# Patient Record
Sex: FEMALE | Race: WHITE | NOT HISPANIC OR LATINO | Employment: OTHER | URBAN - METROPOLITAN AREA
[De-identification: names, ages, dates, MRNs, and addresses within clinical notes are randomized per-mention and may not be internally consistent; named-entity substitution may affect disease eponyms.]

---

## 2017-01-04 RX ORDER — HYDROCODONE BITARTRATE AND ACETAMINOPHEN 7.5; 325 MG/1; MG/1
TABLET ORAL
Qty: 90 TABLET | Refills: 0 | Status: SHIPPED | OUTPATIENT
Start: 2017-01-04 | End: 2017-01-26 | Stop reason: SDUPTHER

## 2017-01-04 NOTE — TELEPHONE ENCOUNTER
----- Message from Shell Whiting sent at 1/3/2017  4:15 PM EST -----  Contact: Patient, ph [phone]  Needs refill on Hydrocodone qty 90. Call patient at [phone] when script is ready for .

## 2017-01-26 ENCOUNTER — OFFICE VISIT (OUTPATIENT)
Dept: INTERNAL MEDICINE | Facility: CLINIC | Age: 47
End: 2017-01-26

## 2017-01-26 VITALS
DIASTOLIC BLOOD PRESSURE: 70 MMHG | SYSTOLIC BLOOD PRESSURE: 110 MMHG | WEIGHT: 247 LBS | BODY MASS INDEX: 38.98 KG/M2 | RESPIRATION RATE: 16 BRPM | HEART RATE: 80 BPM

## 2017-01-26 DIAGNOSIS — R21 RASH: ICD-10-CM

## 2017-01-26 DIAGNOSIS — I10 ESSENTIAL HYPERTENSION: Primary | ICD-10-CM

## 2017-01-26 DIAGNOSIS — R73.03 PREDIABETES: ICD-10-CM

## 2017-01-26 DIAGNOSIS — G89.29 OTHER CHRONIC PAIN: ICD-10-CM

## 2017-01-26 DIAGNOSIS — E78.5 HYPERLIPIDEMIA, UNSPECIFIED HYPERLIPIDEMIA TYPE: ICD-10-CM

## 2017-01-26 DIAGNOSIS — Z23 NEED FOR MMR VACCINE: ICD-10-CM

## 2017-01-26 LAB
ALBUMIN SERPL-MCNC: 4.3 G/DL (ref 3.2–4.8)
ALBUMIN/GLOB SERPL: 1.9 G/DL (ref 1.5–2.5)
ALP SERPL-CCNC: 76 U/L (ref 25–100)
ALT SERPL W P-5'-P-CCNC: 20 U/L (ref 7–40)
AMPHET+METHAMPHET UR QL: NEGATIVE
AMPHETAMINES UR QL: NEGATIVE
ANION GAP SERPL CALCULATED.3IONS-SCNC: 8 MMOL/L (ref 3–11)
ARTICHOKE IGE QN: 114 MG/DL (ref 0–130)
AST SERPL-CCNC: 18 U/L (ref 0–33)
BARBITURATES UR QL SCN: NEGATIVE
BENZODIAZ UR QL SCN: NEGATIVE
BILIRUB SERPL-MCNC: 0.3 MG/DL (ref 0.3–1.2)
BUN BLD-MCNC: 21 MG/DL (ref 9–23)
BUN/CREAT SERPL: 52.5 (ref 7–25)
BUPRENORPHINE SERPL-MCNC: NEGATIVE NG/ML
CALCIUM SPEC-SCNC: 9.9 MG/DL (ref 8.7–10.4)
CANNABINOIDS SERPL QL: NEGATIVE
CHLORIDE SERPL-SCNC: 101 MMOL/L (ref 99–109)
CHOLEST SERPL-MCNC: 206 MG/DL (ref 0–200)
CO2 SERPL-SCNC: 28 MMOL/L (ref 20–31)
COCAINE UR QL: NEGATIVE
CREAT BLD-MCNC: 0.4 MG/DL (ref 0.6–1.3)
DEPRECATED RDW RBC AUTO: 44.3 FL (ref 37–54)
ERYTHROCYTE [DISTWIDTH] IN BLOOD BY AUTOMATED COUNT: 13.4 % (ref 11.3–14.5)
EXPIRATION DATE: NORMAL
GFR SERPL CREATININE-BSD FRML MDRD: >150 ML/MIN/1.73
GLOBULIN UR ELPH-MCNC: 2.3 GM/DL
GLUCOSE BLD-MCNC: 134 MG/DL (ref 70–100)
HBA1C MFR BLD: 6 %
HCT VFR BLD AUTO: 42.6 % (ref 34.5–44)
HDLC SERPL-MCNC: 66 MG/DL (ref 40–60)
HGB BLD-MCNC: 14.3 G/DL (ref 11.5–15.5)
Lab: NORMAL
MCH RBC QN AUTO: 30.6 PG (ref 27–31)
MCHC RBC AUTO-ENTMCNC: 33.6 G/DL (ref 32–36)
MCV RBC AUTO: 91 FL (ref 80–99)
METHADONE UR QL SCN: NEGATIVE
OPIATES UR QL: NEGATIVE
OXYCODONE UR QL SCN: NEGATIVE
PCP UR QL SCN: NEGATIVE
PLATELET # BLD AUTO: 232 10*3/MM3 (ref 150–450)
PMV BLD AUTO: 11.4 FL (ref 6–12)
POTASSIUM BLD-SCNC: 4.5 MMOL/L (ref 3.5–5.5)
PROPOXYPH UR QL: NEGATIVE
PROT SERPL-MCNC: 6.6 G/DL (ref 5.7–8.2)
RBC # BLD AUTO: 4.68 10*6/MM3 (ref 3.89–5.14)
SODIUM BLD-SCNC: 137 MMOL/L (ref 132–146)
TRICYCLICS UR QL SCN: POSITIVE
TRIGL SERPL-MCNC: 111 MG/DL (ref 0–150)
WBC NRBC COR # BLD: 6.48 10*3/MM3 (ref 3.5–10.8)

## 2017-01-26 PROCEDURE — 36415 COLL VENOUS BLD VENIPUNCTURE: CPT | Performed by: INTERNAL MEDICINE

## 2017-01-26 PROCEDURE — 80053 COMPREHEN METABOLIC PANEL: CPT | Performed by: INTERNAL MEDICINE

## 2017-01-26 PROCEDURE — 80306 DRUG TEST PRSMV INSTRMNT: CPT | Performed by: INTERNAL MEDICINE

## 2017-01-26 PROCEDURE — 99214 OFFICE O/P EST MOD 30 MIN: CPT | Performed by: INTERNAL MEDICINE

## 2017-01-26 PROCEDURE — 80061 LIPID PANEL: CPT | Performed by: INTERNAL MEDICINE

## 2017-01-26 PROCEDURE — 83036 HEMOGLOBIN GLYCOSYLATED A1C: CPT | Performed by: INTERNAL MEDICINE

## 2017-01-26 PROCEDURE — 90471 IMMUNIZATION ADMIN: CPT | Performed by: INTERNAL MEDICINE

## 2017-01-26 PROCEDURE — 85027 COMPLETE CBC AUTOMATED: CPT | Performed by: INTERNAL MEDICINE

## 2017-01-26 PROCEDURE — 90707 MMR VACCINE SC: CPT | Performed by: INTERNAL MEDICINE

## 2017-01-26 RX ORDER — PANTOPRAZOLE SODIUM 40 MG/1
40 TABLET, DELAYED RELEASE ORAL DAILY
Qty: 30 TABLET | Refills: 5 | Status: SHIPPED | OUTPATIENT
Start: 2017-01-26 | End: 2017-11-09 | Stop reason: SDUPTHER

## 2017-01-26 RX ORDER — DICLOFENAC SODIUM 75 MG/1
75 TABLET, DELAYED RELEASE ORAL 2 TIMES DAILY
Qty: 30 TABLET | Refills: 5 | Status: SHIPPED | OUTPATIENT
Start: 2017-01-26 | End: 2017-10-16 | Stop reason: SDUPTHER

## 2017-01-26 RX ORDER — TRIAMCINOLONE ACETONIDE 0.25 MG/G
OINTMENT TOPICAL 2 TIMES DAILY PRN
Qty: 15 G | Refills: 0 | Status: SHIPPED | OUTPATIENT
Start: 2017-01-26 | End: 2017-09-19

## 2017-01-26 RX ORDER — CLOPIDOGREL BISULFATE 75 MG/1
75 TABLET ORAL DAILY
Qty: 90 TABLET | Refills: 2 | Status: SHIPPED | OUTPATIENT
Start: 2017-01-26 | End: 2018-02-12 | Stop reason: SDUPTHER

## 2017-01-26 RX ORDER — HYDROCODONE BITARTRATE AND ACETAMINOPHEN 7.5; 325 MG/1; MG/1
TABLET ORAL
Qty: 90 TABLET | Refills: 0 | Status: SHIPPED | OUTPATIENT
Start: 2017-01-26 | End: 2017-02-27 | Stop reason: SDUPTHER

## 2017-01-26 NOTE — LETTER
"VACCINE CONSENT FORM      Patient Name:  Hafsa Barron    Patient :  1970      I/We have read, or have been explained, the information about the diseases and the vaccines listed below.  There was an opportunity to ask questions and any questions were answered satisfactorily.  I/We believe that I/we understand the benefits and risks of the vaccines(s) cited, and ask the vaccine(s) listed below be given to me/us or the person named above (for which i have authorized to make the request).      Vaccine(s) give:    No orders of the defined types were placed in this encounter.        Medicare patients:    The only vaccine covered under your medical benefit is flu/pneumonia and hepatitis B.  All other may be covered under your \"Part D\" prescription plan and requires you to go to a pharmacy with a Physician orders for administration.  If you still prefer to have it administered at our office, you will receive a bill for the vaccine and administration cost.               Patient Initials                     Patient or Parent/Guardian Signature                    Date        A copy of the appropriate Centers for Disease Control and Prevention Vaccine Information Statements has been provided.   "

## 2017-01-26 NOTE — PROGRESS NOTES
Subjective   Hafsa Barron is a 46 y.o. female is here today for follow-up on HTN,HPL,prediabetes,chronic pain and acute complaint of rash. .    History of Present Illness     Ms. Barron is a 47 yo white female in clinic today for follow-up on HTN,HPL,prediabets,chronic pain and acute complaint of rash. She states she has been well overall. No headache, chest pain or fatigue. Ms. Barron is followed every 4-6 months by  Neuroscience for her muscular dystrophy. She has been seen by pain management since her last visit. However, she was told due to her muscular dystrophy they are unable to provide any treatment differing from what she is currently receiving here in the clinic. She has since been released from pain management service.   She is presenting today with the acute complaints of itching, red rash on her abdomen x 1 week. She has been using OTC hydrocortisone cream. She says the rash has improved with this application but just can't seem to completely go away. The rash is isolated to only her right flank, with no spreading, pain or drainage.     The following portions of the patient's history were reviewed and updated as appropriate: allergies, current medications, past family history, past medical history, past social history, past surgical history and problem list.           Review of Systems   Constitutional: Negative.    HENT: Negative.    Eyes: Negative.    Respiratory: Negative.    Cardiovascular:        See HPI.    Gastrointestinal: Negative.    Endocrine:        See HPI.    Genitourinary: Negative.    Musculoskeletal:        See HPI.    Skin: Positive for rash (pruritic, erythematous, isolated to right side of abdomen).   Allergic/Immunologic: Negative.    Neurological: Negative.    Hematological: Negative.    Psychiatric/Behavioral: Negative.             Objective   Physical Exam   Constitutional: She is oriented to person, place, and time. She appears well-developed and well-nourished.   HENT:    Head: Normocephalic and atraumatic.   Right Ear: External ear normal.   Left Ear: External ear normal.   Nose: Nose normal.   Mouth/Throat: Oropharynx is clear and moist.   Eyes: Conjunctivae and EOM are normal. Pupils are equal, round, and reactive to light.   Neck: Normal range of motion. Neck supple. No tracheal deviation present. No thyromegaly present.   Cardiovascular: Normal rate, regular rhythm, normal heart sounds and intact distal pulses.    Pulmonary/Chest: Effort normal and breath sounds normal.   Abdominal: Soft. Bowel sounds are normal.   Neurological: She is alert and oriented to person, place, and time. She has normal reflexes.   Skin: Skin is warm and dry. Rash noted.        Psychiatric: She has a normal mood and affect. Her behavior is normal.   Nursing note and vitals reviewed.             Assessment/Plan    Essential hypertension  -     Comprehensive Metabolic Panel  -     CBC (No Diff)    Hyperlipidemia, unspecified hyperlipidemia type  -     Lipid Panel    Prediabetes  -     POC Glycosylated Hemoglobin (Hb A1C)    Rash  -     triamcinolone (KENALOG) 0.025 % ointment; Apply  topically 2 (Two) Times a Day As Needed for rash.    Other chronic pain  -     Urine Drug Screen  -     HYDROcodone-acetaminophen (LORTAB) 7.5-325 MG per tablet; Take 1 tablet every 4-6 hrs as needed for pain    Need for MMR vaccine  -     MMR Vaccine Subcutaneous    Other orders  -     diclofenac (VOLTAREN) 75 MG EC tablet; Take 1 tablet by mouth 2 (Two) Times a Day.  -     pantoprazole (PROTONIX) 40 MG EC tablet; Take 1 tablet by mouth Daily.  -     clopidogrel (PLAVIX) 75 MG tablet; Take 1 tablet by mouth Daily.      1.) CBC, CMP, Lipid Panel, UDS and A1C today  2.) Refill chronic meds  3.) Triamcinolone 5% ointment BID prn, how to dose and possible s/e discussed.   4.) MMR today  5.) I have agreed to write her pain medication.     * Total time spent in discussion and exam 25 minutes.

## 2017-01-26 NOTE — LETTER
"VACCINE CONSENT FORM      Patient Name:  Hafsa Barron    Patient :  1970      I/We have read, or have been explained, the information about the diseases and the vaccines listed below.  There was an opportunity to ask questions and any questions were answered satisfactorily.  I/We believe that I/we understand the benefits and risks of the vaccines(s) cited, and ask the vaccine(s) listed below be given to me/us or the person named above (for which i have authorized to make the request).      Vaccine(s) give:    MMR-         Medicare patients:    The only vaccine covered under your medical benefit is flu/pneumonia and hepatitis B.  All other may be covered under your \"Part D\" prescription plan and requires you to go to a pharmacy with a Physician orders for administration.  If you still prefer to have it administered at our office, you will receive a bill for the vaccine and administration cost.               Patient Initials                     Patient or Parent/Guardian Signature                    Date        A copy of the appropriate Centers for Disease Control and Prevention Vaccine Information Statements has been provided.   "

## 2017-01-26 NOTE — MR AVS SNAPSHOT
Hafsa Barron   1/26/2017 10:00 AM   Office Visit    Provider:  Pily Giraldo DO   Department:  [facility]IC[facility]   Dept Phone:  [phone]                Your Full Care Plan              Today's Medication Changes          These changes are accurate as of: 1/26/17 11:11 AM.  If you have any questions, ask your nurse or doctor.               New Medication(s)Ordered:     triamcinolone 0.025 % ointment   Commonly known as:  KENALOG   Apply  topically 2 (Two) Times a Day As Needed for rash.   Started by:  Pily Giraldo,          Medication(s)that have changed:     clopidogrel 75 MG tablet   Commonly known as:  PLAVIX   Take 1 tablet by mouth Daily.   What changed:  See the new instructions.   Changed by:  Pily Giraldo, DO       diclofenac 75 MG EC tablet   Commonly known as:  VOLTAREN   Take 1 tablet by mouth 2 (Two) Times a Day.   What changed:  See the new instructions.   Changed by:  Pily Giraldo DO       loratadine 10 MG tablet   Commonly known as:  CLARITIN   Take 1 tablet by mouth daily.   What changed:  Another medication with the same name was removed. Continue taking this medication, and follow the directions you see here.   Changed by:  Pily Giraldo, DO       pantoprazole 40 MG EC tablet   Commonly known as:  PROTONIX   Take 1 tablet by mouth Daily.   What changed:  See the new instructions.   Changed by:  Pily Giraldo DO            Where to Get Your Medications      These medications were sent to [facility] [id number] [place] [address] [address] [place] [address] AT [place] [address] & [facility] [phone] [place] [phone] [address] [address]     Phone:  [phone]     clopidogrel 75 MG tablet    diclofenac 75 MG EC tablet    pantoprazole 40 MG EC tablet    triamcinolone 0.025 % ointment         You can get these medications from any pharmacy     Bring a paper prescription for each of these medications     HYDROcodone-acetaminophen 7.5-325 MG per tablet                  Your Updated Medication List          This list is accurate as of: 1/26/17 11:11 AM.  Always use your most recent med list.                amLODIPine 2.5 MG tablet   Commonly known as:  NORVASC   Take 1 tablet by mouth daily.       baclofen 10 MG tablet   Commonly known as:  LIORESAL       CALCIUM 600+D HIGH POTENCY 600-400 MG-UNIT per tablet   Generic drug:  calcium carbonate-vitamin d   TAKE 1 TABLET BY MOUTH EVERY DAY       clopidogrel 75 MG tablet   Commonly known as:  PLAVIX   Take 1 tablet by mouth Daily.       diclofenac 75 MG EC tablet   Commonly known as:  VOLTAREN   Take 1 tablet by mouth 2 (Two) Times a Day.       DULoxetine 60 MG capsule   Commonly known as:  CYMBALTA   Take 1 capsule by mouth daily.       estradiol 0.075 MG/24HR patch   Commonly known as:  MINIVELLE VIVELLE-DOT       fenofibrate 145 MG tablet   Commonly known as:  TRICOR   Take 1 tablet by mouth daily.       FLONASE ALLERGY RELIEF 50 MCG/ACT nasal spray   Generic drug:  fluticasone       HYDROcodone-acetaminophen 7.5-325 MG per tablet   Commonly known as:  LORTAB   Take 1 tablet every 4-6 hrs as needed for pain       loratadine 10 MG tablet   Commonly known as:  CLARITIN   Take 1 tablet by mouth daily.       metFORMIN 500 MG tablet   Commonly known as:  GLUCOPHAGE   TAKE 1 TABLET BY MOUTH TWICE DAILY WITH FOOD       montelukast 10 MG tablet   Commonly known as:  SINGULAIR   TAKE 1 TABLET BY MOUTH DAILY       NEURONTIN 600 MG tablet   Generic drug:  gabapentin       pantoprazole 40 MG EC tablet   Commonly known as:  PROTONIX   Take 1 tablet by mouth Daily.       potassium chloride 20 MEQ CR tablet   Commonly known as:  K-DUR,KLOR-CON   TAKE 1 TABLET BY MOUTH EVERY DAY       PROAIR  (90 BASE) MCG/ACT inhaler   Generic drug:  albuterol       spironolactone 50 MG tablet   Commonly known as:  ALDACTONE   TAKE  1 TABLET BY MOUTH TWICE DAILY       TiZANidine 4 MG capsule   Commonly known as:  ZANAFLEX       triamcinolone 0.025 % ointment   Commonly known as:  KENALOG   Apply  topically 2 (Two) Times a Day As Needed for rash.               We Performed the Following     CBC (No Diff)     Comprehensive Metabolic Panel     Lipid Panel     POC Glycosylated Hemoglobin (Hb A1C)     Urine Drug Screen       You Were Diagnosed With        Codes Comments    Essential hypertension    -  Primary ICD-10-CM: I10  ICD-9-CM: 401.9     Hyperlipidemia, unspecified hyperlipidemia type     ICD-10-CM: E78.5  ICD-9-CM: 272.4     Prediabetes     ICD-10-CM: R73.03  ICD-9-CM: 790.29     Rash     ICD-10-CM: R21  ICD-9-CM: 782.1     Other chronic pain     ICD-10-CM: G89.29  ICD-9-CM: 338.29     Need for MMR vaccine     ICD-10-CM: Z23  ICD-9-CM: V06.4       Instructions     None    Patient Instructions History      [employer] Signup     Our records indicate that you have an active [employer] account.    You can view your After Visit Summary by going to [employer] and logging in with your [employer] username and password.  If you don't have a [employer] username and password but a parent or guardian has access to your record, the parent or guardian should login with their own [employer] username and password and access your record to view the After Visit Summary.    If you have questions, you can email [email or web address] or call [phone] to talk to our [employer] staff.  Remember, [employer] is NOT to be used for urgent needs.  For medical emergencies, dial 911.               Other Info from Your Visit           Allergies     Penicillins        Reason for Visit     Hypertension follow up      Vital Signs     Blood Pressure Pulse Respirations Weight Body Mass Index Smoking Status    110/70 (BP Location: Left arm, Patient Position: Sitting) 80 16 247 lb (112 kg) 38.98 kg/m2 Never Assessed      Problems and Diagnoses Noted     High  cholesterol or triglycerides    High blood pressure    Prediabetes    Rash and nonspecific skin eruption        Other chronic pain        Need for MMR vaccine

## 2017-02-02 RX ORDER — POTASSIUM CHLORIDE 20 MEQ/1
TABLET, EXTENDED RELEASE ORAL
Qty: 90 TABLET | Refills: 0 | Status: SHIPPED | OUTPATIENT
Start: 2017-02-02 | End: 2017-04-30 | Stop reason: SDUPTHER

## 2017-02-27 DIAGNOSIS — G89.29 OTHER CHRONIC PAIN: ICD-10-CM

## 2017-02-27 RX ORDER — HYDROCODONE BITARTRATE AND ACETAMINOPHEN 7.5; 325 MG/1; MG/1
TABLET ORAL
Qty: 90 TABLET | Refills: 0 | Status: SHIPPED | OUTPATIENT
Start: 2017-02-27 | End: 2017-03-24 | Stop reason: SDUPTHER

## 2017-02-27 NOTE — TELEPHONE ENCOUNTER
----- Message from Swathi Yan sent at 2/27/2017 10:12 AM EST -----  NEEDS SCRIPT FOR    - HYDROcodone-acetaminophen (LORTAB) 7.5-325 MG per tablet     CALL BACK NUMBER [phone]

## 2017-03-24 DIAGNOSIS — G89.29 OTHER CHRONIC PAIN: ICD-10-CM

## 2017-03-27 RX ORDER — HYDROCODONE BITARTRATE AND ACETAMINOPHEN 7.5; 325 MG/1; MG/1
TABLET ORAL
Qty: 90 TABLET | Refills: 0 | Status: SHIPPED | OUTPATIENT
Start: 2017-03-27 | End: 2017-04-26 | Stop reason: SDUPTHER

## 2017-04-26 DIAGNOSIS — G89.29 OTHER CHRONIC PAIN: ICD-10-CM

## 2017-04-26 RX ORDER — HYDROCODONE BITARTRATE AND ACETAMINOPHEN 7.5; 325 MG/1; MG/1
TABLET ORAL
Qty: 90 TABLET | Refills: 0 | Status: SHIPPED | OUTPATIENT
Start: 2017-04-26 | End: 2017-05-23 | Stop reason: SDUPTHER

## 2017-04-26 NOTE — TELEPHONE ENCOUNTER
[phone]    PT NEEDS REFILL OF HYDROCODONE QTY 90    [facility]

## 2017-05-01 RX ORDER — POTASSIUM CHLORIDE 20 MEQ/1
TABLET, EXTENDED RELEASE ORAL
Qty: 90 TABLET | Refills: 0 | Status: SHIPPED | OUTPATIENT
Start: 2017-05-01 | End: 2017-07-28 | Stop reason: SDUPTHER

## 2017-05-23 ENCOUNTER — TELEPHONE (OUTPATIENT)
Dept: INTERNAL MEDICINE | Facility: CLINIC | Age: 47
End: 2017-05-23

## 2017-05-23 DIAGNOSIS — G89.29 OTHER CHRONIC PAIN: ICD-10-CM

## 2017-05-23 RX ORDER — HYDROCODONE BITARTRATE AND ACETAMINOPHEN 7.5; 325 MG/1; MG/1
TABLET ORAL
Qty: 90 TABLET | Refills: 0 | Status: SHIPPED | OUTPATIENT
Start: 2017-05-23 | End: 2017-06-22 | Stop reason: SDUPTHER

## 2017-06-02 RX ORDER — LORATADINE 10 MG/1
TABLET ORAL
Qty: 30 TABLET | Refills: 5 | Status: SHIPPED | OUTPATIENT
Start: 2017-06-02 | End: 2017-09-19

## 2017-06-07 RX ORDER — GABAPENTIN 600 MG/1
TABLET ORAL
Qty: 270 TABLET | Refills: 0 | Status: SHIPPED | OUTPATIENT
Start: 2017-06-07 | End: 2018-05-29 | Stop reason: SDUPTHER

## 2017-06-09 RX ORDER — DULOXETIN HYDROCHLORIDE 60 MG/1
CAPSULE, DELAYED RELEASE ORAL
Qty: 90 CAPSULE | Refills: 0 | Status: SHIPPED | OUTPATIENT
Start: 2017-06-09 | End: 2017-09-19 | Stop reason: SDUPTHER

## 2017-06-22 ENCOUNTER — TELEPHONE (OUTPATIENT)
Dept: INTERNAL MEDICINE | Facility: CLINIC | Age: 47
End: 2017-06-22

## 2017-06-22 DIAGNOSIS — G89.29 OTHER CHRONIC PAIN: ICD-10-CM

## 2017-06-22 RX ORDER — HYDROCODONE BITARTRATE AND ACETAMINOPHEN 7.5; 325 MG/1; MG/1
TABLET ORAL
Qty: 90 TABLET | Refills: 0 | Status: SHIPPED | OUTPATIENT
Start: 2017-06-22 | End: 2017-07-26 | Stop reason: SDUPTHER

## 2017-06-28 ENCOUNTER — TELEPHONE (OUTPATIENT)
Dept: INTERNAL MEDICINE | Facility: CLINIC | Age: 47
End: 2017-06-28

## 2017-06-28 RX ORDER — SPIRONOLACTONE 50 MG/1
TABLET, FILM COATED ORAL
Qty: 180 TABLET | Refills: 0 | Status: SHIPPED | OUTPATIENT
Start: 2017-06-28 | End: 2017-09-19 | Stop reason: SDUPTHER

## 2017-06-28 RX ORDER — ALBUTEROL SULFATE 90 UG/1
1 AEROSOL, METERED RESPIRATORY (INHALATION) EVERY 6 HOURS PRN
Qty: 90 INHALER | Refills: 2 | Status: SHIPPED | OUTPATIENT
Start: 2017-06-28 | End: 2022-03-07 | Stop reason: SDUPTHER

## 2017-06-28 RX ORDER — TIZANIDINE HYDROCHLORIDE 4 MG/1
4 CAPSULE, GELATIN COATED ORAL 3 TIMES DAILY
Qty: 90 CAPSULE | Refills: 2 | Status: SHIPPED | OUTPATIENT
Start: 2017-06-28 | End: 2017-06-28 | Stop reason: SDUPTHER

## 2017-06-28 RX ORDER — TIZANIDINE 4 MG/1
TABLET ORAL
Qty: 270 TABLET | Refills: 2 | Status: SHIPPED | OUTPATIENT
Start: 2017-06-28 | End: 2018-06-14 | Stop reason: SDUPTHER

## 2017-06-28 RX ORDER — FENOFIBRATE 145 MG/1
TABLET, COATED ORAL
Qty: 30 TABLET | Refills: 0 | Status: SHIPPED | OUTPATIENT
Start: 2017-06-28 | End: 2017-10-27 | Stop reason: SDUPTHER

## 2017-06-28 NOTE — TELEPHONE ENCOUNTER
SENT MEDICATION IN VIA ERX.    CALLED PT TO RELAY-NO ANSWER.  LEFT VM MESSAGE TO CALL OFFICE-OFFICE NBR GIVEN.

## 2017-06-28 NOTE — TELEPHONE ENCOUNTER
----- Message from Lucrecia Carpenter sent at 6/27/2017  1:18 PM EDT -----  APPT REQUEST FOR MEDICARE WELLNESS WAS RECEIVED THROUGH [employer]-    CALLED AND LEFT A VOICEMAIL FOR PT TO CALL AND SCHEDULE

## 2017-07-24 RX ORDER — CLOPIDOGREL BISULFATE 75 MG/1
TABLET ORAL
Qty: 90 TABLET | Refills: 0 | Status: SHIPPED | OUTPATIENT
Start: 2017-07-24 | End: 2017-09-19 | Stop reason: SDUPTHER

## 2017-07-25 ENCOUNTER — TRANSCRIBE ORDERS (OUTPATIENT)
Dept: INTERNAL MEDICINE | Facility: CLINIC | Age: 47
End: 2017-07-25

## 2017-07-25 DIAGNOSIS — G89.29 OTHER CHRONIC PAIN: ICD-10-CM

## 2017-07-25 DIAGNOSIS — Z12.31 VISIT FOR SCREENING MAMMOGRAM: Primary | ICD-10-CM

## 2017-07-25 RX ORDER — HYDROCODONE BITARTRATE AND ACETAMINOPHEN 7.5; 325 MG/1; MG/1
TABLET ORAL
Qty: 90 TABLET | Refills: 0 | Status: CANCELLED | OUTPATIENT
Start: 2017-07-25

## 2017-07-25 RX ORDER — MONTELUKAST SODIUM 10 MG/1
TABLET ORAL
Qty: 90 TABLET | Refills: 0 | Status: SHIPPED | OUTPATIENT
Start: 2017-07-25 | End: 2017-09-19 | Stop reason: SDUPTHER

## 2017-07-25 RX ORDER — PANTOPRAZOLE SODIUM 40 MG/1
TABLET, DELAYED RELEASE ORAL
Qty: 90 TABLET | Refills: 0 | Status: SHIPPED | OUTPATIENT
Start: 2017-07-25 | End: 2017-09-19 | Stop reason: SDUPTHER

## 2017-07-25 RX ORDER — AMLODIPINE BESYLATE 2.5 MG/1
TABLET ORAL
Qty: 90 TABLET | Refills: 0 | Status: SHIPPED | OUTPATIENT
Start: 2017-07-25 | End: 2017-09-19 | Stop reason: SDUPTHER

## 2017-07-26 ENCOUNTER — HOSPITAL ENCOUNTER (OUTPATIENT)
Dept: CARDIOLOGY | Facility: HOSPITAL | Age: 47
Discharge: HOME | End: 2017-07-26

## 2017-07-26 ENCOUNTER — APPOINTMENT (OUTPATIENT)
Dept: GENERAL RADIOLOGY | Facility: HOSPITAL | Age: 47
End: 2017-07-26

## 2017-07-26 ENCOUNTER — OFFICE VISIT (OUTPATIENT)
Dept: INTERNAL MEDICINE | Facility: CLINIC | Age: 47
End: 2017-07-26

## 2017-07-26 ENCOUNTER — HOSPITAL ENCOUNTER (EMERGENCY)
Facility: HOSPITAL | Age: 47
Discharge: HOME | End: 2017-07-26
Attending: EMERGENCY MEDICINE | Admitting: EMERGENCY MEDICINE

## 2017-07-26 VITALS
RESPIRATION RATE: 16 BRPM | HEIGHT: 66 IN | DIASTOLIC BLOOD PRESSURE: 78 MMHG | SYSTOLIC BLOOD PRESSURE: 129 MMHG | OXYGEN SATURATION: 92 % | BODY MASS INDEX: 43.39 KG/M2 | WEIGHT: 270 LBS | HEART RATE: 105 BPM | TEMPERATURE: 98 F

## 2017-07-26 VITALS
SYSTOLIC BLOOD PRESSURE: 134 MMHG | TEMPERATURE: 97.2 F | RESPIRATION RATE: 18 BRPM | DIASTOLIC BLOOD PRESSURE: 86 MMHG | HEART RATE: 70 BPM

## 2017-07-26 DIAGNOSIS — G89.29 OTHER CHRONIC PAIN: ICD-10-CM

## 2017-07-26 DIAGNOSIS — M79.605 LOWER EXTREMITY PAIN, DIFFUSE, LEFT: ICD-10-CM

## 2017-07-26 DIAGNOSIS — J30.89 SEASONAL ALLERGIC RHINITIS DUE TO OTHER ALLERGIC TRIGGER: ICD-10-CM

## 2017-07-26 DIAGNOSIS — M25.562 LEFT KNEE PAIN, UNSPECIFIED CHRONICITY: ICD-10-CM

## 2017-07-26 DIAGNOSIS — M25.562 ACUTE PAIN OF LEFT KNEE: ICD-10-CM

## 2017-07-26 DIAGNOSIS — M79.605 LOWER EXTREMITY PAIN, DIFFUSE, LEFT: Primary | ICD-10-CM

## 2017-07-26 DIAGNOSIS — I10 ESSENTIAL HYPERTENSION: Primary | ICD-10-CM

## 2017-07-26 DIAGNOSIS — E78.5 HYPERLIPIDEMIA, UNSPECIFIED HYPERLIPIDEMIA TYPE: ICD-10-CM

## 2017-07-26 LAB
ANION GAP SERPL CALCULATED.3IONS-SCNC: 9 MMOL/L (ref 3–11)
BASOPHILS # BLD AUTO: 0.06 10*3/MM3 (ref 0–0.2)
BASOPHILS NFR BLD AUTO: 1.1 % (ref 0–1)
BH CV LOWER VASCULAR LEFT COMMON FEMORAL AUGMENT: NORMAL
BH CV LOWER VASCULAR LEFT COMMON FEMORAL COMPRESS: NORMAL
BH CV LOWER VASCULAR LEFT COMMON FEMORAL PHASIC: NORMAL
BH CV LOWER VASCULAR LEFT COMMON FEMORAL SPONT: NORMAL
BH CV LOWER VASCULAR LEFT DISTAL FEMORAL COMPRESS: NORMAL
BH CV LOWER VASCULAR LEFT GASTRONEMIUS COMPRESS: NORMAL
BH CV LOWER VASCULAR LEFT GREATER SAPH AK COMPRESS: NORMAL
BH CV LOWER VASCULAR LEFT GREATER SAPH BK COMPRESS: NORMAL
BH CV LOWER VASCULAR LEFT LESSER SAPH COMPRESS: NORMAL
BH CV LOWER VASCULAR LEFT MID FEMORAL AUGMENT: NORMAL
BH CV LOWER VASCULAR LEFT MID FEMORAL COMPRESS: NORMAL
BH CV LOWER VASCULAR LEFT MID FEMORAL PHASIC: NORMAL
BH CV LOWER VASCULAR LEFT MID FEMORAL SPONT: NORMAL
BH CV LOWER VASCULAR LEFT PERONEAL COMPRESS: NORMAL
BH CV LOWER VASCULAR LEFT POPLITEAL AUGMENT: NORMAL
BH CV LOWER VASCULAR LEFT POPLITEAL COMPRESS: NORMAL
BH CV LOWER VASCULAR LEFT POPLITEAL PHASIC: NORMAL
BH CV LOWER VASCULAR LEFT POPLITEAL SPONT: NORMAL
BH CV LOWER VASCULAR LEFT POSTERIOR TIBIAL COMPRESS: NORMAL
BH CV LOWER VASCULAR LEFT PROXIMAL FEMORAL COMPRESS: NORMAL
BH CV LOWER VASCULAR LEFT SAPHENOFEMORAL JUNCTION AUGMENT: NORMAL
BH CV LOWER VASCULAR LEFT SAPHENOFEMORAL JUNCTION COMPETENT: NORMAL
BH CV LOWER VASCULAR LEFT SAPHENOFEMORAL JUNCTION COMPRESS: NORMAL
BH CV LOWER VASCULAR LEFT SAPHENOFEMORAL JUNCTION PHASIC: NORMAL
BH CV LOWER VASCULAR LEFT SAPHENOFEMORAL JUNCTION SPONT: NORMAL
BH CV LOWER VASCULAR RIGHT COMMON FEMORAL AUGMENT: NORMAL
BH CV LOWER VASCULAR RIGHT COMMON FEMORAL COMPRESS: NORMAL
BH CV LOWER VASCULAR RIGHT COMMON FEMORAL PHASIC: NORMAL
BH CV LOWER VASCULAR RIGHT COMMON FEMORAL SPONT: NORMAL
BUN BLD-MCNC: 10 MG/DL (ref 9–23)
BUN/CREAT SERPL: 20 (ref 7–25)
CALCIUM SPEC-SCNC: 9.4 MG/DL (ref 8.7–10.4)
CHLORIDE SERPL-SCNC: 105 MMOL/L (ref 99–109)
CO2 SERPL-SCNC: 24 MMOL/L (ref 20–31)
CREAT BLD-MCNC: 0.5 MG/DL (ref 0.6–1.3)
DEPRECATED RDW RBC AUTO: 42.9 FL (ref 37–54)
EOSINOPHIL # BLD AUTO: 0.1 10*3/MM3 (ref 0–0.3)
EOSINOPHIL NFR BLD AUTO: 1.9 % (ref 0–3)
ERYTHROCYTE [DISTWIDTH] IN BLOOD BY AUTOMATED COUNT: 13 % (ref 11.3–14.5)
GFR SERPL CREATININE-BSD FRML MDRD: 132 ML/MIN/1.73
GLUCOSE BLD-MCNC: 251 MG/DL (ref 70–100)
HCT VFR BLD AUTO: 42.6 % (ref 34.5–44)
HGB BLD-MCNC: 14.2 G/DL (ref 11.5–15.5)
IMM GRANULOCYTES # BLD: 0.02 10*3/MM3 (ref 0–0.03)
IMM GRANULOCYTES NFR BLD: 0.4 % (ref 0–0.6)
INR PPP: 1.2
LYMPHOCYTES # BLD AUTO: 1.9 10*3/MM3 (ref 0.6–4.8)
LYMPHOCYTES NFR BLD AUTO: 35.8 % (ref 24–44)
MCH RBC QN AUTO: 30.1 PG (ref 27–31)
MCHC RBC AUTO-ENTMCNC: 33.3 G/DL (ref 32–36)
MCV RBC AUTO: 90.3 FL (ref 80–99)
MONOCYTES # BLD AUTO: 0.41 10*3/MM3 (ref 0–1)
MONOCYTES NFR BLD AUTO: 7.7 % (ref 0–12)
NEUTROPHILS # BLD AUTO: 2.82 10*3/MM3 (ref 1.5–8.3)
NEUTROPHILS NFR BLD AUTO: 53.1 % (ref 41–71)
PLATELET # BLD AUTO: 169 10*3/MM3 (ref 150–450)
PMV BLD AUTO: 11.6 FL (ref 6–12)
POTASSIUM BLD-SCNC: 4.5 MMOL/L (ref 3.5–5.5)
PROTHROMBIN TIME: 13.1 SECONDS (ref 9.6–11.5)
RBC # BLD AUTO: 4.72 10*6/MM3 (ref 3.89–5.14)
SODIUM BLD-SCNC: 138 MMOL/L (ref 132–146)
WBC NRBC COR # BLD: 5.31 10*3/MM3 (ref 3.5–10.8)

## 2017-07-26 PROCEDURE — 96374 THER/PROPH/DIAG INJ IV PUSH: CPT

## 2017-07-26 PROCEDURE — 72100 X-RAY EXAM L-S SPINE 2/3 VWS: CPT

## 2017-07-26 PROCEDURE — 80048 BASIC METABOLIC PNL TOTAL CA: CPT | Performed by: EMERGENCY MEDICINE

## 2017-07-26 PROCEDURE — 85610 PROTHROMBIN TIME: CPT | Performed by: EMERGENCY MEDICINE

## 2017-07-26 PROCEDURE — 99214 OFFICE O/P EST MOD 30 MIN: CPT | Performed by: INTERNAL MEDICINE

## 2017-07-26 PROCEDURE — 25010000002 KETOROLAC TROMETHAMINE PER 15 MG: Performed by: EMERGENCY MEDICINE

## 2017-07-26 PROCEDURE — 96372 THER/PROPH/DIAG INJ SC/IM: CPT

## 2017-07-26 PROCEDURE — 96376 TX/PRO/DX INJ SAME DRUG ADON: CPT

## 2017-07-26 PROCEDURE — 85025 COMPLETE CBC W/AUTO DIFF WBC: CPT | Performed by: EMERGENCY MEDICINE

## 2017-07-26 PROCEDURE — 25010000002 HYDROMORPHONE PER 4 MG

## 2017-07-26 PROCEDURE — 93971 EXTREMITY STUDY: CPT

## 2017-07-26 PROCEDURE — 93971 EXTREMITY STUDY: CPT | Performed by: INTERNAL MEDICINE

## 2017-07-26 PROCEDURE — 25010000002 HYDROMORPHONE PER 4 MG: Performed by: EMERGENCY MEDICINE

## 2017-07-26 PROCEDURE — 99284 EMERGENCY DEPT VISIT MOD MDM: CPT

## 2017-07-26 PROCEDURE — 73560 X-RAY EXAM OF KNEE 1 OR 2: CPT

## 2017-07-26 RX ORDER — KETOROLAC TROMETHAMINE 30 MG/ML
60 INJECTION, SOLUTION INTRAMUSCULAR; INTRAVENOUS ONCE
Status: COMPLETED | OUTPATIENT
Start: 2017-07-26 | End: 2017-07-26

## 2017-07-26 RX ORDER — HYDROMORPHONE HYDROCHLORIDE 1 MG/ML
0.5 INJECTION, SOLUTION INTRAMUSCULAR; INTRAVENOUS; SUBCUTANEOUS AS NEEDED
Status: COMPLETED | OUTPATIENT
Start: 2017-07-26 | End: 2017-07-26

## 2017-07-26 RX ORDER — HYDROCODONE BITARTRATE AND ACETAMINOPHEN 10; 325 MG/1; MG/1
1 TABLET ORAL EVERY 8 HOURS PRN
Qty: 20 TABLET | Refills: 0 | Status: SHIPPED | OUTPATIENT
Start: 2017-07-26 | End: 2017-08-28

## 2017-07-26 RX ORDER — CYCLOBENZAPRINE HCL 10 MG
10 TABLET ORAL 3 TIMES DAILY PRN
COMMUNITY
End: 2018-02-12 | Stop reason: SDUPTHER

## 2017-07-26 RX ORDER — HYDROCODONE BITARTRATE AND ACETAMINOPHEN 7.5; 325 MG/1; MG/1
TABLET ORAL
Qty: 90 TABLET | Refills: 0 | Status: SHIPPED | OUTPATIENT
Start: 2017-07-26 | End: 2017-08-28 | Stop reason: SDUPTHER

## 2017-07-26 RX ORDER — HYDROMORPHONE HYDROCHLORIDE 1 MG/ML
0.5 INJECTION, SOLUTION INTRAMUSCULAR; INTRAVENOUS; SUBCUTANEOUS ONCE
Status: COMPLETED | OUTPATIENT
Start: 2017-07-26 | End: 2017-07-26

## 2017-07-26 RX ORDER — HYDROCODONE BITARTRATE AND ACETAMINOPHEN 5; 325 MG/1; MG/1
1 TABLET ORAL ONCE
Status: COMPLETED | OUTPATIENT
Start: 2017-07-26 | End: 2017-07-26

## 2017-07-26 RX ADMIN — HYDROMORPHONE HYDROCHLORIDE 0.5 MG: 1 INJECTION, SOLUTION INTRAMUSCULAR; INTRAVENOUS; SUBCUTANEOUS at 04:37

## 2017-07-26 RX ADMIN — HYDROMORPHONE HYDROCHLORIDE 0.5 MG: 1 INJECTION, SOLUTION INTRAMUSCULAR; INTRAVENOUS; SUBCUTANEOUS at 05:07

## 2017-07-26 RX ADMIN — HYDROCODONE BITARTRATE AND ACETAMINOPHEN 1 TABLET: 5; 325 TABLET ORAL at 02:57

## 2017-07-26 RX ADMIN — KETOROLAC TROMETHAMINE 60 MG: 30 INJECTION, SOLUTION INTRAMUSCULAR at 03:01

## 2017-07-26 NOTE — ED PROVIDER NOTES
Subjective   HPI Comments: Pt is a 46-year-old white female that presents emergency Department with complaints of pain in her left lower extremity.  Patient reports that the pain starts in her left knee and radiates up into her thigh.  Patient has a history of muscular dystrophy and advises she is on baclofen, hydrocodone, gabapentin.  Patient has been out of her hydrocodone for the past 2 days.  Patient is an appointment to see her physician tomorrow.  Patient advises that the pain has been increasingly worse over the past month.  Patient denies any injury.  Patient also has a history of sciatica he reports that this pain is making her sciatica worse.  Patient denies any recent travel, immobilization, surgical procedures.  Patient denies any history of DVT, PE.  Patient has a history of a CVA and is currently taking Plavix.    Patient is a 46 y.o. female presenting with lower extremity pain.   History provided by:  Patient  Lower Extremity Issue   Location:  Knee  Time since incident:  1 month  Injury: no    Knee location:  L knee  Pain details:     Quality:  Throbbing and shooting    Radiates to: Lt thigh.  Prior injury to area:  No  Relieved by:  Nothing  Worsened by:  Bearing weight  Associated symptoms: back pain    Associated symptoms: no numbness and no tingling        Review of Systems   Musculoskeletal: Positive for back pain.        Lt knee pain   Neurological: Negative for weakness and numbness.   All other systems reviewed and are negative.      Past Medical History:   Diagnosis Date   • Allergy    • Hyperlipidemia    • Hypertension    • IBS (irritable bowel syndrome)    • Internal hemorrhoid    • Kidney stone    • Muscular dystrophy    • Stroke 08/31/2013    resdual- left sided weakness.        Allergies   Allergen Reactions   • Penicillins        Past Surgical History:   Procedure Laterality Date   • GALLBLADDER SURGERY         Family History   Problem Relation Age of Onset   • Allergies Other    • ADD  / ADHD Other    • Heart block Other    • Other Other      CEREBROVASCULAR ACCIDENT    • Hypertension Other    • Cancer Other      LUNG CANCER   • Breast cancer Other    • Hyperlipidemia Other    • Ovarian cancer Neg Hx        Social History     Social History   • Marital status:      Spouse name: N/A   • Number of children: N/A   • Years of education: N/A     Social History Main Topics   • Smoking status: Former Smoker   • Smokeless tobacco: None   • Alcohol use No   • Drug use: No   • Sexual activity: Defer     Other Topics Concern   • None     Social History Narrative   • None           Objective   Physical Exam   Constitutional: She is oriented to person, place, and time. She appears well-developed and well-nourished. No distress.   HENT:   Head: Normocephalic and atraumatic.   Eyes: EOM are normal. Pupils are equal, round, and reactive to light.   Neck: Normal range of motion. Neck supple.   Cardiovascular: Normal rate, regular rhythm and intact distal pulses.    Pulmonary/Chest: Effort normal and breath sounds normal. No respiratory distress.   Musculoskeletal:        Left knee: She exhibits normal range of motion, no ecchymosis and no erythema. Tenderness found.        Lumbar back: She exhibits tenderness (paraspinal tenderness palpated). She exhibits normal range of motion and no bony tenderness.   Bilateral lower extremities are same size.  No obvious signs of ligamentous injury.  Pt has tenderness ant/post knee.   Neurological: She is alert and oriented to person, place, and time. She has normal strength. No cranial nerve deficit. GCS eye subscore is 4. GCS verbal subscore is 5. GCS motor subscore is 6.   Skin: Skin is warm, dry and intact. She is not diaphoretic.   Nursing note and vitals reviewed.      Procedures         ED Course  ED Course   Comment By Time   Pt reports that the pain has improved.  Patient is advised of results at this time.  Patient will be discharged home.  Patient to return  for an outpatient ultrasound of her left lower extremity to rule out a DVT in the morning.  Patient to keep her appointment as scheduled with her PCP for her chronic pain medication.  She agrees and verbalizes understanding. Jayda Puri, APRN 07/26 0522          Recent Results (from the past 24 hour(s))   CBC Auto Differential    Collection Time: 07/26/17  4:15 AM   Result Value Ref Range    WBC 5.31 3.50 - 10.80 10*3/mm3    RBC 4.72 3.89 - 5.14 10*6/mm3    Hemoglobin 14.2 11.5 - 15.5 g/dL    Hematocrit 42.6 34.5 - 44.0 %    MCV 90.3 80.0 - 99.0 fL    MCH 30.1 27.0 - 31.0 pg    MCHC 33.3 32.0 - 36.0 g/dL    RDW 13.0 11.3 - 14.5 %    RDW-SD 42.9 37.0 - 54.0 fl    MPV 11.6 6.0 - 12.0 fL    Platelets 169 150 - 450 10*3/mm3    Neutrophil % 53.1 41.0 - 71.0 %    Lymphocyte % 35.8 24.0 - 44.0 %    Monocyte % 7.7 0.0 - 12.0 %    Eosinophil % 1.9 0.0 - 3.0 %    Basophil % 1.1 (H) 0.0 - 1.0 %    Immature Grans % 0.4 0.0 - 0.6 %    Neutrophils, Absolute 2.82 1.50 - 8.30 10*3/mm3    Lymphocytes, Absolute 1.90 0.60 - 4.80 10*3/mm3    Monocytes, Absolute 0.41 0.00 - 1.00 10*3/mm3    Eosinophils, Absolute 0.10 0.00 - 0.30 10*3/mm3    Basophils, Absolute 0.06 0.00 - 0.20 10*3/mm3    Immature Grans, Absolute 0.02 0.00 - 0.03 10*3/mm3   Basic Metabolic Panel    Collection Time: 07/26/17  4:15 AM   Result Value Ref Range    Glucose 251 (H) 70 - 100 mg/dL    BUN 10 9 - 23 mg/dL    Creatinine 0.50 (L) 0.60 - 1.30 mg/dL    Sodium 138 132 - 146 mmol/L    Potassium 4.5 3.5 - 5.5 mmol/L    Chloride 105 99 - 109 mmol/L    CO2 24.0 20.0 - 31.0 mmol/L    Calcium 9.4 8.7 - 10.4 mg/dL    eGFR Non African Amer 132 >60 mL/min/1.73    BUN/Creatinine Ratio 20.0 7.0 - 25.0    Anion Gap 9.0 3.0 - 11.0 mmol/L   Protime-INR    Collection Time: 07/26/17  4:15 AM   Result Value Ref Range    Protime 13.1 (H) 9.6 - 11.5 Seconds    INR 1.20      Note: In addition to lab results from this visit, the labs listed above may include labs taken at another  "facility or during a different encounter within the last 24 hours. Please correlate lab times with ED admission and discharge times for further clarification of the services performed during this visit.    XR Knee 1 or 2 View Left   Final Result   Abnormal      No acute bone abnormality.      Small suprapatellar bursal effusion.      Possible diffuse edematous changes subcutaneous soft tissues versus obesity.      Additional nonacute findings as noted.      THIS DOCUMENT HAS BEEN ELECTRONICALLY SIGNED BY JOSE DE JESUS SANCHES MD      XR Spine Lumbar 2 or 3 View   Final Result   Abnormal      No acute bone abnormality.      Nonacute findings as noted above.      THIS DOCUMENT HAS BEEN ELECTRONICALLY SIGNED BY JOSE DE JESUS SANCHES MD        Vitals:    07/26/17 0151 07/26/17 0259 07/26/17 0314 07/26/17 0418   BP: 133/87 152/69  146/75   Pulse: (!) 122 112 105    Resp: 16      Temp: 98 °F (36.7 °C)      SpO2: 96% 98% 95% 94%   Weight: 270 lb (122 kg)      Height: 66\" (167.6 cm)        Medications   ketorolac (TORADOL) injection 60 mg (60 mg Intramuscular Given 7/26/17 0301)   HYDROcodone-acetaminophen (NORCO) 5-325 MG per tablet 1 tablet (1 tablet Oral Given 7/26/17 0257)   HYDROmorphone (DILAUDID) injection 0.5 mg (0.5 mg Intravenous Given 7/26/17 0437)   HYDROmorphone (DILAUDID) injection 0.5 mg (0.5 mg Intravenous Given 7/26/17 5427)     ECG/EMG Results (last 24 hours)     ** No results found for the last 24 hours. **                [facility]    Final diagnoses:   Lower extremity pain, diffuse, left   Left knee pain, unspecified chronicity            Jayda Puri, APRN  07/26/17 0559    "

## 2017-07-26 NOTE — PROGRESS NOTES
Subjective   Hafsa Barron is a 46 y.o. female.   Pt presents today with left lower extremity pain.             History of Present Illness   Pt presents today with left lower extremity pain for the last 3 weeks. She went to the ER earlier today who ordered an US of her  Left extremity (this was done at 11:30 am after she left the ER). She was instructed to follow up with her pcp today that the results would be back by then? but results are not back yet. She has chronically swollen legs off and on, due to her MS she is on chronic pain medication but states the pain in her left lower extremity which is located around her knee is controlled with her chronic meds.   She denies trauma to the area. Xray done in the ER revealed a small suprapatellar bursal effusion/swelling. Xray lumbar spine revealed no acute abnormality. She does have known arthritis in her hips and I wonder if she has arthritis in her knees (which she thinks she does) and she could of had a possible ruptured baker's cyst on the left side causing some component of pseudo phlebitis. None the less I discussed with her that I wanted her to see Ortho if US was negative for DVT.             The following portions of the patient's history were reviewed and updated as appropriate: allergies, current medications, past family history, past medical history, past social history, past surgical history and problem list.             Review of Systems   Constitutional: Negative.    HENT: Negative.    Eyes: Negative.    Respiratory: Negative.    Cardiovascular: Negative.    Gastrointestinal: Negative.    Endocrine: Negative.    Genitourinary: Negative.    Musculoskeletal:        See HPI.    Skin: Negative.    Allergic/Immunologic: Negative.    Neurological: Negative.    Hematological: Negative.    Psychiatric/Behavioral: Negative.                 Objective   Physical Exam   Constitutional: She is oriented to person, place, and time. She appears well-developed and  well-nourished.   HENT:   Head: Normocephalic and atraumatic.   Nose: Nose normal.   Eyes: Conjunctivae and EOM are normal.   Cardiovascular: Normal rate, regular rhythm, normal heart sounds and intact distal pulses.    Noted plus 1 pitting edema in legs, this is not new and happens intermittently.     What is new is soreness to flexion/extension of left knee which she can barely flex or extend.   Ermelinda's sign negative.    Pulmonary/Chest: Effort normal and breath sounds normal.   Abdominal: Soft. Bowel sounds are normal.   Neurological: She is alert and oriented to person, place, and time. She has normal reflexes.   Skin: Skin is warm and dry.   Psychiatric: She has a normal mood and affect.   Nursing note and vitals reviewed.            Assessment/Plan     Essential hypertension    Hyperlipidemia, unspecified hyperlipidemia type    Seasonal allergic rhinitis due to other allergic trigger    Other chronic pain  -     HYDROcodone-acetaminophen (LORTAB) 7.5-325 MG per tablet; Take 1 tablet every 4-6 hrs as needed for pain    Acute pain of left knee  -     HYDROcodone-acetaminophen (NORCO)  MG per tablet; Take 1 tablet by mouth Every 8 (Eight) Hours As Needed for Moderate Pain (4-6).        1.) Refilled her chronic dose of hydrocodone   2.) Due to left extremity pain instructed her to fill the hydrocodone 10 mg PO TID prn #20 only and use until we get her left lower extremity issue resolved, then she can titrate back down to the 7.5 mg (her regular dose)  when she can.   3.) I verified with radiology her US was negative for DVT   4.) Pt has an Ortho appt tomorrow at 2:30 with [facility].

## 2017-07-28 RX ORDER — FENOFIBRATE 145 MG/1
TABLET, COATED ORAL
Qty: 90 TABLET | Refills: 0 | Status: SHIPPED | OUTPATIENT
Start: 2017-07-28 | End: 2017-09-19 | Stop reason: SDUPTHER

## 2017-07-28 RX ORDER — POTASSIUM CHLORIDE 20 MEQ/1
TABLET, EXTENDED RELEASE ORAL
Qty: 90 TABLET | Refills: 0 | Status: SHIPPED | OUTPATIENT
Start: 2017-07-28 | End: 2018-02-12 | Stop reason: SDUPTHER

## 2017-08-22 ENCOUNTER — APPOINTMENT (OUTPATIENT)
Dept: MAMMOGRAPHY | Facility: HOSPITAL | Age: 47
End: 2017-08-22
Attending: INTERNAL MEDICINE

## 2017-08-28 ENCOUNTER — TELEPHONE (OUTPATIENT)
Dept: INTERNAL MEDICINE | Facility: CLINIC | Age: 47
End: 2017-08-28

## 2017-08-28 DIAGNOSIS — G89.29 OTHER CHRONIC PAIN: ICD-10-CM

## 2017-08-28 RX ORDER — HYDROCODONE BITARTRATE AND ACETAMINOPHEN 7.5; 325 MG/1; MG/1
TABLET ORAL
Qty: 90 TABLET | Refills: 0 | Status: SHIPPED | OUTPATIENT
Start: 2017-08-28 | End: 2017-09-19 | Stop reason: DRUGHIGH

## 2017-08-28 NOTE — TELEPHONE ENCOUNTER
LM for pt to return call.  Just needs notified that script is ready to .  Placed up front in file folder.

## 2017-08-28 NOTE — TELEPHONE ENCOUNTER
----- Message from Lucrecia Cobb sent at 8/28/2017  9:07 AM EDT -----  [phone]    NEEDS REFILL OF HYDROCODONE 7.5 QTY 90-WILL BE IN Tuesday AM FOR BLOODWORK-WANTS TO  THEN PLEASE    [facility]

## 2017-09-14 ENCOUNTER — APPOINTMENT (OUTPATIENT)
Dept: LAB | Facility: HOSPITAL | Age: 47
End: 2017-09-14

## 2017-09-14 ENCOUNTER — TRANSCRIBE ORDERS (OUTPATIENT)
Dept: LAB | Facility: HOSPITAL | Age: 47
End: 2017-09-14

## 2017-09-14 DIAGNOSIS — M25.562 ACUTE PAIN OF LEFT KNEE: Primary | ICD-10-CM

## 2017-09-14 LAB
ALBUMIN SERPL-MCNC: 4.2 G/DL (ref 3.2–4.8)
ALBUMIN/GLOB SERPL: 1.8 G/DL (ref 1.5–2.5)
ALP SERPL-CCNC: 137 U/L (ref 25–100)
ALT SERPL W P-5'-P-CCNC: 41 U/L (ref 7–40)
ANION GAP SERPL CALCULATED.3IONS-SCNC: 8 MMOL/L (ref 3–11)
AST SERPL-CCNC: 28 U/L (ref 0–33)
BILIRUB SERPL-MCNC: 0.4 MG/DL (ref 0.3–1.2)
BUN BLD-MCNC: 15 MG/DL (ref 9–23)
BUN/CREAT SERPL: 30 (ref 7–25)
CALCIUM SPEC-SCNC: 9.1 MG/DL (ref 8.7–10.4)
CHLORIDE SERPL-SCNC: 102 MMOL/L (ref 99–109)
CO2 SERPL-SCNC: 27 MMOL/L (ref 20–31)
CREAT BLD-MCNC: 0.5 MG/DL (ref 0.6–1.3)
CRP SERPL-MCNC: 0.57 MG/DL (ref 0–1)
DEPRECATED RDW RBC AUTO: 43.3 FL (ref 37–54)
ERYTHROCYTE [DISTWIDTH] IN BLOOD BY AUTOMATED COUNT: 13.4 % (ref 11.3–14.5)
ERYTHROCYTE [SEDIMENTATION RATE] IN BLOOD: 13 MM/HR (ref 0–20)
GFR SERPL CREATININE-BSD FRML MDRD: 132 ML/MIN/1.73
GLOBULIN UR ELPH-MCNC: 2.3 GM/DL
GLUCOSE BLD-MCNC: 249 MG/DL (ref 70–100)
HCT VFR BLD AUTO: 44.2 % (ref 34.5–44)
HGB BLD-MCNC: 14.8 G/DL (ref 11.5–15.5)
MCH RBC QN AUTO: 29.7 PG (ref 27–31)
MCHC RBC AUTO-ENTMCNC: 33.5 G/DL (ref 32–36)
MCV RBC AUTO: 88.6 FL (ref 80–99)
PLATELET # BLD AUTO: 236 10*3/MM3 (ref 150–450)
PMV BLD AUTO: 11.6 FL (ref 6–12)
POTASSIUM BLD-SCNC: 4.3 MMOL/L (ref 3.5–5.5)
PROT SERPL-MCNC: 6.5 G/DL (ref 5.7–8.2)
RBC # BLD AUTO: 4.99 10*6/MM3 (ref 3.89–5.14)
SODIUM BLD-SCNC: 137 MMOL/L (ref 132–146)
URATE SERPL-MCNC: 2.4 MG/DL (ref 3.1–7.8)
WBC NRBC COR # BLD: 7.11 10*3/MM3 (ref 3.5–10.8)

## 2017-09-14 PROCEDURE — 84550 ASSAY OF BLOOD/URIC ACID: CPT | Performed by: ORTHOPAEDIC SURGERY

## 2017-09-14 PROCEDURE — 86140 C-REACTIVE PROTEIN: CPT | Performed by: ORTHOPAEDIC SURGERY

## 2017-09-14 PROCEDURE — 80053 COMPREHEN METABOLIC PANEL: CPT | Performed by: ORTHOPAEDIC SURGERY

## 2017-09-14 PROCEDURE — 85652 RBC SED RATE AUTOMATED: CPT | Performed by: ORTHOPAEDIC SURGERY

## 2017-09-14 PROCEDURE — 85027 COMPLETE CBC AUTOMATED: CPT | Performed by: ORTHOPAEDIC SURGERY

## 2017-09-14 PROCEDURE — 86038 ANTINUCLEAR ANTIBODIES: CPT | Performed by: ORTHOPAEDIC SURGERY

## 2017-09-14 PROCEDURE — 36415 COLL VENOUS BLD VENIPUNCTURE: CPT | Performed by: ORTHOPAEDIC SURGERY

## 2017-09-18 LAB — ANA SER QL: NEGATIVE

## 2017-09-19 ENCOUNTER — OFFICE VISIT (OUTPATIENT)
Dept: INTERNAL MEDICINE | Facility: CLINIC | Age: 47
End: 2017-09-19

## 2017-09-19 VITALS
TEMPERATURE: 96.2 F | WEIGHT: 274 LBS | SYSTOLIC BLOOD PRESSURE: 144 MMHG | HEART RATE: 86 BPM | DIASTOLIC BLOOD PRESSURE: 86 MMHG | BODY MASS INDEX: 44.22 KG/M2 | RESPIRATION RATE: 22 BRPM

## 2017-09-19 DIAGNOSIS — J30.89 SEASONAL ALLERGIC RHINITIS DUE TO OTHER ALLERGIC TRIGGER: ICD-10-CM

## 2017-09-19 DIAGNOSIS — I10 ESSENTIAL HYPERTENSION: Primary | ICD-10-CM

## 2017-09-19 DIAGNOSIS — R73.03 PREDIABETES: ICD-10-CM

## 2017-09-19 DIAGNOSIS — E78.5 HYPERLIPIDEMIA, UNSPECIFIED HYPERLIPIDEMIA TYPE: ICD-10-CM

## 2017-09-19 DIAGNOSIS — E11.9 TYPE 2 DIABETES MELLITUS WITHOUT COMPLICATION, WITHOUT LONG-TERM CURRENT USE OF INSULIN (HCC): ICD-10-CM

## 2017-09-19 DIAGNOSIS — G71.00 MUSCULAR DYSTROPHY (HCC): ICD-10-CM

## 2017-09-19 LAB
EXPIRATION DATE: NORMAL
HBA1C MFR BLD: 9.7 %
Lab: NORMAL

## 2017-09-19 PROCEDURE — 83036 HEMOGLOBIN GLYCOSYLATED A1C: CPT | Performed by: INTERNAL MEDICINE

## 2017-09-19 PROCEDURE — 99214 OFFICE O/P EST MOD 30 MIN: CPT | Performed by: INTERNAL MEDICINE

## 2017-09-19 RX ORDER — HYDROCODONE BITARTRATE AND ACETAMINOPHEN 10; 325 MG/1; MG/1
1 TABLET ORAL EVERY 6 HOURS PRN
Qty: 120 TABLET | Refills: 0 | Status: SHIPPED | OUTPATIENT
Start: 2017-09-19 | End: 2017-10-19 | Stop reason: ALTCHOICE

## 2017-09-19 NOTE — PROGRESS NOTES
Subjective   Hafsa Barron is a 46 y.o. female.   Pt is here to follow up on HTN,HPL,seasonal allergies,muscular dystrophy and prediabetes.               History of Present Illness   Pt is here to follow up on HTN,HPL,seasonal allergies,muscular dystrophy and prediabetes.   Pt was instructed by Ortho to take 2 tabs of her 7.5 mg PO every 5-6  hours she has noticed an improvement in her knee pain as well as over all in her MD pain.               The following portions of the patient's history were reviewed and updated as appropriate: allergies, current medications, past family history, past medical history, past social history, past surgical history and problem list.          Review of Systems   Constitutional: Negative.    HENT: Negative.    Eyes: Negative.    Respiratory: Negative.    Cardiovascular:        See HPI.    Gastrointestinal: Negative.    Endocrine:        See HPI.    Genitourinary: Negative.    Musculoskeletal:        See HPI.    Skin: Negative.    Allergic/Immunologic:        See HPI.    Neurological:        See HPI.    Hematological: Negative.    Psychiatric/Behavioral: Negative.              Objective   Physical Exam   Constitutional: She is oriented to person, place, and time. She appears well-developed and well-nourished.   HENT:   Head: Normocephalic and atraumatic.   Right Ear: External ear normal.   Left Ear: External ear normal.   Nose: Nose normal.   Mouth/Throat: Oropharynx is clear and moist.   Eyes: Conjunctivae and EOM are normal. Pupils are equal, round, and reactive to light.   Neck: Normal range of motion. Neck supple. No tracheal deviation present. No thyromegaly present.   Cardiovascular: Normal rate, regular rhythm and normal heart sounds.    Pulmonary/Chest: Effort normal and breath sounds normal.   Abdominal: Soft. Bowel sounds are normal.   Neurological: She is alert and oriented to person, place, and time. She has normal reflexes.   Skin: Skin is warm and dry.   Psychiatric:  She has a normal mood and affect.   Nursing note and vitals reviewed.            Assessment/Plan      Essential hypertension    Hyperlipidemia, unspecified hyperlipidemia type    Seasonal allergic rhinitis due to other allergic trigger    Muscular dystrophy  -     Urine Drug Screen  -     HYDROcodone-acetaminophen (NORCO)  MG per tablet; Take 1 tablet by mouth Every 6 (Six) Hours As Needed for Moderate Pain .    Prediabetes  -     POC Glycosylated Hemoglobin (Hb A1C)        1.) Follow up in 3 months   2.) A1C and UDS   3.) Switch Lortab to 10 mg PO Q4-6 prn.   4.) A1C in office today was 9.6% increase metformin to 1000 mg PO BID.

## 2017-10-04 ENCOUNTER — OFFICE VISIT (OUTPATIENT)
Dept: INTERNAL MEDICINE | Facility: CLINIC | Age: 47
End: 2017-10-04

## 2017-10-04 VITALS
TEMPERATURE: 97.7 F | WEIGHT: 274 LBS | RESPIRATION RATE: 20 BRPM | BODY MASS INDEX: 44.22 KG/M2 | DIASTOLIC BLOOD PRESSURE: 70 MMHG | HEART RATE: 111 BPM | SYSTOLIC BLOOD PRESSURE: 116 MMHG | OXYGEN SATURATION: 95 %

## 2017-10-04 DIAGNOSIS — M25.562 CHRONIC PAIN OF LEFT KNEE: ICD-10-CM

## 2017-10-04 DIAGNOSIS — R09.89 BRUIT OF LEFT CAROTID ARTERY: ICD-10-CM

## 2017-10-04 DIAGNOSIS — G71.00 MUSCULAR DYSTROPHY (HCC): ICD-10-CM

## 2017-10-04 DIAGNOSIS — Z01.818 PRE-OPERATIVE CLEARANCE: ICD-10-CM

## 2017-10-04 DIAGNOSIS — Z86.73 HISTORY OF CVA IN ADULTHOOD: ICD-10-CM

## 2017-10-04 DIAGNOSIS — Z01.818 PREOP EXAMINATION: Primary | ICD-10-CM

## 2017-10-04 DIAGNOSIS — E11.9 TYPE 2 DIABETES MELLITUS WITHOUT COMPLICATION, WITHOUT LONG-TERM CURRENT USE OF INSULIN (HCC): ICD-10-CM

## 2017-10-04 DIAGNOSIS — G89.29 CHRONIC PAIN OF LEFT KNEE: ICD-10-CM

## 2017-10-04 DIAGNOSIS — R82.998 LEUKOCYTES IN URINE: ICD-10-CM

## 2017-10-04 LAB
ALBUMIN SERPL-MCNC: 4.4 G/DL (ref 3.2–4.8)
ALBUMIN/GLOB SERPL: 2 G/DL (ref 1.5–2.5)
ALP SERPL-CCNC: 109 U/L (ref 25–100)
ALT SERPL W P-5'-P-CCNC: 50 U/L (ref 7–40)
ANION GAP SERPL CALCULATED.3IONS-SCNC: 9 MMOL/L (ref 3–11)
AST SERPL-CCNC: 40 U/L (ref 0–33)
BASOPHILS # BLD AUTO: 0.06 10*3/MM3 (ref 0–0.2)
BASOPHILS NFR BLD AUTO: 1.3 % (ref 0–1)
BILIRUB BLD-MCNC: NEGATIVE MG/DL
BILIRUB SERPL-MCNC: 0.5 MG/DL (ref 0.3–1.2)
BUN BLD-MCNC: 18 MG/DL (ref 9–23)
BUN/CREAT SERPL: 36 (ref 7–25)
CALCIUM SPEC-SCNC: 9.6 MG/DL (ref 8.7–10.4)
CHLORIDE SERPL-SCNC: 99 MMOL/L (ref 99–109)
CLARITY, POC: CLEAR
CO2 SERPL-SCNC: 28 MMOL/L (ref 20–31)
COLOR UR: YELLOW
CREAT BLD-MCNC: 0.5 MG/DL (ref 0.6–1.3)
DEPRECATED RDW RBC AUTO: 43 FL (ref 37–54)
EOSINOPHIL # BLD AUTO: 0.06 10*3/MM3 (ref 0–0.3)
EOSINOPHIL NFR BLD AUTO: 1.3 % (ref 0–3)
ERYTHROCYTE [DISTWIDTH] IN BLOOD BY AUTOMATED COUNT: 13.1 % (ref 11.3–14.5)
EXPIRATION DATE: ABNORMAL
EXPIRATION DATE: NORMAL
GFR SERPL CREATININE-BSD FRML MDRD: 132 ML/MIN/1.73
GLOBULIN UR ELPH-MCNC: 2.2 GM/DL
GLUCOSE BLD-MCNC: 209 MG/DL (ref 70–100)
GLUCOSE UR STRIP-MCNC: ABNORMAL MG/DL
HBA1C MFR BLD: 9.4 %
HCT VFR BLD AUTO: 43.2 % (ref 34.5–44)
HGB BLD-MCNC: 14.5 G/DL (ref 11.5–15.5)
IMM GRANULOCYTES # BLD: 0.02 10*3/MM3 (ref 0–0.03)
IMM GRANULOCYTES NFR BLD: 0.4 % (ref 0–0.6)
KETONES UR QL: NEGATIVE
LEUKOCYTE EST, POC: ABNORMAL
LYMPHOCYTES # BLD AUTO: 1.68 10*3/MM3 (ref 0.6–4.8)
LYMPHOCYTES NFR BLD AUTO: 37.2 % (ref 24–44)
Lab: ABNORMAL
Lab: NORMAL
MCH RBC QN AUTO: 29.8 PG (ref 27–31)
MCHC RBC AUTO-ENTMCNC: 33.6 G/DL (ref 32–36)
MCV RBC AUTO: 88.7 FL (ref 80–99)
MONOCYTES # BLD AUTO: 0.42 10*3/MM3 (ref 0–1)
MONOCYTES NFR BLD AUTO: 9.3 % (ref 0–12)
NEUTROPHILS # BLD AUTO: 2.28 10*3/MM3 (ref 1.5–8.3)
NEUTROPHILS NFR BLD AUTO: 50.5 % (ref 41–71)
NITRITE UR-MCNC: NEGATIVE MG/ML
PH UR: 5 [PH] (ref 5–8)
PLATELET # BLD AUTO: 198 10*3/MM3 (ref 150–450)
PMV BLD AUTO: 12.1 FL (ref 6–12)
POTASSIUM BLD-SCNC: 4.1 MMOL/L (ref 3.5–5.5)
PROT SERPL-MCNC: 6.6 G/DL (ref 5.7–8.2)
PROT UR STRIP-MCNC: NEGATIVE MG/DL
RBC # BLD AUTO: 4.87 10*6/MM3 (ref 3.89–5.14)
RBC # UR STRIP: NEGATIVE /UL
SODIUM BLD-SCNC: 136 MMOL/L (ref 132–146)
SP GR UR: 1.02 (ref 1–1.03)
UROBILINOGEN UR QL: NORMAL
WBC NRBC COR # BLD: 4.52 10*3/MM3 (ref 3.5–10.8)

## 2017-10-04 PROCEDURE — 87086 URINE CULTURE/COLONY COUNT: CPT | Performed by: PHYSICIAN ASSISTANT

## 2017-10-04 PROCEDURE — 90686 IIV4 VACC NO PRSV 0.5 ML IM: CPT | Performed by: PHYSICIAN ASSISTANT

## 2017-10-04 PROCEDURE — 90471 IMMUNIZATION ADMIN: CPT | Performed by: PHYSICIAN ASSISTANT

## 2017-10-04 PROCEDURE — 99243 OFF/OP CNSLTJ NEW/EST LOW 30: CPT | Performed by: PHYSICIAN ASSISTANT

## 2017-10-04 PROCEDURE — 36415 COLL VENOUS BLD VENIPUNCTURE: CPT | Performed by: PHYSICIAN ASSISTANT

## 2017-10-04 PROCEDURE — 87081 CULTURE SCREEN ONLY: CPT | Performed by: PHYSICIAN ASSISTANT

## 2017-10-04 PROCEDURE — 83036 HEMOGLOBIN GLYCOSYLATED A1C: CPT | Performed by: PHYSICIAN ASSISTANT

## 2017-10-04 PROCEDURE — 85025 COMPLETE CBC W/AUTO DIFF WBC: CPT | Performed by: PHYSICIAN ASSISTANT

## 2017-10-04 PROCEDURE — 81003 URINALYSIS AUTO W/O SCOPE: CPT | Performed by: PHYSICIAN ASSISTANT

## 2017-10-04 PROCEDURE — 80053 COMPREHEN METABOLIC PANEL: CPT | Performed by: PHYSICIAN ASSISTANT

## 2017-10-04 RX ORDER — BLOOD-GLUCOSE METER
1 KIT MISCELLANEOUS 2 TIMES DAILY
Qty: 1 EACH | Refills: 0 | Status: SHIPPED | OUTPATIENT
Start: 2017-10-04 | End: 2017-10-12

## 2017-10-04 NOTE — PROGRESS NOTES
Subjective   Hafsa Barron is a 46 y.o. female.   Chief Complaint   Patient presents with   • Pre-op Exam   • Knee Pain   • Flu Vaccine       History of Present Illness   Pt here for pre op exam before arthroscopic left knee surgery to be performed at [facility] by Dr Bennett and scheduled for 10/10/17.  She's had left knee pain x 2 months and it is thought she may have a meniscal tear.    On plavix for hx of stroke and has been cleared in the past for being off short term.    Hx of muscular dystrophy.Takes hydrocodone 10/325mg QID-5 times daily for MD.    Stroke 4 years ago.  No hx of anesthesia reactions but MD requires certain protocol for anesthesia.    [id number]--fcfdn to have a1c of 9%.  Doesn't test glucose at home.  Eats a lot of carbs.    Allergies   Allergen Reactions   • Penicillins        Social History     Social History   • Marital status:      Spouse name: N/A   • Number of children: N/A   • Years of education: N/A     Social History Main Topics   • Smoking status: Former Smoker     Packs/day: 1.50     Years: 15.00     Start date: 12/19/1983     Quit date: 8/30/2013   • Smokeless tobacco: None      Comment: use ecig that doesn't have nicotine   • Alcohol use No   • Drug use: No   • Sexual activity: Yes     Partners: Male     Birth control/ protection: Post-menopausal      Comment: Menapause no cycle since sept 2013     Other Topics Concern   • None     Social History Narrative   • None         Current Outpatient Prescriptions:   •  albuterol (PROAIR HFA) 108 (90 BASE) MCG/ACT inhaler, Inhale 1 puff Every 6 (Six) Hours As Needed for Wheezing., Disp: 90 inhaler, Rfl: 2  •  amLODIPine (NORVASC) 2.5 MG tablet, Take 1 tablet by mouth daily., Disp: 90 tablet, Rfl: 2  •  baclofen (LIORESAL) 10 MG tablet, Take  by mouth., Disp: , Rfl:   •  CALCIUM 600+D HIGH POTENCY 600-400 MG-UNIT per tablet, TAKE 1 TABLET BY MOUTH EVERY DAY, Disp: 30 tablet, Rfl: 0  •  clopidogrel (PLAVIX) 75 MG tablet, Take 1  tablet by mouth Daily., Disp: 90 tablet, Rfl: 2  •  cyclobenzaprine (FLEXERIL) 10 MG tablet, Take 10 mg by mouth 3 (Three) Times a Day As Needed for Muscle Spasms., Disp: , Rfl:   •  diclofenac (VOLTAREN) 75 MG EC tablet, Take 1 tablet by mouth 2 (Two) Times a Day., Disp: 30 tablet, Rfl: 5  •  DULoxetine (CYMBALTA) 60 MG capsule, Take 1 capsule by mouth daily., Disp: 90 capsule, Rfl: 2  •  fenofibrate (TRICOR) 145 MG tablet, TAKE 1 TABLET BY MOUTH DAILY, Disp: 30 tablet, Rfl: 0  •  fluticasone (FLONASE ALLERGY RELIEF) 50 MCG/ACT nasal spray, into each nostril., Disp: , Rfl:   •  gabapentin (NEURONTIN) 600 MG tablet, TAKE 1 TABLET BY MOUTH THREE TIMES DAILY, Disp: 270 tablet, Rfl: 0  •  HYDROcodone-acetaminophen (NORCO)  MG per tablet, Take 1 tablet by mouth Every 6 (Six) Hours As Needed for Moderate Pain ., Disp: 120 tablet, Rfl: 0  •  loratadine (CLARITIN) 10 MG tablet, Take 1 tablet by mouth daily., Disp: 30 tablet, Rfl: 11  •  metFORMIN (GLUCOPHAGE) 1000 MG tablet, Take 1 tablet by mouth 2 (Two) Times a Day With Meals. NOTE THIS IS A DOSE INCREASE PER DR JAFFE, Disp: 60 tablet, Rfl: 4  •  montelukast (SINGULAIR) 10 MG tablet, TAKE 1 TABLET BY MOUTH DAILY, Disp: 30 tablet, Rfl: 0  •  pantoprazole (PROTONIX) 40 MG EC tablet, Take 1 tablet by mouth Daily., Disp: 30 tablet, Rfl: 5  •  potassium chloride (K-DUR,KLOR-CON) 20 MEQ CR tablet, TAKE 1 TABLET BY MOUTH EVERY DAY, Disp: 90 tablet, Rfl: 0  •  spironolactone (ALDACTONE) 50 MG tablet, TAKE 1 TABLET BY MOUTH TWICE DAILY, Disp: 180 tablet, Rfl: 1  •  tiZANidine (ZANAFLEX) 4 MG tablet, TAKE 1 TABLET BY MOUTH THREE TIMES DAILY, Disp: 270 tablet, Rfl: 2  •  VOLTAREN 1 % gel gel, Apply 1 application topically 2 (Two) Times a Day., Disp: , Rfl: 5  •  glucose blood test strip, Test BID, Disp: 100 each, Rfl: 3  •  glucose monitor monitoring kit, 1 each 2 (Two) Times a Day., Disp: 1 each, Rfl: 0  •  Lancets 30G misc, 1 Units Daily., Disp: 100 each, Rfl:  2    Family History   Problem Relation Age of Onset   • Allergies Other    • ADD / ADHD Other    • Heart block Other    • Other Other      CEREBROVASCULAR ACCIDENT    • Hypertension Other    • Cancer Other      LUNG CANCER   • Breast cancer Other    • Hyperlipidemia Other    • Alcohol abuse Mother    • Depression Mother    • Early death Mother       age 59   • Heart disease Mother      Mother  of Massive Heart attack   • Hyperlipidemia Mother    • Hypertension Mother    • Miscarriages / Stillbirths Mother      Miscarriage   • Alcohol abuse Father    • Cancer Father      Had Lung Cancer - had 1 lung till death   • Diabetes Father      Lived on insuline shots   • Early death Father       age 53   • Cancer Maternal Aunt      Age 74 had Brast cancer survivior   • Learning disabilities Son      Had Adhd   • Ovarian cancer Neg Hx        The following portions of the patient's history were reviewed and updated as appropriate: allergies, current medications, past family history, past medical history, past social history, past surgical history and problem list.    Review of Systems   Constitutional: Negative for chills and fever.   HENT: Negative.    Eyes: Negative for visual disturbance.   Respiratory: Negative for cough and shortness of breath.    Cardiovascular: Negative for chest pain.   Gastrointestinal: Negative for blood in stool, constipation and diarrhea.   Endocrine: Positive for polyuria.   Genitourinary: Negative for dysuria.   Musculoskeletal: Positive for arthralgias and back pain (sciatica). Negative for neck pain.   Allergic/Immunologic: Positive for environmental allergies.   Neurological: Negative for dizziness and headaches.   Psychiatric/Behavioral: Positive for sleep disturbance.       Objective   Physical Exam   Constitutional: She appears well-developed and well-nourished.   HENT:   Head: Normocephalic.   Right Ear: Hearing, tympanic membrane and external ear normal.   Left Ear:  Hearing, tympanic membrane and external ear normal.   Nose: Nose normal. Right sinus exhibits no maxillary sinus tenderness and no frontal sinus tenderness. Left sinus exhibits no maxillary sinus tenderness and no frontal sinus tenderness.   Mouth/Throat: Oropharynx is clear and moist. She has dentures.   Eyes: Conjunctivae and EOM are normal. Pupils are equal, round, and reactive to light. No scleral icterus.   Neck: Decreased carotid pulses present. Carotid bruit is present (left side). No tracheal deviation present. No thyromegaly present.   Cardiovascular: Normal rate, regular rhythm, normal heart sounds and intact distal pulses.    No murmur heard.  No pitting edema of the LE.   Pulmonary/Chest: Effort normal. No respiratory distress. She has no decreased breath sounds. She has no wheezes. She has no rhonchi. She has no rales. She exhibits no tenderness.   Abdominal: Soft. Bowel sounds are normal. She exhibits no distension and no mass. There is no tenderness.   Musculoskeletal: She exhibits no edema.        Left knee: She exhibits decreased range of motion, abnormal patellar mobility and abnormal meniscus. Tenderness found. Patellar tendon tenderness noted. No medial joint line and no lateral joint line tenderness noted.   LEft shoulder cannot fully abduct from stroke, left hand weakness.  Left leg has normal strength.   Lymphadenopathy:     She has no cervical adenopathy.   Neurological: She has normal strength. She displays normal reflexes. No cranial nerve deficit.   Reflex Scores:       Patellar reflexes are 2+ on the right side and 2+ on the left side.  Skin: No rash noted. No erythema.   Psychiatric: She has a normal mood and affect. Her behavior is normal. Judgment and thought content normal.   Vitals reviewed.      Assessment/Plan   Hafsa was seen today for pre-op exam, knee pain and flu vaccine.    Diagnoses and all orders for this visit:    Preop examination  -     CBC & Differential  -      Comprehensive Metabolic Panel  -     POC Urinalysis Dipstick, Automated  -     POC Glycosylated Hemoglobin (Hb A1C)  -     MRSA Screen Culture - Swab, Nares  -     Cancel: Urine Drug Screen - Urine, Clean Catch  -     CBC Auto Differential    Chronic pain of left knee  -     CBC & Differential  -     Comprehensive Metabolic Panel  -     POC Urinalysis Dipstick, Automated  -     POC Glycosylated Hemoglobin (Hb A1C)  -     MRSA Screen Culture - Swab, Nares  -     Cancel: Urine Drug Screen - Urine, Clean Catch  -     CBC Auto Differential    Leukocytes in urine  -     Urine Culture - Urine, Urine, Clean Catch    Type 2 diabetes mellitus without complication, without long-term current use of insulin  -     glucose blood test strip; Test BID  -     glucose monitor monitoring kit; 1 each 2 (Two) Times a Day.  -     Lancets 30G misc; 1 Units Daily.  -     Ambulatory Referral to Cardiology    Pre-operative clearance  -     Ambulatory Referral to Cardiology    Bruit of left carotid artery  -     Ambulatory Referral to Cardiology    History of CVA in adulthood  -     Ambulatory Referral to Cardiology    Muscular dystrophy  -     Ambulatory Referral to Cardiology    Other orders  -     Flu Vaccine Quad PF 3YR+      Results for orders placed or performed in visit on 10/04/17   MRSA Screen Culture - Swab, Nares   Result Value Ref Range    MRSA SCREEN CX       No Methicillin Resistant Staphylococcus aureus isolated   Urine Culture - Urine, Urine, Clean Catch   Result Value Ref Range    Urine Culture      Urine Culture 20,000-30,000 CFU/mL Normal Urogenital Nereyda    Comprehensive Metabolic Panel   Result Value Ref Range    Glucose 209 (H) 70 - 100 mg/dL    BUN 18 9 - 23 mg/dL    Creatinine 0.50 (L) 0.60 - 1.30 mg/dL    Sodium 136 132 - 146 mmol/L    Potassium 4.1 3.5 - 5.5 mmol/L    Chloride 99 99 - 109 mmol/L    CO2 28.0 20.0 - 31.0 mmol/L    Calcium 9.6 8.7 - 10.4 mg/dL    Total Protein 6.6 5.7 - 8.2 g/dL    Albumin 4.40 3.20 -  4.80 g/dL    ALT (SGPT) 50 (H) 7 - 40 U/L    AST (SGOT) 40 (H) 0 - 33 U/L    Alkaline Phosphatase 109 (H) 25 - 100 U/L    Total Bilirubin 0.5 0.3 - 1.2 mg/dL    eGFR Non African Amer 132 >60 mL/min/1.73    Globulin 2.2 gm/dL    A/G Ratio 2.0 1.5 - 2.5 g/dL    BUN/Creatinine Ratio 36.0 (H) 7.0 - 25.0    Anion Gap 9.0 3.0 - 11.0 mmol/L   CBC Auto Differential   Result Value Ref Range    WBC 4.52 3.50 - 10.80 10*3/mm3    RBC 4.87 3.89 - 5.14 10*6/mm3    Hemoglobin 14.5 11.5 - 15.5 g/dL    Hematocrit 43.2 34.5 - 44.0 %    MCV 88.7 80.0 - 99.0 fL    MCH 29.8 27.0 - 31.0 pg    MCHC 33.6 32.0 - 36.0 g/dL    RDW 13.1 11.3 - 14.5 %    RDW-SD 43.0 37.0 - 54.0 fl    MPV 12.1 (H) 6.0 - 12.0 fL    Platelets 198 150 - 450 10*3/mm3    Neutrophil % 50.5 41.0 - 71.0 %    Lymphocyte % 37.2 24.0 - 44.0 %    Monocyte % 9.3 0.0 - 12.0 %    Eosinophil % 1.3 0.0 - 3.0 %    Basophil % 1.3 (H) 0.0 - 1.0 %    Immature Grans % 0.4 0.0 - 0.6 %    Neutrophils, Absolute 2.28 1.50 - 8.30 10*3/mm3    Lymphocytes, Absolute 1.68 0.60 - 4.80 10*3/mm3    Monocytes, Absolute 0.42 0.00 - 1.00 10*3/mm3    Eosinophils, Absolute 0.06 0.00 - 0.30 10*3/mm3    Basophils, Absolute 0.06 0.00 - 0.20 10*3/mm3    Immature Grans, Absolute 0.02 0.00 - 0.03 10*3/mm3   POC Urinalysis Dipstick, Automated   Result Value Ref Range    Color Yellow Yellow, Straw, Dark Yellow, Karyn    Clarity, UA Clear Clear    Glucose,  mg/dL (A) Negative, 1000 mg/dL (3+) mg/dL    Bilirubin Negative Negative    Ketones, UA Negative Negative    Specific Gravity  1.020 1.005 - 1.030    Blood, UA Negative Negative    pH, Urine 5.0 5.0 - 8.0    Protein, POC Negative Negative mg/dL    Urobilinogen, UA Normal Normal    Leukocytes 75 Zac/ul (A) Negative    Nitrite, UA Negative Negative    Lot Number [id number]     Expiration Date 5-31-18    POC Glycosylated Hemoglobin (Hb A1C)   Result Value Ref Range    Hemoglobin A1C 9.4 %    Lot Number [id number]     Expiration Date 5-19          Pt will  hold plavix 7 days prior to surgery.    T2DM uncontrolled--she will start testing glucose BID, discussed goals.  She will cut back 50% of carb intake.  Will start jardiance.    Pt will need cardiac clearance given finding of carotid bruit, recent uncontrolled diabetes, hx of stroke and MD.  Have notified ortho practice.

## 2017-10-05 ENCOUNTER — TELEPHONE (OUTPATIENT)
Dept: INTERNAL MEDICINE | Facility: CLINIC | Age: 47
End: 2017-10-05

## 2017-10-05 PROBLEM — R09.89 BRUIT OF LEFT CAROTID ARTERY: Status: ACTIVE | Noted: 2017-10-05

## 2017-10-05 NOTE — TELEPHONE ENCOUNTER
Spoke with Dr. Bennett's SADAF Muñoz and informed her of statement below. Wanda verbalized statement and reach out to pt and have this rescheduled.

## 2017-10-05 NOTE — TELEPHONE ENCOUNTER
----- Message from Ronel Cooney PA-C sent at 10/5/2017  8:49 AM EDT -----  Pls call Dr Mike' office (ortho) and let her know that pt had pre op physical yesterday.   Her diabetes has become uncontrolled and a1c is now 9%.  She will start checking glucose at home.  She was found to have a left carotid bruit.  Think she should have cardiac clearance since she is on plavix and has hx of stroke.  Sx was scheduled for 10/10, so think this will need to be changed.

## 2017-10-06 ENCOUNTER — TELEPHONE (OUTPATIENT)
Dept: INTERNAL MEDICINE | Facility: CLINIC | Age: 47
End: 2017-10-06

## 2017-10-06 DIAGNOSIS — E11.9 TYPE 2 DIABETES MELLITUS WITHOUT COMPLICATION, WITHOUT LONG-TERM CURRENT USE OF INSULIN (HCC): Primary | ICD-10-CM

## 2017-10-06 LAB
BACTERIA SPEC AEROBE CULT: NORMAL
BACTERIA SPEC AEROBE CULT: NORMAL
MRSA SPEC QL CULT: NORMAL

## 2017-10-06 NOTE — TELEPHONE ENCOUNTER
----- Message from Cristina Barkley MA sent at 10/5/2017  4:23 PM EDT -----  Pt called stating she was not cleared for surgery because her A1C was too high.  Pt states Ronel mentioned putting her on insulin.  Pt wants to know what she needs to do to get started because otherwise she will have to wait even longer to get her surgery.    Pt also needs a referral for cardiology ASAP.     PT: [phone]

## 2017-10-06 NOTE — TELEPHONE ENCOUNTER
Card referral done yesterday for urgent appt.    For diabetes, continue metformin.  I have sent in jardiance to take daily.  Let me know if not covered by insurance.  Lets try wo insulin first.

## 2017-10-09 NOTE — TELEPHONE ENCOUNTER
Jardiance would help her lose some weight and has a heart protective effect according to new studies.  Insulin will make her gain weight.  Her sugar also really depends on what she's eating.

## 2017-10-09 NOTE — TELEPHONE ENCOUNTER
Spoke with pt and she states that she has a concern about the Rx Jardiance-some pt's become dehydrated-doesn't want to try this if necessary because dehydration caused her to have a stroke last time. Is pt suppose to take Metformin and Jardiance?

## 2017-10-12 ENCOUNTER — OFFICE VISIT (OUTPATIENT)
Dept: CARDIOLOGY | Facility: CLINIC | Age: 47
End: 2017-10-12

## 2017-10-12 VITALS
SYSTOLIC BLOOD PRESSURE: 126 MMHG | BODY MASS INDEX: 43.55 KG/M2 | DIASTOLIC BLOOD PRESSURE: 98 MMHG | WEIGHT: 271 LBS | HEART RATE: 114 BPM | HEIGHT: 66 IN | OXYGEN SATURATION: 97 %

## 2017-10-12 DIAGNOSIS — I10 ESSENTIAL HYPERTENSION: ICD-10-CM

## 2017-10-12 DIAGNOSIS — R06.00 PND (PAROXYSMAL NOCTURNAL DYSPNEA): ICD-10-CM

## 2017-10-12 DIAGNOSIS — R09.89 BRUIT OF LEFT CAROTID ARTERY: ICD-10-CM

## 2017-10-12 DIAGNOSIS — Z01.810 PREOP CARDIOVASCULAR EXAM: Primary | ICD-10-CM

## 2017-10-12 DIAGNOSIS — E78.5 HYPERLIPIDEMIA, UNSPECIFIED HYPERLIPIDEMIA TYPE: ICD-10-CM

## 2017-10-12 DIAGNOSIS — E11.9 TYPE 2 DIABETES MELLITUS WITHOUT COMPLICATION, WITHOUT LONG-TERM CURRENT USE OF INSULIN (HCC): ICD-10-CM

## 2017-10-12 PROCEDURE — 93000 ELECTROCARDIOGRAM COMPLETE: CPT | Performed by: INTERNAL MEDICINE

## 2017-10-12 PROCEDURE — 99244 OFF/OP CNSLTJ NEW/EST MOD 40: CPT | Performed by: INTERNAL MEDICINE

## 2017-10-12 NOTE — PROGRESS NOTES
[facility] at [facility]  Consultation H&P  Hafsa Barron  1970  [phone]    VISIT DATE:  10/12/2017    PCP: Ronel Cooney PA-C  [address]  [facility] [address]    IDENTIFICATION: A 46 y.o. female from [facility]    CC:  Chief Complaint   Patient presents with   • cc knee surgery       PROBLEM LIST:  1. HTN  2. HLD  1. 17 lipids: , , HDL 66, , ASCVD 10 year risk 1.9%  3. DM II  1. 10/4/17 A1c 9.4  4. Obesity  5. Allergic rhinitis  6. Muscular dystrophy   7. Fibromyalgia  8.   9. Surgical Hx:  1. cholecystectomy    Allergies  Allergies   Allergen Reactions   • Penicillins Shortness Of Breath       Current Medications    Current Outpatient Prescriptions:   •  albuterol (PROAIR HFA) 108 (90 BASE) MCG/ACT inhaler, Inhale 1 puff Every 6 (Six) Hours As Needed for Wheezing., Disp: 90 inhaler, Rfl: 2  •  amLODIPine (NORVASC) 2.5 MG tablet, Take 1 tablet by mouth daily., Disp: 90 tablet, Rfl: 2  •  baclofen (LIORESAL) 10 MG tablet, Take  by mouth 3 (Three) Times a Day., Disp: , Rfl:   •  CALCIUM 600+D HIGH POTENCY 600-400 MG-UNIT per tablet, TAKE 1 TABLET BY MOUTH EVERY DAY, Disp: 30 tablet, Rfl: 0  •  clopidogrel (PLAVIX) 75 MG tablet, Take 1 tablet by mouth Daily., Disp: 90 tablet, Rfl: 2  •  cyclobenzaprine (FLEXERIL) 10 MG tablet, Take 10 mg by mouth 3 (Three) Times a Day As Needed for Muscle Spasms., Disp: , Rfl:   •  diclofenac (VOLTAREN) 75 MG EC tablet, Take 1 tablet by mouth 2 (Two) Times a Day., Disp: 30 tablet, Rfl: 5  •  DULoxetine (CYMBALTA) 60 MG capsule, Take 1 capsule by mouth daily., Disp: 90 capsule, Rfl: 2  •  Empagliflozin (JARDIANCE) 10 MG tablet, Take 10 mg by mouth Daily., Disp: 30 tablet, Rfl: 2  •  fenofibrate (TRICOR) 145 MG tablet, TAKE 1 TABLET BY MOUTH DAILY, Disp: 30 tablet, Rfl: 0  •  fluticasone (FLONASE ALLERGY RELIEF) 50 MCG/ACT nasal spray, into each nostril., Disp: , Rfl:   •  gabapentin (NEURONTIN)  600 MG tablet, TAKE 1 TABLET BY MOUTH THREE TIMES DAILY, Disp: 270 tablet, Rfl: 0  •  HYDROcodone-acetaminophen (NORCO)  MG per tablet, Take 1 tablet by mouth Every 6 (Six) Hours As Needed for Moderate Pain ., Disp: 120 tablet, Rfl: 0  •  loratadine (CLARITIN) 10 MG tablet, Take 1 tablet by mouth daily., Disp: 30 tablet, Rfl: 11  •  metFORMIN (GLUCOPHAGE) 1000 MG tablet, Take 1 tablet by mouth 2 (Two) Times a Day With Meals. NOTE THIS IS A DOSE INCREASE PER DR JAFFE, Disp: 60 tablet, Rfl: 4  •  montelukast (SINGULAIR) 10 MG tablet, TAKE 1 TABLET BY MOUTH DAILY, Disp: 30 tablet, Rfl: 0  •  pantoprazole (PROTONIX) 40 MG EC tablet, Take 1 tablet by mouth Daily., Disp: 30 tablet, Rfl: 5  •  potassium chloride (K-DUR,KLOR-CON) 20 MEQ CR tablet, TAKE 1 TABLET BY MOUTH EVERY DAY, Disp: 90 tablet, Rfl: 0  •  spironolactone (ALDACTONE) 50 MG tablet, TAKE 1 TABLET BY MOUTH TWICE DAILY, Disp: 180 tablet, Rfl: 1  •  tiZANidine (ZANAFLEX) 4 MG tablet, TAKE 1 TABLET BY MOUTH THREE TIMES DAILY, Disp: 270 tablet, Rfl: 2  •  VOLTAREN 1 % gel gel, Apply 1 application topically 2 (Two) Times a Day., Disp: , Rfl: 5     History of Present Illness   HPI  This is a 46-year-old  female with the above mentioned PMH who presents for consult from Ronel Cooney [facility] for evaluation of cardiac clearance for knee surgery. Her PCP recently heard a bruit on her L carotid.     Pt was just recently diagnosed with DM with a a1c of 9.4. Her BGs have been running from 140s-240s and it is improving. Her lipids are borderline, but is on fenofibrate. Pt has been treated for HTN for 4 years, since her CVA. She had an idiopathic stroke in 2013 and still has some mild residual L sided weakness.  Has been on plavix since and was told she will be for life. She quit smoking at that time and has been vaping since. Does not exercise often due to her knee and muscular dystrophy.  Reports stable dyspnea on exertion. Reports snoring and occasional  PND. Never been tested for GIORGIO.     Pt denies any chest pain, dyspnea at rest, orthopnea, PND, palpitations, lower extremity edema, or claudication. Pt denies history of CHF, DVT, PE, MI, or rheumatic fever. She has 2 first cousins with sudden cardiac death in their sleep at young ages. The pt had been worked up by cards in [place] 10+ years ago due to this family history that was wnl, data deficient. Mother  of MI at 59.       ROS  Review of Systems   Constitution: Positive for malaise/fatigue.   Cardiovascular: Positive for dyspnea on exertion and paroxysmal nocturnal dyspnea. Negative for chest pain and palpitations.   Respiratory: Positive for snoring.    Musculoskeletal: Positive for arthritis, joint pain, muscle cramps, muscle weakness and myalgias.   All other systems reviewed and are negative.      SOCIAL HX  Social History     Social History   • Marital status:      Spouse name: N/A   • Number of children: N/A   • Years of education: N/A     Occupational History   • Not on file.     Social History Main Topics   • Smoking status: Former Smoker     Packs/day: 1.50     Years: 15.00     Start date: 1983     Quit date: 2013   • Smokeless tobacco: Not on file      Comment: use ecig that doesn't have nicotine   • Alcohol use No   • Drug use: No   • Sexual activity: Yes     Partners: Male     Birth control/ protection: Post-menopausal      Comment: Menapause no cycle since 2013     Other Topics Concern   • Not on file     Social History Narrative       FAMILY HX  Family History   Problem Relation Age of Onset   • Allergies Other    • ADD / ADHD Other    • Heart block Other    • Other Other      CEREBROVASCULAR ACCIDENT    • Hypertension Other    • Cancer Other      LUNG CANCER   • Breast cancer Other    • Hyperlipidemia Other    • Alcohol abuse Mother    • Depression Mother    • Early death Mother       age 59   • Heart disease Mother      Mother  of Massive Heart attack   •  "Hyperlipidemia Mother    • Hypertension Mother    • Miscarriages / Stillbirths Mother      Miscarriage   • Alcohol abuse Father    • Cancer Father      Had Lung Cancer - had 1 lung till death   • Diabetes Father      Lived on insuline shots   • Early death Father       age 53   • Cancer Maternal Aunt      Age 74 had Brast cancer survivior   • Learning disabilities Son      Had Adhd   • Ovarian cancer Neg Hx        Vitals:    10/12/17 1453   BP: 126/98   BP Location: Right arm   Patient Position: Sitting   Pulse: 114   SpO2: 97%   Weight: 271 lb (123 kg)   Height: 66\" (167.6 cm)       PHYSICAL EXAMINATION:  Physical Exam   Constitutional: She is oriented to person, place, and time. She appears well-developed and well-nourished. No distress.   obese   HENT:   Head: Normocephalic and atraumatic.   Right Ear: External ear normal.   Left Ear: External ear normal.   Nose: Nose normal.   Eyes: Conjunctivae and EOM are normal.   Neck: Neck supple. No hepatojugular reflux and no JVD present. Carotid bruit is not present. No thyromegaly present.   L sided carotid bruit   Cardiovascular: Regular rhythm, S1 normal, S2 normal, normal heart sounds, intact distal pulses and normal pulses.  Tachycardia present.  Exam reveals no gallop, no distant heart sounds and no midsystolic click.    No murmur heard.  Pulses:       Radial pulses are 2+ on the right side, and 2+ on the left side.        Dorsalis pedis pulses are 2+ on the right side, and 2+ on the left side.        Posterior tibial pulses are 2+ on the right side, and 2+ on the left side.   Pulmonary/Chest: Effort normal and breath sounds normal. No respiratory distress. She has no decreased breath sounds. She has no wheezes. She has no rhonchi. She has no rales.   Abdominal: Soft. Bowel sounds are normal. There is no hepatosplenomegaly. There is no tenderness.   Musculoskeletal: Normal range of motion. She exhibits edema.   Neurological: She is alert and oriented to " person, place, and time.   No focal deficits.   Skin: Skin is warm and dry. No erythema.   Psychiatric: She has a normal mood and affect. Thought content normal.   Nursing note and vitals reviewed.      Diagnostic Data:    ECG 12 Lead  Date/Time: 10/12/2017 3:37 PM  Performed by: ARCHIE STRICKLAND  Authorized by: ARCHIE STRICKLAND   Rhythm: sinus tachycardia  BPM: 114  Clinical impression: non-specific ECG  Comments: Nl except for tachycardia          Lab Results   Component Value Date    TRIG 111 01/26/2017    HDL 66 (H) 01/26/2017    LDLDIRECT 114 01/26/2017     Lab Results   Component Value Date    GLUCOSE 209 (H) 10/04/2017    BUN 18 10/04/2017    CREATININE 0.50 (L) 10/04/2017     10/04/2017    K 4.1 10/04/2017    CL 99 10/04/2017    CO2 28.0 10/04/2017     Lab Results   Component Value Date    HGBA1C 9.4 10/04/2017     Lab Results   Component Value Date    WBC 4.52 10/04/2017    HGB 14.5 10/04/2017    HCT 43.2 10/04/2017     10/04/2017       ASSESSMENT:   Diagnosis Plan   1. Preop cardiovascular exam     2. Essential hypertension     3. Hyperlipidemia, unspecified hyperlipidemia type     4. PND (paroxysmal nocturnal dyspnea)  Overnight Sleep Oximetry Study   5. Type 2 diabetes mellitus without complication, without long-term current use of insulin     6. Bruit of left carotid artery  Duplex Carotid Ultrasound CAR       PLAN:  1. Pt acceptable cardiac risk for knee surgery  2. HTN well controlled, continue antihypertensives  3. Lipids slightly elevated on fenofibrate  4. PND, will screen for GIORGIO with overnight pulseox  5. DM: pt counseled on the importance of tight glycemic control to prevent complications of diabetes on multiple body symptoms. Pt counseled that cardiac risk goes up with a1c above 7. Pt counseled to avoid high carb foods such as bread, pasta, potatoes, sweets.   6. Pt counseled that due to pt's carotid angiogram 4 years ago was clean, its very unlikely that the carotid bruit is due to  blockage. Pt however wishes to pursue ultrasound so that she can stop worrying about it.   7. Pt encouraged in her pursuit of gastric sleeve in the future.      Scribed for Robert Peralta MD by Patsy Ferrari PA-C. 10/12/2017  3:39 PM    Robert Peralta MD, [facility]    I, Robert Peralta MD, personally performed the services described in this documentation as scribed by the above named individual in my presence, and it is both accurate and complete.  10/26/2017  1:14 PM

## 2017-10-16 ENCOUNTER — TELEPHONE (OUTPATIENT)
Dept: INTERNAL MEDICINE | Facility: CLINIC | Age: 47
End: 2017-10-16

## 2017-10-16 DIAGNOSIS — G71.00 MUSCULAR DYSTROPHY (HCC): ICD-10-CM

## 2017-10-16 RX ORDER — DICLOFENAC SODIUM 75 MG/1
75 TABLET, DELAYED RELEASE ORAL 2 TIMES DAILY
Qty: 30 TABLET | Refills: 5 | Status: SHIPPED | OUTPATIENT
Start: 2017-10-16 | End: 2017-11-09 | Stop reason: SDUPTHER

## 2017-10-16 NOTE — TELEPHONE ENCOUNTER
Please check to see if the patient signed a controlled substance agreement when she saw Ronel Cooney on 10/4/17.  If not, she will need to sign one.  Please do a Nelson.      Also confirm with the patient whether she is seeing pain management and getting any medications from them.    Also, make sure the patient is aware that both Dr. Giraldo and Ronel Cooney are leaving, and she will need to find a new PCP outside of this practice.

## 2017-10-16 NOTE — TELEPHONE ENCOUNTER
----- Message from Dona Nuñez sent at 10/16/2017  4:20 PM EDT -----  [phone]  REFILL ON HYDROCODONE 10  PILLS   CALL PT WHEN READY FOR

## 2017-10-18 NOTE — TELEPHONE ENCOUNTER
Spoke with pt who states she was sent to pain management but was told that her pain was being managed fine by PCP.  Pt states she is almost out of meds and would like to get script soon.

## 2017-10-19 RX ORDER — HYDROCODONE BITARTRATE AND ACETAMINOPHEN 7.5; 325 MG/1; MG/1
1 TABLET ORAL 3 TIMES DAILY PRN
Qty: 90 TABLET | Refills: 0 | Status: SHIPPED | OUTPATIENT
Start: 2017-10-19 | End: 2017-11-13

## 2017-10-19 NOTE — TELEPHONE ENCOUNTER
Nelson reviewed.  rx printed for Norco 7.5 mg TID.  This rx should last for 1 month if she takes one pill every 6 hours during the day.  Nelson reveals she is taking more often than that.   I am not comfortable with prescribing more than that.

## 2017-10-19 NOTE — TELEPHONE ENCOUNTER
She takes Norco for Muscular Dis trophyy and knee pain   Last month Dr Giraldo increased her Norco to 10mg from the 7.5mg as she said she was not getting relief from pain.  She states she takes Norco 10mg Q6 hrs   She see Dr Lamar at  for her Muscular Distorphy who will also perscribe her Gabapentin .  She states sometimes Dr Giraldo would give her Gabapentin. Explained to her that when she signs a CSA she can only get controlled medication from that provider.     Advised her that she will need to stop in and sign a CSA when she picks up her Rx.  She aggreed to this.     She states she has a appt on Nov 13/ 17 at [facility] to establish care.

## 2017-10-19 NOTE — TELEPHONE ENCOUNTER
Nelson reviewed.  She was getting [place] from ortho for right knee pain.  Is that what the Norco is for?  Why is the dose increased?  Why does she need so many pills?

## 2017-10-23 ENCOUNTER — TELEPHONE (OUTPATIENT)
Dept: INTERNAL MEDICINE | Facility: CLINIC | Age: 47
End: 2017-10-23

## 2017-10-23 RX ORDER — MONTELUKAST SODIUM 10 MG/1
10 TABLET ORAL NIGHTLY
Qty: 30 TABLET | Refills: 1 | Status: SHIPPED | OUTPATIENT
Start: 2017-10-23 | End: 2017-10-23 | Stop reason: SDUPTHER

## 2017-10-23 NOTE — TELEPHONE ENCOUNTER
Spoke with pt and informed her of statement below. Pt verbalized understanding. Pt states that her #'s has not been going down and ranging between 119-187 and wants to know if she can get her Rx Jardiance increased. Pt has an upcoming apt scheduled on 11/13/17 in [facility] and wants to know if we can take care of this before then. Please call pt back @ [phone].

## 2017-10-23 NOTE — TELEPHONE ENCOUNTER
----- Message from Lyric Howell sent at 10/23/2017 11:52 AM EDT -----  Patient states she's returning a call from nurse. Patient had requested a call back when available, patient also has a few questions about prescriptions as well.     Call back number: [phone]    Thank you.

## 2017-10-23 NOTE — TELEPHONE ENCOUNTER
Even though those sugar readings are a little high, they are not bad.  I would rather wait until she sees her new PCP and see what the A1C is before the dose is changed.

## 2017-10-24 RX ORDER — AMLODIPINE BESYLATE 2.5 MG/1
2.5 TABLET ORAL DAILY
Qty: 90 TABLET | Refills: 0 | Status: SHIPPED | OUTPATIENT
Start: 2017-10-24 | End: 2018-06-04 | Stop reason: SDUPTHER

## 2017-10-24 RX ORDER — MONTELUKAST SODIUM 10 MG/1
TABLET ORAL
Qty: 90 TABLET | Refills: 1 | Status: SHIPPED | OUTPATIENT
Start: 2017-10-24 | End: 2017-11-29 | Stop reason: SDUPTHER

## 2017-10-26 ENCOUNTER — TRANSCRIBE ORDERS (OUTPATIENT)
Dept: ADMINISTRATIVE | Facility: HOSPITAL | Age: 47
End: 2017-10-26

## 2017-10-26 DIAGNOSIS — Z12.31 VISIT FOR SCREENING MAMMOGRAM: Primary | ICD-10-CM

## 2017-10-27 ENCOUNTER — HOSPITAL ENCOUNTER (OUTPATIENT)
Dept: CARDIOLOGY | Facility: HOSPITAL | Age: 47
Discharge: HOME | End: 2017-10-27
Admitting: PHYSICIAN ASSISTANT

## 2017-10-27 DIAGNOSIS — R09.89 BRUIT OF LEFT CAROTID ARTERY: ICD-10-CM

## 2017-10-27 LAB
BH CV XLRA MEAS LEFT DIST CCA EDV: 42 CM/SEC
BH CV XLRA MEAS LEFT DIST CCA PSV: 121 CM/SEC
BH CV XLRA MEAS LEFT DIST ICA EDV: 40 CM/SEC
BH CV XLRA MEAS LEFT DIST ICA PSV: 90 CM/SEC
BH CV XLRA MEAS LEFT ICA/CCA RATIO: 0.8
BH CV XLRA MEAS LEFT MID ICA EDV: 39 CM/SEC
BH CV XLRA MEAS LEFT MID ICA PSV: 97 CM/SEC
BH CV XLRA MEAS LEFT PROX CCA EDV: 47 CM/SEC
BH CV XLRA MEAS LEFT PROX CCA PSV: 130 CM/SEC
BH CV XLRA MEAS LEFT PROX ECA EDV: 25 CM/SEC
BH CV XLRA MEAS LEFT PROX ECA PSV: 101 CM/SEC
BH CV XLRA MEAS LEFT PROX ICA EDV: 36 CM/SEC
BH CV XLRA MEAS LEFT PROX ICA PSV: 93 CM/SEC
BH CV XLRA MEAS LEFT PROX SCLA EDV: 18 CM/SEC
BH CV XLRA MEAS LEFT PROX SCLA PSV: 161 CM/SEC
BH CV XLRA MEAS LEFT VERTEBRAL A EDV: 34 CM/SEC
BH CV XLRA MEAS LEFT VERTEBRAL A PSV: 67 CM/SEC
BH CV XLRA MEAS RIGHT DIST CCA EDV: 31 CM/SEC
BH CV XLRA MEAS RIGHT DIST CCA PSV: 90 CM/SEC
BH CV XLRA MEAS RIGHT DIST ICA EDV: 50 CM/SEC
BH CV XLRA MEAS RIGHT DIST ICA PSV: 117 CM/SEC
BH CV XLRA MEAS RIGHT ICA/CCA RATIO: 0.9
BH CV XLRA MEAS RIGHT MID ICA EDV: 35 CM/SEC
BH CV XLRA MEAS RIGHT MID ICA PSV: 84 CM/SEC
BH CV XLRA MEAS RIGHT PROX CCA EDV: 30 CM/SEC
BH CV XLRA MEAS RIGHT PROX CCA PSV: 106 CM/SEC
BH CV XLRA MEAS RIGHT PROX ECA EDV: 28 CM/SEC
BH CV XLRA MEAS RIGHT PROX ECA PSV: 134 CM/SEC
BH CV XLRA MEAS RIGHT PROX ICA EDV: 30 CM/SEC
BH CV XLRA MEAS RIGHT PROX ICA PSV: 85 CM/SEC
BH CV XLRA MEAS RIGHT PROX SCLA EDV: 11 CM/SEC
BH CV XLRA MEAS RIGHT PROX SCLA PSV: 124 CM/SEC
BH CV XLRA MEAS RIGHT VERTEBRAL A EDV: 28 CM/SEC
BH CV XLRA MEAS RIGHT VERTEBRAL A PSV: 70 CM/SEC
LEFT ARM BP: NORMAL MMHG
RIGHT ARM BP: NORMAL MMHG

## 2017-10-27 PROCEDURE — 93880 EXTRACRANIAL BILAT STUDY: CPT

## 2017-10-27 PROCEDURE — 93880 EXTRACRANIAL BILAT STUDY: CPT | Performed by: INTERNAL MEDICINE

## 2017-10-27 RX ORDER — FENOFIBRATE 145 MG/1
145 TABLET, COATED ORAL DAILY
Qty: 30 TABLET | Refills: 0 | Status: SHIPPED | OUTPATIENT
Start: 2017-10-27 | End: 2018-02-12 | Stop reason: SDUPTHER

## 2017-11-09 RX ORDER — PANTOPRAZOLE SODIUM 40 MG/1
40 TABLET, DELAYED RELEASE ORAL DAILY
Qty: 30 TABLET | Refills: 0 | Status: SHIPPED | OUTPATIENT
Start: 2017-11-09 | End: 2018-02-12 | Stop reason: SDUPTHER

## 2017-11-09 RX ORDER — DICLOFENAC SODIUM 75 MG/1
TABLET, DELAYED RELEASE ORAL
Qty: 30 TABLET | Refills: 0 | Status: SHIPPED | OUTPATIENT
Start: 2017-11-09 | End: 2017-11-13 | Stop reason: SDUPTHER

## 2017-11-13 ENCOUNTER — OFFICE VISIT (OUTPATIENT)
Dept: INTERNAL MEDICINE | Facility: CLINIC | Age: 47
End: 2017-11-13

## 2017-11-13 VITALS
SYSTOLIC BLOOD PRESSURE: 140 MMHG | TEMPERATURE: 98.4 F | WEIGHT: 271.8 LBS | DIASTOLIC BLOOD PRESSURE: 82 MMHG | HEIGHT: 66 IN | BODY MASS INDEX: 43.68 KG/M2

## 2017-11-13 DIAGNOSIS — R79.89 ELEVATED LFTS: ICD-10-CM

## 2017-11-13 DIAGNOSIS — F41.9 ANXIETY AND DEPRESSION: ICD-10-CM

## 2017-11-13 DIAGNOSIS — G71.11 MYOTONIC DYSTROPHY, TYPE 2 (HCC): Primary | ICD-10-CM

## 2017-11-13 DIAGNOSIS — F32.A ANXIETY AND DEPRESSION: ICD-10-CM

## 2017-11-13 DIAGNOSIS — E11.9 TYPE 2 DIABETES MELLITUS WITHOUT COMPLICATION, WITHOUT LONG-TERM CURRENT USE OF INSULIN (HCC): ICD-10-CM

## 2017-11-13 DIAGNOSIS — M79.7 FIBROMYALGIA: ICD-10-CM

## 2017-11-13 DIAGNOSIS — I10 ESSENTIAL HYPERTENSION: ICD-10-CM

## 2017-11-13 LAB
ALBUMIN SERPL-MCNC: 4.6 G/DL (ref 3.2–4.8)
ALBUMIN/GLOB SERPL: 1.9 G/DL (ref 1.5–2.5)
ALP SERPL-CCNC: 99 U/L (ref 25–100)
ALT SERPL W P-5'-P-CCNC: 46 U/L (ref 7–40)
ANION GAP SERPL CALCULATED.3IONS-SCNC: 9 MMOL/L (ref 3–11)
AST SERPL-CCNC: 38 U/L (ref 0–33)
BILIRUB SERPL-MCNC: 0.4 MG/DL (ref 0.3–1.2)
BUN BLD-MCNC: 18 MG/DL (ref 9–23)
BUN/CREAT SERPL: 30 (ref 7–25)
CALCIUM SPEC-SCNC: 9.9 MG/DL (ref 8.7–10.4)
CHLORIDE SERPL-SCNC: 102 MMOL/L (ref 99–109)
CO2 SERPL-SCNC: 30 MMOL/L (ref 20–31)
CREAT BLD-MCNC: 0.6 MG/DL (ref 0.6–1.3)
GFR SERPL CREATININE-BSD FRML MDRD: 107 ML/MIN/1.73
GLOBULIN UR ELPH-MCNC: 2.4 GM/DL
GLUCOSE BLD-MCNC: 141 MG/DL (ref 70–100)
POTASSIUM BLD-SCNC: 4.7 MMOL/L (ref 3.5–5.5)
PROT SERPL-MCNC: 7 G/DL (ref 5.7–8.2)
SODIUM BLD-SCNC: 141 MMOL/L (ref 132–146)

## 2017-11-13 PROCEDURE — 80053 COMPREHEN METABOLIC PANEL: CPT | Performed by: INTERNAL MEDICINE

## 2017-11-13 PROCEDURE — 99204 OFFICE O/P NEW MOD 45 MIN: CPT | Performed by: INTERNAL MEDICINE

## 2017-11-13 RX ORDER — HYDROCODONE BITARTRATE AND ACETAMINOPHEN 10; 325 MG/1; MG/1
1 TABLET ORAL EVERY 6 HOURS PRN
Qty: 120 TABLET | Refills: 0 | Status: SHIPPED | OUTPATIENT
Start: 2017-11-13 | End: 2017-12-14 | Stop reason: SDUPTHER

## 2017-11-13 RX ORDER — DICLOFENAC SODIUM 75 MG/1
75 TABLET, DELAYED RELEASE ORAL 2 TIMES DAILY
Qty: 60 TABLET | Refills: 5 | Status: SHIPPED | OUTPATIENT
Start: 2017-11-13 | End: 2018-06-12 | Stop reason: SDUPTHER

## 2017-11-13 RX ORDER — BLOOD-GLUCOSE METER
EACH MISCELLANEOUS
COMMUNITY
Start: 2017-10-05

## 2017-11-13 RX ORDER — HYDROCODONE BITARTRATE AND ACETAMINOPHEN 10; 325 MG/1; MG/1
1 TABLET ORAL EVERY 6 HOURS PRN
Qty: 120 TABLET | Refills: 0 | Status: SHIPPED | OUTPATIENT
Start: 2017-11-13 | End: 2017-11-13 | Stop reason: SDUPTHER

## 2017-11-13 RX ORDER — BUSPIRONE HYDROCHLORIDE 10 MG/1
10 TABLET ORAL 2 TIMES DAILY
Qty: 60 TABLET | Refills: 2 | Status: SHIPPED | OUTPATIENT
Start: 2017-11-13 | End: 2018-03-03 | Stop reason: SDUPTHER

## 2017-11-13 NOTE — PATIENT INSTRUCTIONS
Acetaminophen; Hydrocodone tablets or capsules  What is this medicine?  ACETAMINOPHEN; HYDROCODONE (a set a DENIZ wayne fen; maria luz droe KOE done) is a pain reliever. It is used to treat moderate to severe pain.  This medicine may be used for other purposes; ask your health care provider or pharmacist if you have questions.  COMMON BRAND NAME(S): Anexsia, Bancap HC, Ceta-Plus, Co-Gesic, Comfortpak, Dolagesic, Dolorex Forte, DuoCet, Hydrocet, Hydrogesic, Lorcet, Lorcet HD, Lorcet Plus, Lortab, Margesic H, Maxidone, Norco, Polygesic, Stagesic, Vanacet, Verdrocet, Vicodin, Vicodin ES, Vicodin HP, Xodol, Zydone  What should I tell my health care provider before I take this medicine?  They need to know if you have any of these conditions:  -brain tumor  -Crohn's disease, inflammatory bowel disease, or ulcerative colitis  -drug abuse or addiction  -head injury  -heart or circulation problems  -if you often drink alcohol  -kidney disease or problems going to the bathroom  -liver disease  -lung disease, asthma, or breathing problems  -an unusual or allergic reaction to acetaminophen, hydrocodone, other opioid analgesics, other medicines, foods, dyes, or preservatives  -pregnant or trying to get pregnant  -breast-feeding  How should I use this medicine?  Take this medicine by mouth with a glass of water. Follow the directions on the prescription label. You can take it with or without food. If it upsets your stomach, take it with food. Do not take your medicine more often than directed.  A special MedGuide will be given to you by the pharmacist with each prescription and refill. Be sure to read this information carefully each time.  Talk to your pediatrician regarding the use of this medicine in children. Special care may be needed.  Overdosage: If you think you have taken too much of this medicine contact a poison control center or emergency room at once.  NOTE: This medicine is only for you. Do not share this medicine with  others.  What if I miss a dose?  If you miss a dose, take it as soon as you can. If it is almost time for your next dose, take only that dose. Do not take double or extra doses.  What may interact with this medicine?  This medicine may interact with the following medications:  -alcohol  -antiviral medicines for HIV or AIDS  -atropine  -antihistamines for allergy, cough and cold  -certain antibiotics like erythromycin, clarithromycin  -certain medicines for anxiety or sleep  -certain medicines for bladder problems like oxybutynin, tolterodine  -certain medicines for depression like amitriptyline, fluoxetine, sertraline  -certain medicines for fungal infections like ketoconazole and itraconazole  -certain medicines for Parkinson's disease like benztropine, trihexyphenidyl  -certain medicines for seizures like carbamazepine, phenobarbital, phenytoin, primidone  -certain medicines for stomach problems like dicyclomine, hyoscyamine  -certain medicines for travel sickness like scopolamine  -general anesthetics like halothane, isoflurane, methoxyflurane, propofol  -ipratropium  -local anesthetics like lidocaine, pramoxine, tetracaine  -MAOIs like Carbex, Eldepryl, Marplan, Nardil, and Parnate  -medicines that relax muscles for surgery  -other medicines with acetaminophen  -other narcotic medicines for pain or cough  -phenothiazines like chlorpromazine, mesoridazine, prochlorperazine, thioridazine  -rifampin  This list may not describe all possible interactions. Give your health care provider a list of all the medicines, herbs, non-prescription drugs, or dietary supplements you use. Also tell them if you smoke, drink alcohol, or use illegal drugs. Some items may interact with your medicine.  What should I watch for while using this medicine?  Tell your doctor or health care professional if your pain does not go away, if it gets worse, or if you have new or a different type of pain. You may develop tolerance to the medicine.  Tolerance means that you will need a higher dose of the medicine for pain relief. Tolerance is normal and is expected if you take the medicine for a long time.  Do not suddenly stop taking your medicine because you may develop a severe reaction. Your body becomes used to the medicine. This does NOT mean you are addicted. Addiction is a behavior related to getting and using a drug for a non-medical reason. If you have pain, you have a medical reason to take pain medicine. Your doctor will tell you how much medicine to take. If your doctor wants you to stop the medicine, the dose will be slowly lowered over time to avoid any side effects.  There are different types of narcotic medicines (opiates). If you take more than one type at the same time or if you are taking another medicine that also causes drowsiness, you may have more side effects. Give your health care provider a list of all medicines you use. Your doctor will tell you how much medicine to take. Do not take more medicine than directed. Call emergency for help if you have problems breathing or unusual sleepiness.  Do not take other medicines that contain acetaminophen with this medicine. Always read labels carefully. If you have questions, ask your doctor or pharmacist.  If you take too much acetaminophen get medical help right away. Too much acetaminophen can be very dangerous and cause liver damage. Even if you do not have symptoms, it is important to get help right away.  You may get drowsy or dizzy. Do not drive, use machinery, or do anything that needs mental alertness until you know how this medicine affects you. Do not stand or sit up quickly, especially if you are an older patient. This reduces the risk of dizzy or fainting spells. Alcohol may interfere with the effect of this medicine. Avoid alcoholic drinks.  The medicine will cause constipation. Try to have a bowel movement at least every 2 to 3 days. If you do not have a bowel movement for 3  days, call your doctor or health care professional.  Your mouth may get dry. Chewing sugarless gum or sucking hard candy, and drinking plenty of water may help. Contact your doctor if the problem does not go away or is severe.  What side effects may I notice from receiving this medicine?  Side effects that you should report to your doctor or health care professional as soon as possible:  -allergic reactions like skin rash, itching or hives, swelling of the face, lips, or tongue  -breathing problems  -confusion  -redness, blistering, peeling or loosening of the skin, including inside the mouth  -signs and symptoms of low blood pressure like dizziness; feeling faint or lightheaded, falls; unusually weak or tired  -trouble passing urine or change in the amount of urine  -yellowing of the eyes or skin  Side effects that usually do not require medical attention (report to your doctor or health care professional if they continue or are bothersome):  -constipation  -dry mouth  -nausea, vomiting  -tiredness  This list may not describe all possible side effects. Call your doctor for medical advice about side effects. You may report side effects to FDA at 5-647-FDA-9043.  Where should I keep my medicine?  Keep out of the reach of children. This medicine can be abused. Keep your medicine in a safe place to protect it from theft. Do not share this medicine with anyone. Selling or giving away this medicine is dangerous and against the law.  This medicine may cause accidental overdose and death if it taken by other adults, children, or pets. Mix any unused medicine with a substance like cat litter or coffee grounds. Then throw the medicine away in a sealed container like a sealed bag or a coffee can with a lid. Do not use the medicine after the expiration date.  Store at room temperature between 15 and 30 degrees C (59 and 86 degrees F).  NOTE: This sheet is a summary. It may not cover all possible information. If you have  questions about this medicine, talk to your doctor, pharmacist, or health care provider.     © 2017, [employer]. (2016-09-09 10:02:16)

## 2017-11-13 NOTE — PROGRESS NOTES
Subjective   Hafsa Barron is a 46 y.o. female here to establish care for myotonic dystrophy type II, peripheral neuropathy, FM, HTN, DMII, A&D.  A&D: controlled currently on regimen. Wants to continue this. Buspar works well. DMII: newly started jardiance and has not noticed a big improvement with her glc measurements at home. Glc is still ~180s and she is taking metformin and jardiance daily. HTN: no issues, takes med daily. Neuropathy: related to her MD and diabetes, prescribed gabapentin by neuro at  (Dr. Lamar). MD type II: adult onset, noticed it in her 20s where her legs would give out and she would fall often. She has associated FM with this. She has been on Norco chronically; dose had to be recently increased due to increased pain from left knee pain (awaiting surgery by ortho). Says she has degeneration in the knee and it needs to be replaced. Because of that worsening pain Dr. Giraldo her last PCP increased to Norco 10mg which has really improved her sx of pain and functionality. She denies abuse or misuse of her medications. Has been to pain mgmt in the past and does not prefer to go there again; she was not discharged.    Review of Systems   HENT: Negative.    Eyes: Negative.    Respiratory: Negative.    Cardiovascular: Negative.    Gastrointestinal: Negative.    Endocrine:        Hyperglycemia   Genitourinary: Negative.    Musculoskeletal: Positive for arthralgias, back pain and myalgias.   Skin: Negative.    Allergic/Immunologic: Negative.    Neurological: Positive for weakness. Negative for seizures.   Hematological: Negative.    Psychiatric/Behavioral: The patient is nervous/anxious.        Past Medical History:   Diagnosis Date   • Allergic    • Allergy    • Anxiety    • Asthma Allergies induced   • Depression    • Fibromyalgia, primary    • GERD (gastroesophageal reflux disease)    • Headache    • Hyperlipidemia    • Hypertension    • IBS (irritable bowel syndrome)    • Internal hemorrhoid    •  Kidney stone    • Low back pain    • Muscular dystrophy    • Obesity    • Stroke 2013    resdual- left sided weakness.    • Type 2 diabetes mellitus without complication, without long-term current use of insulin      Family History   Problem Relation Age of Onset   • Allergies Other    • ADD / ADHD Other    • Heart block Other    • Other Other      CEREBROVASCULAR ACCIDENT    • Hypertension Other    • Cancer Other      LUNG CANCER   • Breast cancer Other    • Hyperlipidemia Other    • Alcohol abuse Mother    • Depression Mother    • Early death Mother       age 59   • Heart disease Mother      Mother  of Massive Heart attack   • Hyperlipidemia Mother    • Hypertension Mother    • Miscarriages / Stillbirths Mother      Miscarriage   • Alcohol abuse Father    • Cancer Father      Had Lung Cancer  had 1 lung till death   • Diabetes Father      Lived on insuline shots   • Early death Father       age 53   • Cancer Maternal Aunt      Age 74 had Brast cancer survivior   • Learning disabilities Son      Had Adhd   • Ovarian cancer Neg Hx      Past Surgical History:   Procedure Laterality Date   • CHOLECYSTECTOMY     • COLONOSCOPY     • GALLBLADDER SURGERY     • LYMPH NODE BIOPSY       Social History     Social History   • Marital status:      Spouse name: N/A   • Number of children: N/A   • Years of education: N/A     Occupational History   • Not on file.     Social History Main Topics   • Smoking status: Former Smoker     Packs/day: 1.50     Years: 15.00     Start date: 1983     Quit date: 2013   • Smokeless tobacco: Never Used      Comment: use ecig that doesn't have nicotine   • Alcohol use No   • Drug use: No   • Sexual activity: Yes     Partners: Male     Birth control/ protection: Post-menopausal      Comment: Menapause no cycle since 2013     Other Topics Concern   • Not on file     Social History Narrative         Current Outpatient Prescriptions:   •  albuterol  (PROAIR HFA) 108 (90 BASE) MCG/ACT inhaler, Inhale 1 puff Every 6 (Six) Hours As Needed for Wheezing., Disp: 90 inhaler, Rfl: 2  •  amLODIPine (NORVASC) 2.5 MG tablet, Take 1 tablet by mouth Daily., Disp: 90 tablet, Rfl: 0  •  baclofen (LIORESAL) 10 MG tablet, Take  by mouth 3 (Three) Times a Day., Disp: , Rfl:   •  Blood Glucose Monitoring Suppl (ONE TOUCH ULTRA 2) w/Device kit, , Disp: , Rfl:   •  CALCIUM 600+D HIGH POTENCY 600-400 MG-UNIT per tablet, TAKE 1 TABLET BY MOUTH EVERY DAY, Disp: 30 tablet, Rfl: 3  •  clopidogrel (PLAVIX) 75 MG tablet, Take 1 tablet by mouth Daily., Disp: 90 tablet, Rfl: 2  •  cyclobenzaprine (FLEXERIL) 10 MG tablet, Take 10 mg by mouth 3 (Three) Times a Day As Needed for Muscle Spasms., Disp: , Rfl:   •  diclofenac (VOLTAREN) 75 MG EC tablet, TAKE 1 TABLET BY MOUTH TWICE DAILY, Disp: 30 tablet, Rfl: 0  •  DULoxetine (CYMBALTA) 60 MG capsule, Take 1 capsule by mouth daily., Disp: 90 capsule, Rfl: 2  •  Empagliflozin (JARDIANCE) 10 MG tablet, Take 10 mg by mouth Daily., Disp: 30 tablet, Rfl: 2  •  fenofibrate (TRICOR) 145 MG tablet, Take 1 tablet by mouth Daily., Disp: 30 tablet, Rfl: 0  •  fluticasone (FLONASE ALLERGY RELIEF) 50 MCG/ACT nasal spray, into each nostril., Disp: , Rfl:   •  gabapentin (NEURONTIN) 600 MG tablet, TAKE 1 TABLET BY MOUTH THREE TIMES DAILY, Disp: 270 tablet, Rfl: 0  •  HYDROcodone-acetaminophen (NORCO) 7.5-325 MG per tablet, Take 1 tablet by mouth 3 (Three) Times a Day As Needed for Moderate Pain ., Disp: 90 tablet, Rfl: 0  •  loratadine (CLARITIN) 10 MG tablet, Take 1 tablet by mouth daily., Disp: 30 tablet, Rfl: 11  •  metFORMIN (GLUCOPHAGE) 1000 MG tablet, Take 1 tablet by mouth 2 (Two) Times a Day With Meals. NOTE THIS IS A DOSE INCREASE PER DR JAFFE, Disp: 60 tablet, Rfl: 4  •  montelukast (SINGULAIR) 10 MG tablet, TAKE 1 TABLET BY MOUTH EVERY NIGHT, Disp: 90 tablet, Rfl: 1  •  ONE TOUCH ULTRA TEST test strip, , Disp: , Rfl:   •  pantoprazole (PROTONIX)  "40 MG EC tablet, Take 1 tablet by mouth Daily., Disp: 30 tablet, Rfl: 0  •  potassium chloride (K-DUR,KLOR-CON) 20 MEQ CR tablet, TAKE 1 TABLET BY MOUTH EVERY DAY, Disp: 90 tablet, Rfl: 0  •  spironolactone (ALDACTONE) 50 MG tablet, TAKE 1 TABLET BY MOUTH TWICE DAILY, Disp: 180 tablet, Rfl: 1  •  tiZANidine (ZANAFLEX) 4 MG tablet, TAKE 1 TABLET BY MOUTH THREE TIMES DAILY, Disp: 270 tablet, Rfl: 2    Objective   /82 (BP Location: Right arm, Patient Position: Sitting, Cuff Size: Large Adult)  Temp 98.4 °F (36.9 °C) (Temporal Artery )   Ht 66\" (167.6 cm)  Wt 271 lb 12.8 oz (123 kg)  BMI 43.87 kg/m2  Physical Exam   Constitutional: She is oriented to person, place, and time. She appears well-developed and well-nourished.   HENT:   Head: Normocephalic and atraumatic.   Nose: Nose normal.   Eyes: Conjunctivae are normal.   Cardiovascular: Normal rate, regular rhythm and normal heart sounds.  Exam reveals no gallop and no friction rub.    No murmur heard.  Pulmonary/Chest: Effort normal and breath sounds normal. She has no wheezes.   Musculoskeletal:   Walks with a cane. Left sided mild hemiparesis in extremities   Neurological: She is alert and oriented to person, place, and time.   Skin: Skin is warm and dry.   Psychiatric: She has a normal mood and affect. Her behavior is normal. Judgment and thought content normal.   Vitals reviewed.      Assessment/Plan   Hafsa was seen today for establish care.    Diagnoses and all orders for this visit:    Myotonic dystrophy, type 2  -     Discontinue: HYDROcodone-acetaminophen (NORCO)  MG per tablet; Take 1 tablet by mouth Every 6 (Six) Hours As Needed for Moderate Pain .  -     UDS trav [facility] done, consistent except one overlap by same provider when does was changed from 7.5mg to 10mg. New controlled substance agreement signed.  TRAV query complete. Treatment plan to include course of prescribed  controlled substance. Risks including addiction, benefits, and " alternatives presented to patient.   -     diclofenac (VOLTAREN) 75 MG EC tablet; Take 1 tablet by mouth 2 (Two) Times a Day.  -     HYDROcodone-acetaminophen (NORCO)  MG per tablet; Take 1 tablet by mouth Every 6 (Six) Hours As Needed for Moderate Pain .    Type 2 diabetes mellitus without complication, without long-term current use of insulin  -     Empagliflozin (JARDIANCE) 25 MG tablet; Take 1 tablet by mouth Daily. Increased from 10mg as glc review from glucometer with uncontrolled glc    Essential hypertension  -elevated a bit today, will follow closely    Fibromyalgia  -see above pain regimen    Anxiety and depression  -     busPIRone (BUSPAR) 10 MG tablet; Take 1 tablet by mouth 2 (Two) Times a Day.    Elevated LFTs  -     Comprehensive Metabolic Panel. New problem. Discussed with pt. Will get repeat CMP to анна.

## 2017-11-14 ENCOUNTER — TELEPHONE (OUTPATIENT)
Dept: INTERNAL MEDICINE | Facility: CLINIC | Age: 47
End: 2017-11-14

## 2017-11-14 ENCOUNTER — TELEPHONE (OUTPATIENT)
Dept: CARDIOLOGY | Facility: CLINIC | Age: 47
End: 2017-11-14

## 2017-11-14 NOTE — TELEPHONE ENCOUNTER
----- Message from Jocy Snow MD sent at 11/14/2017 11:43 AM EST -----  Please call the patient regarding her abnormal result. Tell her liver function tests were a bit improved from last check and I think they are coming down. I will re-test again in 1 month and if again still elevated then I will pursue a workup of as to why they are elevated.

## 2017-11-14 NOTE — TELEPHONE ENCOUNTER
Patient called about carotid duplex results and to let us know that she has not hear about her overnight ox. Informed her that her test showed on right and left  internal carotid artery plaque without significant stenosis. Will refax info to patient aids for overnight ox.

## 2017-11-29 ENCOUNTER — TELEPHONE (OUTPATIENT)
Dept: INTERNAL MEDICINE | Facility: CLINIC | Age: 47
End: 2017-11-29

## 2017-11-29 RX ORDER — MONTELUKAST SODIUM 10 MG/1
10 TABLET ORAL NIGHTLY
Qty: 90 TABLET | Refills: 1 | Status: SHIPPED | OUTPATIENT
Start: 2017-11-29 | End: 2018-09-24 | Stop reason: SDUPTHER

## 2017-12-01 ENCOUNTER — HOSPITAL ENCOUNTER (OUTPATIENT)
Dept: MAMMOGRAPHY | Facility: HOSPITAL | Age: 47
Discharge: HOME | End: 2017-12-01
Admitting: INTERNAL MEDICINE

## 2017-12-01 DIAGNOSIS — Z12.31 VISIT FOR SCREENING MAMMOGRAM: ICD-10-CM

## 2017-12-01 PROCEDURE — 77063 BREAST TOMOSYNTHESIS BI: CPT

## 2017-12-01 PROCEDURE — G0202 SCR MAMMO BI INCL CAD: HCPCS

## 2017-12-02 PROCEDURE — 77063 BREAST TOMOSYNTHESIS BI: CPT | Performed by: RADIOLOGY

## 2017-12-02 PROCEDURE — 77067 SCR MAMMO BI INCL CAD: CPT | Performed by: RADIOLOGY

## 2017-12-13 ENCOUNTER — TELEPHONE (OUTPATIENT)
Dept: INTERNAL MEDICINE | Facility: CLINIC | Age: 47
End: 2017-12-13

## 2017-12-14 DIAGNOSIS — G71.11 MYOTONIC DYSTROPHY, TYPE 2 (HCC): ICD-10-CM

## 2017-12-14 RX ORDER — HYDROCODONE BITARTRATE AND ACETAMINOPHEN 10; 325 MG/1; MG/1
1 TABLET ORAL EVERY 6 HOURS PRN
Qty: 120 TABLET | Refills: 0 | Status: CANCELLED | OUTPATIENT
Start: 2017-12-14

## 2017-12-14 RX ORDER — HYDROCODONE BITARTRATE AND ACETAMINOPHEN 10; 325 MG/1; MG/1
1 TABLET ORAL EVERY 6 HOURS PRN
Qty: 120 TABLET | Refills: 0 | Status: SHIPPED | OUTPATIENT
Start: 2017-12-14 | End: 2018-01-11 | Stop reason: SDUPTHER

## 2017-12-18 ENCOUNTER — LAB (OUTPATIENT)
Dept: INTERNAL MEDICINE | Facility: CLINIC | Age: 47
End: 2017-12-18

## 2017-12-18 DIAGNOSIS — R79.89 ELEVATED LFTS: Primary | ICD-10-CM

## 2017-12-18 LAB
ALBUMIN SERPL-MCNC: 4.6 G/DL (ref 3.2–4.8)
ALBUMIN/GLOB SERPL: 2.1 G/DL (ref 1.5–2.5)
ALP SERPL-CCNC: 98 U/L (ref 25–100)
ALT SERPL W P-5'-P-CCNC: 39 U/L (ref 7–40)
ANION GAP SERPL CALCULATED.3IONS-SCNC: 9 MMOL/L (ref 3–11)
AST SERPL-CCNC: 28 U/L (ref 0–33)
BILIRUB SERPL-MCNC: 0.3 MG/DL (ref 0.3–1.2)
BUN BLD-MCNC: 13 MG/DL (ref 9–23)
BUN/CREAT SERPL: 21.7 (ref 7–25)
CALCIUM SPEC-SCNC: 9.5 MG/DL (ref 8.7–10.4)
CHLORIDE SERPL-SCNC: 102 MMOL/L (ref 99–109)
CO2 SERPL-SCNC: 30 MMOL/L (ref 20–31)
CREAT BLD-MCNC: 0.6 MG/DL (ref 0.6–1.3)
GFR SERPL CREATININE-BSD FRML MDRD: 107 ML/MIN/1.73
GLOBULIN UR ELPH-MCNC: 2.2 GM/DL
GLUCOSE BLD-MCNC: 172 MG/DL (ref 70–100)
POTASSIUM BLD-SCNC: 4.5 MMOL/L (ref 3.5–5.5)
PROT SERPL-MCNC: 6.8 G/DL (ref 5.7–8.2)
SODIUM BLD-SCNC: 141 MMOL/L (ref 132–146)

## 2017-12-18 PROCEDURE — 80053 COMPREHEN METABOLIC PANEL: CPT | Performed by: INTERNAL MEDICINE

## 2017-12-26 RX ORDER — PANTOPRAZOLE SODIUM 40 MG/1
TABLET, DELAYED RELEASE ORAL
Qty: 30 TABLET | Refills: 0 | OUTPATIENT
Start: 2017-12-26

## 2017-12-26 RX ORDER — EMPAGLIFLOZIN 10 MG/1
TABLET, FILM COATED ORAL
Qty: 30 TABLET | Refills: 2 | OUTPATIENT
Start: 2017-12-26

## 2018-01-02 RX ORDER — EMPAGLIFLOZIN 10 MG/1
TABLET, FILM COATED ORAL
Qty: 30 TABLET | Refills: 2 | OUTPATIENT
Start: 2018-01-02

## 2018-01-02 RX ORDER — LORATADINE 10 MG/1
TABLET ORAL
Qty: 30 TABLET | Refills: 5 | OUTPATIENT
Start: 2018-01-02

## 2018-01-04 RX ORDER — LORATADINE 10 MG/1
TABLET ORAL
Qty: 30 TABLET | Refills: 5 | OUTPATIENT
Start: 2018-01-04

## 2018-01-11 DIAGNOSIS — G71.11 MYOTONIC DYSTROPHY, TYPE 2 (HCC): ICD-10-CM

## 2018-01-11 RX ORDER — HYDROCODONE BITARTRATE AND ACETAMINOPHEN 10; 325 MG/1; MG/1
1 TABLET ORAL EVERY 6 HOURS PRN
Qty: 120 TABLET | Refills: 0 | Status: SHIPPED | OUTPATIENT
Start: 2018-01-11 | End: 2018-02-07 | Stop reason: SDUPTHER

## 2018-01-11 RX ORDER — LORATADINE 10 MG/1
10 TABLET ORAL DAILY
Qty: 30 TABLET | Refills: 11 | Status: SHIPPED | OUTPATIENT
Start: 2018-01-11 | End: 2018-10-02 | Stop reason: SDUPTHER

## 2018-01-24 ENCOUNTER — OFFICE VISIT (OUTPATIENT)
Dept: INTERNAL MEDICINE | Facility: CLINIC | Age: 48
End: 2018-01-24

## 2018-01-24 VITALS — BODY MASS INDEX: 42.27 KG/M2 | TEMPERATURE: 97.9 F | HEIGHT: 66 IN | WEIGHT: 263 LBS

## 2018-01-24 DIAGNOSIS — E11.9 TYPE 2 DIABETES MELLITUS WITHOUT COMPLICATION, WITHOUT LONG-TERM CURRENT USE OF INSULIN (HCC): Primary | ICD-10-CM

## 2018-01-24 DIAGNOSIS — J06.9 ACUTE URI: ICD-10-CM

## 2018-01-24 LAB
ALBUMIN SERPL-MCNC: 4.9 G/DL (ref 3.2–4.8)
ALBUMIN/GLOB SERPL: 2.1 G/DL (ref 1.5–2.5)
ALP SERPL-CCNC: 98 U/L (ref 25–100)
ALT SERPL W P-5'-P-CCNC: 40 U/L (ref 7–40)
ANION GAP SERPL CALCULATED.3IONS-SCNC: 11 MMOL/L (ref 3–11)
AST SERPL-CCNC: 27 U/L (ref 0–33)
BILIRUB SERPL-MCNC: 0.4 MG/DL (ref 0.3–1.2)
BUN BLD-MCNC: 20 MG/DL (ref 9–23)
BUN/CREAT SERPL: 40 (ref 7–25)
CALCIUM SPEC-SCNC: 10.2 MG/DL (ref 8.7–10.4)
CHLORIDE SERPL-SCNC: 104 MMOL/L (ref 99–109)
CO2 SERPL-SCNC: 25 MMOL/L (ref 20–31)
CREAT BLD-MCNC: 0.5 MG/DL (ref 0.6–1.3)
GFR SERPL CREATININE-BSD FRML MDRD: 132 ML/MIN/1.73
GLOBULIN UR ELPH-MCNC: 2.3 GM/DL
GLUCOSE BLD-MCNC: 123 MG/DL (ref 70–100)
HBA1C MFR BLD: 7.5 % (ref 4.8–5.6)
POTASSIUM BLD-SCNC: 4.6 MMOL/L (ref 3.5–5.5)
PROT SERPL-MCNC: 7.2 G/DL (ref 5.7–8.2)
SODIUM BLD-SCNC: 140 MMOL/L (ref 132–146)

## 2018-01-24 PROCEDURE — 36415 COLL VENOUS BLD VENIPUNCTURE: CPT | Performed by: INTERNAL MEDICINE

## 2018-01-24 PROCEDURE — 99213 OFFICE O/P EST LOW 20 MIN: CPT | Performed by: INTERNAL MEDICINE

## 2018-01-24 PROCEDURE — 83036 HEMOGLOBIN GLYCOSYLATED A1C: CPT | Performed by: INTERNAL MEDICINE

## 2018-01-24 PROCEDURE — 80053 COMPREHEN METABOLIC PANEL: CPT | Performed by: INTERNAL MEDICINE

## 2018-01-24 RX ORDER — CLARITHROMYCIN 250 MG/1
250 TABLET, FILM COATED ORAL EVERY 12 HOURS SCHEDULED
Qty: 14 TABLET | Refills: 0 | Status: SHIPPED | OUTPATIENT
Start: 2018-01-24 | End: 2018-02-12

## 2018-01-24 RX ORDER — METHYLPREDNISOLONE 4 MG/1
TABLET ORAL
Qty: 21 EACH | Refills: 0 | Status: SHIPPED | OUTPATIENT
Start: 2018-01-24 | End: 2018-02-21

## 2018-01-24 NOTE — PATIENT INSTRUCTIONS
Methylprednisolone tablets  What is this medicine?  METHYLPREDNISOLONE (meth ill pred NISS oh lone) is a corticosteroid. It is commonly used to treat inflammation of the skin, joints, lungs, and other organs. Common conditions treated include asthma, allergies, and arthritis. It is also used for other conditions, such as blood disorders and diseases of the adrenal glands.  This medicine may be used for other purposes; ask your health care provider or pharmacist if you have questions.  COMMON BRAND NAME(S): Medrol, Medrol Dosepak  What should I tell my health care provider before I take this medicine?  They need to know if you have any of these conditions:  -Cushing's syndrome  -eye disease, vision problems  -diabetes  -glaucoma  -heart disease  -high blood pressure  -infection (especially a virus infection such as chickenpox, cold sores, or herpes)  -liver disease  -mental illness  -myasthenia gravis  -osteoporosis  -recently received or scheduled to receive a vaccine  -seizures  -stomach or intestine problems  -thyroid disease  -an unusual or allergic reaction to lactose, methylprednisolone, other medicines, foods, dyes, or preservatives  -pregnant or trying to get pregnant  -breast-feeding  How should I use this medicine?  Take this medicine by mouth with a glass of water. Follow the directions on the prescription label. Take this medicine with food. If you are taking this medicine once a day, take it in the morning. Do not take it more often than directed. Do not suddenly stop taking your medicine because you may develop a severe reaction. Your doctor will tell you how much medicine to take. If your doctor wants you to stop the medicine, the dose may be slowly lowered over time to avoid any side effects.  Talk to your pediatrician regarding the use of this medicine in children. Special care may be needed.  Overdosage: If you think you have taken too much of this medicine contact a poison control center or  emergency room at once.  NOTE: This medicine is only for you. Do not share this medicine with others.  What if I miss a dose?  If you miss a dose, take it as soon as you can. If it is almost time for your next dose, talk to your doctor or health care professional. You may need to miss a dose or take an extra dose. Do not take double or extra doses without advice.  What may interact with this medicine?  Do not take this medicine with any of the following medications:  -alefacept  -echinacea  -live virus vaccines  -metyrapone  -mifepristone  This medicine may also interact with the following medications:  -amphotericin B  -aspirin and aspirin-like medicines  -certain antibiotics like erythromycin, clarithromycin, troleandomycin  -certain medicines for diabetes  -certain medicines for fungal infections like ketoconazole  -certain medicines for seizures like carbamazepine, phenobarbital, phenytoin  -certain medicines that treat or prevent blood clots like warfarin  -cholestyramine  -cyclosporine  -digoxin  -diuretics  -female hormones, like estrogens and birth control pills  -isoniazid  -NSAIDs, medicines for pain inflammation, like ibuprofen or naproxen  -other medicines for myasthenia gravis  -rifampin  -vaccines  This list may not describe all possible interactions. Give your health care provider a list of all the medicines, herbs, non-prescription drugs, or dietary supplements you use. Also tell them if you smoke, drink alcohol, or use illegal drugs. Some items may interact with your medicine.  What should I watch for while using this medicine?  Tell your doctor or healthcare professional if your symptoms do not start to get better or if they get worse. Do not stop taking except on your doctor's advice. You may develop a severe reaction. Your doctor will tell you how much medicine to take.  This medicine may increase your risk of getting an infection. Tell your doctor or health care professional if you are around  anyone with measles or chickenpox, or if you develop sores or blisters that do not heal properly.  This medicine may affect blood sugar levels. If you have diabetes, check with your doctor or health care professional before you change your diet or the dose of your diabetic medicine.  Tell your doctor or health care professional right away if you have any change in your eyesight.  Using this medicine for a long time may increase your risk of low bone mass. Talk to your doctor about bone health.  What side effects may I notice from receiving this medicine?  Side effects that you should report to your doctor or health care professional as soon as possible:  -allergic reactions like skin rash, itching or hives, swelling of the face, lips, or tongue  -bloody or tarry stools  -changes in vision  -hallucination, loss of contact with reality  -muscle cramps  -muscle pain  -palpitations  -signs and symptoms of high blood sugar such as dizziness; dry mouth; dry skin; fruity breath; nausea; stomach pain; increased hunger or thirst; increased urination  -signs and symptoms of infection like fever or chills; cough; sore throat; pain or trouble passing urine  -trouble passing urine or change in the amount of urine  Side effects that usually do not require medical attention (report to your doctor or health care professional if they continue or are bothersome):  -changes in emotions or mood  -constipation  -diarrhea  -excessive hair growth on the face or body  -headache  -nausea, vomiting  -trouble sleeping  -weight gain  This list may not describe all possible side effects. Call your doctor for medical advice about side effects. You may report side effects to FDA at 0-903-FDA-3113.  Where should I keep my medicine?  Keep out of the reach of children.  Store at room temperature between 20 and 25 degrees C (68 and 77 degrees F). Throw away any unused medicine after the expiration date.  NOTE: This sheet is a summary. It may not  cover all possible information. If you have questions about this medicine, talk to your doctor, pharmacist, or health care provider.  © 2018 [employer] (2017-02-23 15:53:30)

## 2018-01-24 NOTE — PROGRESS NOTES
"Subjective   Hafsa Barron is a 47 y.o. female here for URI and f/u DMII. URI has been over a week, went to [facility] twice and got Doxy then Azith and neither have helped. She has unrelenting cough and sinus congestion, mild sore throat. Ear pressure on the left. No fever or chills or body aches. Son has also been sick. DMII: due for blood work. Fasting glc is around 150, and after meals it is around 200-250. She is interested in victoza if another med has to be added on.    PMH: reviewed  Meds: reviewed  Allergies: PCN    Review of Systems   Constitutional: Positive for activity change and fatigue. Negative for chills and fever.   HENT: Positive for congestion, postnasal drip, rhinorrhea, sinus pressure, sneezing and sore throat. Negative for ear discharge and ear pain.    Respiratory: Negative for cough, shortness of breath and wheezing.    Gastrointestinal: Negative.    Musculoskeletal: Negative.    Skin: Negative.          Objective   Temp 97.9 °F (36.6 °C) (Temporal Artery )   Ht 167.6 cm (66\")  Wt 119 kg (263 lb)  BMI 42.45 kg/m2    Physical Exam   Constitutional: She appears well-developed and well-nourished.   HENT:   Right Ear: Tympanic membrane, external ear and ear canal normal.   Left Ear: Tympanic membrane, external ear and ear canal normal.   Nose: Rhinorrhea present. No mucosal edema. Right sinus exhibits no maxillary sinus tenderness and no frontal sinus tenderness. Left sinus exhibits no maxillary sinus tenderness and no frontal sinus tenderness.   Mouth/Throat: Oropharynx is clear and moist and mucous membranes are normal. No oropharyngeal exudate, posterior oropharyngeal edema or posterior oropharyngeal erythema.   Neck: Neck supple.   Pulmonary/Chest: Effort normal and breath sounds normal. No respiratory distress. She has no wheezes.   Lymphadenopathy:     She has no cervical adenopathy.   Skin: Skin is warm and dry.   Vitals reviewed.      Assessment/Plan   Hafsa was seen today for " sinusitis.    Diagnoses and all orders for this visit:    Acute URI  -     clarithromycin (BIAXIN) 250 MG tablet; Take 1 tablet by mouth Every 12 (Twelve) Hours.  -     MethylPREDNISolone (MEDROL, JOE,) 4 MG tablet; Take as directed on package instructions.    Type 2 diabetes mellitus without complication, without long-term current use of insulin  -     Hemoglobin A1c  -     Comprehensive Metabolic Panel

## 2018-02-06 ENCOUNTER — TELEPHONE (OUTPATIENT)
Dept: INTERNAL MEDICINE | Facility: CLINIC | Age: 48
End: 2018-02-06

## 2018-02-06 RX ORDER — PANTOPRAZOLE SODIUM 40 MG/1
TABLET, DELAYED RELEASE ORAL
Qty: 30 TABLET | Refills: 0 | OUTPATIENT
Start: 2018-02-06

## 2018-02-07 DIAGNOSIS — G71.11 MYOTONIC DYSTROPHY, TYPE 2 (HCC): ICD-10-CM

## 2018-02-07 RX ORDER — HYDROCODONE BITARTRATE AND ACETAMINOPHEN 10; 325 MG/1; MG/1
1 TABLET ORAL EVERY 6 HOURS PRN
Qty: 120 TABLET | Refills: 0 | Status: SHIPPED | OUTPATIENT
Start: 2018-02-07 | End: 2018-03-06 | Stop reason: SDUPTHER

## 2018-02-12 ENCOUNTER — TELEPHONE (OUTPATIENT)
Dept: INTERNAL MEDICINE | Facility: CLINIC | Age: 48
End: 2018-02-12

## 2018-02-12 RX ORDER — CYCLOBENZAPRINE HCL 10 MG
10 TABLET ORAL 3 TIMES DAILY PRN
Qty: 270 TABLET | Refills: 2 | Status: SHIPPED | OUTPATIENT
Start: 2018-02-12 | End: 2018-06-14 | Stop reason: SDUPTHER

## 2018-02-12 RX ORDER — PANTOPRAZOLE SODIUM 40 MG/1
40 TABLET, DELAYED RELEASE ORAL DAILY
Qty: 90 TABLET | Refills: 2 | Status: SHIPPED | OUTPATIENT
Start: 2018-02-12 | End: 2018-06-04 | Stop reason: SDUPTHER

## 2018-02-12 RX ORDER — POTASSIUM CHLORIDE 20 MEQ/1
20 TABLET, EXTENDED RELEASE ORAL DAILY
Qty: 90 TABLET | Refills: 2 | Status: SHIPPED | OUTPATIENT
Start: 2018-02-12 | End: 2018-06-04 | Stop reason: SDUPTHER

## 2018-02-12 RX ORDER — FENOFIBRATE 145 MG/1
145 TABLET, COATED ORAL DAILY
Qty: 90 TABLET | Refills: 2 | Status: SHIPPED | OUTPATIENT
Start: 2018-02-12 | End: 2018-06-14 | Stop reason: SDUPTHER

## 2018-02-12 RX ORDER — CLOPIDOGREL BISULFATE 75 MG/1
75 TABLET ORAL DAILY
Qty: 90 TABLET | Refills: 2 | Status: SHIPPED | OUTPATIENT
Start: 2018-02-12 | End: 2018-05-21 | Stop reason: SDUPTHER

## 2018-02-12 NOTE — TELEPHONE ENCOUNTER
THIS PATIENT NEEDS TO HAVE A PRE-OP B-4 THE 27TH. CAN YOU TELL ME WHERE YOU  LIKE FOR ME TO SCHEDULE HER. [phone].

## 2018-02-12 NOTE — TELEPHONE ENCOUNTER
You can put her somewhere in the morning, just block off 30 mins because I have to do EKG and such. Next week looks better than this week!

## 2018-02-18 DIAGNOSIS — J06.9 ACUTE URI: ICD-10-CM

## 2018-02-19 RX ORDER — CLARITHROMYCIN 250 MG/1
TABLET, FILM COATED ORAL
Qty: 14 TABLET | Refills: 0 | Status: SHIPPED | OUTPATIENT
Start: 2018-02-19 | End: 2018-02-21

## 2018-02-19 RX ORDER — FLUCONAZOLE 150 MG/1
TABLET ORAL
Qty: 2 TABLET | Refills: 0 | Status: SHIPPED | OUTPATIENT
Start: 2018-02-19 | End: 2018-02-21

## 2018-02-21 ENCOUNTER — HOSPITAL ENCOUNTER (OUTPATIENT)
Dept: GENERAL RADIOLOGY | Facility: HOSPITAL | Age: 48
Discharge: HOME | End: 2018-02-21
Admitting: INTERNAL MEDICINE

## 2018-02-21 ENCOUNTER — OFFICE VISIT (OUTPATIENT)
Dept: INTERNAL MEDICINE | Facility: CLINIC | Age: 48
End: 2018-02-21

## 2018-02-21 VITALS
HEIGHT: 66 IN | WEIGHT: 270 LBS | TEMPERATURE: 97.8 F | DIASTOLIC BLOOD PRESSURE: 82 MMHG | BODY MASS INDEX: 43.39 KG/M2 | SYSTOLIC BLOOD PRESSURE: 134 MMHG

## 2018-02-21 DIAGNOSIS — Z01.818 PRE-OP EVALUATION: Primary | ICD-10-CM

## 2018-02-21 PROBLEM — M25.562 ACUTE PAIN OF LEFT KNEE: Status: RESOLVED | Noted: 2017-07-26 | Resolved: 2018-02-21

## 2018-02-21 LAB
ALBUMIN SERPL-MCNC: 4.3 G/DL (ref 3.2–4.8)
ALBUMIN/GLOB SERPL: 2.3 G/DL (ref 1.5–2.5)
ALP SERPL-CCNC: 93 U/L (ref 25–100)
ALT SERPL W P-5'-P-CCNC: 45 U/L (ref 7–40)
ANION GAP SERPL CALCULATED.3IONS-SCNC: 8 MMOL/L (ref 3–11)
AST SERPL-CCNC: 26 U/L (ref 0–33)
BASOPHILS # BLD AUTO: 0.07 10*3/MM3 (ref 0–0.2)
BASOPHILS NFR BLD AUTO: 1.4 % (ref 0–1)
BILIRUB BLD-MCNC: NEGATIVE MG/DL
BILIRUB SERPL-MCNC: 0.3 MG/DL (ref 0.3–1.2)
BUN BLD-MCNC: 11 MG/DL (ref 9–23)
BUN/CREAT SERPL: 22 (ref 7–25)
CALCIUM SPEC-SCNC: 9.7 MG/DL (ref 8.7–10.4)
CHLORIDE SERPL-SCNC: 103 MMOL/L (ref 99–109)
CLARITY, POC: CLEAR
CO2 SERPL-SCNC: 29 MMOL/L (ref 20–31)
COLOR UR: YELLOW
CREAT BLD-MCNC: 0.5 MG/DL (ref 0.6–1.3)
DEPRECATED RDW RBC AUTO: 46.9 FL (ref 37–54)
EOSINOPHIL # BLD AUTO: 0.15 10*3/MM3 (ref 0–0.3)
EOSINOPHIL NFR BLD AUTO: 3 % (ref 0–3)
ERYTHROCYTE [DISTWIDTH] IN BLOOD BY AUTOMATED COUNT: 14.3 % (ref 11.3–14.5)
GFR SERPL CREATININE-BSD FRML MDRD: 132 ML/MIN/1.73
GLOBULIN UR ELPH-MCNC: 1.9 GM/DL
GLUCOSE BLD-MCNC: 174 MG/DL (ref 70–100)
GLUCOSE UR STRIP-MCNC: ABNORMAL MG/DL
HBA1C MFR BLD: 7.6 % (ref 4.8–5.6)
HCT VFR BLD AUTO: 45 % (ref 34.5–44)
HGB BLD-MCNC: 14.9 G/DL (ref 11.5–15.5)
IMM GRANULOCYTES # BLD: 0.04 10*3/MM3 (ref 0–0.03)
IMM GRANULOCYTES NFR BLD: 0.8 % (ref 0–0.6)
KETONES UR QL: NEGATIVE
LEUKOCYTE EST, POC: ABNORMAL
LYMPHOCYTES # BLD AUTO: 2.08 10*3/MM3 (ref 0.6–4.8)
LYMPHOCYTES NFR BLD AUTO: 42.1 % (ref 24–44)
MCH RBC QN AUTO: 29.7 PG (ref 27–31)
MCHC RBC AUTO-ENTMCNC: 33.1 G/DL (ref 32–36)
MCV RBC AUTO: 89.8 FL (ref 80–99)
MONOCYTES # BLD AUTO: 0.45 10*3/MM3 (ref 0–1)
MONOCYTES NFR BLD AUTO: 9.1 % (ref 0–12)
NEUTROPHILS # BLD AUTO: 2.15 10*3/MM3 (ref 1.5–8.3)
NEUTROPHILS NFR BLD AUTO: 43.6 % (ref 41–71)
NITRITE UR-MCNC: NEGATIVE MG/ML
PH UR: 5 [PH] (ref 5–8)
PLATELET # BLD AUTO: 214 10*3/MM3 (ref 150–450)
PMV BLD AUTO: 12.6 FL (ref 6–12)
POTASSIUM BLD-SCNC: 4.1 MMOL/L (ref 3.5–5.5)
PROT SERPL-MCNC: 6.2 G/DL (ref 5.7–8.2)
PROT UR STRIP-MCNC: NEGATIVE MG/DL
RBC # BLD AUTO: 5.01 10*6/MM3 (ref 3.89–5.14)
RBC # UR STRIP: NEGATIVE /UL
SODIUM BLD-SCNC: 140 MMOL/L (ref 132–146)
SP GR UR: 1.02 (ref 1–1.03)
UROBILINOGEN UR QL: NORMAL
WBC NRBC COR # BLD: 4.94 10*3/MM3 (ref 3.5–10.8)

## 2018-02-21 PROCEDURE — 80053 COMPREHEN METABOLIC PANEL: CPT | Performed by: INTERNAL MEDICINE

## 2018-02-21 PROCEDURE — 93000 ELECTROCARDIOGRAM COMPLETE: CPT | Performed by: INTERNAL MEDICINE

## 2018-02-21 PROCEDURE — 71046 X-RAY EXAM CHEST 2 VIEWS: CPT

## 2018-02-21 PROCEDURE — 81003 URINALYSIS AUTO W/O SCOPE: CPT | Performed by: INTERNAL MEDICINE

## 2018-02-21 PROCEDURE — 83036 HEMOGLOBIN GLYCOSYLATED A1C: CPT | Performed by: INTERNAL MEDICINE

## 2018-02-21 PROCEDURE — 99244 OFF/OP CNSLTJ NEW/EST MOD 40: CPT | Performed by: INTERNAL MEDICINE

## 2018-02-21 PROCEDURE — 85025 COMPLETE CBC W/AUTO DIFF WBC: CPT | Performed by: INTERNAL MEDICINE

## 2018-02-21 PROCEDURE — 87081 CULTURE SCREEN ONLY: CPT | Performed by: INTERNAL MEDICINE

## 2018-02-21 NOTE — PROGRESS NOTES
Subjective   Hafsa Barron is a 47 y.o. female here for pre op exam at the request of Dr. Mayer of [facility] in [facility].  Pt is having left knee arthroscopy on 18 with possible meniscal repair depending on what is found during arthroscopy. She has had chronic left knee pain for a long time and it is impacting her gait. She also has hx of muscular dystrophy type II and walks with a cane. Of note, pt also has hx of DMII, HTN, anxiety, chronic pain (on narcotic pain med), CVA (on plavix chronically). She is not on insulin.     Review of Systems   Constitutional: Negative.    HENT: Negative.    Eyes: Negative.    Respiratory: Negative.    Cardiovascular: Negative.    Gastrointestinal: Negative.    Endocrine: Negative.    Genitourinary: Negative.    Musculoskeletal:        Chronic left knee pain   Skin: Negative.    Allergic/Immunologic: Negative.    Neurological: Negative.    Hematological: Negative.    Psychiatric/Behavioral: Negative.        Past Medical History:   Diagnosis Date   • Allergic    • Allergy    • Anxiety    • Asthma Allergies induced   • Depression    • Fibromyalgia, primary    • GERD (gastroesophageal reflux disease)    • Headache    • Hyperlipidemia    • Hypertension    • IBS (irritable bowel syndrome)    • Internal hemorrhoid    • Kidney stone    • Low back pain    • Muscular dystrophy    • Obesity    • Stroke 2013    resdual- left sided weakness.    • Type 2 diabetes mellitus without complication, without long-term current use of insulin      Family History   Problem Relation Age of Onset   • Allergies Other    • ADD / ADHD Other    • Heart block Other    • Other Other      CEREBROVASCULAR ACCIDENT    • Hypertension Other    • Cancer Other      LUNG CANCER   • Hyperlipidemia Other    • Alcohol abuse Mother    • Depression Mother    • Early death Mother       age 59   • Heart disease Mother      Mother  of Massive Heart attack   • Hyperlipidemia Mother     • Hypertension Mother    • Miscarriages / Stillbirths Mother      Miscarriage   • Alcohol abuse Father    • Cancer Father      Had Lung Cancer - had 1 lung till death   • Diabetes Father      Lived on insuline shots   • Early death Father       age 53   • Breast cancer Maternal Aunt    • Learning disabilities Son      Had Adhd   • Ovarian cancer Neg Hx      Past Surgical History:   Procedure Laterality Date   • CHOLECYSTECTOMY     • COLONOSCOPY     • GALLBLADDER SURGERY     • LYMPH NODE BIOPSY       Social History     Social History   • Marital status:      Spouse name: N/A   • Number of children: N/A   • Years of education: N/A     Occupational History   • Not on file.     Social History Main Topics   • Smoking status: Former Smoker     Packs/day: 1.50     Years: 15.00     Start date: 1983     Quit date: 2013   • Smokeless tobacco: Never Used      Comment: use ecig that doesn't have nicotine   • Alcohol use No   • Drug use: No   • Sexual activity: Yes     Partners: Male     Birth control/ protection: Post-menopausal      Comment: Menapause no cycle since 2013     Other Topics Concern   • Not on file     Social History Narrative         Current Outpatient Prescriptions:   •  albuterol (PROAIR HFA) 108 (90 BASE) MCG/ACT inhaler, Inhale 1 puff Every 6 (Six) Hours As Needed for Wheezing., Disp: 90 inhaler, Rfl: 2  •  amLODIPine (NORVASC) 2.5 MG tablet, Take 1 tablet by mouth Daily., Disp: 90 tablet, Rfl: 0  •  baclofen (LIORESAL) 10 MG tablet, Take  by mouth 3 (Three) Times a Day., Disp: , Rfl:   •  Blood Glucose Monitoring Suppl (ONE TOUCH ULTRA 2) w/Device kit, , Disp: , Rfl:   •  busPIRone (BUSPAR) 10 MG tablet, Take 1 tablet by mouth 2 (Two) Times a Day., Disp: 60 tablet, Rfl: 2  •  calcium carbonate-vitamin d (CALCIUM 600+D HIGH POTENCY) 600-400 MG-UNIT per tablet, Take 1 tablet by mouth Daily., Disp: 90 tablet, Rfl: 2  •  clopidogrel (PLAVIX) 75 MG tablet, Take 1 tablet by mouth  Daily., Disp: 90 tablet, Rfl: 2  •  cyclobenzaprine (FLEXERIL) 10 MG tablet, Take 1 tablet by mouth 3 (Three) Times a Day As Needed for Muscle Spasms., Disp: 270 tablet, Rfl: 2  •  diclofenac (VOLTAREN) 75 MG EC tablet, Take 1 tablet by mouth 2 (Two) Times a Day., Disp: 60 tablet, Rfl: 5  •  DULoxetine (CYMBALTA) 60 MG capsule, Take 1 capsule by mouth daily., Disp: 90 capsule, Rfl: 2  •  Empagliflozin (JARDIANCE) 25 MG tablet, Take 1 tablet by mouth Daily., Disp: 30 tablet, Rfl: 5  •  fenofibrate (TRICOR) 145 MG tablet, Take 1 tablet by mouth Daily., Disp: 90 tablet, Rfl: 2  •  fluticasone (FLONASE ALLERGY RELIEF) 50 MCG/ACT nasal spray, into each nostril., Disp: , Rfl:   •  gabapentin (NEURONTIN) 600 MG tablet, TAKE 1 TABLET BY MOUTH THREE TIMES DAILY, Disp: 270 tablet, Rfl: 0  •  HYDROcodone-acetaminophen (NORCO)  MG per tablet, Take 1 tablet by mouth Every 6 (Six) Hours As Needed for Moderate Pain ., Disp: 120 tablet, Rfl: 0  •  Liraglutide (VICTOZA) 18 MG/3ML solution pen-injector injection, Inject 0.6mg subcutaneous daily for 1 week then increase to 1.2mg subcutaneous daily., Disp: 3 pen, Rfl: 11  •  loratadine (CLARITIN) 10 MG tablet, Take 1 tablet by mouth Daily., Disp: 30 tablet, Rfl: 11  •  metFORMIN (GLUCOPHAGE) 1000 MG tablet, Take 1 tablet by mouth 2 (Two) Times a Day With Meals. NOTE THIS IS A DOSE INCREASE PER DR JAFFE, Disp: 60 tablet, Rfl: 4  •  montelukast (SINGULAIR) 10 MG tablet, Take 1 tablet by mouth Every Night., Disp: 90 tablet, Rfl: 1  •  ONE TOUCH ULTRA TEST test strip, , Disp: , Rfl:   •  pantoprazole (PROTONIX) 40 MG EC tablet, Take 1 tablet by mouth Daily., Disp: 90 tablet, Rfl: 2  •  potassium chloride (K-DUR,KLOR-CON) 20 MEQ CR tablet, Take 1 tablet by mouth Daily., Disp: 90 tablet, Rfl: 2  •  spironolactone (ALDACTONE) 50 MG tablet, TAKE 1 TABLET BY MOUTH TWICE DAILY, Disp: 180 tablet, Rfl: 1  •  tiZANidine (ZANAFLEX) 4 MG tablet, TAKE 1 TABLET BY MOUTH THREE TIMES DAILY,  "Disp: 270 tablet, Rfl: 2    Objective   /82 (BP Location: Right arm, Patient Position: Sitting, Cuff Size: Large Adult)  Temp 97.8 °F (36.6 °C) (Temporal Artery )   Ht 167.6 cm (66\")  Wt 122 kg (270 lb)  BMI 43.58 kg/m2  Physical Exam   Constitutional: She is oriented to person, place, and time. She appears well-developed and well-nourished.   Cardiovascular: Normal rate, regular rhythm and normal heart sounds.    Pulmonary/Chest: Effort normal and breath sounds normal. She has no wheezes. She has no rales.   Musculoskeletal:   Left knee with no edema, deformity. Pt walks with a cane   Neurological: She is alert and oriented to person, place, and time.   Skin: Skin is warm and dry.   Psychiatric: She has a normal mood and affect. Her behavior is normal. Thought content normal.   Vitals reviewed.        ECG 12 Lead  Date/Time: 2/21/2018 10:39 AM  Performed by: BRYNN CHAPIN  Authorized by: BRYNN CHAPIN   Comparison: compared with previous ECG   Similar to previous ECG  Rhythm: sinus rhythm  Conduction: conduction normal  ST Segments: ST segments normal  Other: no other findings  Clinical impression: normal ECG            Assessment/Plan   Hafsa was seen today for pre-op exam.    Diagnoses and all orders for this visit:    Pre-op evaluation  -     POC Urinalysis Dipstick, Automated: >1000 glc otherwise negative  -     ECG 12 Lead: NSR  -     Comprehensive Metabolic Panel  -     Hemoglobin A1c  -     XR Chest PA & Lateral: clear  -     MRSA Screen Culture - Swab, Nares. Prelim result to [facility] on Friday (takes 3 days to be finalized and she has intake appt on Friday)  -     CBC Auto Differential  -     Medium risk for low risk surgery, proceed to OR as scheduled  -     Hold plavix 5d prior, no PO hypoglycemics or victoza, baclofen, gabapentin day of surgery             "

## 2018-02-23 LAB — MRSA SPEC QL CULT: NORMAL

## 2018-03-03 DIAGNOSIS — F32.A ANXIETY AND DEPRESSION: ICD-10-CM

## 2018-03-03 DIAGNOSIS — F41.9 ANXIETY AND DEPRESSION: ICD-10-CM

## 2018-03-05 RX ORDER — BUSPIRONE HYDROCHLORIDE 10 MG/1
TABLET ORAL
Qty: 60 TABLET | Refills: 2 | Status: SHIPPED | OUTPATIENT
Start: 2018-03-05 | End: 2018-04-09

## 2018-03-05 RX ORDER — FLUCONAZOLE 150 MG/1
TABLET ORAL
Qty: 2 TABLET | Refills: 0 | Status: SHIPPED | OUTPATIENT
Start: 2018-03-05 | End: 2018-03-29 | Stop reason: SDUPTHER

## 2018-03-06 DIAGNOSIS — G71.11 MYOTONIC DYSTROPHY, TYPE 2 (HCC): ICD-10-CM

## 2018-03-06 RX ORDER — HYDROCODONE BITARTRATE AND ACETAMINOPHEN 10; 325 MG/1; MG/1
1 TABLET ORAL EVERY 6 HOURS PRN
Qty: 120 TABLET | Refills: 0 | Status: SHIPPED | OUTPATIENT
Start: 2018-03-06 | End: 2018-04-02 | Stop reason: SDUPTHER

## 2018-03-07 ENCOUNTER — TELEPHONE (OUTPATIENT)
Dept: CARDIOLOGY | Facility: CLINIC | Age: 48
End: 2018-03-07

## 2018-03-07 NOTE — TELEPHONE ENCOUNTER
----- Message from Hafsa Barron sent at 3/7/2018 12:31 AM EST -----  Regarding: Visit Follow-Up Question  Any word yet for sleep study to see if I have sleep apnea? Been waiting many months since my appt there & still haven't gotten word on it.

## 2018-03-07 NOTE — TELEPHONE ENCOUNTER
Called patient and left VM  to let her know that we have not received any results of sleep study and I called patient aids and they have not record of study. Please call back and let us know what company she did the study through so we can contact them.

## 2018-03-09 RX ORDER — BENZONATATE 200 MG/1
200 CAPSULE ORAL 3 TIMES DAILY PRN
Qty: 21 CAPSULE | Refills: 0 | Status: SHIPPED | OUTPATIENT
Start: 2018-03-09 | End: 2018-03-29 | Stop reason: SDUPTHER

## 2018-03-30 RX ORDER — FLUCONAZOLE 150 MG/1
TABLET ORAL
Qty: 2 TABLET | Refills: 0 | Status: SHIPPED | OUTPATIENT
Start: 2018-03-30 | End: 2018-08-08 | Stop reason: SDUPTHER

## 2018-03-30 RX ORDER — BENZONATATE 200 MG/1
CAPSULE ORAL
Qty: 21 CAPSULE | Refills: 0 | Status: SHIPPED | OUTPATIENT
Start: 2018-03-30 | End: 2018-05-04

## 2018-04-02 DIAGNOSIS — G47.8 UNREFRESHED BY SLEEP: Primary | ICD-10-CM

## 2018-04-02 DIAGNOSIS — G71.11 MYOTONIC DYSTROPHY, TYPE 2 (HCC): ICD-10-CM

## 2018-04-02 RX ORDER — HYDROCODONE BITARTRATE AND ACETAMINOPHEN 10; 325 MG/1; MG/1
1 TABLET ORAL EVERY 6 HOURS PRN
Qty: 120 TABLET | Refills: 0 | Status: SHIPPED | OUTPATIENT
Start: 2018-04-02 | End: 2018-05-07 | Stop reason: SDUPTHER

## 2018-04-09 RX ORDER — BUSPIRONE HYDROCHLORIDE 15 MG/1
15 TABLET ORAL 2 TIMES DAILY
Qty: 60 TABLET | Refills: 1 | Status: SHIPPED | OUTPATIENT
Start: 2018-04-09 | End: 2018-05-07 | Stop reason: SDUPTHER

## 2018-04-11 NOTE — TELEPHONE ENCOUNTER
----- Message from Stas Reyna MD sent at 6/22/2017 11:44 AM EDT -----  I have put the Rx on Ivette's desk.  She will need to seen to be seen  ----- Message -----     From: Dona Nuñez     Sent: 6/22/2017  11:12 AM       To: Stas Reyna MD    [phone]  REFILL ON HYDROCODONE   CALL PT WHEN READY FOR      Yo

## 2018-05-03 RX ORDER — SPIRONOLACTONE 50 MG/1
50 TABLET, FILM COATED ORAL 2 TIMES DAILY
Qty: 180 TABLET | Refills: 1 | Status: SHIPPED | OUTPATIENT
Start: 2018-05-03 | End: 2018-05-07 | Stop reason: SDUPTHER

## 2018-05-04 ENCOUNTER — OFFICE VISIT (OUTPATIENT)
Dept: INTERNAL MEDICINE | Facility: CLINIC | Age: 48
End: 2018-05-04

## 2018-05-04 VITALS
TEMPERATURE: 97.8 F | WEIGHT: 271 LBS | SYSTOLIC BLOOD PRESSURE: 124 MMHG | BODY MASS INDEX: 43.55 KG/M2 | DIASTOLIC BLOOD PRESSURE: 78 MMHG | HEIGHT: 66 IN

## 2018-05-04 DIAGNOSIS — F32.A ANXIETY AND DEPRESSION: ICD-10-CM

## 2018-05-04 DIAGNOSIS — I10 ESSENTIAL HYPERTENSION: ICD-10-CM

## 2018-05-04 DIAGNOSIS — E78.5 HYPERLIPIDEMIA, UNSPECIFIED HYPERLIPIDEMIA TYPE: ICD-10-CM

## 2018-05-04 DIAGNOSIS — F41.9 ANXIETY AND DEPRESSION: ICD-10-CM

## 2018-05-04 DIAGNOSIS — E11.9 TYPE 2 DIABETES MELLITUS WITHOUT COMPLICATION, WITHOUT LONG-TERM CURRENT USE OF INSULIN (HCC): Primary | ICD-10-CM

## 2018-05-04 LAB
ALBUMIN SERPL-MCNC: 4.6 G/DL (ref 3.2–4.8)
ALBUMIN/GLOB SERPL: 2.1 G/DL (ref 1.5–2.5)
ALP SERPL-CCNC: 94 U/L (ref 25–100)
ALT SERPL W P-5'-P-CCNC: 42 U/L (ref 7–40)
ANION GAP SERPL CALCULATED.3IONS-SCNC: 9 MMOL/L (ref 3–11)
ARTICHOKE IGE QN: 132 MG/DL (ref 0–130)
AST SERPL-CCNC: 28 U/L (ref 0–33)
BILIRUB SERPL-MCNC: 0.3 MG/DL (ref 0.3–1.2)
BUN BLD-MCNC: 19 MG/DL (ref 9–23)
BUN/CREAT SERPL: 31.7 (ref 7–25)
CALCIUM SPEC-SCNC: 9.5 MG/DL (ref 8.7–10.4)
CHLORIDE SERPL-SCNC: 103 MMOL/L (ref 99–109)
CHOLEST SERPL-MCNC: 194 MG/DL (ref 0–200)
CO2 SERPL-SCNC: 29 MMOL/L (ref 20–31)
CREAT BLD-MCNC: 0.6 MG/DL (ref 0.6–1.3)
GFR SERPL CREATININE-BSD FRML MDRD: 107 ML/MIN/1.73
GLOBULIN UR ELPH-MCNC: 2.2 GM/DL
GLUCOSE BLD-MCNC: 139 MG/DL (ref 70–100)
HBA1C MFR BLD: 7.5 % (ref 4.8–5.6)
HDLC SERPL-MCNC: 57 MG/DL (ref 40–60)
POTASSIUM BLD-SCNC: 4.5 MMOL/L (ref 3.5–5.5)
PROT SERPL-MCNC: 6.8 G/DL (ref 5.7–8.2)
SODIUM BLD-SCNC: 141 MMOL/L (ref 132–146)
TRIGL SERPL-MCNC: 95 MG/DL (ref 0–150)

## 2018-05-04 PROCEDURE — 83036 HEMOGLOBIN GLYCOSYLATED A1C: CPT | Performed by: INTERNAL MEDICINE

## 2018-05-04 PROCEDURE — 80061 LIPID PANEL: CPT | Performed by: INTERNAL MEDICINE

## 2018-05-04 PROCEDURE — 82043 UR ALBUMIN QUANTITATIVE: CPT | Performed by: INTERNAL MEDICINE

## 2018-05-04 PROCEDURE — 99214 OFFICE O/P EST MOD 30 MIN: CPT | Performed by: INTERNAL MEDICINE

## 2018-05-04 PROCEDURE — 80053 COMPREHEN METABOLIC PANEL: CPT | Performed by: INTERNAL MEDICINE

## 2018-05-04 PROCEDURE — 82570 ASSAY OF URINE CREATININE: CPT | Performed by: INTERNAL MEDICINE

## 2018-05-04 RX ORDER — DULOXETIN HYDROCHLORIDE 30 MG/1
30 CAPSULE, DELAYED RELEASE ORAL DAILY
Qty: 30 CAPSULE | Refills: 2 | Status: SHIPPED | OUTPATIENT
Start: 2018-05-04 | End: 2018-08-08

## 2018-05-04 NOTE — PROGRESS NOTES
"Subjective   Hafsa Barron is a 47 y.o. female here for follow-up DMII, HTN, HLD, chronic pain syndrome. DMII: due for recheck of A1c, last one at goal. Needs microalbumin done, has never had protein in her urine before. HTN: at goal, has been for a long time, takes meds as prescribed. HLD: due for FLP. Chronic pain: well-controlled on Norco and muscle relaxants. She was initially put on Cymbalta to help with the MD associated pain but finds it doesn't do much for her mood. She has issues with her  and is irritable and depressed. Anxiety is improved with buspar. She tried wellbutrin in the past and it changed her mood for the worse. She would like to change up the cymbalta.     The following portions of the patient's history were reviewed and updated as appropriate: allergies, current medications, past medical history, past social history and problem list.    Review of Systems:  General: negative  CV: negative  Respiratory: negative  Neuro: negative  Psych: depression, irritability  MSK: chronic pain    Objective   /78 (BP Location: Right arm, Patient Position: Sitting, Cuff Size: Large Adult)   Temp 97.8 °F (36.6 °C) (Temporal Artery )   Ht 167.6 cm (66\")   Wt 123 kg (271 lb)   BMI 43.74 kg/m²     Physical Exam   Constitutional: She is oriented to person, place, and time. She appears well-developed and well-nourished.   Cardiovascular: Normal rate, regular rhythm and normal heart sounds.    Pulmonary/Chest: Effort normal and breath sounds normal. She has no wheezes. She has no rales.   Neurological: She is alert and oriented to person, place, and time.   Skin: Skin is warm and dry.   Psychiatric: She has a normal mood and affect. Her behavior is normal. Thought content normal.   Vitals reviewed.      Assessment/Plan   Hafsa was seen today for diabetes.    Diagnoses and all orders for this visit:    Type 2 diabetes mellitus without complication, without long-term current use of insulin  -     " Hemoglobin A1c  -     Lipid Panel  -     Comprehensive Metabolic Panel  -     Microalbumin / Creatinine Urine Ratio - Urine, Clean Catch  -     Getting record of recent DM eye exam    Essential hypertension  -at goal, continue current meds    Hyperlipidemia, unspecified hyperlipidemia type  -FLP today    Anxiety and depression  -     DULoxetine (CYMBALTA) 30 MG capsule; Take 1 capsule by mouth Daily. Decreased dose in hopes of coming off this and transitioning to another med

## 2018-05-04 NOTE — PATIENT INSTRUCTIONS
Diabetes and Foot Care  Diabetes may cause you to have problems because of poor blood supply (circulation) to your feet and legs. This may cause the skin on your feet to become thinner, break easier, and heal more slowly. Your skin may become dry, and the skin may peel and crack. You may also have nerve damage in your legs and feet causing decreased feeling in them. You may not notice minor injuries to your feet that could lead to infections or more serious problems. Taking care of your feet is one of the most important things you can do for yourself.  Follow these instructions at home:  · Wear shoes at all times, even in the house. Do not go barefoot. Bare feet are easily injured.  · Check your feet daily for blisters, cuts, and redness. If you cannot see the bottom of your feet, use a mirror or ask someone for help.  · Wash your feet with warm water (do not use hot water) and mild soap. Then pat your feet and the areas between your toes until they are completely dry. Do not soak your feet as this can dry your skin.  · Apply a moisturizing lotion or petroleum jelly (that does not contain alcohol and is unscented) to the skin on your feet and to dry, brittle toenails. Do not apply lotion between your toes.  · Trim your toenails straight across. Do not dig under them or around the cuticle. File the edges of your nails with an emery board or nail file.  · Do not cut corns or calluses or try to remove them with medicine.  · Wear clean socks or stockings every day. Make sure they are not too tight. Do not wear knee-high stockings since they may decrease blood flow to your legs.  · Wear shoes that fit properly and have enough cushioning. To break in new shoes, wear them for just a few hours a day. This prevents you from injuring your feet. Always look in your shoes before you put them on to be sure there are no objects inside.  · Do not cross your legs. This may decrease the blood flow to your feet.  · If you find a  minor scrape, cut, or break in the skin on your feet, keep it and the skin around it clean and dry. These areas may be cleansed with mild soap and water. Do not cleanse the area with peroxide, alcohol, or iodine.  · When you remove an adhesive bandage, be sure not to damage the skin around it.  · If you have a wound, look at it several times a day to make sure it is healing.  · Do not use heating pads or hot water bottles. They may burn your skin. If you have lost feeling in your feet or legs, you may not know it is happening until it is too late.  · Make sure your health care provider performs a complete foot exam at least annually or more often if you have foot problems. Report any cuts, sores, or bruises to your health care provider immediately.  Contact a health care provider if:  · You have an injury that is not healing.  · You have cuts or breaks in the skin.  · You have an ingrown nail.  · You notice redness on your legs or feet.  · You feel burning or tingling in your legs or feet.  · You have pain or cramps in your legs and feet.  · Your legs or feet are numb.  · Your feet always feel cold.  Get help right away if:  · There is increasing redness, swelling, or pain in or around a wound.  · There is a red line that goes up your leg.  · Pus is coming from a wound.  · You develop a fever or as directed by your health care provider.  · You notice a bad smell coming from an ulcer or wound.  This information is not intended to replace advice given to you by your health care provider. Make sure you discuss any questions you have with your health care provider.  Document Released: 12/15/2001 Document Revised: 05/25/2017 Document Reviewed: 05/27/2014  [employer] Interactive Patient Education © 2017 [employer]

## 2018-05-06 LAB
CREAT 24H UR-MCNC: 77.1 MG/DL
MICROALBUMIN UR-MCNC: 7 UG/ML
MICROALBUMIN/CREAT UR: 9.1 MG/G CREAT (ref 0–30)

## 2018-05-07 ENCOUNTER — TELEPHONE (OUTPATIENT)
Dept: INTERNAL MEDICINE | Facility: CLINIC | Age: 48
End: 2018-05-07

## 2018-05-07 DIAGNOSIS — G71.11 MYOTONIC DYSTROPHY, TYPE 2 (HCC): ICD-10-CM

## 2018-05-07 RX ORDER — HYDROCODONE BITARTRATE AND ACETAMINOPHEN 10; 325 MG/1; MG/1
1 TABLET ORAL EVERY 6 HOURS PRN
Qty: 120 TABLET | Refills: 0 | Status: SHIPPED | OUTPATIENT
Start: 2018-05-07 | End: 2018-06-05 | Stop reason: SDUPTHER

## 2018-05-07 RX ORDER — BUSPIRONE HYDROCHLORIDE 15 MG/1
15 TABLET ORAL 3 TIMES DAILY
Qty: 90 TABLET | Refills: 5 | Status: SHIPPED | OUTPATIENT
Start: 2018-05-07 | End: 2018-06-04 | Stop reason: SDUPTHER

## 2018-05-07 RX ORDER — SPIRONOLACTONE 50 MG/1
50 TABLET, FILM COATED ORAL 2 TIMES DAILY
Qty: 180 TABLET | Refills: 1 | Status: SHIPPED | OUTPATIENT
Start: 2018-05-07 | End: 2018-05-29 | Stop reason: SDUPTHER

## 2018-05-21 RX ORDER — CLOPIDOGREL BISULFATE 75 MG/1
75 TABLET ORAL DAILY
Qty: 90 TABLET | Refills: 2 | Status: SHIPPED | OUTPATIENT
Start: 2018-05-21 | End: 2018-06-14 | Stop reason: SDUPTHER

## 2018-05-29 RX ORDER — SPIRONOLACTONE 50 MG/1
50 TABLET, FILM COATED ORAL 2 TIMES DAILY
Qty: 180 TABLET | Refills: 1 | Status: SHIPPED | OUTPATIENT
Start: 2018-05-29 | End: 2019-05-20

## 2018-05-29 RX ORDER — GABAPENTIN 600 MG/1
600 TABLET ORAL 3 TIMES DAILY
Qty: 270 TABLET | Refills: 0 | Status: SHIPPED | OUTPATIENT
Start: 2018-05-29 | End: 2018-10-29 | Stop reason: SDUPTHER

## 2018-06-04 RX ORDER — AMLODIPINE BESYLATE 2.5 MG/1
2.5 TABLET ORAL DAILY
Qty: 90 TABLET | Refills: 1 | Status: SHIPPED | OUTPATIENT
Start: 2018-06-04 | End: 2018-06-07 | Stop reason: SDUPTHER

## 2018-06-04 RX ORDER — BUSPIRONE HYDROCHLORIDE 15 MG/1
15 TABLET ORAL 3 TIMES DAILY
Qty: 270 TABLET | Refills: 1 | Status: SHIPPED | OUTPATIENT
Start: 2018-06-04 | End: 2018-06-07 | Stop reason: SDUPTHER

## 2018-06-04 RX ORDER — PANTOPRAZOLE SODIUM 40 MG/1
40 TABLET, DELAYED RELEASE ORAL DAILY
Qty: 90 TABLET | Refills: 1 | Status: SHIPPED | OUTPATIENT
Start: 2018-06-04 | End: 2018-06-07 | Stop reason: SDUPTHER

## 2018-06-04 RX ORDER — POTASSIUM CHLORIDE 20 MEQ/1
20 TABLET, EXTENDED RELEASE ORAL DAILY
Qty: 90 TABLET | Refills: 1 | Status: SHIPPED | OUTPATIENT
Start: 2018-06-04 | End: 2018-06-07 | Stop reason: SDUPTHER

## 2018-06-05 DIAGNOSIS — G71.11 MYOTONIC DYSTROPHY, TYPE 2 (HCC): ICD-10-CM

## 2018-06-05 RX ORDER — HYDROCODONE BITARTRATE AND ACETAMINOPHEN 10; 325 MG/1; MG/1
1 TABLET ORAL EVERY 6 HOURS PRN
Qty: 120 TABLET | Refills: 0 | Status: SHIPPED | OUTPATIENT
Start: 2018-06-05 | End: 2018-07-05 | Stop reason: SDUPTHER

## 2018-06-07 RX ORDER — BUSPIRONE HYDROCHLORIDE 15 MG/1
15 TABLET ORAL 3 TIMES DAILY
Qty: 270 TABLET | Refills: 1 | Status: SHIPPED | OUTPATIENT
Start: 2018-06-07 | End: 2018-11-17 | Stop reason: SDUPTHER

## 2018-06-07 RX ORDER — PANTOPRAZOLE SODIUM 40 MG/1
40 TABLET, DELAYED RELEASE ORAL DAILY
Qty: 90 TABLET | Refills: 1 | Status: SHIPPED | OUTPATIENT
Start: 2018-06-07 | End: 2018-11-09 | Stop reason: SDUPTHER

## 2018-06-07 RX ORDER — POTASSIUM CHLORIDE 20 MEQ/1
20 TABLET, EXTENDED RELEASE ORAL DAILY
Qty: 90 TABLET | Refills: 1 | Status: SHIPPED | OUTPATIENT
Start: 2018-06-07 | End: 2018-06-14 | Stop reason: SDUPTHER

## 2018-06-07 RX ORDER — AMLODIPINE BESYLATE 2.5 MG/1
2.5 TABLET ORAL DAILY
Qty: 90 TABLET | Refills: 1 | Status: SHIPPED | OUTPATIENT
Start: 2018-06-07 | End: 2018-12-08 | Stop reason: SDUPTHER

## 2018-06-12 DIAGNOSIS — G71.11 MYOTONIC DYSTROPHY, TYPE 2 (HCC): ICD-10-CM

## 2018-06-12 RX ORDER — DICLOFENAC SODIUM 75 MG/1
75 TABLET, DELAYED RELEASE ORAL 2 TIMES DAILY
Qty: 180 TABLET | Refills: 2 | Status: SHIPPED | OUTPATIENT
Start: 2018-06-12 | End: 2019-02-19 | Stop reason: SDUPTHER

## 2018-06-14 RX ORDER — CYCLOBENZAPRINE HCL 10 MG
10 TABLET ORAL 3 TIMES DAILY PRN
Qty: 270 TABLET | Refills: 2 | Status: SHIPPED | OUTPATIENT
Start: 2018-06-14 | End: 2021-09-03 | Stop reason: SDUPTHER

## 2018-06-14 RX ORDER — BACLOFEN 10 MG/1
10 TABLET ORAL 3 TIMES DAILY
Qty: 270 TABLET | Refills: 1 | Status: SHIPPED | OUTPATIENT
Start: 2018-06-14 | End: 2019-11-25 | Stop reason: SDUPTHER

## 2018-06-14 RX ORDER — CLOPIDOGREL BISULFATE 75 MG/1
75 TABLET ORAL DAILY
Qty: 90 TABLET | Refills: 2 | Status: SHIPPED | OUTPATIENT
Start: 2018-06-14 | End: 2019-02-27 | Stop reason: SDUPTHER

## 2018-06-14 RX ORDER — FENOFIBRATE 145 MG/1
145 TABLET, COATED ORAL DAILY
Qty: 90 TABLET | Refills: 2 | Status: SHIPPED | OUTPATIENT
Start: 2018-06-14 | End: 2019-03-08 | Stop reason: SDUPTHER

## 2018-06-14 RX ORDER — POTASSIUM CHLORIDE 20 MEQ/1
20 TABLET, EXTENDED RELEASE ORAL DAILY
Qty: 90 TABLET | Refills: 1 | Status: SHIPPED | OUTPATIENT
Start: 2018-06-14 | End: 2019-05-20

## 2018-06-14 RX ORDER — TIZANIDINE 4 MG/1
4 TABLET ORAL 3 TIMES DAILY
Qty: 270 TABLET | Refills: 2 | Status: SHIPPED | OUTPATIENT
Start: 2018-06-14 | End: 2019-05-05 | Stop reason: SDUPTHER

## 2018-07-05 DIAGNOSIS — G71.11 MYOTONIC DYSTROPHY, TYPE 2 (HCC): ICD-10-CM

## 2018-07-05 RX ORDER — HYDROCODONE BITARTRATE AND ACETAMINOPHEN 10; 325 MG/1; MG/1
1 TABLET ORAL EVERY 6 HOURS PRN
Qty: 120 TABLET | Refills: 0 | Status: SHIPPED | OUTPATIENT
Start: 2018-07-05 | End: 2018-08-07 | Stop reason: SDUPTHER

## 2018-07-13 ENCOUNTER — OFFICE VISIT (OUTPATIENT)
Dept: INTERNAL MEDICINE | Facility: CLINIC | Age: 48
End: 2018-07-13

## 2018-07-13 VITALS
SYSTOLIC BLOOD PRESSURE: 132 MMHG | DIASTOLIC BLOOD PRESSURE: 84 MMHG | WEIGHT: 270 LBS | HEIGHT: 66 IN | BODY MASS INDEX: 43.39 KG/M2 | TEMPERATURE: 97.8 F

## 2018-07-13 DIAGNOSIS — G71.11 MYOTONIC DYSTROPHY, TYPE 2 (HCC): Primary | ICD-10-CM

## 2018-07-13 PROCEDURE — 99213 OFFICE O/P EST LOW 20 MIN: CPT | Performed by: INTERNAL MEDICINE

## 2018-07-13 NOTE — PROGRESS NOTES
"Subjective   Hafsa Barron is a 47 y.o. female here for evaluation for motorized scooter. 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 She walks with a cane, has difficulty ambulating over a block without significant fatigue and weakness. She is not able to do activities that she wants and often has to stay in the car when her family goes places. She has difficulty with ADLs inside the home due to mobility. She cannot get it out of the car herself so she is lacking independence which she hopes to achieve with the scooter. She also has mobility issues within the home that would greatly improve independence if she had a scooter.      The following portions of the patient's history were reviewed and updated as appropriate: allergies, current medications, past medical history, past social history and problem list.    Review of Systems:  General: negative  CV: negative  Respiratory: negative  Neuro:weakness  MSK: see hpi    Objective   /84 (BP Location: Right arm, Patient Position: Sitting, Cuff Size: Adult)   Temp 97.8 °F (36.6 °C) (Temporal Artery )   Ht 167.6 cm (66\")   Wt 122 kg (270 lb)   BMI 43.58 kg/m²     Physical Exam   Constitutional: She is oriented to person, place, and time. She appears well-developed and well-nourished.   Pulmonary/Chest: Effort normal.   Neurological: She is alert and oriented to person, place, and time.   Skin: Skin is warm and dry.   Psychiatric: She has a normal mood and affect. Her behavior is normal. Judgment and thought content normal.   Vitals reviewed.      Assessment/Plan   Hafsa was seen today for follow-up.    Diagnoses and all orders for this visit:    Myotonic dystrophy, type 2 (CMS/HCC)  -would benefit greatly from a motorized scooter for her impaired mobility. She has decreased stamina and weakness, unable to ambulate more than 20 feet without having to rest. For her future mobility quality of life a motorized scooter is a medical " necessity.

## 2018-07-27 RX ORDER — GUAIFENESIN AND CODEINE PHOSPHATE 100; 10 MG/5ML; MG/5ML
5 SOLUTION ORAL 3 TIMES DAILY PRN
Qty: 118 ML | Refills: 0 | Status: SHIPPED | OUTPATIENT
Start: 2018-07-27 | End: 2018-08-08

## 2018-07-31 RX ORDER — AMLODIPINE BESYLATE 2.5 MG/1
TABLET ORAL
Qty: 90 TABLET | Refills: 1 | OUTPATIENT
Start: 2018-07-31

## 2018-08-01 ENCOUNTER — TELEPHONE (OUTPATIENT)
Dept: INTERNAL MEDICINE | Facility: CLINIC | Age: 48
End: 2018-08-01

## 2018-08-01 NOTE — TELEPHONE ENCOUNTER
Patient wanted to let you know due to her son's football schedule she was unable to make it here tomorrow so she went to the [facility]. They diagnosed her with Bronchitis and gave her med but was glad you said to go somewhere and let them listen to her lungs.

## 2018-08-01 NOTE — TELEPHONE ENCOUNTER
Patient sts her cough isn't getting better and her phlegm is now a yellowish green. Sts she has apt on Tuesday. Asking if she has to come in sooner to have something different for cough and abx?

## 2018-08-07 DIAGNOSIS — G71.11 MYOTONIC DYSTROPHY, TYPE 2 (HCC): ICD-10-CM

## 2018-08-07 RX ORDER — HYDROCODONE BITARTRATE AND ACETAMINOPHEN 10; 325 MG/1; MG/1
1 TABLET ORAL EVERY 6 HOURS PRN
Qty: 120 TABLET | Refills: 0 | Status: SHIPPED | OUTPATIENT
Start: 2018-08-07 | End: 2018-09-10 | Stop reason: SDUPTHER

## 2018-08-08 ENCOUNTER — OFFICE VISIT (OUTPATIENT)
Dept: INTERNAL MEDICINE | Facility: CLINIC | Age: 48
End: 2018-08-08

## 2018-08-08 VITALS
WEIGHT: 275 LBS | DIASTOLIC BLOOD PRESSURE: 72 MMHG | BODY MASS INDEX: 44.2 KG/M2 | TEMPERATURE: 97.4 F | HEIGHT: 66 IN | SYSTOLIC BLOOD PRESSURE: 140 MMHG

## 2018-08-08 DIAGNOSIS — M79.7 FIBROMYALGIA: ICD-10-CM

## 2018-08-08 DIAGNOSIS — E11.9 TYPE 2 DIABETES MELLITUS WITHOUT COMPLICATION, WITHOUT LONG-TERM CURRENT USE OF INSULIN (HCC): Primary | ICD-10-CM

## 2018-08-08 DIAGNOSIS — F41.9 ANXIETY AND DEPRESSION: ICD-10-CM

## 2018-08-08 DIAGNOSIS — J40 BRONCHITIS: ICD-10-CM

## 2018-08-08 DIAGNOSIS — F32.A ANXIETY AND DEPRESSION: ICD-10-CM

## 2018-08-08 LAB — HBA1C MFR BLD: 7.2 %

## 2018-08-08 PROCEDURE — 96372 THER/PROPH/DIAG INJ SC/IM: CPT | Performed by: INTERNAL MEDICINE

## 2018-08-08 PROCEDURE — 99214 OFFICE O/P EST MOD 30 MIN: CPT | Performed by: INTERNAL MEDICINE

## 2018-08-08 PROCEDURE — 83036 HEMOGLOBIN GLYCOSYLATED A1C: CPT | Performed by: INTERNAL MEDICINE

## 2018-08-08 RX ORDER — VENLAFAXINE HYDROCHLORIDE 37.5 MG/1
37.5 CAPSULE, EXTENDED RELEASE ORAL DAILY
Qty: 30 CAPSULE | Refills: 2 | Status: SHIPPED | OUTPATIENT
Start: 2018-08-08 | End: 2019-01-28 | Stop reason: SDUPTHER

## 2018-08-08 RX ORDER — TRIAMCINOLONE ACETONIDE 40 MG/ML
40 INJECTION, SUSPENSION INTRA-ARTICULAR; INTRAMUSCULAR ONCE
Status: COMPLETED | OUTPATIENT
Start: 2018-08-08 | End: 2018-08-08

## 2018-08-08 RX ORDER — FLUCONAZOLE 150 MG/1
150 TABLET ORAL ONCE
Qty: 2 TABLET | Refills: 0 | Status: SHIPPED | OUTPATIENT
Start: 2018-08-08 | End: 2018-08-08

## 2018-08-08 RX ORDER — VENLAFAXINE HYDROCHLORIDE 37.5 MG/1
37.5 CAPSULE, EXTENDED RELEASE ORAL DAILY
Qty: 30 CAPSULE | Refills: 2 | Status: SHIPPED | OUTPATIENT
Start: 2018-08-08 | End: 2018-08-08 | Stop reason: SDUPTHER

## 2018-08-08 RX ADMIN — TRIAMCINOLONE ACETONIDE 40 MG: 40 INJECTION, SUSPENSION INTRA-ARTICULAR; INTRAMUSCULAR at 16:48

## 2018-08-08 NOTE — PROGRESS NOTES
"Subjective   Hafsa Barron is a 47 y.o. female here for follow-up DMII, A&D, FM, cough. DMII: takes meds as prescribed, last A1c was a bit over goal. Watching diet. A&D: mood still not controlled. Feels she has mood swings all the time, can be very angry one minute then the next pleasant. Says her  pushes her mood frequently but he doesn't realize it, then complains that she has mood swings. Chronic disease plays into her mood, can't do all she wants due to limited mobility from MD. FM: on cymbalta long-term, but not sure if it does much for her pain. She is also on chronic opiates. Cough: present for 2 weeks, has had PO steroids, abx, cough syrup, antihistamines and nothing has worked. No SOA, no other sx. Does feel like sinus drainage plays into the cough. CXR was negative.      The following portions of the patient's history were reviewed and updated as appropriate: allergies, current medications, past medical history, past social history and problem list.    Review of Systems:  General: negative  HEENT: see hpi  CV: negative  Respiratory: cough  Neuro: negative  Psych: A&D, mood swings  MSK: myalgias  Endo: negative    Objective   /72 (BP Location: Left arm, Patient Position: Sitting, Cuff Size: Large Adult)   Temp 97.4 °F (36.3 °C) (Temporal Artery )   Ht 167.6 cm (66\")   Wt 125 kg (275 lb)   BMI 44.39 kg/m²     Physical Exam   Constitutional: She is oriented to person, place, and time. She appears well-developed and well-nourished.   Cardiovascular: Normal rate, regular rhythm and normal heart sounds.    Pulmonary/Chest: Effort normal and breath sounds normal. She has no wheezes. She has no rales.   Neurological: She is alert and oriented to person, place, and time.   Skin: Skin is warm and dry.   Psychiatric: She has a normal mood and affect. Her behavior is normal. Thought content normal.   Vitals reviewed.      Assessment/Plan   Hafsa was seen today for bronchitis.    Diagnoses and " all orders for this visit:    Type 2 diabetes mellitus without complication, without long-term current use of insulin (CMS/[facility])  -     POC Glycosylated Hemoglobin (Hb A1C): 7.2%, almost at goal, continue diet and meds    Fibromyalgia  -continue current med, did discuss a potential worsening of sx going off cymbalta but hopefully effexor will help sx as well    Bronchitis  -     triamcinolone acetonide (KENALOG-40) injection 40 mg; Inject 1 mL into the appropriate muscle as directed by prescriber 1 (One) Time.    Anxiety and depression  -     venlafaxine XR (EFFEXOR XR) 37.5 MG 24 hr capsule; Take 1 capsule by mouth Daily.  -     Directly sub for cymbalta  -     Continue buspar

## 2018-08-13 RX ORDER — FLUCONAZOLE 150 MG/1
150 TABLET ORAL ONCE
Qty: 3 TABLET | Refills: 0 | Status: SHIPPED | OUTPATIENT
Start: 2018-08-13 | End: 2018-08-13

## 2018-08-13 RX ORDER — FLUCONAZOLE 150 MG/1
TABLET ORAL
Qty: 2 TABLET | Refills: 0 | OUTPATIENT
Start: 2018-08-13

## 2018-08-28 ENCOUNTER — HOSPITAL ENCOUNTER (EMERGENCY)
Facility: HOSPITAL | Age: 48
Discharge: ANOTHER HOSPITAL | End: 2018-08-28
Attending: EMERGENCY MEDICINE | Admitting: EMERGENCY MEDICINE

## 2018-08-28 VITALS
DIASTOLIC BLOOD PRESSURE: 85 MMHG | WEIGHT: 274 LBS | OXYGEN SATURATION: 95 % | TEMPERATURE: 98.1 F | BODY MASS INDEX: 44.03 KG/M2 | SYSTOLIC BLOOD PRESSURE: 176 MMHG | RESPIRATION RATE: 18 BRPM | HEART RATE: 109 BPM | HEIGHT: 66 IN

## 2018-08-28 DIAGNOSIS — R73.9 HYPERGLYCEMIA: ICD-10-CM

## 2018-08-28 DIAGNOSIS — I99.8 ISCHEMIA OF LEFT UPPER EXTREMITY: ICD-10-CM

## 2018-08-28 DIAGNOSIS — I10 UNCONTROLLED HYPERTENSION: ICD-10-CM

## 2018-08-28 DIAGNOSIS — K55.059: Primary | ICD-10-CM

## 2018-08-28 LAB
ALBUMIN SERPL-MCNC: 4.45 G/DL (ref 3.2–4.8)
ALBUMIN/GLOB SERPL: 1.8 G/DL (ref 1.5–2.5)
ALP SERPL-CCNC: 82 U/L (ref 25–100)
ALT SERPL W P-5'-P-CCNC: 49 U/L (ref 7–40)
ANION GAP SERPL CALCULATED.3IONS-SCNC: 8 MMOL/L (ref 3–11)
APTT PPP: 24.8 SECONDS (ref 24–31)
AST SERPL-CCNC: 34 U/L (ref 0–33)
BASOPHILS # BLD AUTO: 0.09 10*3/MM3 (ref 0–0.2)
BASOPHILS NFR BLD AUTO: 1.3 % (ref 0–1)
BILIRUB SERPL-MCNC: 0.4 MG/DL (ref 0.3–1.2)
BUN BLD-MCNC: 16 MG/DL (ref 9–23)
BUN/CREAT SERPL: 30.2 (ref 7–25)
CALCIUM SPEC-SCNC: 9 MG/DL (ref 8.7–10.4)
CHLORIDE SERPL-SCNC: 105 MMOL/L (ref 99–109)
CO2 SERPL-SCNC: 27 MMOL/L (ref 20–31)
CREAT BLD-MCNC: 0.53 MG/DL (ref 0.6–1.3)
DEPRECATED RDW RBC AUTO: 45.3 FL (ref 37–54)
EOSINOPHIL # BLD AUTO: 1.11 10*3/MM3 (ref 0–0.3)
EOSINOPHIL NFR BLD AUTO: 16.2 % (ref 0–3)
ERYTHROCYTE [DISTWIDTH] IN BLOOD BY AUTOMATED COUNT: 13.6 % (ref 11.3–14.5)
GFR SERPL CREATININE-BSD FRML MDRD: 123 ML/MIN/1.73
GLOBULIN UR ELPH-MCNC: 2.5 GM/DL
GLUCOSE BLD-MCNC: 167 MG/DL (ref 70–100)
HCT VFR BLD AUTO: 46.7 % (ref 34.5–44)
HGB BLD-MCNC: 15.3 G/DL (ref 11.5–15.5)
IMM GRANULOCYTES # BLD: 0.03 10*3/MM3 (ref 0–0.03)
IMM GRANULOCYTES NFR BLD: 0.4 % (ref 0–0.6)
INR PPP: 1.03 (ref 0.91–1.09)
LYMPHOCYTES # BLD AUTO: 1.89 10*3/MM3 (ref 0.6–4.8)
LYMPHOCYTES NFR BLD AUTO: 27.6 % (ref 24–44)
MCH RBC QN AUTO: 30 PG (ref 27–31)
MCHC RBC AUTO-ENTMCNC: 32.8 G/DL (ref 32–36)
MCV RBC AUTO: 91.6 FL (ref 80–99)
MONOCYTES # BLD AUTO: 0.4 10*3/MM3 (ref 0–1)
MONOCYTES NFR BLD AUTO: 5.8 % (ref 0–12)
NEUTROPHILS # BLD AUTO: 3.33 10*3/MM3 (ref 1.5–8.3)
NEUTROPHILS NFR BLD AUTO: 48.7 % (ref 41–71)
PLATELET # BLD AUTO: 206 10*3/MM3 (ref 150–450)
PMV BLD AUTO: 11.2 FL (ref 6–12)
POTASSIUM BLD-SCNC: 4.1 MMOL/L (ref 3.5–5.5)
PROT SERPL-MCNC: 6.9 G/DL (ref 5.7–8.2)
PROTHROMBIN TIME: 10.8 SECONDS (ref 9.6–11.5)
RBC # BLD AUTO: 5.1 10*6/MM3 (ref 3.89–5.14)
SODIUM BLD-SCNC: 140 MMOL/L (ref 132–146)
WBC NRBC COR # BLD: 6.85 10*3/MM3 (ref 3.5–10.8)

## 2018-08-28 PROCEDURE — 99284 EMERGENCY DEPT VISIT MOD MDM: CPT

## 2018-08-28 PROCEDURE — 85610 PROTHROMBIN TIME: CPT | Performed by: EMERGENCY MEDICINE

## 2018-08-28 PROCEDURE — 25010000002 HEPARIN (PORCINE) PER 1000 UNITS: Performed by: EMERGENCY MEDICINE

## 2018-08-28 PROCEDURE — 93005 ELECTROCARDIOGRAM TRACING: CPT | Performed by: EMERGENCY MEDICINE

## 2018-08-28 PROCEDURE — 80053 COMPREHEN METABOLIC PANEL: CPT | Performed by: EMERGENCY MEDICINE

## 2018-08-28 PROCEDURE — 96365 THER/PROPH/DIAG IV INF INIT: CPT

## 2018-08-28 PROCEDURE — 85730 THROMBOPLASTIN TIME PARTIAL: CPT | Performed by: EMERGENCY MEDICINE

## 2018-08-28 PROCEDURE — 85025 COMPLETE CBC W/AUTO DIFF WBC: CPT | Performed by: EMERGENCY MEDICINE

## 2018-08-28 RX ORDER — HEPARIN SODIUM 1000 [USP'U]/ML
40 INJECTION, SOLUTION INTRAVENOUS; SUBCUTANEOUS AS NEEDED
Status: DISCONTINUED | OUTPATIENT
Start: 2018-08-28 | End: 2018-08-28 | Stop reason: SDUPTHER

## 2018-08-28 RX ORDER — SODIUM CHLORIDE 0.9 % (FLUSH) 0.9 %
10 SYRINGE (ML) INJECTION AS NEEDED
Status: DISCONTINUED | OUTPATIENT
Start: 2018-08-28 | End: 2018-08-29 | Stop reason: HOSPADM

## 2018-08-28 RX ORDER — HEPARIN SODIUM 1000 [USP'U]/ML
80 INJECTION, SOLUTION INTRAVENOUS; SUBCUTANEOUS AS NEEDED
Status: DISCONTINUED | OUTPATIENT
Start: 2018-08-28 | End: 2018-08-28 | Stop reason: SDUPTHER

## 2018-08-28 RX ORDER — HEPARIN SODIUM 1000 [USP'U]/ML
80 INJECTION, SOLUTION INTRAVENOUS; SUBCUTANEOUS ONCE
Status: COMPLETED | OUTPATIENT
Start: 2018-08-28 | End: 2018-08-28

## 2018-08-28 RX ADMIN — HEPARIN SODIUM 12 UNITS/KG/HR: 10000 INJECTION, SOLUTION INTRAVENOUS at 20:47

## 2018-08-28 RX ADMIN — HEPARIN SODIUM 9920 UNITS: 1000 INJECTION, SOLUTION INTRAVENOUS; SUBCUTANEOUS at 20:45

## 2018-08-30 ENCOUNTER — TELEPHONE (OUTPATIENT)
Dept: INTERNAL MEDICINE | Facility: CLINIC | Age: 48
End: 2018-08-30

## 2018-08-31 ENCOUNTER — TELEPHONE (OUTPATIENT)
Dept: CARDIOLOGY | Facility: CLINIC | Age: 48
End: 2018-08-31

## 2018-08-31 ENCOUNTER — TRANSITIONAL CARE MANAGEMENT TELEPHONE ENCOUNTER (OUTPATIENT)
Dept: INTERNAL MEDICINE | Facility: CLINIC | Age: 48
End: 2018-08-31

## 2018-08-31 RX ORDER — ALBUTEROL SULFATE 2.5 MG/3ML
2.5 SOLUTION RESPIRATORY (INHALATION) EVERY 4 HOURS PRN
Qty: 90 VIAL | Refills: 1 | Status: SHIPPED | OUTPATIENT
Start: 2018-08-31

## 2018-09-04 ENCOUNTER — PATIENT MESSAGE (OUTPATIENT)
Dept: CARDIOLOGY | Facility: CLINIC | Age: 48
End: 2018-09-04

## 2018-09-10 ENCOUNTER — OFFICE VISIT (OUTPATIENT)
Dept: INTERNAL MEDICINE | Facility: CLINIC | Age: 48
End: 2018-09-10

## 2018-09-10 VITALS
DIASTOLIC BLOOD PRESSURE: 82 MMHG | HEIGHT: 66 IN | SYSTOLIC BLOOD PRESSURE: 132 MMHG | WEIGHT: 269.4 LBS | TEMPERATURE: 97.8 F | BODY MASS INDEX: 43.3 KG/M2

## 2018-09-10 DIAGNOSIS — I74.8 SUBCLAVIAN ARTERY THROMBOSIS (HCC): Primary | ICD-10-CM

## 2018-09-10 DIAGNOSIS — Z86.73 H/O: STROKE: ICD-10-CM

## 2018-09-10 DIAGNOSIS — G71.11 MYOTONIC DYSTROPHY, TYPE 2 (HCC): ICD-10-CM

## 2018-09-10 PROCEDURE — 99495 TRANSJ CARE MGMT MOD F2F 14D: CPT | Performed by: INTERNAL MEDICINE

## 2018-09-10 RX ORDER — HYDROCODONE BITARTRATE AND ACETAMINOPHEN 10; 325 MG/1; MG/1
1 TABLET ORAL EVERY 6 HOURS PRN
Qty: 120 TABLET | Refills: 0 | Status: SHIPPED | OUTPATIENT
Start: 2018-09-10 | End: 2018-10-01 | Stop reason: SDUPTHER

## 2018-09-10 NOTE — PROGRESS NOTES
Transitional Care Follow Up Visit  Subjective     Hafsa Barron is a 47 y.o. female who presents for a transitional care management visit.    Within 48 business hours after discharge our office contacted her via telephone to coordinate her care and needs.      I reviewed and discussed the details of that call along with the discharge summary, hospital problems, inpatient lab results, inpatient diagnostic studies, and consultation reports with Hafsa.     Current outpatient and discharge medications have been reconciled for the patient.    Current outpatient and discharge medications have been reconciled for the patient.  Reviewed by: Jocy Snow MD      Current Outpatient Prescriptions:   •  albuterol (PROAIR HFA) 108 (90 BASE) MCG/ACT inhaler, Inhale 1 puff Every 6 (Six) Hours As Needed for Wheezing., Disp: 90 inhaler, Rfl: 2  •  albuterol (PROVENTIL) (2.5 MG/3ML) 0.083% nebulizer solution, Take 2.5 mg by nebulization Every 4 (Four) Hours As Needed for Wheezing., Disp: 90 vial, Rfl: 1  •  amLODIPine (NORVASC) 2.5 MG tablet, Take 1 tablet by mouth Daily., Disp: 90 tablet, Rfl: 1  •  baclofen (LIORESAL) 10 MG tablet, Take 1 tablet by mouth 3 (Three) Times a Day., Disp: 270 tablet, Rfl: 1  •  Blood Glucose Monitoring Suppl (ONE TOUCH ULTRA 2) w/Device kit, , Disp: , Rfl:   •  busPIRone (BUSPAR) 15 MG tablet, Take 1 tablet by mouth 3 (Three) Times a Day., Disp: 270 tablet, Rfl: 1  •  calcium carbonate-vitamin d (CALCIUM 600+D HIGH POTENCY) 600-400 MG-UNIT per tablet, Take 1 tablet by mouth Daily., Disp: 90 tablet, Rfl: 2  •  clopidogrel (PLAVIX) 75 MG tablet, Take 1 tablet by mouth Daily., Disp: 90 tablet, Rfl: 2  •  cyclobenzaprine (FLEXERIL) 10 MG tablet, Take 1 tablet by mouth 3 (Three) Times a Day As Needed for Muscle Spasms., Disp: 270 tablet, Rfl: 2  •  diclofenac (VOLTAREN) 75 MG EC tablet, Take 1 tablet by mouth 2 (Two) Times a Day., Disp: 180 tablet, Rfl: 2  •  Empagliflozin (JARDIANCE) 25  MG tablet, Take 1 tablet by mouth Daily., Disp: 30 tablet, Rfl: 5  •  fenofibrate (TRICOR) 145 MG tablet, Take 1 tablet by mouth Daily., Disp: 90 tablet, Rfl: 2  •  fluconazole (DIFLUCAN) 150 MG tablet, Take 1 tablet by mouth 1 (One) Time for 1 dose., Disp: 3 tablet, Rfl: 0  •  fluticasone (FLONASE ALLERGY RELIEF) 50 MCG/ACT nasal spray, into each nostril., Disp: , Rfl:   •  gabapentin (NEURONTIN) 600 MG tablet, Take 1 tablet by mouth 3 (Three) Times a Day., Disp: 270 tablet, Rfl: 0  •  HYDROcodone-acetaminophen (NORCO)  MG per tablet, Take 1 tablet by mouth Every 6 (Six) Hours As Needed for Moderate Pain ., Disp: 120 tablet, Rfl: 0  •  Liraglutide (VICTOZA) 18 MG/3ML solution pen-injector injection, Inject 0.6mg subcutaneous daily for 1 week then increase to 1.2mg subcutaneous daily., Disp: 3 pen, Rfl: 11  •  loratadine (CLARITIN) 10 MG tablet, Take 1 tablet by mouth Daily., Disp: 30 tablet, Rfl: 11  •  metFORMIN (GLUCOPHAGE) 1000 MG tablet, Take 1 tablet by mouth 2 (Two) Times a Day With Meals., Disp: 180 tablet, Rfl: 2  •  montelukast (SINGULAIR) 10 MG tablet, Take 1 tablet by mouth Every Night., Disp: 90 tablet, Rfl: 1  •  ONE TOUCH ULTRA TEST test strip, , Disp: , Rfl:   •  pantoprazole (PROTONIX) 40 MG EC tablet, Take 1 tablet by mouth Daily., Disp: 90 tablet, Rfl: 1  •  potassium chloride (K-DUR,KLOR-CON) 20 MEQ CR tablet, Take 1 tablet by mouth Daily., Disp: 90 tablet, Rfl: 1  •  spironolactone (ALDACTONE) 50 MG tablet, Take 1 tablet by mouth 2 (Two) Times a Day., Disp: 180 tablet, Rfl: 1  •  tiZANidine (ZANAFLEX) 4 MG tablet, Take 1 tablet by mouth 3 (Three) Times a Day., Disp: 270 tablet, Rfl: 2  •  venlafaxine XR (EFFEXOR XR) 37.5 MG 24 hr capsule, Take 1 capsule by mouth Daily., Disp: 30 capsule, Rfl: 2      Date of TCM Phone Call 8/31/2018   [facility]   Date of Admission 8/29/2018   Date of Discharge 8/30/2018   Discharge Disposition Home or Self Care     Risk for Readmission  (LACE) No Data Recorded    History of Present Illness   Course During Hospital Stay:  Pt is a 48yo F with pmh muscular dystrophy, previous idiopathic CVA (currently on plavix) who presented to ED with complaints of left arm and hand pain and discoloration of fingers. They had a difficult time finding left radial pulse so was sent to [facility] then on to  where she was diagnosed with left severe subclavian artery stenosis. A stent was placed on 8/29 and she has had return of some feeling in the 3rd-5th digits on left hand. Still has 5th fingertip purplish discoloration. No pain in the hand or weakness. She does admit increased anxiety related to her health now that she has had two vascular issues which are seemingly unprovoked. She is afraid that something could happen to her health-wise at any time.  Her only med change is adding asa 81mg which she is taking in combo with her plavix. Her pain is well-controlled on current regimen of Norco. The pain is lower back and legs, related to FM+MD.     The following portions of the patient's history were reviewed and updated as appropriate: allergies, current medications, past family history, past medical history, past social history, past surgical history and problem list.    Review of Systems    Objective   Physical Exam   Constitutional: She is oriented to person, place, and time. She appears well-developed and well-nourished.   Cardiovascular: Normal rate, regular rhythm and normal heart sounds.    Pulmonary/Chest: Effort normal and breath sounds normal. She has no wheezes. She has no rales.   Neurological: She is alert and oriented to person, place, and time.   Skin: Skin is warm and dry.   Psychiatric: She has a normal mood and affect. Her behavior is normal. Thought content normal.   Vitals reviewed.      Assessment/Plan   Hafsa was seen today for follow-up.    Diagnoses and all orders for this visit:    Subclavian artery thrombosis (CMS/HCC)  -     Ambulatory  Referral to Hematology  -     Following with vascular  -     Continue asa+plavix    H/O: stroke  -     Ambulatory Referral to Hematology    Myotonic dystrophy, type 2 (CMS/HCC)  -     HYDROcodone-acetaminophen (NORCO)  MG per tablet; Take 1 tablet by mouth Every 6 (Six) Hours As Needed for Moderate Pain .

## 2018-09-11 RX ORDER — FLUCONAZOLE 150 MG/1
150 TABLET ORAL ONCE
Qty: 3 TABLET | Refills: 0 | Status: SHIPPED | OUTPATIENT
Start: 2018-09-11 | End: 2018-09-11

## 2018-09-20 RX ORDER — MONTELUKAST SODIUM 10 MG/1
TABLET ORAL
Qty: 30 TABLET | Refills: 1 | OUTPATIENT
Start: 2018-09-20

## 2018-09-24 RX ORDER — MONTELUKAST SODIUM 10 MG/1
TABLET ORAL
Qty: 30 TABLET | Refills: 1 | Status: CANCELLED | OUTPATIENT
Start: 2018-09-24

## 2018-09-24 RX ORDER — MONTELUKAST SODIUM 10 MG/1
10 TABLET ORAL NIGHTLY
Qty: 90 TABLET | Refills: 1 | Status: SHIPPED | OUTPATIENT
Start: 2018-09-24 | End: 2019-03-25 | Stop reason: SDUPTHER

## 2018-10-01 DIAGNOSIS — G71.11 MYOTONIC DYSTROPHY, TYPE 2 (HCC): ICD-10-CM

## 2018-10-01 RX ORDER — HYDROCODONE BITARTRATE AND ACETAMINOPHEN 10; 325 MG/1; MG/1
1 TABLET ORAL EVERY 6 HOURS PRN
Qty: 120 TABLET | Refills: 0 | Status: SHIPPED | OUTPATIENT
Start: 2018-10-01 | End: 2018-11-05 | Stop reason: SDUPTHER

## 2018-10-02 RX ORDER — LORATADINE 10 MG/1
10 TABLET ORAL DAILY
Qty: 30 TABLET | Refills: 11 | Status: SHIPPED | OUTPATIENT
Start: 2018-10-02 | End: 2019-05-05 | Stop reason: SDUPTHER

## 2018-10-15 ENCOUNTER — CONSULT (OUTPATIENT)
Dept: ONCOLOGY | Facility: CLINIC | Age: 48
End: 2018-10-15

## 2018-10-15 ENCOUNTER — LAB (OUTPATIENT)
Dept: LAB | Facility: HOSPITAL | Age: 48
End: 2018-10-15

## 2018-10-15 VITALS
WEIGHT: 271 LBS | OXYGEN SATURATION: 96 % | HEIGHT: 65 IN | TEMPERATURE: 98.4 F | HEART RATE: 118 BPM | DIASTOLIC BLOOD PRESSURE: 95 MMHG | SYSTOLIC BLOOD PRESSURE: 136 MMHG | BODY MASS INDEX: 45.15 KG/M2 | RESPIRATION RATE: 20 BRPM

## 2018-10-15 DIAGNOSIS — I74.8 THROMBOSIS OF SUBCLAVIAN ARTERY (HCC): Primary | ICD-10-CM

## 2018-10-15 DIAGNOSIS — I74.8 THROMBOSIS OF SUBCLAVIAN ARTERY (HCC): ICD-10-CM

## 2018-10-15 PROCEDURE — 85300 ANTITHROMBIN III ACTIVITY: CPT

## 2018-10-15 PROCEDURE — 83520 IMMUNOASSAY QUANT NOS NONAB: CPT

## 2018-10-15 PROCEDURE — 36415 COLL VENOUS BLD VENIPUNCTURE: CPT

## 2018-10-15 PROCEDURE — 81240 F2 GENE: CPT

## 2018-10-15 PROCEDURE — 85303 CLOT INHIBIT PROT C ACTIVITY: CPT

## 2018-10-15 PROCEDURE — 99204 OFFICE O/P NEW MOD 45 MIN: CPT | Performed by: INTERNAL MEDICINE

## 2018-10-15 PROCEDURE — 81241 F5 GENE: CPT

## 2018-10-15 PROCEDURE — 85306 CLOT INHIBIT PROT S FREE: CPT

## 2018-10-15 PROCEDURE — 85305 CLOT INHIBIT PROT S TOTAL: CPT

## 2018-10-15 PROCEDURE — 86146 BETA-2 GLYCOPROTEIN ANTIBODY: CPT

## 2018-10-15 PROCEDURE — 81270 JAK2 GENE: CPT

## 2018-10-15 PROCEDURE — 86147 CARDIOLIPIN ANTIBODY EA IG: CPT

## 2018-10-15 PROCEDURE — 81400 MOPATH PROCEDURE LEVEL 1: CPT

## 2018-10-15 PROCEDURE — 85302 CLOT INHIBIT PROT C ANTIGEN: CPT

## 2018-10-15 RX ORDER — ASPIRIN 81 MG/1
81 TABLET ORAL DAILY
COMMUNITY

## 2018-10-15 NOTE — PROGRESS NOTES
ID: 47 y.o. year old female from [facility] [id number]    PCP: Jocy Snow MD    REFERRING PHYSICIAN: Jocy Negro*    Reason for Consultation: Unprovoked left subclavian artery thrombosis    Dear Dr. Marcus    It is a pleasure to meet Ms. Barron today.  As you know she is a very pleasant 47-year-old lady who is seen in consultation for unprovoked arterial thrombosis of the subclavian.  She was initially seen in the ER at [facility] with left arm pain and ischemia.  Subsequently she was sent to  and was seen by Dr. Grace is part of the vascular program there.  She underwent a thrombectomy and stent placement.  She was already on Plavix and so aspirin was added to the regimen.  She has a history of an ischemic stroke from 5 years ago.  She has underlying myotonic dystrophy.  She is not very active.  She denies any autoimmune diseases.       Past Medical History:   Diagnosis Date   • Allergic    • Allergy    • Anxiety    • Asthma Allergies induced   • Depression    • Fibromyalgia, primary    • GERD (gastroesophageal reflux disease)    • Headache    • Hyperlipidemia    • Hypertension    • IBS (irritable bowel syndrome)    • Internal hemorrhoid    • Kidney stone    • Low back pain    • Muscular dystrophy    • Muscular dystrophy     II   • Myotonia    • Obesity    • Stroke (CMS/[facility]) 08/31/2013    resdual- left sided weakness.    • Type 2 diabetes mellitus without complication, without long-term current use of insulin (CMS/[facility])        Past Surgical History:   Procedure Laterality Date   • CHOLECYSTECTOMY  07/2004   • COLONOSCOPY  07/2017   • GALLBLADDER SURGERY     • LYMPH NODE BIOPSY     • SUBCLAVIAN ARTERY STENT  08/2018    x2       Social History     Social History   • Marital status:      Social History Main Topics   • Smoking status: Former Smoker     Packs/day: 1.50     Years: 15.00     Start date: 12/19/1983     Quit date: 8/30/2013   • Smokeless tobacco: Never Used       Comment: use Ec[employer] now NO NICOTENE   • Alcohol use Yes      Comment: rarely   • Drug use: No   • Sexual activity: Yes     Partners: Male     Birth control/ protection: Post-menopausal      Comment: Menapause no cycle since 2013     Other Topics Concern   • Not on file       Family History   Problem Relation Age of Onset   • Allergies Other    • ADD / ADHD Other    • Heart block Other    • Other Other         CEREBROVASCULAR ACCIDENT    • Hypertension Other    • Cancer Other         LUNG CANCER   • Hyperlipidemia Other    • Alcohol abuse Mother    • Depression Mother    • Early death Mother          age 59   • Heart disease Mother         Mother  of Massive Heart attack   • Hyperlipidemia Mother    • Hypertension Mother    • Miscarriages / Stillbirths Mother         Miscarriage   • Heart attack Mother          of  massive heart attack   • Alcohol abuse Father    • Cancer Father         Had Lung Cancer - had 1 lung till death   • Diabetes Father         Lived on insuline shots   • Early death Father          age 53   • Breast cancer Maternal Aunt    • Learning disabilities Son         Had Adhd   • Ovarian cancer Neg Hx        Review of Systems:    16 point review of systems was performed and reviewed and scanned into the EMR    Review of Systems - Oncology      Current Outpatient Prescriptions:   •  albuterol (PROAIR HFA) 108 (90 BASE) MCG/ACT inhaler, Inhale 1 puff Every 6 (Six) Hours As Needed for Wheezing., Disp: 90 inhaler, Rfl: 2  •  albuterol (PROVENTIL) (2.5 MG/3ML) 0.083% nebulizer solution, Take 2.5 mg by nebulization Every 4 (Four) Hours As Needed for Wheezing., Disp: 90 vial, Rfl: 1  •  amLODIPine (NORVASC) 2.5 MG tablet, Take 1 tablet by mouth Daily., Disp: 90 tablet, Rfl: 1  •  aspirin 81 MG EC tablet, Take 81 mg by mouth Daily., Disp: , Rfl:   •  baclofen (LIORESAL) 10 MG tablet, Take 1 tablet by mouth 3 (Three) Times a Day., Disp: 270 tablet, Rfl: 1  •  Blood Glucose  Monitoring Suppl (ONE TOUCH ULTRA 2) w/Device kit, , Disp: , Rfl:   •  busPIRone (BUSPAR) 15 MG tablet, Take 1 tablet by mouth 3 (Three) Times a Day., Disp: 270 tablet, Rfl: 1  •  calcium carbonate-vitamin d (CALCIUM 600+D HIGH POTENCY) 600-400 MG-UNIT per tablet, Take 1 tablet by mouth Daily., Disp: 90 tablet, Rfl: 2  •  clopidogrel (PLAVIX) 75 MG tablet, Take 1 tablet by mouth Daily., Disp: 90 tablet, Rfl: 2  •  cyclobenzaprine (FLEXERIL) 10 MG tablet, Take 1 tablet by mouth 3 (Three) Times a Day As Needed for Muscle Spasms., Disp: 270 tablet, Rfl: 2  •  diclofenac (VOLTAREN) 75 MG EC tablet, Take 1 tablet by mouth 2 (Two) Times a Day., Disp: 180 tablet, Rfl: 2  •  Empagliflozin (JARDIANCE) 25 MG tablet, Take 1 tablet by mouth Daily., Disp: 30 tablet, Rfl: 5  •  fenofibrate (TRICOR) 145 MG tablet, Take 1 tablet by mouth Daily., Disp: 90 tablet, Rfl: 2  •  fluticasone (FLONASE ALLERGY RELIEF) 50 MCG/ACT nasal spray, into each nostril., Disp: , Rfl:   •  gabapentin (NEURONTIN) 600 MG tablet, Take 1 tablet by mouth 3 (Three) Times a Day., Disp: 270 tablet, Rfl: 0  •  HYDROcodone-acetaminophen (NORCO)  MG per tablet, Take 1 tablet by mouth Every 6 (Six) Hours As Needed for Moderate Pain ., Disp: 120 tablet, Rfl: 0  •  loratadine (CLARITIN) 10 MG tablet, Take 1 tablet by mouth Daily., Disp: 30 tablet, Rfl: 11  •  metFORMIN (GLUCOPHAGE) 1000 MG tablet, Take 1 tablet by mouth 2 (Two) Times a Day With Meals., Disp: 180 tablet, Rfl: 2  •  montelukast (SINGULAIR) 10 MG tablet, Take 1 tablet by mouth Every Night., Disp: 90 tablet, Rfl: 1  •  ONE TOUCH ULTRA TEST test strip, , Disp: , Rfl:   •  pantoprazole (PROTONIX) 40 MG EC tablet, Take 1 tablet by mouth Daily., Disp: 90 tablet, Rfl: 1  •  potassium chloride (K-DUR,KLOR-CON) 20 MEQ CR tablet, Take 1 tablet by mouth Daily., Disp: 90 tablet, Rfl: 1  •  spironolactone (ALDACTONE) 50 MG tablet, Take 1 tablet by mouth 2 (Two) Times a Day., Disp: 180 tablet, Rfl: 1  •   tiZANidine (ZANAFLEX) 4 MG tablet, Take 1 tablet by mouth 3 (Three) Times a Day., Disp: 270 tablet, Rfl: 2  •  venlafaxine XR (EFFEXOR XR) 37.5 MG 24 hr capsule, Take 1 capsule by mouth Daily., Disp: 30 capsule, Rfl: 2  •  Liraglutide (VICTOZA) 18 MG/3ML solution pen-injector injection, Inject 0.6mg subcutaneous daily for 1 week then increase to 1.2mg subcutaneous daily., Disp: 3 pen, Rfl: 11    Allergies   Allergen Reactions   • Penicillins Anaphylaxis and Shortness Of Breath       ECOG SCORE: 1    Objective     Vitals:    10/15/18 1117   BP: 136/95   Pulse: 118   Resp: 20   Temp: 98.4 °F (36.9 °C)   SpO2: 96%       Physical Exam    General: well appearing, in no acute distress  HEENT: sclera anicteric, oropharynx clear, neck is supple  Lymphatics: no cervical, supraclavicular, or axillary adenopathy  Cardiovascular: regular rate and rhythm, no murmurs, rubs or gallops  Lungs: clear to auscultation bilaterally  Abdomen: soft, nontender, nondistended.  No palpable organomegaly  Extremities: no lower extremity edema  Skin: no rashes, lesions, bruising, or petechiae  Msk:  Shows  weakness of the large muscle groups  Psych: Mood is stable      Lab Results   Component Value Date    GLUCOSE 167 (H) 08/28/2018    BUN 16 08/28/2018    CREATININE 0.53 (L) 08/28/2018     08/28/2018    K 4.1 08/28/2018     08/28/2018    CO2 27.0 08/28/2018    CALCIUM 9.0 08/28/2018    PROTEINTOT 6.9 08/28/2018    ALBUMIN 4.45 08/28/2018    BILITOT 0.4 08/28/2018    ALKPHOS 82 08/28/2018    AST 34 (H) 08/28/2018    ALT 49 (H) 08/28/2018       Lab Results   Component Value Date    HGB 15.3 08/28/2018    HCT 46.7 (H) 08/28/2018    MCV 91.6 08/28/2018     08/28/2018    WBC 6.85 08/28/2018    NEUTROABS 3.33 08/28/2018    LYMPHSABS 1.89 08/28/2018    MONOSABS 0.40 08/28/2018    EOSABS 1.11 (H) 08/28/2018    BASOSABS 0.09 08/28/2018       Xr Chest 2 View    Result Date: 8/1/2018  No acute cardiopulmonary process specifically no  focal consolidation to suggest acute pneumonia.  D:  08/01/2018 E:  08/01/2018  This report was finalized on 8/1/2018 12:55 PM by Dr. Marcelo Quintanilla.            ASSESSMENT:    47-year-old lady with unprovoked thrombosis of the subclavian artery on the left.  This is a very unusual place for arterial thrombosis.  With her history of vascular events appears reasonable to consider a thrombophilia workup.  I am most concerned about an antiphospholipid syndrome.   If she does carry this process then we have to reconsider anticoagulation and plan for Coumadin in that setting.  I will initiate a thrombophilia workup today and see her in 2 weeks with the results.  Most of the time the workup is usually negative.      Thank you for allowing me to participate in the care of this patient.    Yours sincerely,    Henna Borjas MD  [facility]  Hematology and Oncology    Return on: 10/31/18    Orders Placed This Encounter   Procedures   • Protein C Antigen, Total     Standing Status:   Future     Number of Occurrences:   1     Standing Expiration Date:   10/15/2019   • Protein S Antigen, Free     Standing Status:   Future     Number of Occurrences:   1     Standing Expiration Date:   10/15/2019   • Protein S Functional     Standing Status:   Future     Number of Occurrences:   1     Standing Expiration Date:   10/15/2019   • Protein S, Total & Free     Standing Status:   Future     Number of Occurrences:   1     Standing Expiration Date:   10/15/2019   • Prothrombin gene mutation     Standing Status:   Future     Number of Occurrences:   1     Standing Expiration Date:   10/15/2019   • Protein C Activity     Standing Status:   Future     Number of Occurrences:   1     Standing Expiration Date:   10/15/2019   • Plasminogen Activator Inhibitor     Standing Status:   Future     Number of Occurrences:   1     Standing Expiration Date:   10/15/2019   • Phospholipid Antibodies (APhL Mixture)     Standing Status:    Future     Number of Occurrences:   1     Standing Expiration Date:   10/15/2019   • Factor 5 Leiden     Standing Status:   Future     Number of Occurrences:   1     Standing Expiration Date:   10/15/2019   • Cardiolipin Antibody     Standing Status:   Future     Number of Occurrences:   1     Standing Expiration Date:   10/15/2019   • Beta-2 Glycoprotein Antibodies     Standing Status:   Future     Number of Occurrences:   1     Standing Expiration Date:   10/15/2019   • Antithrombin III     Standing Status:   Future     Number of Occurrences:   1     Standing Expiration Date:   10/15/2019   • JAK2 Mutation Analysis, Qual     Standing Status:   Future     Number of Occurrences:   1     Standing Expiration Date:   10/15/2019         Please note that portions of this note may have been completed with a voice recognition program. Efforts were made to edit the dictations, but occasionally words are mistranscribed.

## 2018-10-16 LAB
CARDIOLIPIN IGA SER IA-ACNC: <9 APL U/ML (ref 0–11)
CARDIOLIPIN IGG SER IA-ACNC: <9 GPL U/ML (ref 0–14)
CARDIOLIPIN IGM SER IA-ACNC: <9 MPL U/ML (ref 0–12)

## 2018-10-17 LAB
AT III PPP CHRO-ACNC: 106 % (ref 75–135)
B2 GLYCOPROT1 IGA SER-ACNC: <9 GPI IGA UNITS (ref 0–25)
B2 GLYCOPROT1 IGG SER-ACNC: <9 GPI IGG UNITS (ref 0–20)
B2 GLYCOPROT1 IGM SER-ACNC: <9 GPI IGM UNITS (ref 0–32)
PROT C AG PPP IA-ACNC: 145 % (ref 60–150)
PROT S FREE PPP-ACNC: 135 % (ref 57–157)
PROT S FREE PPP-ACNC: 140 % (ref 57–157)
PROT S PPP-ACNC: 82 % (ref 60–150)
PROTEIN S-FUNCTIONAL: 118 % (ref 63–140)

## 2018-10-18 LAB
JAK2 P.V617F BLD/T QL: NORMAL
LAB DIRECTOR NAME PROVIDER: NORMAL
LABORATORY COMMENT REPORT: NORMAL
PRT C ACTIVITY (CHROMOGENIC): 150 %

## 2018-10-19 LAB — F2 GENE MUT ANL BLD/T: NORMAL

## 2018-10-22 LAB
ANTI-PHOSPHATIDIC ACID: NORMAL
ANTI-PHOSPHATIDIC,IGA: 4.4 U/ML
ANTI-PHOSPHATIDIC,IGG: 5.5 U/ML
ANTI-PHOSPHATIDIC,IGM: 4 U/ML
ANTI-PHOSPHATIDYL GLYCEROL, IGA: 4.5 U/ML
ANTI-PHOSPHATIDYL GLYCEROL, IGG: 2.5 U/ML
ANTI-PHOSPHATIDYL GLYCEROL, IGM: 2.9 U/ML
ANTI-PHOSPHATIDYL GLYCEROL: NORMAL
ANTI-PHOSPHATIDYL INOSITOL: NORMAL
ANTI-PHOSPHATIDYL,IGA: 4.6 U/ML
ANTI-PHOSPHATIDYL,IGG: 2.4 U/ML
ANTI-PHOSPHATIDYL,IGM: 2.1 U/ML
ANTI-PHOSPHATIDYLETHANOLAMINE, IGA: 3.7 U/ML
ANTI-PHOSPHATIDYLETHANOLAMINE, IGG: 1.1 U/ML
ANTI-PHOSPHATIDYLETHANOLAMINE, IGM: 1.4 U/ML
ANTI-PHOSPHATIDYLETHANOLAMINE: NORMAL
F5 GENE MUT ANL BLD/T: NORMAL

## 2018-10-24 LAB
INTERPRETATION: NORMAL
LABORATORY COMMENT REPORT: NORMAL
Lab: NORMAL
PAI-1 LOCUS 4G/5G POLYMORPHISM: NORMAL

## 2018-10-29 RX ORDER — FLUCONAZOLE 150 MG/1
150 TABLET ORAL ONCE
Qty: 1 TABLET | Refills: 0 | Status: SHIPPED | OUTPATIENT
Start: 2018-10-29 | End: 2018-10-29

## 2018-10-29 RX ORDER — GABAPENTIN 600 MG/1
600 TABLET ORAL 3 TIMES DAILY
Qty: 270 TABLET | Refills: 0 | Status: SHIPPED | OUTPATIENT
Start: 2018-10-29 | End: 2018-11-27 | Stop reason: SDUPTHER

## 2018-11-05 DIAGNOSIS — G71.11 MYOTONIC DYSTROPHY, TYPE 2 (HCC): ICD-10-CM

## 2018-11-05 RX ORDER — HYDROCODONE BITARTRATE AND ACETAMINOPHEN 10; 325 MG/1; MG/1
1 TABLET ORAL EVERY 6 HOURS PRN
Qty: 120 TABLET | Refills: 0 | Status: SHIPPED | OUTPATIENT
Start: 2018-11-05 | End: 2018-12-03 | Stop reason: SDUPTHER

## 2018-11-05 RX ORDER — FLUCONAZOLE 150 MG/1
150 TABLET ORAL ONCE
Qty: 1 TABLET | Refills: 0 | Status: SHIPPED | OUTPATIENT
Start: 2018-11-05 | End: 2018-12-29 | Stop reason: SDUPTHER

## 2018-11-09 ENCOUNTER — OFFICE VISIT (OUTPATIENT)
Dept: INTERNAL MEDICINE | Facility: CLINIC | Age: 48
End: 2018-11-09

## 2018-11-09 VITALS
WEIGHT: 276 LBS | SYSTOLIC BLOOD PRESSURE: 128 MMHG | HEIGHT: 65 IN | BODY MASS INDEX: 45.98 KG/M2 | TEMPERATURE: 97.8 F | DIASTOLIC BLOOD PRESSURE: 74 MMHG

## 2018-11-09 DIAGNOSIS — F41.9 ANXIETY AND DEPRESSION: ICD-10-CM

## 2018-11-09 DIAGNOSIS — F32.A ANXIETY AND DEPRESSION: ICD-10-CM

## 2018-11-09 DIAGNOSIS — I10 ESSENTIAL HYPERTENSION: Primary | ICD-10-CM

## 2018-11-09 DIAGNOSIS — E78.5 HYPERLIPIDEMIA, UNSPECIFIED HYPERLIPIDEMIA TYPE: ICD-10-CM

## 2018-11-09 DIAGNOSIS — G89.29 OTHER CHRONIC PAIN: ICD-10-CM

## 2018-11-09 DIAGNOSIS — E11.9 TYPE 2 DIABETES MELLITUS WITHOUT COMPLICATION, WITHOUT LONG-TERM CURRENT USE OF INSULIN (HCC): ICD-10-CM

## 2018-11-09 DIAGNOSIS — I74.8 THROMBOSIS OF SUBCLAVIAN ARTERY (HCC): ICD-10-CM

## 2018-11-09 DIAGNOSIS — G71.11 MYOTONIC DYSTROPHY, TYPE 2 (HCC): ICD-10-CM

## 2018-11-09 DIAGNOSIS — Z23 INFLUENZA VACCINATION ADMINISTERED AT CURRENT VISIT: ICD-10-CM

## 2018-11-09 LAB — HBA1C MFR BLD: 6.5 %

## 2018-11-09 PROCEDURE — 83036 HEMOGLOBIN GLYCOSYLATED A1C: CPT | Performed by: INTERNAL MEDICINE

## 2018-11-09 PROCEDURE — 99214 OFFICE O/P EST MOD 30 MIN: CPT | Performed by: INTERNAL MEDICINE

## 2018-11-09 PROCEDURE — 90471 IMMUNIZATION ADMIN: CPT | Performed by: INTERNAL MEDICINE

## 2018-11-09 PROCEDURE — 90686 IIV4 VACC NO PRSV 0.5 ML IM: CPT | Performed by: INTERNAL MEDICINE

## 2018-11-09 RX ORDER — PANTOPRAZOLE SODIUM 40 MG/1
40 TABLET, DELAYED RELEASE ORAL DAILY
Qty: 90 TABLET | Refills: 1 | Status: SHIPPED | OUTPATIENT
Start: 2018-11-09

## 2018-11-09 NOTE — PROGRESS NOTES
"Subjective   Hafsa Barron is a 47 y.o. female here for follow-up HTN, DMII, chronic pain related to MD2, HLD, subclavian artery thrombosis. HTN: no issues, has been at goal long term. DMII: taking meds as prescribed, no hypoglycemia. Chronic pain: well-controlled on Norco and has been on the same pain regimen chronically. Has MD type II. Awaiting getting her motorized scooter. HLD: on statin, no issues. Mood stable on current regimen.    The following portions of the patient's history were reviewed and updated as appropriate: allergies, current medications, past medical history, past social history and problem list.    Review of Systems:  General: negative  CV: negative  Respiratory: negative  Neuro: negative  Psych: A&D  Endo: negative  MSK: chronic diffuse pain    Objective   /74 (BP Location: Left arm, Patient Position: Sitting, Cuff Size: Large Adult)   Temp 97.8 °F (36.6 °C) (Temporal Artery )   Ht 165.1 cm (65\")   Wt 125 kg (276 lb)   BMI 45.93 kg/m²     Physical Exam   Constitutional: She is oriented to person, place, and time. She appears well-developed and well-nourished.   Cardiovascular: Normal rate, regular rhythm and normal heart sounds.    Pulmonary/Chest: Effort normal and breath sounds normal. She has no wheezes. She has no rales.   Neurological: She is alert and oriented to person, place, and time.   Skin: Skin is warm and dry.   Psychiatric: She has a normal mood and affect. Her behavior is normal. Thought content normal.   Vitals reviewed.      Assessment/Plan   Hafsa was seen today for diabetes.    Diagnoses and all orders for this visit:    Essential hypertension  -at goal, continue current meds    Hyperlipidemia, unspecified hyperlipidemia type  -continue statin    Type 2 diabetes mellitus without complication, without long-term current use of insulin (CMS/[facility])  -     POC Glycosylated Hemoglobin (Hb A1C)    Myotonic dystrophy, type 2 (CMS/[facility])  -awaiting motorized scooter for " better mobility, continue pain medication    Other chronic pain    Anxiety and depression  -mood stable, continue current meds    Thrombosis of Left subclavian artery (CMS/HCC) - in Aug 2018 - Stent in place  -pending heme workup, from my review I see no abnormalities  -continue plavix+asa  -low dose eliquis or xarelto is a consideration with the new recs for CAD/PVD in place of plavix    Influenza vaccination administered at current visit  -     Fluarix/Fluzone/Afluria Quad/FluLaval Quad    Other orders  -     pantoprazole (PROTONIX) 40 MG EC tablet; Take 1 tablet by mouth Daily.

## 2018-11-19 DIAGNOSIS — H91.90 HEARING LOSS, UNSPECIFIED HEARING LOSS TYPE, UNSPECIFIED LATERALITY: Primary | ICD-10-CM

## 2018-11-19 RX ORDER — BUSPIRONE HYDROCHLORIDE 15 MG/1
TABLET ORAL
Qty: 270 TABLET | Refills: 1 | Status: SHIPPED | OUTPATIENT
Start: 2018-11-19 | End: 2019-05-10 | Stop reason: SDUPTHER

## 2018-11-21 ENCOUNTER — OFFICE VISIT (OUTPATIENT)
Dept: ONCOLOGY | Facility: CLINIC | Age: 48
End: 2018-11-21

## 2018-11-21 VITALS
HEIGHT: 65 IN | WEIGHT: 271 LBS | HEART RATE: 112 BPM | TEMPERATURE: 97.6 F | OXYGEN SATURATION: 96 % | RESPIRATION RATE: 20 BRPM | BODY MASS INDEX: 45.15 KG/M2 | DIASTOLIC BLOOD PRESSURE: 86 MMHG | SYSTOLIC BLOOD PRESSURE: 139 MMHG

## 2018-11-21 DIAGNOSIS — I74.8 THROMBOSIS OF SUBCLAVIAN ARTERY (HCC): Primary | ICD-10-CM

## 2018-11-21 PROCEDURE — 99214 OFFICE O/P EST MOD 30 MIN: CPT | Performed by: INTERNAL MEDICINE

## 2018-11-21 NOTE — PROGRESS NOTES
"PROBLEM LIST:    1.  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  Homozygote plasminogen activator inhibitor 4G/4G [place]      REASON FOR VISIT: Follow-up of her blood clot    HISTORY OF PRESENT ILLNESS:   47 y.o.  female presents today for follow-up of her subclavian artery thrombosis.  Clinically she is doing well on Plavix and aspirin.  She had a fairly extensive workup for hypercoagulable state and our last visit.  She presents today to discuss the results of the studies.  Clinically otherwise no new events since I last saw her.    Past medical history, social history and family history was reviewed and unchanged from prior visit.    Review of Systems:    Review of Systems - Oncology   A comprehensive 14 point review of systems was performed and was negative except as mentioned.      Medications:  The current medication list was reviewed in the EMR    ALLERGIES:    Allergies   Allergen Reactions   • Penicillins Anaphylaxis and Shortness Of Breath         Physical Exam    VITAL SIGNS:  /86 Comment: Left Wrist  Pulse 112   Temp 97.6 °F (36.4 °C) (Temporal)   Resp 20   Ht 165.1 cm (65\")   Wt 123 kg (271 lb)   SpO2 96% Comment: RA  BMI 45.10 kg/m²     Wt Readings from Last 3 Encounters:   11/21/18 123 kg (271 lb)   11/09/18 125 kg (276 lb)   10/15/18 123 kg (271 lb)        Performance Status: 0    General: well appearing, in no acute distress  HEENT: sclera anicteric, oropharynx clear, neck is supple  Lymphatics: no cervical, supraclavicular, or axillary adenopathy  Cardiovascular: regular rate and rhythm, no murmurs, rubs or gallops  Lungs: clear to auscultation bilaterally  Abdomen: soft, nontender, nondistended.  No palpable organomegaly  Extremities: no lower extremity edema  Skin: no rashes, lesions, bruising, or petechiae  Msk:  Shows no weakness of the large muscle groups  Psych: Mood " is stable        RECENT LABS:    Lab Results   Component Value Date    HGB 15.3 2018    HCT 46.7 (H) 2018    MCV 91.6 2018     2018    WBC 6.85 2018    NEUTROABS 3.33 2018    LYMPHSABS 1.89 2018    MONOSABS 0.40 2018    EOSABS 1.11 (H) 2018    BASOSABS 0.09 2018       Lab Results   Component Value Date    GLUCOSE 167 (H) 2018    BUN 16 2018    CREATININE 0.53 (L) 2018     2018    K 4.1 2018     2018    CO2 27.0 2018    CALCIUM 9.0 2018    PROTEINTOT 6.9 2018    ALBUMIN 4.45 2018    BILITOT 0.4 2018    ALKPHOS 82 2018    AST 34 (H) 2018    ALT 49 (H) 2018       KARMA-1 Locus 4G/5G Polymorphism Comment    Comment: Patient DNA was evaluated for the KARMA-1 4G/5G promoter   polymorphism, which is a single base pair guanine (4G/5G)   deletion/insertion polymorphism, using polymerase chain   reaction (PCR) technology and restriction fragment length   polymorphism (RFLP).   Results 4G/4G    Comment: Homozygous for the 4G deletion allele.   Interpretation Comment    Comment: This individual has two copies of the 4G allele, also known   as the 4G/4G genotype of the plasminogen activator   inhibitor type 1 (KARMA-1) gene. The 4G/4G genotype is   associated with the highest KARMA-1 activity and antigen   levels compared to those individuals that have either the   5G/5G or 4G/5G genotype. The 4G/4G genotype is associated   with enhanced transcription and KARMA levels about 25% higher   than those with a 5G/5G genotype. Elevated KARMA-1 levels are   associated with an increased risk of coronary artery   disease, venous thromboembolic disease and possibly   complications of pregnancy such as recurrent .   Comment Comment    Comment: Simultaneous Risks: If a patient possesses two or more   congenital or acquired risk factors, the risk of disease   may rise to more than the  sum of the risk ratios for the   individual risk factors. For instance, a combination of the   4G/4G genotype and the insulin resistance syndrome may   confer an increase in cardiovascular disease risk over that   conferred by the presence of an isolated KARMA-1 4G/4G   polymorphism.   Recommendations for Genetic Counseling: The KARMA-1 4G allele   is an inherited characteristic. If the polymorphism is   present in a heterozygous or homozygous fashion, we   recommend that the patient and their family consider   genetic counseling to obtain additional information on   inheritance and to identify other family members at risk.   Testing Characteristics: Genetic testing by PCR provides   exceptionally high sensitivity and specificity. Incorrect   genotyping results can be caused by rare polymorphisms in   primer binding sites and to misidentification of specimens   by collectors or laboratory personnel. This assay analyzes   only the KARMA 4G/5G locus and does not measure genetic   abnormalities elsewhere in the genome.   This test was developed and its performance characteristics   determined by [employer]. It has not been cleared or approved   by the Food and Drug Administration.   References:   Isrrael TOVAR. Thromb Haemost. 2003;90:1061.;   Oksana. Clin Chem. 2003;49:1081.; Tolu et   al. NEJM. 2000;342:1792.; Brennan CHAVEZ et al. Arterioscl   Thromb and Vasc Bio. 1998;18:152.         Assessment/Plan    1.  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  The only thing positive on her workup was a genetic deletion of the plasminogen activator inhibitor.  This would increase her risk of blood clots.  Usually we see this as a increased risk of coronary artery disease and miscarriages.  She did have several miscarriages when she was younger.  This could be the likely culprit.  As far as its cause of her recent left subclavian artery thrombosis I am less inclined to believe this is related to that process.   Regardless she needs to be on indefinite antiplatelet therapy with Plavix and aspirin.  If she fails those then we can consider atenolol inhibitor.  At this time no other intervention or workup is necessary.  I will see her on an as-needed basis.  I spoke to her regarding this and answer the questions she had for me.      I spent 25 minutes on the patient's plan and care with more than 50% of time spent counseling the patient.        Henna Borjas MD  [facility] Hematology and Oncology    Return in (Approximately): PRN    No orders of the defined types were placed in this encounter.            Please note that portions of this note may have been completed with a voice recognition program. Efforts were made to edit the dictations, but occasionally words are mistranscribed.

## 2018-11-26 RX ORDER — NITROFURANTOIN 25; 75 MG/1; MG/1
100 CAPSULE ORAL 2 TIMES DAILY
Qty: 10 CAPSULE | Refills: 0 | Status: SHIPPED | OUTPATIENT
Start: 2018-11-26 | End: 2018-12-04

## 2018-11-27 RX ORDER — GABAPENTIN 600 MG/1
TABLET ORAL
Qty: 270 TABLET | Refills: 0 | Status: SHIPPED | OUTPATIENT
Start: 2018-11-27 | End: 2019-04-26 | Stop reason: SDUPTHER

## 2018-12-03 DIAGNOSIS — G71.11 MYOTONIC DYSTROPHY, TYPE 2 (HCC): ICD-10-CM

## 2018-12-03 RX ORDER — HYDROCODONE BITARTRATE AND ACETAMINOPHEN 10; 325 MG/1; MG/1
1 TABLET ORAL EVERY 6 HOURS PRN
Qty: 120 TABLET | Refills: 0 | Status: SHIPPED | OUTPATIENT
Start: 2018-12-03 | End: 2019-01-08 | Stop reason: SDUPTHER

## 2018-12-04 ENCOUNTER — OFFICE VISIT (OUTPATIENT)
Dept: INTERNAL MEDICINE | Facility: CLINIC | Age: 48
End: 2018-12-04

## 2018-12-04 VITALS
TEMPERATURE: 97.8 F | WEIGHT: 269 LBS | HEIGHT: 65 IN | SYSTOLIC BLOOD PRESSURE: 128 MMHG | DIASTOLIC BLOOD PRESSURE: 84 MMHG | BODY MASS INDEX: 44.82 KG/M2

## 2018-12-04 DIAGNOSIS — N30.00 ACUTE CYSTITIS WITHOUT HEMATURIA: Primary | ICD-10-CM

## 2018-12-04 DIAGNOSIS — Z23 NEED FOR HEPATITIS A IMMUNIZATION: ICD-10-CM

## 2018-12-04 LAB
BILIRUB BLD-MCNC: NEGATIVE MG/DL
CLARITY, POC: CLEAR
COLOR UR: YELLOW
GLUCOSE UR STRIP-MCNC: ABNORMAL MG/DL
KETONES UR QL: NEGATIVE
LEUKOCYTE EST, POC: ABNORMAL
NITRITE UR-MCNC: NEGATIVE MG/ML
PH UR: 5 [PH] (ref 5–8)
PROT UR STRIP-MCNC: ABNORMAL MG/DL
RBC # UR STRIP: NEGATIVE /UL
SP GR UR: 1 (ref 1–1.03)
UROBILINOGEN UR QL: NORMAL

## 2018-12-04 PROCEDURE — 99213 OFFICE O/P EST LOW 20 MIN: CPT | Performed by: INTERNAL MEDICINE

## 2018-12-04 PROCEDURE — 81003 URINALYSIS AUTO W/O SCOPE: CPT | Performed by: INTERNAL MEDICINE

## 2018-12-04 RX ORDER — CIPROFLOXACIN 250 MG/1
250 TABLET, FILM COATED ORAL 2 TIMES DAILY
Qty: 14 TABLET | Refills: 0 | Status: SHIPPED | OUTPATIENT
Start: 2018-12-04 | End: 2018-12-11

## 2018-12-04 NOTE — PROGRESS NOTES
"Subjective   Hafsa Barron is a 47 y.o. female here for UTI sx. She recently completed macrobid but it did not relieve sx. She has urinary frequency, decreased urine amount, urgency, and dysuria. No constitutional sx.       The following portions of the patient's history were reviewed and updated as appropriate: allergies, current medications, past medical history, past social history and problem list.    Review of Systems:  General: negative  GI: negative  : see hpi    Objective   /84 (BP Location: Left arm, Patient Position: Sitting, Cuff Size: Large Adult)   Temp 97.8 °F (36.6 °C) (Temporal)   Ht 165.1 cm (65\")   Wt 122 kg (269 lb)   BMI 44.76 kg/m²     Physical Exam   Constitutional: She is oriented to person, place, and time. She appears well-developed and well-nourished.   Pulmonary/Chest: Effort normal.   Neurological: She is alert and oriented to person, place, and time.   Skin: Skin is warm and dry.   Psychiatric: She has a normal mood and affect. Her behavior is normal. Judgment and thought content normal.   Vitals reviewed.      Assessment/Plan   Hafsa was seen today for urinary tract infection.    Diagnoses and all orders for this visit:    Acute cystitis without hematuria  -     POC Urinalysis Dipstick, Automated  -     ciprofloxacin (CIPRO) 250 MG tablet; Take 1 tablet by mouth 2 (Two) Times a Day for 7 days. Failed macrobid    Need for hepatitis A immunization  -     hepatitis A (HAVRIX) vaccine 1,440 Units; Inject 1 mL into the appropriate muscle as directed by prescriber 1 (One) Time.           "

## 2018-12-10 DIAGNOSIS — R05.3 PERSISTENT COUGH FOR 3 WEEKS OR LONGER: Primary | ICD-10-CM

## 2018-12-10 RX ORDER — AMLODIPINE BESYLATE 2.5 MG/1
TABLET ORAL
Qty: 90 TABLET | Refills: 1 | Status: SHIPPED | OUTPATIENT
Start: 2018-12-10 | End: 2019-02-13 | Stop reason: SDUPTHER

## 2018-12-14 ENCOUNTER — HOSPITAL ENCOUNTER (OUTPATIENT)
Dept: CT IMAGING | Facility: HOSPITAL | Age: 48
Discharge: HOME | End: 2018-12-14
Admitting: INTERNAL MEDICINE

## 2018-12-14 DIAGNOSIS — R05.3 PERSISTENT COUGH FOR 3 WEEKS OR LONGER: ICD-10-CM

## 2018-12-14 PROCEDURE — 71250 CT THORAX DX C-: CPT

## 2018-12-17 ENCOUNTER — PATIENT MESSAGE (OUTPATIENT)
Dept: INTERNAL MEDICINE | Facility: CLINIC | Age: 48
End: 2018-12-17

## 2018-12-31 RX ORDER — FLUCONAZOLE 150 MG/1
TABLET ORAL
Qty: 1 TABLET | Refills: 0 | Status: SHIPPED | OUTPATIENT
Start: 2018-12-31 | End: 2019-01-02 | Stop reason: SDUPTHER

## 2019-01-02 RX ORDER — FLUCONAZOLE 150 MG/1
150 TABLET ORAL ONCE
Qty: 1 TABLET | Refills: 5 | Status: SHIPPED | OUTPATIENT
Start: 2019-01-02 | End: 2019-01-02

## 2019-01-08 DIAGNOSIS — G71.11 MYOTONIC DYSTROPHY, TYPE 2 (HCC): ICD-10-CM

## 2019-01-08 RX ORDER — HYDROCODONE BITARTRATE AND ACETAMINOPHEN 10; 325 MG/1; MG/1
1 TABLET ORAL EVERY 6 HOURS PRN
Qty: 120 TABLET | Refills: 0 | Status: SHIPPED | OUTPATIENT
Start: 2019-01-08 | End: 2019-02-04 | Stop reason: SDUPTHER

## 2019-01-16 RX ORDER — POTASSIUM CHLORIDE 1500 MG/1
TABLET, EXTENDED RELEASE ORAL
Qty: 90 TABLET | Refills: 2 | Status: SHIPPED | OUTPATIENT
Start: 2019-01-16 | End: 2019-11-03 | Stop reason: SDUPTHER

## 2019-01-27 RX ORDER — VENLAFAXINE HYDROCHLORIDE 37.5 MG/1
CAPSULE, EXTENDED RELEASE ORAL
Qty: 30 CAPSULE | Refills: 2 | Status: CANCELLED | OUTPATIENT
Start: 2019-01-27

## 2019-01-28 RX ORDER — VENLAFAXINE HYDROCHLORIDE 37.5 MG/1
37.5 CAPSULE, EXTENDED RELEASE ORAL DAILY
Qty: 30 CAPSULE | Refills: 2 | Status: SHIPPED | OUTPATIENT
Start: 2019-01-28 | End: 2019-02-01 | Stop reason: SDUPTHER

## 2019-01-31 RX ORDER — VENLAFAXINE HYDROCHLORIDE 37.5 MG/1
37.5 CAPSULE, EXTENDED RELEASE ORAL DAILY
Qty: 30 CAPSULE | Refills: 2 | Status: CANCELLED | OUTPATIENT
Start: 2019-01-31

## 2019-02-01 RX ORDER — VENLAFAXINE HYDROCHLORIDE 37.5 MG/1
37.5 CAPSULE, EXTENDED RELEASE ORAL DAILY
Qty: 30 CAPSULE | Refills: 2 | Status: SHIPPED | OUTPATIENT
Start: 2019-02-01 | End: 2019-06-05 | Stop reason: SDUPTHER

## 2019-02-04 DIAGNOSIS — G71.11 MYOTONIC DYSTROPHY, TYPE 2 (HCC): ICD-10-CM

## 2019-02-04 RX ORDER — HYDROCODONE BITARTRATE AND ACETAMINOPHEN 10; 325 MG/1; MG/1
1 TABLET ORAL EVERY 6 HOURS PRN
Qty: 120 TABLET | Refills: 0 | Status: SHIPPED | OUTPATIENT
Start: 2019-02-04 | End: 2019-03-05 | Stop reason: SDUPTHER

## 2019-02-14 RX ORDER — AMLODIPINE BESYLATE 2.5 MG/1
2.5 TABLET ORAL DAILY
Qty: 90 TABLET | Refills: 1 | Status: SHIPPED | OUTPATIENT
Start: 2019-02-14 | End: 2019-11-03 | Stop reason: SDUPTHER

## 2019-02-19 ENCOUNTER — OFFICE VISIT (OUTPATIENT)
Dept: INTERNAL MEDICINE | Facility: CLINIC | Age: 49
End: 2019-02-19

## 2019-02-19 VITALS
BODY MASS INDEX: 45.25 KG/M2 | SYSTOLIC BLOOD PRESSURE: 130 MMHG | WEIGHT: 271.6 LBS | TEMPERATURE: 97.8 F | DIASTOLIC BLOOD PRESSURE: 82 MMHG | HEIGHT: 65 IN

## 2019-02-19 DIAGNOSIS — E11.9 TYPE 2 DIABETES MELLITUS WITHOUT COMPLICATION, WITHOUT LONG-TERM CURRENT USE OF INSULIN (HCC): Primary | ICD-10-CM

## 2019-02-19 DIAGNOSIS — G71.11 MYOTONIC DYSTROPHY, TYPE 2 (HCC): ICD-10-CM

## 2019-02-19 DIAGNOSIS — I10 ESSENTIAL HYPERTENSION: ICD-10-CM

## 2019-02-19 DIAGNOSIS — E78.5 HYPERLIPIDEMIA, UNSPECIFIED HYPERLIPIDEMIA TYPE: ICD-10-CM

## 2019-02-19 DIAGNOSIS — G89.29 OTHER CHRONIC PAIN: ICD-10-CM

## 2019-02-19 LAB — HBA1C MFR BLD: 6.9 %

## 2019-02-19 PROCEDURE — 99214 OFFICE O/P EST MOD 30 MIN: CPT | Performed by: INTERNAL MEDICINE

## 2019-02-19 PROCEDURE — 83036 HEMOGLOBIN GLYCOSYLATED A1C: CPT | Performed by: INTERNAL MEDICINE

## 2019-02-19 RX ORDER — DICLOFENAC SODIUM 75 MG/1
TABLET, DELAYED RELEASE ORAL
Qty: 180 TABLET | Refills: 2 | Status: SHIPPED | OUTPATIENT
Start: 2019-02-19 | End: 2019-03-25 | Stop reason: SDUPTHER

## 2019-02-19 NOTE — PROGRESS NOTES
"Subjective   Hafsa Barron is a 48 y.o. female here for follow-up DMII, HTN, HLD, chronic pain due to FM and MD2. DMII: taking metformin, januvia, trulicity. No further UTI or yeast vaginitis now that off jardiance and on januvia. No issues otherwise. No hypoglycemia. HTN: has been at goal, takes meds as prescribed. HLD: last FLP acceptable. Chronic pain: mostly back, some myalgias. Takes Norco and has done well with that. Got her motorized scooter and has found great independence using it. No new issues, generally feels well.        The following portions of the patient's history were reviewed and updated as appropriate: allergies, current medications, past medical history, past social history, past surgical history and problem list.    Review of Systems:  General: negative  CV: negative  Respiratory: negative  Neuro: negative  Psych: negative  MSK: chronic back and leg pain  Endo: negative    Objective   /82 (BP Location: Left arm, Patient Position: Sitting, Cuff Size: Large Adult)   Temp 97.8 °F (36.6 °C) (Temporal)   Ht 165.1 cm (65\")   Wt 123 kg (271 lb 9.6 oz)   BMI 45.20 kg/m²     Physical Exam   Constitutional: She is oriented to person, place, and time. She appears well-developed and well-nourished.   Cardiovascular: Normal rate, regular rhythm and normal heart sounds.   Pulmonary/Chest: Effort normal and breath sounds normal. She has no wheezes. She has no rales.   Neurological: She is alert and oriented to person, place, and time.   Skin: Skin is warm and dry.   Psychiatric: She has a normal mood and affect. Her behavior is normal. Thought content normal.   Vitals reviewed.      Assessment/Plan   Hafsa was seen today for hypertension and diabetes.    Diagnoses and all orders for this visit:    Type 2 diabetes mellitus without complication, without long-term current use of insulin (CMS/[facility])  -     POC Glycosylated Hemoglobin (Hb A1C)    Essential hypertension  -at goal, continue current " meds    Hyperlipidemia, unspecified hyperlipidemia type  -continue tricor, FLP at goal    Other chronic pain  -continue opiates and muscle relaxants  The patient has read and signed the [facility] Controlled Substance Contract.  I will continue to see patient for regular follow up appointments.  They are well controlled on their medication.  SULEMA is updated every 3 months. The patient is aware of the potential for addiction and dependence.    Myotonic dystrophy, type 2 (CMS/HCC)  -see chronic pain  -doing well on scooter

## 2019-02-22 RX ORDER — AZITHROMYCIN 250 MG/1
TABLET, FILM COATED ORAL
Qty: 6 TABLET | Refills: 0 | Status: SHIPPED | OUTPATIENT
Start: 2019-02-22 | End: 2019-03-13

## 2019-02-27 RX ORDER — CLOPIDOGREL BISULFATE 75 MG/1
TABLET ORAL
Qty: 90 TABLET | Refills: 2 | Status: SHIPPED | OUTPATIENT
Start: 2019-02-27 | End: 2019-06-10 | Stop reason: SDUPTHER

## 2019-03-05 DIAGNOSIS — G71.11 MYOTONIC DYSTROPHY, TYPE 2 (HCC): ICD-10-CM

## 2019-03-05 RX ORDER — HYDROCODONE BITARTRATE AND ACETAMINOPHEN 10; 325 MG/1; MG/1
1 TABLET ORAL EVERY 6 HOURS PRN
Qty: 120 TABLET | Refills: 0 | Status: SHIPPED | OUTPATIENT
Start: 2019-03-05 | End: 2019-04-05 | Stop reason: SDUPTHER

## 2019-03-08 RX ORDER — FENOFIBRATE 145 MG/1
TABLET, COATED ORAL
Qty: 90 TABLET | Refills: 2 | Status: SHIPPED | OUTPATIENT
Start: 2019-03-08 | End: 2019-05-10 | Stop reason: SDUPTHER

## 2019-03-13 ENCOUNTER — TRANSCRIBE ORDERS (OUTPATIENT)
Dept: ADMINISTRATIVE | Facility: HOSPITAL | Age: 49
End: 2019-03-13

## 2019-03-13 ENCOUNTER — OFFICE VISIT (OUTPATIENT)
Dept: INTERNAL MEDICINE | Facility: CLINIC | Age: 49
End: 2019-03-13

## 2019-03-13 VITALS
TEMPERATURE: 97.8 F | DIASTOLIC BLOOD PRESSURE: 72 MMHG | HEIGHT: 65 IN | WEIGHT: 278 LBS | BODY MASS INDEX: 46.32 KG/M2 | SYSTOLIC BLOOD PRESSURE: 130 MMHG

## 2019-03-13 DIAGNOSIS — Z00.00 HEALTH CARE MAINTENANCE: Primary | ICD-10-CM

## 2019-03-13 DIAGNOSIS — E11.9 TYPE 2 DIABETES MELLITUS WITHOUT COMPLICATION, WITHOUT LONG-TERM CURRENT USE OF INSULIN (HCC): ICD-10-CM

## 2019-03-13 DIAGNOSIS — Z12.31 VISIT FOR SCREENING MAMMOGRAM: Primary | ICD-10-CM

## 2019-03-13 LAB
ALBUMIN SERPL-MCNC: 4.57 G/DL (ref 3.2–4.8)
ALBUMIN/GLOB SERPL: 2.8 G/DL (ref 1.5–2.5)
ALP SERPL-CCNC: 88 U/L (ref 25–100)
ALT SERPL W P-5'-P-CCNC: 41 U/L (ref 7–40)
ANION GAP SERPL CALCULATED.3IONS-SCNC: 9 MMOL/L (ref 3–11)
ARTICHOKE IGE QN: 67 MG/DL (ref 0–130)
AST SERPL-CCNC: 25 U/L (ref 0–33)
BILIRUB SERPL-MCNC: 0.4 MG/DL (ref 0.3–1.2)
BUN BLD-MCNC: 18 MG/DL (ref 9–23)
BUN/CREAT SERPL: 33.3 (ref 7–25)
CALCIUM SPEC-SCNC: 9.4 MG/DL (ref 8.7–10.4)
CHLORIDE SERPL-SCNC: 105 MMOL/L (ref 99–109)
CHOLEST SERPL-MCNC: 119 MG/DL (ref 0–200)
CO2 SERPL-SCNC: 29 MMOL/L (ref 20–31)
CREAT BLD-MCNC: 0.54 MG/DL (ref 0.6–1.3)
DEPRECATED RDW RBC AUTO: 51.7 FL (ref 37–54)
ERYTHROCYTE [DISTWIDTH] IN BLOOD BY AUTOMATED COUNT: 15.2 % (ref 11.3–14.5)
GFR SERPL CREATININE-BSD FRML MDRD: 120 ML/MIN/1.73
GLOBULIN UR ELPH-MCNC: 1.6 GM/DL
GLUCOSE BLD-MCNC: 166 MG/DL (ref 70–100)
HCT VFR BLD AUTO: 43.6 % (ref 34.5–44)
HDLC SERPL-MCNC: 43 MG/DL (ref 40–60)
HGB BLD-MCNC: 13.8 G/DL (ref 11.5–15.5)
MCH RBC QN AUTO: 29.1 PG (ref 27–31)
MCHC RBC AUTO-ENTMCNC: 31.7 G/DL (ref 32–36)
MCV RBC AUTO: 92 FL (ref 80–99)
PLATELET # BLD AUTO: 263 10*3/MM3 (ref 150–450)
PMV BLD AUTO: 11.9 FL (ref 6–12)
POTASSIUM BLD-SCNC: 3.9 MMOL/L (ref 3.5–5.5)
PROT SERPL-MCNC: 6.2 G/DL (ref 5.7–8.2)
RBC # BLD AUTO: 4.74 10*6/MM3 (ref 3.89–5.14)
SODIUM BLD-SCNC: 143 MMOL/L (ref 132–146)
TRIGL SERPL-MCNC: 95 MG/DL (ref 0–150)
WBC NRBC COR # BLD: 6.92 10*3/MM3 (ref 3.5–10.8)

## 2019-03-13 PROCEDURE — 99396 PREV VISIT EST AGE 40-64: CPT | Performed by: INTERNAL MEDICINE

## 2019-03-13 PROCEDURE — 80061 LIPID PANEL: CPT | Performed by: INTERNAL MEDICINE

## 2019-03-13 PROCEDURE — 85027 COMPLETE CBC AUTOMATED: CPT | Performed by: INTERNAL MEDICINE

## 2019-03-13 PROCEDURE — 80053 COMPREHEN METABOLIC PANEL: CPT | Performed by: INTERNAL MEDICINE

## 2019-03-13 RX ORDER — GABAPENTIN 600 MG/1
TABLET ORAL
Qty: 270 TABLET | Refills: 0 | Status: SHIPPED | OUTPATIENT
Start: 2019-03-13 | End: 2019-07-29 | Stop reason: SDUPTHER

## 2019-03-13 NOTE — PROGRESS NOTES
Subjective   Hafsa Barron is a 48 y.o. female here for annual exam. She had pap in 2015, normal. She is UTD mammo. Fasting for labs today. Had PNA vaccine in 2012 though unsure which. Wants to get a tattoo but unsure because she is on asa+plavix.     Review of Systems   HENT: Negative.    Eyes: Negative.    Respiratory: Negative.    Cardiovascular: Negative.    Gastrointestinal: Negative.    Endocrine: Negative.    Genitourinary: Negative.    Musculoskeletal: Positive for back pain and myalgias.   Skin: Negative.    Allergic/Immunologic: Negative.    Neurological: Positive for weakness.   Hematological:        Clots   Psychiatric/Behavioral: Negative.        Past Medical History:   Diagnosis Date   • Allergic    • Allergy    • Anxiety    • Asthma Allergies induced   • Depression    • Fibromyalgia, primary    • GERD (gastroesophageal reflux disease)    • Headache    • Hyperlipidemia    • Hypertension    • IBS (irritable bowel syndrome)    • Internal hemorrhoid    • Kidney stone    • Low back pain    • Muscular dystrophy    • Muscular dystrophy     II   • Myotonia    • Obesity    • Stroke (CMS/[facility]) 2013    resdual- left sided weakness.    • Type 2 diabetes mellitus without complication, without long-term current use of insulin (CMS/[facility])      Family History   Problem Relation Age of Onset   • Allergies Other    • ADD / ADHD Other    • Heart block Other    • Other Other         CEREBROVASCULAR ACCIDENT    • Hypertension Other    • Cancer Other         LUNG CANCER   • Hyperlipidemia Other    • Alcohol abuse Mother    • Depression Mother    • Early death Mother          age 59   • Heart disease Mother         Mother  of Massive Heart attack   • Hyperlipidemia Mother    • Hypertension Mother    • Miscarriages / Stillbirths Mother         Miscarriage   • Heart attack Mother          of  massive heart attack   • Alcohol abuse Father    • Cancer Father         Had Lung Cancer - had 1 lung till death    • Diabetes Father         Lived on insuline shots   • Early death Father          age 53   • Breast cancer Maternal Aunt    • Learning disabilities Son         Had Adhd   • Ovarian cancer Neg Hx      Past Surgical History:   Procedure Laterality Date   • CHOLECYSTECTOMY  2004   • COLONOSCOPY  2017   • GALLBLADDER SURGERY     • LYMPH NODE BIOPSY     • SUBCLAVIAN ARTERY STENT  08/2018    x2     Social History     Socioeconomic History   • Marital status:      Spouse name: Not on file   • Number of children: Not on file   • Years of education: Not on file   • Highest education level: Not on file   Social Needs   • Financial resource strain: Not on file   • Food insecurity - worry: Not on file   • Food insecurity - inability: Not on file   • Transportation needs - medical: Not on file   • Transportation needs - non-medical: Not on file   Occupational History   • Not on file   Tobacco Use   • Smoking status: Former Smoker     Packs/day: 1.50     Years: 15.00     Pack years: 22.50     Start date: 1983     Last attempt to quit: 2013     Years since quittin.5   • Smokeless tobacco: Never Used   • Tobacco comment: use Ecigg now NO NICOTENE   Substance and Sexual Activity   • Alcohol use: Yes     Comment: rarely   • Drug use: No   • Sexual activity: Yes     Partners: Male     Birth control/protection: Post-menopausal     Comment: Menapause no cycle since 2013   Other Topics Concern   • Not on file   Social History Narrative   • Not on file         Current Outpatient Medications:   •  albuterol (PROAIR HFA) 108 (90 BASE) MCG/ACT inhaler, Inhale 1 puff Every 6 (Six) Hours As Needed for Wheezing., Disp: 90 inhaler, Rfl: 2  •  albuterol (PROVENTIL) (2.5 MG/3ML) 0.083% nebulizer solution, Take 2.5 mg by nebulization Every 4 (Four) Hours As Needed for Wheezing., Disp: 90 vial, Rfl: 1  •  amLODIPine (NORVASC) 2.5 MG tablet, Take 1 tablet by mouth Daily., Disp: 90 tablet, Rfl: 1  •  aspirin 81  MG EC tablet, Take 81 mg by mouth Daily., Disp: , Rfl:   •  azithromycin (ZITHROMAX Z-JOE) 250 MG tablet, Take 2 tablets the first day, then 1 tablet daily for 4 days., Disp: 6 tablet, Rfl: 0  •  baclofen (LIORESAL) 10 MG tablet, Take 1 tablet by mouth 3 (Three) Times a Day., Disp: 270 tablet, Rfl: 1  •  Blood Glucose Monitoring Suppl (ONE TOUCH ULTRA 2) w/Device kit, , Disp: , Rfl:   •  busPIRone (BUSPAR) 15 MG tablet, TAKE 1 TABLET THREE TIMES A DAY, Disp: 270 tablet, Rfl: 1  •  calcium carbonate-vitamin d (CALCIUM 600+D HIGH POTENCY) 600-400 MG-UNIT per tablet, Take 1 tablet by mouth Daily., Disp: 90 tablet, Rfl: 2  •  clopidogrel (PLAVIX) 75 MG tablet, TAKE 1 TABLET DAILY, Disp: 90 tablet, Rfl: 2  •  cyclobenzaprine (FLEXERIL) 10 MG tablet, Take 1 tablet by mouth 3 (Three) Times a Day As Needed for Muscle Spasms., Disp: 270 tablet, Rfl: 2  •  diclofenac (VOLTAREN) 75 MG EC tablet, TAKE 1 TABLET TWICE A DAY, Disp: 180 tablet, Rfl: 2  •  Dulaglutide 0.75 MG/0.5ML solution pen-injector, Inject  under the skin into the appropriate area as directed., Disp: , Rfl:   •  fenofibrate (TRICOR) 145 MG tablet, TAKE 1 TABLET DAILY, Disp: 90 tablet, Rfl: 2  •  fluticasone (FLONASE ALLERGY RELIEF) 50 MCG/ACT nasal spray, into each nostril., Disp: , Rfl:   •  gabapentin (NEURONTIN) 600 MG tablet, TAKE 1 TABLET BY MOUTH THREE TIMES DAILY, Disp: 270 tablet, Rfl: 0  •  gabapentin (NEURONTIN) 600 MG tablet, TAKE 1 TABLET BY MOUTH THREE TIMES DAILY, Disp: 270 tablet, Rfl: 0  •  HYDROcodone-acetaminophen (NORCO)  MG per tablet, Take 1 tablet by mouth Every 6 (Six) Hours As Needed for Moderate Pain ., Disp: 120 tablet, Rfl: 0  •  KLOR-CON 20 MEQ CR tablet, TAKE 1 TABLET BY MOUTH ONCE DAILY, Disp: 90 tablet, Rfl: 2  •  loratadine (CLARITIN) 10 MG tablet, Take 1 tablet by mouth Daily., Disp: 30 tablet, Rfl: 11  •  metFORMIN (GLUCOPHAGE) 1000 MG tablet, TAKE 1 TABLET TWICE A DAY WITH MEALS, Disp: 180 tablet, Rfl: 2  •  montelukast  "(SINGULAIR) 10 MG tablet, Take 1 tablet by mouth Every Night., Disp: 90 tablet, Rfl: 1  •  ONE TOUCH ULTRA TEST test strip, , Disp: , Rfl:   •  pantoprazole (PROTONIX) 40 MG EC tablet, Take 1 tablet by mouth Daily., Disp: 90 tablet, Rfl: 1  •  potassium chloride (K-DUR,KLOR-CON) 20 MEQ CR tablet, Take 1 tablet by mouth Daily., Disp: 90 tablet, Rfl: 1  •  SITagliptin (JANUVIA) 100 MG tablet, Take 1 tablet by mouth Daily., Disp: 30 tablet, Rfl: 2  •  spironolactone (ALDACTONE) 50 MG tablet, Take 1 tablet by mouth 2 (Two) Times a Day., Disp: 180 tablet, Rfl: 1  •  tiZANidine (ZANAFLEX) 4 MG tablet, Take 1 tablet by mouth 3 (Three) Times a Day., Disp: 270 tablet, Rfl: 2  •  venlafaxine XR (EFFEXOR XR) 37.5 MG 24 hr capsule, Take 1 capsule by mouth Daily., Disp: 30 capsule, Rfl: 2    Objective   /72 (BP Location: Left arm, Patient Position: Sitting, Cuff Size: Large Adult)   Temp 97.8 °F (36.6 °C) (Temporal)   Ht 165.1 cm (65\")   Wt 126 kg (278 lb)   BMI 46.26 kg/m²   Physical Exam   Constitutional: She is oriented to person, place, and time. She appears well-developed and well-nourished. No distress.   HENT:   Head: Normocephalic and atraumatic.   Right Ear: Tympanic membrane, external ear and ear canal normal.   Left Ear: Tympanic membrane, external ear and ear canal normal.   Nose: Nose normal.   Mouth/Throat: Oropharynx is clear and moist.   Eyes: Conjunctivae and lids are normal. Pupils are equal, round, and reactive to light. No scleral icterus.   Neck: Neck supple. Carotid bruit is not present. No thyroid mass and no thyromegaly present.   Cardiovascular: Normal rate, regular rhythm, normal heart sounds and intact distal pulses. Exam reveals no gallop, no S3, no S4 and no friction rub.   No murmur heard.  Pulmonary/Chest: Effort normal and breath sounds normal. No respiratory distress. She has no wheezes. She has no rhonchi. She has no rales.   Abdominal: Soft. Bowel sounds are normal. She exhibits no " distension and no mass. There is no hepatosplenomegaly. There is no tenderness.   Musculoskeletal: Normal range of motion.   Walks with a cane   Lymphadenopathy:     She has no cervical adenopathy.   Neurological: She is alert and oriented to person, place, and time. No cranial nerve deficit or sensory deficit.   Skin: Skin is warm and dry. No rash noted. No erythema. No pallor.   Psychiatric: She has a normal mood and affect. Her speech is normal and behavior is normal. Judgment and thought content normal. Cognition and memory are normal.   Vitals reviewed.      Assessment/Plan   Hafsa was seen today for annual exam.    Diagnoses and all orders for this visit:    Health care maintenance  -     Lipid Panel  -     Comprehensive Metabolic Panel  -     CBC (No Diff)        1. HCM  -pap due, gave her some names for gyno  -mamm UTD  -cscope next year  -fasting labs today  -had PNA vaccine in 2012 though unsure which one, defers Tdap  -refer for foot exam  Reviewed the following with the patient: advised patient of need for:  pap smear, mammogram and immunizations discussed, ideal body weight discussed with patient and weight loss encouraged.      Of note, I did discourage her from getting tattoo on blood thinner. She will get a second opinion from heme

## 2019-03-25 DIAGNOSIS — G71.11 MYOTONIC DYSTROPHY, TYPE 2 (HCC): ICD-10-CM

## 2019-03-25 RX ORDER — MONTELUKAST SODIUM 10 MG/1
10 TABLET ORAL NIGHTLY
Qty: 90 TABLET | Refills: 1 | Status: SHIPPED | OUTPATIENT
Start: 2019-03-25 | End: 2019-09-29 | Stop reason: SDUPTHER

## 2019-03-25 RX ORDER — DICLOFENAC SODIUM 75 MG/1
75 TABLET, DELAYED RELEASE ORAL 2 TIMES DAILY
Qty: 180 TABLET | Refills: 2 | Status: SHIPPED | OUTPATIENT
Start: 2019-03-25 | End: 2019-12-17 | Stop reason: SDUPTHER

## 2019-03-25 RX ORDER — MONTELUKAST SODIUM 10 MG/1
TABLET ORAL
Qty: 90 TABLET | Refills: 1 | Status: SHIPPED | OUTPATIENT
Start: 2019-03-25 | End: 2019-05-20

## 2019-03-25 RX ORDER — ATORVASTATIN CALCIUM 40 MG/1
40 TABLET, FILM COATED ORAL DAILY
Qty: 30 TABLET | Refills: 2 | Status: SHIPPED | OUTPATIENT
Start: 2019-03-25 | End: 2019-09-29 | Stop reason: SDUPTHER

## 2019-03-25 RX ORDER — MONTELUKAST SODIUM 10 MG/1
10 TABLET ORAL NIGHTLY
Qty: 90 TABLET | Refills: 1 | Status: SHIPPED | OUTPATIENT
Start: 2019-03-25 | End: 2019-04-26 | Stop reason: SDUPTHER

## 2019-04-05 DIAGNOSIS — G71.11 MYOTONIC DYSTROPHY, TYPE 2 (HCC): ICD-10-CM

## 2019-04-05 RX ORDER — HYDROCODONE BITARTRATE AND ACETAMINOPHEN 10; 325 MG/1; MG/1
1 TABLET ORAL EVERY 6 HOURS PRN
Qty: 120 TABLET | Refills: 0 | Status: SHIPPED | OUTPATIENT
Start: 2019-04-05 | End: 2019-05-05 | Stop reason: SDUPTHER

## 2019-04-12 ENCOUNTER — OFFICE VISIT (OUTPATIENT)
Dept: OBSTETRICS AND GYNECOLOGY | Facility: CLINIC | Age: 49
End: 2019-04-12

## 2019-04-12 VITALS
WEIGHT: 279 LBS | RESPIRATION RATE: 14 BRPM | BODY MASS INDEX: 46.43 KG/M2 | DIASTOLIC BLOOD PRESSURE: 74 MMHG | SYSTOLIC BLOOD PRESSURE: 132 MMHG

## 2019-04-12 DIAGNOSIS — N39.3 SUI (STRESS URINARY INCONTINENCE, FEMALE): ICD-10-CM

## 2019-04-12 DIAGNOSIS — N95.0 POSTMENOPAUSAL BLEEDING: ICD-10-CM

## 2019-04-12 DIAGNOSIS — Z01.419 ENCNTR FOR GYN EXAM (GENERAL) (ROUTINE) W/O ABN FINDINGS: Primary | ICD-10-CM

## 2019-04-12 PROCEDURE — 99386 PREV VISIT NEW AGE 40-64: CPT | Performed by: OBSTETRICS & GYNECOLOGY

## 2019-04-25 ENCOUNTER — APPOINTMENT (OUTPATIENT)
Dept: MAMMOGRAPHY | Facility: HOSPITAL | Age: 49
End: 2019-04-25

## 2019-04-26 ENCOUNTER — OFFICE VISIT (OUTPATIENT)
Dept: CARDIOLOGY | Facility: CLINIC | Age: 49
End: 2019-04-26

## 2019-04-26 VITALS
HEIGHT: 65 IN | SYSTOLIC BLOOD PRESSURE: 138 MMHG | WEIGHT: 275.8 LBS | DIASTOLIC BLOOD PRESSURE: 70 MMHG | BODY MASS INDEX: 45.95 KG/M2 | OXYGEN SATURATION: 95 % | HEART RATE: 110 BPM

## 2019-04-26 DIAGNOSIS — I10 ESSENTIAL HYPERTENSION: Primary | ICD-10-CM

## 2019-04-26 DIAGNOSIS — E11.9 TYPE 2 DIABETES MELLITUS WITHOUT COMPLICATION, WITHOUT LONG-TERM CURRENT USE OF INSULIN (HCC): ICD-10-CM

## 2019-04-26 DIAGNOSIS — R09.89 BRUIT OF LEFT CAROTID ARTERY: ICD-10-CM

## 2019-04-26 DIAGNOSIS — E78.5 HYPERLIPIDEMIA, UNSPECIFIED HYPERLIPIDEMIA TYPE: ICD-10-CM

## 2019-04-26 PROCEDURE — 99214 OFFICE O/P EST MOD 30 MIN: CPT | Performed by: INTERNAL MEDICINE

## 2019-05-01 ENCOUNTER — TELEPHONE (OUTPATIENT)
Dept: INTERNAL MEDICINE | Facility: CLINIC | Age: 49
End: 2019-05-01

## 2019-05-01 NOTE — TELEPHONE ENCOUNTER
[facility] is calling [facility] Please [facility] them back

## 2019-05-01 NOTE — TELEPHONE ENCOUNTER
Spoke to the pharmacy. The surgeon didn't put a dispense amount on her pain medications. I spoke with patient and advised to have surgeons office to call her pharmacy.

## 2019-05-05 DIAGNOSIS — G71.11 MYOTONIC DYSTROPHY, TYPE 2 (HCC): ICD-10-CM

## 2019-05-06 RX ORDER — TIZANIDINE 4 MG/1
4 TABLET ORAL NIGHTLY
Qty: 90 TABLET | Refills: 2 | Status: SHIPPED | OUTPATIENT
Start: 2019-05-06 | End: 2020-01-28

## 2019-05-06 RX ORDER — HYDROCODONE BITARTRATE AND ACETAMINOPHEN 10; 325 MG/1; MG/1
1 TABLET ORAL EVERY 6 HOURS PRN
Qty: 120 TABLET | Refills: 0 | Status: SHIPPED | OUTPATIENT
Start: 2019-05-06 | End: 2019-06-05 | Stop reason: SDUPTHER

## 2019-05-06 RX ORDER — LORATADINE 10 MG/1
10 TABLET ORAL DAILY
Qty: 30 TABLET | Refills: 11 | Status: SHIPPED | OUTPATIENT
Start: 2019-05-06

## 2019-05-10 RX ORDER — BUSPIRONE HYDROCHLORIDE 15 MG/1
15 TABLET ORAL 3 TIMES DAILY
Qty: 270 TABLET | Refills: 1 | Status: SHIPPED | OUTPATIENT
Start: 2019-05-10 | End: 2019-11-03 | Stop reason: SDUPTHER

## 2019-05-10 RX ORDER — FENOFIBRATE 145 MG/1
145 TABLET, COATED ORAL DAILY
Qty: 90 TABLET | Refills: 2 | Status: SHIPPED | OUTPATIENT
Start: 2019-05-10 | End: 2020-02-05 | Stop reason: SDUPTHER

## 2019-05-20 ENCOUNTER — OFFICE VISIT (OUTPATIENT)
Dept: INTERNAL MEDICINE | Facility: CLINIC | Age: 49
End: 2019-05-20

## 2019-05-20 VITALS
RESPIRATION RATE: 18 BRPM | SYSTOLIC BLOOD PRESSURE: 114 MMHG | DIASTOLIC BLOOD PRESSURE: 78 MMHG | WEIGHT: 259 LBS | BODY MASS INDEX: 43.15 KG/M2 | OXYGEN SATURATION: 97 % | HEART RATE: 87 BPM | TEMPERATURE: 96.4 F | HEIGHT: 65 IN

## 2019-05-20 DIAGNOSIS — E55.9 VITAMIN D DEFICIENCY: ICD-10-CM

## 2019-05-20 DIAGNOSIS — I10 ESSENTIAL HYPERTENSION: ICD-10-CM

## 2019-05-20 DIAGNOSIS — Z98.84 S/P BARIATRIC SURGERY: ICD-10-CM

## 2019-05-20 DIAGNOSIS — E11.9 TYPE 2 DIABETES MELLITUS WITHOUT COMPLICATION, WITHOUT LONG-TERM CURRENT USE OF INSULIN (HCC): Primary | ICD-10-CM

## 2019-05-20 LAB
25(OH)D3 SERPL-MCNC: 30.2 NG/ML (ref 30–100)
ANION GAP SERPL CALCULATED.3IONS-SCNC: 16.4 MMOL/L
BUN BLD-MCNC: 17 MG/DL (ref 6–20)
BUN/CREAT SERPL: 33.3 (ref 7–25)
CALCIUM SPEC-SCNC: 10.3 MG/DL (ref 8.6–10.5)
CHLORIDE SERPL-SCNC: 101 MMOL/L (ref 98–107)
CO2 SERPL-SCNC: 24.6 MMOL/L (ref 22–29)
CREAT BLD-MCNC: 0.51 MG/DL (ref 0.57–1)
GFR SERPL CREATININE-BSD FRML MDRD: 128 ML/MIN/1.73
GLUCOSE BLD-MCNC: 142 MG/DL (ref 65–99)
HBA1C MFR BLD: 6.87 % (ref 4.8–5.6)
POTASSIUM BLD-SCNC: 3.7 MMOL/L (ref 3.5–5.2)
SODIUM BLD-SCNC: 142 MMOL/L (ref 136–145)

## 2019-05-20 PROCEDURE — 82306 VITAMIN D 25 HYDROXY: CPT | Performed by: INTERNAL MEDICINE

## 2019-05-20 PROCEDURE — 80048 BASIC METABOLIC PNL TOTAL CA: CPT | Performed by: INTERNAL MEDICINE

## 2019-05-20 PROCEDURE — 83036 HEMOGLOBIN GLYCOSYLATED A1C: CPT | Performed by: INTERNAL MEDICINE

## 2019-05-20 PROCEDURE — 99214 OFFICE O/P EST MOD 30 MIN: CPT | Performed by: INTERNAL MEDICINE

## 2019-05-20 RX ORDER — SPIRONOLACTONE 50 MG/1
50 TABLET, FILM COATED ORAL DAILY
COMMUNITY
End: 2020-03-02 | Stop reason: SDUPTHER

## 2019-05-20 RX ORDER — ONDANSETRON HYDROCHLORIDE 8 MG/1
TABLET, FILM COATED ORAL
Refills: 1 | COMMUNITY
Start: 2019-05-01 | End: 2022-02-05 | Stop reason: HOSPADM

## 2019-05-20 NOTE — PROGRESS NOTES
"Subjective   Hafsa Barron is a 48 y.o. female here for follow-up DMII, vit D deficiency, HTN, s/p gastric sleeve. DMII: off all DM meds, glc has been overall under 200, only 190 once. She has lost 27lbs in the month since her gastric sleeve. She is introducing textures to her diet. She is taking her vitamins and denies any significant problems. Already feels better. She has had f/u with bariatrics. She does have hx vit D deficiency and would like to get that checked. HTN: on spironolactone and amlodipine. The spirono has been halved and the ultimate goal is to stop both with continuing weight loss.       The following portions of the patient's history were reviewed and updated as appropriate: allergies, current medications, past medical history, past social history and problem list.    Review of Systems:  General: negative  CV: negative  Respiratory: negative  Neuro: negative  Psych: negative  GI: negative  Endo: hyperglycemia    Objective   /78   Pulse 87   Temp 96.4 °F (35.8 °C) (Temporal)   Resp 18   Ht 165.1 cm (65\")   Wt 117 kg (259 lb)   LMP  (LMP Unknown)   SpO2 97%   BMI 43.10 kg/m²     Physical Exam   Constitutional: She is oriented to person, place, and time. She appears well-developed and well-nourished.   Cardiovascular: Normal rate, regular rhythm and normal heart sounds.   Pulmonary/Chest: Effort normal and breath sounds normal. She has no wheezes. She has no rales.   Neurological: She is alert and oriented to person, place, and time.   Skin: Skin is warm and dry.   Psychiatric: She has a normal mood and affect. Her behavior is normal. Thought content normal.   Vitals reviewed.      Assessment/Plan   Hafsa was seen today for diabetes.    Diagnoses and all orders for this visit:    Type 2 diabetes mellitus without complication, without long-term current use of insulin (CMS/[facility])  -     Cancel: POC Glycosylated Hemoglobin (Hb A1C)  -     Basic Metabolic Panel  -     Hemoglobin " A1c    Vitamin D deficiency  -     Vitamin D 25 Hydroxy    Essential hypertension  -continue spironolactone and amlodipine for now, goal of coming off them with more weight loss    S/P bariatric surgery  -discussed diet, doing well on that and getting protein intake, has f/u bariatrics and has been going to those. No issues thus far.

## 2019-05-22 ENCOUNTER — APPOINTMENT (OUTPATIENT)
Dept: MAMMOGRAPHY | Facility: HOSPITAL | Age: 49
End: 2019-05-22

## 2019-06-05 DIAGNOSIS — G71.11 MYOTONIC DYSTROPHY, TYPE 2 (HCC): ICD-10-CM

## 2019-06-06 RX ORDER — HYDROCODONE BITARTRATE AND ACETAMINOPHEN 10; 325 MG/1; MG/1
1 TABLET ORAL EVERY 6 HOURS PRN
Qty: 120 TABLET | Refills: 0 | Status: SHIPPED | OUTPATIENT
Start: 2019-06-06 | End: 2019-07-05 | Stop reason: SDUPTHER

## 2019-06-06 RX ORDER — VENLAFAXINE HYDROCHLORIDE 37.5 MG/1
37.5 CAPSULE, EXTENDED RELEASE ORAL DAILY
Qty: 30 CAPSULE | Refills: 2 | Status: SHIPPED | OUTPATIENT
Start: 2019-06-06 | End: 2019-08-18 | Stop reason: SDUPTHER

## 2019-06-10 RX ORDER — CLOPIDOGREL BISULFATE 75 MG/1
75 TABLET ORAL DAILY
Qty: 90 TABLET | Refills: 2 | Status: SHIPPED | OUTPATIENT
Start: 2019-06-10 | End: 2019-11-25 | Stop reason: SDUPTHER

## 2019-07-04 DIAGNOSIS — G71.11 MYOTONIC DYSTROPHY, TYPE 2 (HCC): ICD-10-CM

## 2019-07-04 RX ORDER — HYDROCODONE BITARTRATE AND ACETAMINOPHEN 10; 325 MG/1; MG/1
1 TABLET ORAL EVERY 6 HOURS PRN
Qty: 120 TABLET | Refills: 0 | Status: CANCELLED | OUTPATIENT
Start: 2019-07-04

## 2019-07-05 DIAGNOSIS — G71.11 MYOTONIC DYSTROPHY, TYPE 2 (HCC): ICD-10-CM

## 2019-07-05 RX ORDER — HYDROCODONE BITARTRATE AND ACETAMINOPHEN 10; 325 MG/1; MG/1
1 TABLET ORAL EVERY 6 HOURS PRN
Qty: 120 TABLET | Refills: 0 | Status: SHIPPED | OUTPATIENT
Start: 2019-07-05 | End: 2019-08-05 | Stop reason: SDUPTHER

## 2019-07-09 ENCOUNTER — APPOINTMENT (OUTPATIENT)
Dept: MAMMOGRAPHY | Facility: HOSPITAL | Age: 49
End: 2019-07-09

## 2019-07-29 RX ORDER — GABAPENTIN 600 MG/1
TABLET ORAL
Qty: 270 TABLET | Refills: 0 | Status: SHIPPED | OUTPATIENT
Start: 2019-07-29 | End: 2019-11-03 | Stop reason: SDUPTHER

## 2019-08-05 DIAGNOSIS — G71.11 MYOTONIC DYSTROPHY, TYPE 2 (HCC): ICD-10-CM

## 2019-08-06 RX ORDER — HYDROCODONE BITARTRATE AND ACETAMINOPHEN 10; 325 MG/1; MG/1
1 TABLET ORAL EVERY 6 HOURS PRN
Qty: 120 TABLET | Refills: 0 | Status: SHIPPED | OUTPATIENT
Start: 2019-08-06 | End: 2019-09-04 | Stop reason: SDUPTHER

## 2019-08-09 ENCOUNTER — OFFICE VISIT (OUTPATIENT)
Dept: INTERNAL MEDICINE | Facility: CLINIC | Age: 49
End: 2019-08-09

## 2019-08-09 VITALS
DIASTOLIC BLOOD PRESSURE: 74 MMHG | BODY MASS INDEX: 39.49 KG/M2 | TEMPERATURE: 97.7 F | SYSTOLIC BLOOD PRESSURE: 122 MMHG | HEIGHT: 65 IN | WEIGHT: 237 LBS

## 2019-08-09 DIAGNOSIS — E11.9 TYPE 2 DIABETES MELLITUS WITHOUT COMPLICATION, WITHOUT LONG-TERM CURRENT USE OF INSULIN (HCC): ICD-10-CM

## 2019-08-09 DIAGNOSIS — F32.A ANXIETY AND DEPRESSION: ICD-10-CM

## 2019-08-09 DIAGNOSIS — Z98.84 H/O BARIATRIC SURGERY: ICD-10-CM

## 2019-08-09 DIAGNOSIS — F41.9 ANXIETY AND DEPRESSION: ICD-10-CM

## 2019-08-09 DIAGNOSIS — G89.29 OTHER CHRONIC PAIN: ICD-10-CM

## 2019-08-09 DIAGNOSIS — I10 ESSENTIAL HYPERTENSION: Primary | ICD-10-CM

## 2019-08-09 DIAGNOSIS — G71.11 MYOTONIC DYSTROPHY, TYPE 2 (HCC): ICD-10-CM

## 2019-08-09 PROCEDURE — 99214 OFFICE O/P EST MOD 30 MIN: CPT | Performed by: INTERNAL MEDICINE

## 2019-08-09 NOTE — PROGRESS NOTES
"Subjective   Hafsa Barron is a 48 y.o. female here for follow-up HTN, chronic back pain, muscular dystrophy, A&D, DMII. HTN: has been at goal, no issues. On amlodipine alone. She has continued to lose weight, making good progress after gastric sleeve. She is motivated to lose much more. Does find she is moving around easier, but still has weakness from the MD as anticipated. Pain medication still helps her to function and takes the edge off her muscle and joint pain. She thinks not carrying around so much weight will be good for her. A&D: no issues, mood is stable if not improved. Wants to continue her meds for now. DMII: off meds since gastric sleeve. Due for A1c. Anticipates good A1c. Has been following low carb diet.    The following portions of the patient's history were reviewed and updated as appropriate: allergies, current medications, past medical history, past social history, past surgical history and problem list.    Review of Systems:  General: negative  CV: negative  Respiratory: negative  Neuro: weakness  Psych: negative  MSK: see hpi    Objective   /74 (BP Location: Right arm, Patient Position: Sitting, Cuff Size: Large Adult)   Temp 97.7 °F (36.5 °C) (Temporal)   Ht 165.1 cm (65\")   Wt 108 kg (237 lb)   LMP  (LMP Unknown)   BMI 39.44 kg/m²     Physical Exam   Constitutional: She is oriented to person, place, and time. She appears well-developed and well-nourished.   Cardiovascular: Normal rate, regular rhythm and normal heart sounds.   Pulmonary/Chest: Effort normal and breath sounds normal. She has no wheezes. She has no rales.   Neurological: She is alert and oriented to person, place, and time.   Skin: Skin is warm and dry.   Psychiatric: She has a normal mood and affect. Her behavior is normal. Thought content normal.   Vitals reviewed.      Assessment/Plan   Hafsa was seen today for hypertension, hyperlipidemia and diabetes.    Diagnoses and all orders for this " visit:    Essential hypertension  -continue amlodipine, BP at goal    Type 2 diabetes mellitus without complication, without long-term current use of insulin (CMS/[facility])  -off all meds and diet-controlled  -A1c    Myotonic dystrophy, type 2 (CMS/HCC)  -continue pain meds and muscle relaxants  The patient has read and signed the [facility] Controlled Substance Contract.  I will continue to see patient for regular follow up appointments.  They are well controlled on their medication.  SULEMA is updated every 3 months. The patient is aware of the potential for addiction and dependence.    Other chronic pain  -see MD above    Anxiety and depression  -continue current meds, mood stable

## 2019-08-12 ENCOUNTER — APPOINTMENT (OUTPATIENT)
Dept: LAB | Facility: HOSPITAL | Age: 49
End: 2019-08-12

## 2019-08-12 PROCEDURE — 83970 ASSAY OF PARATHORMONE: CPT | Performed by: INTERNAL MEDICINE

## 2019-08-12 PROCEDURE — 83921 ORGANIC ACID SINGLE QUANT: CPT | Performed by: INTERNAL MEDICINE

## 2019-08-12 PROCEDURE — 85025 COMPLETE CBC W/AUTO DIFF WBC: CPT | Performed by: INTERNAL MEDICINE

## 2019-08-12 PROCEDURE — 80053 COMPREHEN METABOLIC PANEL: CPT | Performed by: INTERNAL MEDICINE

## 2019-08-12 PROCEDURE — 82306 VITAMIN D 25 HYDROXY: CPT | Performed by: INTERNAL MEDICINE

## 2019-08-12 PROCEDURE — 83540 ASSAY OF IRON: CPT | Performed by: INTERNAL MEDICINE

## 2019-08-12 PROCEDURE — 82728 ASSAY OF FERRITIN: CPT | Performed by: INTERNAL MEDICINE

## 2019-08-12 PROCEDURE — 83036 HEMOGLOBIN GLYCOSYLATED A1C: CPT | Performed by: INTERNAL MEDICINE

## 2019-08-12 PROCEDURE — 82746 ASSAY OF FOLIC ACID SERUM: CPT | Performed by: INTERNAL MEDICINE

## 2019-08-13 ENCOUNTER — TELEPHONE (OUTPATIENT)
Dept: INTERNAL MEDICINE | Facility: CLINIC | Age: 49
End: 2019-08-13

## 2019-08-13 LAB
25(OH)D3 SERPL-MCNC: 60 NG/ML (ref 30–100)
ALBUMIN SERPL-MCNC: 4.4 G/DL (ref 3.5–5.2)
ALBUMIN/GLOB SERPL: 1.7 G/DL
ALP SERPL-CCNC: 73 U/L (ref 39–117)
ALT SERPL W P-5'-P-CCNC: 26 U/L (ref 1–33)
ANION GAP SERPL CALCULATED.3IONS-SCNC: 12.7 MMOL/L (ref 5–15)
AST SERPL-CCNC: 21 U/L (ref 1–32)
BASOPHILS # BLD AUTO: 0.06 10*3/MM3 (ref 0–0.2)
BASOPHILS NFR BLD AUTO: 1 % (ref 0–1.5)
BILIRUB SERPL-MCNC: 0.5 MG/DL (ref 0.2–1.2)
BUN BLD-MCNC: 19 MG/DL (ref 6–20)
BUN/CREAT SERPL: 33.3 (ref 7–25)
CALCIUM SPEC-SCNC: 10 MG/DL (ref 8.6–10.5)
CHLORIDE SERPL-SCNC: 104 MMOL/L (ref 98–107)
CO2 SERPL-SCNC: 26.3 MMOL/L (ref 22–29)
CREAT BLD-MCNC: 0.57 MG/DL (ref 0.57–1)
DEPRECATED RDW RBC AUTO: 51.7 FL (ref 37–54)
EOSINOPHIL # BLD AUTO: 0.17 10*3/MM3 (ref 0–0.4)
EOSINOPHIL NFR BLD AUTO: 2.8 % (ref 0.3–6.2)
ERYTHROCYTE [DISTWIDTH] IN BLOOD BY AUTOMATED COUNT: 15.7 % (ref 12.3–15.4)
FERRITIN SERPL-MCNC: 82.5 NG/ML (ref 13–150)
FOLATE SERPL-MCNC: >20 NG/ML (ref 4.78–24.2)
GFR SERPL CREATININE-BSD FRML MDRD: 113 ML/MIN/1.73
GLOBULIN UR ELPH-MCNC: 2.6 GM/DL
GLUCOSE BLD-MCNC: 120 MG/DL (ref 65–99)
HBA1C MFR BLD: 6.1 % (ref 4.8–5.6)
HCT VFR BLD AUTO: 45.6 % (ref 34–46.6)
HGB BLD-MCNC: 13.9 G/DL (ref 12–15.9)
IMM GRANULOCYTES # BLD AUTO: 0.03 10*3/MM3 (ref 0–0.05)
IMM GRANULOCYTES NFR BLD AUTO: 0.5 % (ref 0–0.5)
IRON 24H UR-MRATE: 76 MCG/DL (ref 37–145)
LYMPHOCYTES # BLD AUTO: 1.62 10*3/MM3 (ref 0.7–3.1)
LYMPHOCYTES NFR BLD AUTO: 26.5 % (ref 19.6–45.3)
MCH RBC QN AUTO: 27.3 PG (ref 26.6–33)
MCHC RBC AUTO-ENTMCNC: 30.5 G/DL (ref 31.5–35.7)
MCV RBC AUTO: 89.6 FL (ref 79–97)
MONOCYTES # BLD AUTO: 0.37 10*3/MM3 (ref 0.1–0.9)
MONOCYTES NFR BLD AUTO: 6.1 % (ref 5–12)
NEUTROPHILS # BLD AUTO: 3.86 10*3/MM3 (ref 1.7–7)
NEUTROPHILS NFR BLD AUTO: 63.1 % (ref 42.7–76)
NRBC BLD AUTO-RTO: 0 /100 WBC (ref 0–0.2)
PLATELET # BLD AUTO: 234 10*3/MM3 (ref 140–450)
PMV BLD AUTO: 12.6 FL (ref 6–12)
POTASSIUM BLD-SCNC: 4.5 MMOL/L (ref 3.5–5.2)
PROT SERPL-MCNC: 7 G/DL (ref 6–8.5)
PTH-INTACT SERPL-MCNC: 22.2 PG/ML (ref 15–65)
RBC # BLD AUTO: 5.09 10*6/MM3 (ref 3.77–5.28)
SODIUM BLD-SCNC: 143 MMOL/L (ref 136–145)
WBC NRBC COR # BLD: 6.11 10*3/MM3 (ref 3.4–10.8)

## 2019-08-17 LAB
Lab: NORMAL
METHYLMALONATE SERPL-SCNC: 163 NMOL/L (ref 0–378)

## 2019-08-19 RX ORDER — VENLAFAXINE HYDROCHLORIDE 37.5 MG/1
37.5 CAPSULE, EXTENDED RELEASE ORAL DAILY
Qty: 30 CAPSULE | Refills: 2 | Status: SHIPPED | OUTPATIENT
Start: 2019-08-19 | End: 2020-03-02

## 2019-08-19 RX ORDER — VENLAFAXINE HYDROCHLORIDE 37.5 MG/1
CAPSULE, EXTENDED RELEASE ORAL
Qty: 30 CAPSULE | Refills: 2 | Status: SHIPPED | OUTPATIENT
Start: 2019-08-19 | End: 2020-02-18

## 2019-08-26 ENCOUNTER — HOSPITAL ENCOUNTER (OUTPATIENT)
Dept: MAMMOGRAPHY | Facility: HOSPITAL | Age: 49
Discharge: HOME | End: 2019-08-26
Admitting: INTERNAL MEDICINE

## 2019-08-26 DIAGNOSIS — Z12.31 VISIT FOR SCREENING MAMMOGRAM: ICD-10-CM

## 2019-08-26 PROCEDURE — 77063 BREAST TOMOSYNTHESIS BI: CPT

## 2019-08-26 PROCEDURE — 77063 BREAST TOMOSYNTHESIS BI: CPT | Performed by: RADIOLOGY

## 2019-08-26 PROCEDURE — 77067 SCR MAMMO BI INCL CAD: CPT | Performed by: RADIOLOGY

## 2019-08-26 PROCEDURE — 77067 SCR MAMMO BI INCL CAD: CPT

## 2019-08-26 RX ORDER — SPIRONOLACTONE 50 MG/1
TABLET, FILM COATED ORAL
Qty: 180 TABLET | Refills: 1 | Status: SHIPPED | OUTPATIENT
Start: 2019-08-26 | End: 2020-02-18

## 2019-09-04 DIAGNOSIS — G71.11 MYOTONIC DYSTROPHY, TYPE 2 (HCC): ICD-10-CM

## 2019-09-04 RX ORDER — HYDROCODONE BITARTRATE AND ACETAMINOPHEN 10; 325 MG/1; MG/1
1 TABLET ORAL EVERY 6 HOURS PRN
Qty: 120 TABLET | Refills: 0 | Status: SHIPPED | OUTPATIENT
Start: 2019-09-04 | End: 2019-10-03 | Stop reason: SDUPTHER

## 2019-09-30 RX ORDER — MONTELUKAST SODIUM 10 MG/1
10 TABLET ORAL NIGHTLY
Qty: 90 TABLET | Refills: 1 | Status: SHIPPED | OUTPATIENT
Start: 2019-09-30 | End: 2020-02-18

## 2019-09-30 RX ORDER — ATORVASTATIN CALCIUM 40 MG/1
40 TABLET, FILM COATED ORAL DAILY
Qty: 30 TABLET | Refills: 2 | Status: SHIPPED | OUTPATIENT
Start: 2019-09-30 | End: 2019-12-10 | Stop reason: SDUPTHER

## 2019-09-30 RX ORDER — MONTELUKAST SODIUM 10 MG/1
TABLET ORAL
Qty: 90 TABLET | Refills: 1 | Status: SHIPPED | OUTPATIENT
Start: 2019-09-30 | End: 2020-04-06 | Stop reason: SDUPTHER

## 2019-10-03 DIAGNOSIS — G71.11 MYOTONIC DYSTROPHY, TYPE 2 (HCC): ICD-10-CM

## 2019-10-03 RX ORDER — HYDROCODONE BITARTRATE AND ACETAMINOPHEN 10; 325 MG/1; MG/1
1 TABLET ORAL EVERY 6 HOURS PRN
Qty: 120 TABLET | Refills: 0 | Status: SHIPPED | OUTPATIENT
Start: 2019-10-03 | End: 2019-11-04 | Stop reason: SDUPTHER

## 2019-11-03 DIAGNOSIS — G71.11 MYOTONIC DYSTROPHY, TYPE 2 (HCC): ICD-10-CM

## 2019-11-04 DIAGNOSIS — G71.11 MYOTONIC DYSTROPHY, TYPE 2 (HCC): ICD-10-CM

## 2019-11-04 RX ORDER — HYDROCODONE BITARTRATE AND ACETAMINOPHEN 10; 325 MG/1; MG/1
1 TABLET ORAL EVERY 6 HOURS PRN
Qty: 120 TABLET | Refills: 0 | Status: CANCELLED | OUTPATIENT
Start: 2019-11-04

## 2019-11-04 RX ORDER — HYDROCODONE BITARTRATE AND ACETAMINOPHEN 10; 325 MG/1; MG/1
1 TABLET ORAL EVERY 6 HOURS PRN
Qty: 120 TABLET | Refills: 0 | Status: SHIPPED | OUTPATIENT
Start: 2019-11-04 | End: 2019-12-05 | Stop reason: SDUPTHER

## 2019-11-04 RX ORDER — POTASSIUM CHLORIDE 20 MEQ/1
20 TABLET, EXTENDED RELEASE ORAL DAILY
Qty: 90 TABLET | Refills: 2 | Status: SHIPPED | OUTPATIENT
Start: 2019-11-04 | End: 2020-05-18 | Stop reason: SDUPTHER

## 2019-11-04 RX ORDER — BUSPIRONE HYDROCHLORIDE 15 MG/1
15 TABLET ORAL 3 TIMES DAILY
Qty: 270 TABLET | Refills: 1 | Status: SHIPPED | OUTPATIENT
Start: 2019-11-04 | End: 2020-05-04 | Stop reason: SDUPTHER

## 2019-11-04 RX ORDER — AMLODIPINE BESYLATE 2.5 MG/1
2.5 TABLET ORAL DAILY
Qty: 90 TABLET | Refills: 1 | Status: SHIPPED | OUTPATIENT
Start: 2019-11-04 | End: 2019-11-11 | Stop reason: SDUPTHER

## 2019-11-04 RX ORDER — GABAPENTIN 600 MG/1
600 TABLET ORAL 3 TIMES DAILY
Qty: 270 TABLET | Refills: 0 | Status: SHIPPED | OUTPATIENT
Start: 2019-11-04 | End: 2020-05-04 | Stop reason: SDUPTHER

## 2019-11-08 PROCEDURE — 87086 URINE CULTURE/COLONY COUNT: CPT | Performed by: FAMILY MEDICINE

## 2019-11-08 PROCEDURE — 87186 SC STD MICRODIL/AGAR DIL: CPT | Performed by: FAMILY MEDICINE

## 2019-11-08 PROCEDURE — 87088 URINE BACTERIA CULTURE: CPT | Performed by: FAMILY MEDICINE

## 2019-11-10 ENCOUNTER — TELEPHONE (OUTPATIENT)
Dept: URGENT CARE | Facility: CLINIC | Age: 49
End: 2019-11-10

## 2019-11-12 RX ORDER — AMLODIPINE BESYLATE 2.5 MG/1
2.5 TABLET ORAL DAILY
Qty: 90 TABLET | Refills: 1 | Status: SHIPPED | OUTPATIENT
Start: 2019-11-12 | End: 2020-02-05 | Stop reason: SDUPTHER

## 2019-11-14 ENCOUNTER — TELEPHONE (OUTPATIENT)
Dept: PEDIATRICS | Facility: OTHER | Age: 49
End: 2019-11-14

## 2019-11-14 RX ORDER — CIPROFLOXACIN 250 MG/1
250 TABLET, FILM COATED ORAL 2 TIMES DAILY
Qty: 14 TABLET | Refills: 0 | Status: SHIPPED | OUTPATIENT
Start: 2019-11-14 | End: 2019-11-29 | Stop reason: SDUPTHER

## 2019-11-14 NOTE — TELEPHONE ENCOUNTER
PT called following up on her email sent through the portal. Is experiencing a lot of pain due to the UTI. Would like someone to call her back and advise.

## 2019-11-25 RX ORDER — CLOPIDOGREL BISULFATE 75 MG/1
75 TABLET ORAL DAILY
Qty: 90 TABLET | Refills: 2 | Status: SHIPPED | OUTPATIENT
Start: 2019-11-25 | End: 2020-05-18 | Stop reason: SDUPTHER

## 2019-11-25 RX ORDER — BACLOFEN 10 MG/1
10 TABLET ORAL 3 TIMES DAILY
Qty: 270 TABLET | Refills: 1 | Status: SHIPPED | OUTPATIENT
Start: 2019-11-25 | End: 2020-03-02 | Stop reason: SDUPTHER

## 2019-12-02 RX ORDER — CIPROFLOXACIN 250 MG/1
250 TABLET, FILM COATED ORAL 2 TIMES DAILY
Qty: 14 TABLET | Refills: 0 | Status: SHIPPED | OUTPATIENT
Start: 2019-12-02 | End: 2019-12-02 | Stop reason: SDUPTHER

## 2019-12-02 RX ORDER — CIPROFLOXACIN 250 MG/1
250 TABLET, FILM COATED ORAL 2 TIMES DAILY
Qty: 14 TABLET | Refills: 0 | Status: SHIPPED | OUTPATIENT
Start: 2019-12-02 | End: 2019-12-09

## 2019-12-05 DIAGNOSIS — G71.11 MYOTONIC DYSTROPHY, TYPE 2 (HCC): ICD-10-CM

## 2019-12-06 DIAGNOSIS — G71.11 MYOTONIC DYSTROPHY, TYPE 2 (HCC): ICD-10-CM

## 2019-12-06 RX ORDER — HYDROCODONE BITARTRATE AND ACETAMINOPHEN 10; 325 MG/1; MG/1
1 TABLET ORAL EVERY 6 HOURS PRN
Qty: 120 TABLET | Refills: 0 | Status: SHIPPED | OUTPATIENT
Start: 2019-12-06 | End: 2019-12-09 | Stop reason: SDUPTHER

## 2019-12-09 DIAGNOSIS — G71.11 MYOTONIC DYSTROPHY, TYPE 2 (HCC): ICD-10-CM

## 2019-12-10 RX ORDER — HYDROCODONE BITARTRATE AND ACETAMINOPHEN 10; 325 MG/1; MG/1
1 TABLET ORAL EVERY 6 HOURS PRN
Qty: 120 TABLET | Refills: 0 | Status: SHIPPED | OUTPATIENT
Start: 2019-12-10 | End: 2020-03-02 | Stop reason: SDUPTHER

## 2019-12-10 RX ORDER — HYDROCODONE BITARTRATE AND ACETAMINOPHEN 10; 325 MG/1; MG/1
1 TABLET ORAL EVERY 6 HOURS PRN
Qty: 120 TABLET | Refills: 0 | Status: SHIPPED | OUTPATIENT
Start: 2019-12-10 | End: 2020-01-06 | Stop reason: SDUPTHER

## 2019-12-11 RX ORDER — ATORVASTATIN CALCIUM 40 MG/1
TABLET, FILM COATED ORAL
Qty: 90 TABLET | Refills: 0 | Status: SHIPPED | OUTPATIENT
Start: 2019-12-11 | End: 2020-05-11 | Stop reason: DRUGHIGH

## 2019-12-17 DIAGNOSIS — G71.11 MYOTONIC DYSTROPHY, TYPE 2 (HCC): ICD-10-CM

## 2019-12-17 RX ORDER — DICLOFENAC SODIUM 75 MG/1
TABLET, DELAYED RELEASE ORAL
Qty: 180 TABLET | Refills: 0 | Status: SHIPPED | OUTPATIENT
Start: 2019-12-17 | End: 2020-01-07 | Stop reason: SDUPTHER

## 2020-01-05 DIAGNOSIS — G71.11 MYOTONIC DYSTROPHY, TYPE 2 (HCC): ICD-10-CM

## 2020-01-06 DIAGNOSIS — G71.11 MYOTONIC DYSTROPHY, TYPE 2 (HCC): ICD-10-CM

## 2020-01-06 RX ORDER — HYDROCODONE BITARTRATE AND ACETAMINOPHEN 10; 325 MG/1; MG/1
1 TABLET ORAL EVERY 6 HOURS PRN
Qty: 120 TABLET | Refills: 0 | OUTPATIENT
Start: 2020-01-06

## 2020-01-06 RX ORDER — HYDROCODONE BITARTRATE AND ACETAMINOPHEN 10; 325 MG/1; MG/1
1 TABLET ORAL EVERY 6 HOURS PRN
Qty: 120 TABLET | Refills: 0 | Status: SHIPPED | OUTPATIENT
Start: 2020-01-06 | End: 2020-01-28 | Stop reason: SDUPTHER

## 2020-01-07 DIAGNOSIS — G71.11 MYOTONIC DYSTROPHY, TYPE 2 (HCC): ICD-10-CM

## 2020-01-08 RX ORDER — DICLOFENAC SODIUM 75 MG/1
TABLET, DELAYED RELEASE ORAL
Qty: 180 TABLET | Refills: 0 | Status: SHIPPED | OUTPATIENT
Start: 2020-01-08 | End: 2020-03-02 | Stop reason: SDUPTHER

## 2020-01-08 RX ORDER — DICLOFENAC SODIUM 75 MG/1
75 TABLET, DELAYED RELEASE ORAL 2 TIMES DAILY
Qty: 180 TABLET | Refills: 0 | Status: SHIPPED | OUTPATIENT
Start: 2020-01-08 | End: 2020-02-18

## 2020-01-28 DIAGNOSIS — G71.11 MYOTONIC DYSTROPHY, TYPE 2 (HCC): ICD-10-CM

## 2020-01-28 RX ORDER — TIZANIDINE 4 MG/1
4 TABLET ORAL NIGHTLY
Qty: 90 TABLET | Refills: 0 | Status: SHIPPED | OUTPATIENT
Start: 2020-01-28 | End: 2020-05-04 | Stop reason: SDUPTHER

## 2020-01-28 RX ORDER — TIZANIDINE 4 MG/1
4 TABLET ORAL NIGHTLY
Qty: 90 TABLET | Refills: 0 | Status: CANCELLED | OUTPATIENT
Start: 2020-01-28

## 2020-01-28 RX ORDER — HYDROCODONE BITARTRATE AND ACETAMINOPHEN 10; 325 MG/1; MG/1
1 TABLET ORAL EVERY 6 HOURS PRN
Qty: 120 TABLET | Refills: 0 | Status: SHIPPED | OUTPATIENT
Start: 2020-01-28 | End: 2020-01-31 | Stop reason: SDUPTHER

## 2020-01-28 NOTE — TELEPHONE ENCOUNTER
Tizanidine already filled. Cancelled off of refill request.     LOV:08/09/2019  No future appt  Last refill:01/06/20 #120

## 2020-01-31 DIAGNOSIS — G71.11 MYOTONIC DYSTROPHY, TYPE 2 (HCC): ICD-10-CM

## 2020-02-03 RX ORDER — HYDROCODONE BITARTRATE AND ACETAMINOPHEN 10; 325 MG/1; MG/1
1 TABLET ORAL EVERY 6 HOURS PRN
Qty: 120 TABLET | Refills: 0 | Status: SHIPPED | OUTPATIENT
Start: 2020-02-03 | End: 2020-02-18

## 2020-02-05 RX ORDER — AMLODIPINE BESYLATE 2.5 MG/1
2.5 TABLET ORAL DAILY
Qty: 90 TABLET | Refills: 1 | Status: SHIPPED | OUTPATIENT
Start: 2020-02-05 | End: 2020-05-18 | Stop reason: SDUPTHER

## 2020-02-05 RX ORDER — FENOFIBRATE 145 MG/1
145 TABLET, COATED ORAL DAILY
Qty: 90 TABLET | Refills: 2 | Status: SHIPPED | OUTPATIENT
Start: 2020-02-05 | End: 2020-05-04 | Stop reason: SDUPTHER

## 2020-02-05 RX ORDER — FENOFIBRATE 145 MG/1
TABLET, COATED ORAL
Qty: 90 TABLET | Refills: 0 | Status: SHIPPED | OUTPATIENT
Start: 2020-02-05 | End: 2020-02-18

## 2020-02-07 RX ORDER — BROMPHENIRAMINE MALEATE, PSEUDOEPHEDRINE HYDROCHLORIDE, AND DEXTROMETHORPHAN HYDROBROMIDE 2; 30; 10 MG/5ML; MG/5ML; MG/5ML
2.5 SYRUP ORAL 4 TIMES DAILY PRN
Qty: 118 ML | Refills: 0 | Status: SHIPPED | OUTPATIENT
Start: 2020-02-07 | End: 2021-06-23

## 2020-02-18 ENCOUNTER — OFFICE VISIT (OUTPATIENT)
Dept: NEUROLOGY | Facility: CLINIC | Age: 50
End: 2020-02-18

## 2020-02-18 VITALS
SYSTOLIC BLOOD PRESSURE: 124 MMHG | DIASTOLIC BLOOD PRESSURE: 76 MMHG | BODY MASS INDEX: 35.84 KG/M2 | WEIGHT: 223 LBS | HEIGHT: 66 IN

## 2020-02-18 DIAGNOSIS — G71.11 MYOTONIC DYSTROPHY, TYPE 2 (HCC): Primary | ICD-10-CM

## 2020-02-18 DIAGNOSIS — G47.19 DAYTIME HYPERSOMNOLENCE: ICD-10-CM

## 2020-02-18 PROCEDURE — 99204 OFFICE O/P NEW MOD 45 MIN: CPT | Performed by: PSYCHIATRY & NEUROLOGY

## 2020-02-18 RX ORDER — MODAFINIL 100 MG/1
100 TABLET ORAL DAILY
Qty: 30 TABLET | Refills: 1 | Status: SHIPPED | OUTPATIENT
Start: 2020-02-18 | End: 2020-05-21 | Stop reason: SDUPTHER

## 2020-02-18 RX ORDER — ARMODAFINIL 250 MG/1
250 TABLET ORAL EVERY MORNING
COMMUNITY
Start: 2019-12-11 | End: 2020-02-18

## 2020-02-18 NOTE — PROGRESS NOTES
Subjective:    CC: Hafsa Barron is seen today in consultation for Myotonic Dystrophy Type 2       HPI:  Patient is a 49-year-old female with known history of type II myotonic dystrophy referred to clinic to establish care.  She reports that she started having symptoms back in 2003 soon after delivery.  At that time she noted some leg weakness which progressively became worse and in 2009, she was seen by [facility] neuromuscular department and underwent detailed neurological examination, which was suggestive of proximal muscle weakness and possibility of myopathy.  Following this, EMG was performed which showed diffuse myotonic discharges and eventually genetic testing was done which confirmed the diagnosis of type II myotonic dystrophy.  She has followed with [facility] neuroscience since year 2009 and was followed initially by Dr. Marina and then Dr. Lynn.  Since her diagnosis in 2009, she reports that there is been slight worsening in bilateral proximal muscle weakness in foot and lower extremities.  In year 2013, she reports that she had a stroke which affected her left upper and lower extremity strength.  Currently she was uses cane for shorter distances and electrical scooter for longer distances.  She is established with ophthalmology and has regular eye examination as well as is established with cardiology to monitor her heart health.  She does have history of obstructive sleep apnea and uses CPAP machine regularly.  Initially, she reports that it had helped her significantly but now she reports that even after using it regularly, she wakes up tired and reports extreme daytime somnolence, tiredness and fatigue.  She was prescribed Nuvigil by her sleep physician which initially helped and then it stopped working.  She has never tried Provigil for daytime somnolence.  She denies any problems with vision.  Currently she takes baclofen 10 mg 3 times a day, tizanidine and cyclobenzaprine only at night.  She also takes  gabapentin for paresthesias and muscle spasms.  She does report poor sleep quality.  She is on BuSpar 15 mg 3 times a day and venlafaxine 37.5 mg daily for mood      The following portions of the patient's history were reviewed today and updated as of 02/18/2020  : allergies, social history and problem list.  This document will be scanned to patient's chart.      Current Outpatient Medications:   •  albuterol (PROAIR HFA) 108 (90 BASE) MCG/ACT inhaler, Inhale 1 puff Every 6 (Six) Hours As Needed for Wheezing., Disp: 90 inhaler, Rfl: 2  •  albuterol (PROVENTIL) (2.5 MG/3ML) 0.083% nebulizer solution, Take 2.5 mg by nebulization Every 4 (Four) Hours As Needed for Wheezing., Disp: 90 vial, Rfl: 1  •  amLODIPine (NORVASC) 2.5 MG tablet, Take 1 tablet by mouth Daily., Disp: 90 tablet, Rfl: 1  •  aspirin 81 MG EC tablet, Take 81 mg by mouth Daily., Disp: , Rfl:   •  atorvastatin (LIPITOR) 40 MG tablet, TAKE 1 TABLET BY MOUTH ONCE DAILY, Disp: 90 tablet, Rfl: 0  •  baclofen (LIORESAL) 10 MG tablet, Take 1 tablet by mouth 3 (Three) Times a Day., Disp: 270 tablet, Rfl: 1  •  Blood Glucose Monitoring Suppl (ONE TOUCH ULTRA 2) w/Device kit, , Disp: , Rfl:   •  brompheniramine-pseudoephedrine-DM 30-2-10 MG/5ML syrup, Take 2.5 mL by mouth 4 (Four) Times a Day As Needed for Allergies., Disp: 118 mL, Rfl: 0  •  busPIRone (BUSPAR) 15 MG tablet, Take 1 tablet by mouth 3 (Three) Times a Day., Disp: 270 tablet, Rfl: 1  •  calcium carbonate-vitamin d (CALCIUM 600+D HIGH POTENCY) 600-400 MG-UNIT per tablet, Take 1 tablet by mouth Daily., Disp: 90 tablet, Rfl: 2  •  clopidogrel (PLAVIX) 75 MG tablet, Take 1 tablet by mouth Daily., Disp: 90 tablet, Rfl: 2  •  cyclobenzaprine (FLEXERIL) 10 MG tablet, Take 1 tablet by mouth 3 (Three) Times a Day As Needed for Muscle Spasms., Disp: 270 tablet, Rfl: 2  •  diclofenac (VOLTAREN) 75 MG EC tablet, TAKE 1 TABLET BY MOUTH TWICE DAILY, Disp: 180 tablet, Rfl: 0  •  fenofibrate (TRICOR) 145 MG tablet,  Take 1 tablet by mouth Daily., Disp: 90 tablet, Rfl: 2  •  fluticasone (FLONASE ALLERGY RELIEF) 50 MCG/ACT nasal spray, into each nostril., Disp: , Rfl:   •  gabapentin (NEURONTIN) 600 MG tablet, Take 1 tablet by mouth 3 (Three) Times a Day., Disp: 270 tablet, Rfl: 0  •  HYDROcodone-acetaminophen (NORCO)  MG per tablet, Take 1 tablet by mouth Every 6 (Six) Hours As Needed for Moderate Pain ., Disp: 120 tablet, Rfl: 0  •  loratadine (CLARITIN) 10 MG tablet, Take 1 tablet by mouth Daily., Disp: 30 tablet, Rfl: 11  •  montelukast (SINGULAIR) 10 MG tablet, TAKE 1 TABLET BY MOUTH ONCE DAILY IN THE EVENING, Disp: 90 tablet, Rfl: 1  •  nitrofurantoin, macrocrystal-monohydrate, (MACROBID) 100 MG capsule, Take 1 capsule by mouth 2 (Two) Times a Day., Disp: 10 capsule, Rfl: 0  •  ondansetron (ZOFRAN) 8 MG tablet, , Disp: , Rfl: 1  •  ONE TOUCH ULTRA TEST test strip, , Disp: , Rfl:   •  pantoprazole (PROTONIX) 40 MG EC tablet, Take 1 tablet by mouth Daily., Disp: 90 tablet, Rfl: 1  •  potassium chloride (KLOR-CON) 20 MEQ CR tablet, Take 1 tablet by mouth Daily., Disp: 90 tablet, Rfl: 2  •  spironolactone (ALDACTONE) 50 MG tablet, Take 50 mg by mouth Daily., Disp: , Rfl:   •  tiZANidine (ZANAFLEX) 4 MG tablet, TAKE 1 TABLET BY MOUTH EVERY NIGHT, Disp: 90 tablet, Rfl: 0  •  venlafaxine XR (EFFEXOR XR) 37.5 MG 24 hr capsule, Take 1 capsule by mouth Daily., Disp: 30 capsule, Rfl: 2  •  modafinil (PROVIGIL) 100 MG tablet, Take 1 tablet by mouth Daily., Disp: 30 tablet, Rfl: 1   Past Medical History:   Diagnosis Date   • Allergic    • Allergy    • Anxiety    • Asthma Allergies induced   • Depression    • Fibromyalgia, primary    • GERD (gastroesophageal reflux disease)    • Headache    • History of blood clots    • Hyperlipidemia    • Hypertension    • IBS (irritable bowel syndrome)    • Internal hemorrhoid    • Kidney stone    • Low back pain    • Muscular dystrophy (CMS/HCC)    • Muscular dystrophy (CMS/HCC)     II   •  "Myotonia    • Obesity    • Stroke (CMS/HCC) 2013    resdual- left sided weakness.    • Type 2 diabetes mellitus without complication, without long-term current use of insulin (CMS/[facility])       Past Surgical History:   Procedure Laterality Date   • CHOLECYSTECTOMY  2004   • COLONOSCOPY  2017   • D&C WITH SUCTION     • LYMPH NODE BIOPSY     • SUBCLAVIAN ARTERY STENT  08/2018    x2      Family History   Problem Relation Age of Onset   • Allergies Other    • ADD / ADHD Other    • Heart block Other    • Other Other         CEREBROVASCULAR ACCIDENT    • Hypertension Other    • Cancer Other         LUNG CANCER   • Hyperlipidemia Other    • Alcohol abuse Mother    • Depression Mother    • Early death Mother          age 59   • Heart disease Mother         Mother  of Massive Heart attack   • Hyperlipidemia Mother    • Hypertension Mother    • Miscarriages / Stillbirths Mother         Miscarriage   • Heart attack Mother          of  massive heart attack   • Alcohol abuse Father    • Cancer Father         Had Lung Cancer - had 1 lung till death   • Diabetes Father         Lived on insuline shots   • Early death Father          age 53   • Breast cancer Maternal Aunt    • Learning disabilities Son         Had Adhd   • Breast cancer Cousin    • Ovarian cancer Neg Hx    • Endometrial cancer Neg Hx    • Uterine cancer Neg Hx    • Colon cancer Neg Hx       Review of Systems   Genitourinary: Positive for difficulty urinating, dysuria and frequency.   Musculoskeletal: Positive for back pain, gait problem, myalgias and neck pain.   Neurological: Positive for speech difficulty and weakness.   Psychiatric/Behavioral: The patient is nervous/anxious.        All other systems reviewed and are negative     Objective:    /76   Ht 167.6 cm (66\")   Wt 101 kg (223 lb)   LMP  (LMP Unknown)   BMI 35.99 kg/m²     Neurology Exam:    General apperance: NAD.     Mental status: Alert, awake and oriented to time " place and person.    Recent and Remote memory: Can recall 3/3 objects at 5 minutes. Can recall historical events.     Attention span and Concentration: Serial 7s: Normal.     Fund of knowledge:  Normal.     Language and Speech: No aphasia or dysarthria.    Naming , Repitition and Comprehension:  Can name objects, repeat a sentence and follow commands. Speech is clear and fluent with good repetition, comprehension, and naming.    Cranial Nerves:   CN II: Visual fields are full. Intact. Fundi - Normal, No papillederma, Pupils - HARLAN  CN III, IV and VI: Extraocular movements are intact. Normal saccades.   CN V: Facial sensation is intact.   CN VII: Muscles of facial expression reveal no asymmetry. Intact.   CN VIII: Hearing is intact. Whispered voice intact.   CN IX and X: Palate elevates symmetrically. Intact  CN XI: Shoulder shrug is intact.   CN XII: Tongue is midline without evidence of atrophy or fasciculation.     Motor:  Right UE muscle strength 5/5 except for right deltoid which is 4/5.  Poor effort noted.    Left UE muscle strength 4/5 throughout.     Right LE muscle strength5/5 except for right hip flexor which is 4/5.  Poor effort noted.    Left LE muscle strength 4/5.    Sensory: Normal light touch, vibration and pinprick sensation bilaterally.    DTRs: 2+ bilaterally in upper and lower extremities.    Babinski: Negative bilaterally.    Co-ordination: Normal finger-to-nose, heel to shin B/L.    Rhomberg: Negative.    Gait: Clearly with the help of a cane.    Cardiovascular: Regular rate and rhythm without murmur, gallop or rub.    Ophthalmoscopic exam: Normal fundi, no papilledema.    Assessment and Plan:  1. Myotonic dystrophy, type 2 (CMS/HCC)  2. Daytime hypersomnolence  -Patient with known diagnosis of type II myotonic dystrophy confirmed with EMG and genetic testing.  Diagnosis was confirmed in 2009.  Since the diagnosis, she has noted mild progression of bilateral proximal muscle weakness.  I have  had lengthy discussion with the patient and she is aware that there is no cure for myotonic dystrophy and management is primarily directed towards symptomatic treatment.  Overall, type II myotonic dystrophy has milder course then type I myotonic dystrophy which she is aware.  At present, her most important concern is daytime hypersomnolence, tiredness and fatigue.  She tried Nuvigil in the past which worked for some time and then it stopped working.  I would like to start her on Provigil 100 mg to be taken every day and see how she does.  If no response to 100 mg dose then it can be increased to 200 mg.  We discussed about considering Ritalin in future to help with her daytime hypersomnolence and fatigue and she may consider it. She is on 3 different muscle relaxers which I feel is unnecessary and in future, I would like to continue her only on one muscle relaxer that is most beneficial to her.  I will see her back in 3 months for follow-up.    - modafinil (PROVIGIL) 100 MG tablet; Take 1 tablet by mouth Daily.  Dispense: 30 tablet; Refill: 1       Return in about 3 months (around 5/18/2020).     Fausto Edwards MD

## 2020-02-24 ENCOUNTER — PRIOR AUTHORIZATION (OUTPATIENT)
Dept: NEUROLOGY | Facility: CLINIC | Age: 50
End: 2020-02-24

## 2020-02-24 ENCOUNTER — TELEPHONE (OUTPATIENT)
Dept: INTERNAL MEDICINE | Facility: CLINIC | Age: 50
End: 2020-02-24

## 2020-02-24 ENCOUNTER — TELEPHONE (OUTPATIENT)
Dept: CARDIOLOGY | Facility: CLINIC | Age: 50
End: 2020-02-24

## 2020-02-24 DIAGNOSIS — R09.89 BRUIT OF LEFT CAROTID ARTERY: Primary | ICD-10-CM

## 2020-02-24 DIAGNOSIS — I74.8 THROMBOSIS OF SUBCLAVIAN ARTERY (HCC): ICD-10-CM

## 2020-02-24 DIAGNOSIS — E78.5 HYPERLIPIDEMIA, UNSPECIFIED HYPERLIPIDEMIA TYPE: ICD-10-CM

## 2020-02-24 NOTE — TELEPHONE ENCOUNTER
Patient called with concerns due to recent procedure to check her subclavian artery stent, per Anesthesia she has a 20% deduction in pumping ability. Last echo was at  in 2018. New order placed to establish current baseline. Will attempt to obtain records from [facility]

## 2020-02-24 NOTE — TELEPHONE ENCOUNTER
CALLED TO SCHEDULE PT FOR HER REQUESTED APPOINTMENT, THERE WAS NO ANSWER, LEFT A VM TO CALL BACK AND SCHEDULE.

## 2020-03-02 DIAGNOSIS — G71.11 MYOTONIC DYSTROPHY, TYPE 2 (HCC): ICD-10-CM

## 2020-03-02 RX ORDER — HYDROCODONE BITARTRATE AND ACETAMINOPHEN 10; 325 MG/1; MG/1
1 TABLET ORAL EVERY 6 HOURS PRN
Qty: 120 TABLET | Refills: 0 | Status: SHIPPED | OUTPATIENT
Start: 2020-03-02 | End: 2020-04-01 | Stop reason: SDUPTHER

## 2020-03-02 RX ORDER — VENLAFAXINE HYDROCHLORIDE 37.5 MG/1
37.5 CAPSULE, EXTENDED RELEASE ORAL DAILY
Qty: 90 CAPSULE | Refills: 1 | Status: SHIPPED | OUTPATIENT
Start: 2020-03-02 | End: 2020-04-06 | Stop reason: SDUPTHER

## 2020-03-02 RX ORDER — VENLAFAXINE HYDROCHLORIDE 37.5 MG/1
CAPSULE, EXTENDED RELEASE ORAL
Qty: 30 CAPSULE | Refills: 0 | Status: SHIPPED | OUTPATIENT
Start: 2020-03-02 | End: 2020-03-02 | Stop reason: SDUPTHER

## 2020-03-02 RX ORDER — BACLOFEN 10 MG/1
10 TABLET ORAL 3 TIMES DAILY
Qty: 270 TABLET | Refills: 1 | Status: SHIPPED | OUTPATIENT
Start: 2020-03-02 | End: 2020-07-12 | Stop reason: SDUPTHER

## 2020-03-02 RX ORDER — DICLOFENAC SODIUM 75 MG/1
75 TABLET, DELAYED RELEASE ORAL 2 TIMES DAILY
Qty: 180 TABLET | Refills: 1 | Status: SHIPPED | OUTPATIENT
Start: 2020-03-02 | End: 2020-07-12 | Stop reason: SDUPTHER

## 2020-03-02 RX ORDER — SPIRONOLACTONE 50 MG/1
50 TABLET, FILM COATED ORAL DAILY
Qty: 90 TABLET | Refills: 1 | Status: SHIPPED | OUTPATIENT
Start: 2020-03-02 | End: 2020-12-14 | Stop reason: SDUPTHER

## 2020-03-05 ENCOUNTER — APPOINTMENT (OUTPATIENT)
Dept: CARDIOLOGY | Facility: HOSPITAL | Age: 50
End: 2020-03-05

## 2020-03-15 NOTE — PROGRESS NOTES
Subjective   Chief Complaint   Patient presents with   • [facility]     Hafsa Barron is a 48 y.o. year old  menopausal female presenting to be seen for her annual exam.  This past year she has not been on hormone replacement therapy.  There has been vaginal bleeding in the last 12 months. Patient reports over the last 12 months she has had two episodes of spotting with wiping. She does reports that she has a history of hemorrhoids and she is unsure the origin of the spotting. Menopausal symptoms are present (hot flashes) but not affecting her quality of life . Patient is scheduled for weight loss surgery on . Last mammogram: 2 years; scheduled next month. Last pap smear: 3 years ago; denies history of abnormal pap smears.     SEXUAL Hx:  She is currently sexually active.  In the past year there there has been NO new sexual partners.    Condoms are never used.  She would not like to be screened for STD's at today's exam.  [place] is painful: no  HEALTH Hx:  She exercises regularly: limited secondary to mobility..  She wears her seat belt: yes.  She has concerns about domestic violence: no.  OTHER THINGS SHE WANTS TO DISCUSS TODAY:  Nothing else    The following portions of the patient's history were reviewed and updated as appropriate:problem list, current medications, allergies, past family history, past medical history, past social history and past surgical history.    Social History    Tobacco Use      Smoking status: Former Smoker        Packs/day: 1.50        Years: 15.00        Pack years: 22.5        Start date: 1983        Quit date: 2013        Years since quittin.6      Smokeless tobacco: Never Used      Tobacco comment: use [employer] now NO NICOTENE      Review of Systems  Constitutional POS: nothing reported    NEG: anorexia or night sweats   Genitourinary POS: nothing reported    NEG: dysuria or hematuria      Gastointestinal POS: nothing reported    NEG: bloating,  change in bowel habits, melena or reflux symptoms   Integument POS: nothing reported    NEG: moles that are changing in size, shape, color or rashes   Breast POS: nothing reported    NEG: persistent breast lump, skin dimpling or nipple discharge        Objective   /74   Resp 14   Wt 127 kg (279 lb)   LMP  (LMP Unknown)   Breastfeeding? No   BMI 46.43 kg/m²     General:  well developed; well nourished  no acute distress   Skin:  No suspicious lesions seen   Thyroid: normal to inspection and palpation   Breasts:  Examined in supine position  Symmetric without masses or skin dimpling  Nipples normal without inversion, lesions or discharge  There are no palpable axillary nodes   Abdomen: soft, non-tender; no masses  no umbilical or inguinal hernias are present  no hepato-splenomegaly   Pelvis: Exam limited by  body habitus  Clinical staff was present for exam  External genitalia:  normal appearance of the external genitalia including Bartholin's and [place]'s glands.  :  urethral meatus normal;  Vaginal:  normal pink mucosa without prolapse or lesions.  Cervix:  normal appearance.  Uterus:  not palpable.  Adnexa:  non palpable bilaterally.        Assessment   1. Normal GYN exam  2. Postmenopausal Bleeding  3. Stress Urinary Incontinence     Plan   1. Pap and HPV were done today.  If she does not receive the results of the Pap within 2 weeks  time, she was instructed to call to find out the results.  I explained to Hafsa that the recommendations for Pap smear interval in a low risk patient's has lengthened to 5 years time if both cytology and HPV testing were normal.  I encouraged her to be seen yearly for a full physical exam including breast and pelvic exam even during the off years when PAP's will not be performed.  2. She was encouraged to get yearly mammograms.  She should report any palpable breast lump(s) or skin changes regardless of mammographic findings.  I explained to Hafsa that  notification regarding her mammogram results will come from the center performing the study.  Our office will not be routinely calling with mammogram results.  It is her responsibility to make sure that the results from the mammogram are communicated to her by the breast center.  If she has any questions about the results, she is welcome to call our office anytime.  3. Ultrasound needs to be done ASAP secondary to postmenopausal bleeding.  4. Discussed weight loss, kegel exercises, limiting caffeine and alcohol intake, and limiting amount of fluid intake at one time as strategies to improve MIGUEL.  5. The importance of keeping all planned follow-up and taking all medications as prescribed was emphasized.  6. Follow up after ultrasound.     No orders of the defined types were placed in this encounter.         This note was electronically signed.    Silke Marcelino DO  April 12, 2019    Note: Speech recognition transcription software may have been used to create portions of this document.  An attempt at proofreading has been made but errors in transcription could still be present.   no photophobia/no drainage/no eye lid swelling/no itching/no pain/no double vision/no purulent drainage/no blurred vision/no foreign body/no discharge

## 2020-04-01 DIAGNOSIS — G71.11 MYOTONIC DYSTROPHY, TYPE 2 (HCC): ICD-10-CM

## 2020-04-01 RX ORDER — HYDROCODONE BITARTRATE AND ACETAMINOPHEN 10; 325 MG/1; MG/1
1 TABLET ORAL EVERY 6 HOURS PRN
Qty: 120 TABLET | Refills: 0 | Status: SHIPPED | OUTPATIENT
Start: 2020-04-01 | End: 2020-05-01 | Stop reason: SDUPTHER

## 2020-04-06 RX ORDER — VENLAFAXINE HYDROCHLORIDE 37.5 MG/1
37.5 CAPSULE, EXTENDED RELEASE ORAL DAILY
Qty: 90 CAPSULE | Refills: 1 | Status: SHIPPED | OUTPATIENT
Start: 2020-04-06 | End: 2020-10-05

## 2020-04-06 RX ORDER — MONTELUKAST SODIUM 10 MG/1
10 TABLET ORAL EVERY EVENING
Qty: 90 TABLET | Refills: 1 | Status: SHIPPED | OUTPATIENT
Start: 2020-04-06 | End: 2020-10-05

## 2020-05-01 ENCOUNTER — OFFICE VISIT (OUTPATIENT)
Dept: CARDIOLOGY | Facility: CLINIC | Age: 50
End: 2020-05-01

## 2020-05-01 VITALS — WEIGHT: 218 LBS | BODY MASS INDEX: 35.03 KG/M2 | HEIGHT: 66 IN

## 2020-05-01 DIAGNOSIS — G71.11 MYOTONIC DYSTROPHY, TYPE 2 (HCC): ICD-10-CM

## 2020-05-01 DIAGNOSIS — E11.9 TYPE 2 DIABETES MELLITUS WITHOUT COMPLICATION, WITHOUT LONG-TERM CURRENT USE OF INSULIN (HCC): ICD-10-CM

## 2020-05-01 DIAGNOSIS — E78.2 MIXED HYPERLIPIDEMIA: ICD-10-CM

## 2020-05-01 DIAGNOSIS — I10 ESSENTIAL HYPERTENSION: Primary | ICD-10-CM

## 2020-05-01 PROCEDURE — 99442 PR PHYS/QHP TELEPHONE EVALUATION 11-20 MIN: CPT | Performed by: INTERNAL MEDICINE

## 2020-05-01 RX ORDER — HYDROCODONE BITARTRATE AND ACETAMINOPHEN 10; 325 MG/1; MG/1
1 TABLET ORAL EVERY 6 HOURS PRN
Qty: 120 TABLET | Refills: 0 | Status: SHIPPED | OUTPATIENT
Start: 2020-05-01 | End: 2020-06-01 | Stop reason: SDUPTHER

## 2020-05-01 NOTE — PROGRESS NOTES
[facility]     Hafsa Barron  1970  [phone]    VISIT DATE:  2020     PCP: Jocy Snow MD  [address]  [facility] [address]    IDENTIFICATION: A 49 y.o. female from [facility]    CC:  Chief Complaint   Patient presents with   • Hypertension       PROBLEM LIST:  1. HTN  2. HLD  1. 17 lipids: , , HDL 66, , ASCVD 10 year risk 1.9%  2. 3  TG 95 HDL 43 LDL 67  3. DM II  1. 10/4/17 A1c 9.4  4. Carotid bruit  1. 10/27/2017 CUS WNL  5. Hv L subclavian artery thrombosis  1. S/p stent [facility] 2018  2.  invasive relook ok  6.  Hypercoagulability w/u Dr. Borjas, rec indefinite DAPT  7. Hx CVA  1. , [facility], mild residual L arm weakness, started on DAPT  8. Obesity  9. 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 Fibromyalgia  12.   13. Surgical Hx:  1. Cholecystectomy   2. Gastric sleeve  max weight 280    Allergies  Allergies   Allergen Reactions   • Penicillins Anaphylaxis and Shortness Of Breath       Current Medications    Current Outpatient Medications:   •  albuterol (PROAIR HFA) 108 (90 BASE) MCG/ACT inhaler, Inhale 1 puff Every 6 (Six) Hours As Needed for Wheezing., Disp: 90 inhaler, Rfl: 2  •  albuterol (PROVENTIL) (2.5 MG/3ML) 0.083% nebulizer solution, Take 2.5 mg by nebulization Every 4 (Four) Hours As Needed for Wheezing., Disp: 90 vial, Rfl: 1  •  amLODIPine (NORVASC) 2.5 MG tablet, Take 1 tablet by mouth Daily., Disp: 90 tablet, Rfl: 1  •  aspirin 81 MG EC tablet, Take 81 mg by mouth Daily., Disp: , Rfl:   •  atorvastatin (LIPITOR) 40 MG tablet, TAKE 1 TABLET BY MOUTH ONCE DAILY (Patient taking differently: Take 80 mg by mouth Daily.), Disp: 90 tablet, Rfl: 0  •  baclofen (LIORESAL) 10 MG tablet, Take 1 tablet by mouth 3 (Three) Times a Day., Disp: 270 tablet, Rfl: 1  •  Blood Glucose Monitoring Suppl (ONE TOUCH ULTRA 2) w/Device kit, , Disp: ,  Rfl:   •  brompheniramine-pseudoephedrine-DM 30-2-10 MG/5ML syrup, Take 2.5 mL by mouth 4 (Four) Times a Day As Needed for Allergies., Disp: 118 mL, Rfl: 0  •  busPIRone (BUSPAR) 15 MG tablet, Take 1 tablet by mouth 3 (Three) Times a Day., Disp: 270 tablet, Rfl: 1  •  calcium carbonate-vitamin d (CALCIUM 600+D HIGH POTENCY) 600-400 MG-UNIT per tablet, Take 1 tablet by mouth Daily., Disp: 90 tablet, Rfl: 2  •  clopidogrel (PLAVIX) 75 MG tablet, Take 1 tablet by mouth Daily., Disp: 90 tablet, Rfl: 2  •  cyclobenzaprine (FLEXERIL) 10 MG tablet, Take 1 tablet by mouth 3 (Three) Times a Day As Needed for Muscle Spasms., Disp: 270 tablet, Rfl: 2  •  diclofenac (VOLTAREN) 75 MG EC tablet, Take 1 tablet by mouth 2 (Two) Times a Day., Disp: 180 tablet, Rfl: 1  •  fenofibrate (TRICOR) 145 MG tablet, Take 1 tablet by mouth Daily., Disp: 90 tablet, Rfl: 2  •  fluticasone (FLONASE ALLERGY RELIEF) 50 MCG/ACT nasal spray, into each nostril., Disp: , Rfl:   •  gabapentin (NEURONTIN) 600 MG tablet, Take 1 tablet by mouth 3 (Three) Times a Day., Disp: 270 tablet, Rfl: 0  •  HYDROcodone-acetaminophen (NORCO)  MG per tablet, Take 1 tablet by mouth Every 6 (Six) Hours As Needed for Moderate Pain ., Disp: 120 tablet, Rfl: 0  •  loratadine (CLARITIN) 10 MG tablet, Take 1 tablet by mouth Daily., Disp: 30 tablet, Rfl: 11  •  modafinil (PROVIGIL) 100 MG tablet, Take 1 tablet by mouth Daily., Disp: 30 tablet, Rfl: 1  •  montelukast (SINGULAIR) 10 MG tablet, Take 1 tablet by mouth Every Evening., Disp: 90 tablet, Rfl: 1  •  nitrofurantoin, macrocrystal-monohydrate, (MACROBID) 100 MG capsule, Take 1 capsule by mouth 2 (Two) Times a Day., Disp: 10 capsule, Rfl: 0  •  ondansetron (ZOFRAN) 8 MG tablet, , Disp: , Rfl: 1  •  ONE TOUCH ULTRA TEST test strip, , Disp: , Rfl:   •  pantoprazole (PROTONIX) 40 MG EC tablet, Take 1 tablet by mouth Daily., Disp: 90 tablet, Rfl: 1  •  potassium chloride (KLOR-CON) 20 MEQ CR tablet, Take 1 tablet by  mouth Daily., Disp: 90 tablet, Rfl: 2  •  spironolactone (ALDACTONE) 50 MG tablet, Take 1 tablet by mouth Daily., Disp: 90 tablet, Rfl: 1  •  tiZANidine (ZANAFLEX) 4 MG tablet, TAKE 1 TABLET BY MOUTH EVERY NIGHT, Disp: 90 tablet, Rfl: 0  •  venlafaxine XR (EFFEXOR-XR) 37.5 MG 24 hr capsule, Take 1 capsule by mouth Daily., Disp: 90 capsule, Rfl: 1     History of Present Illness   HPI  This is a 46-year-old  female presents in telehealth follow-up.  In the interim she is undergone gastric sleeve procedure at an outside hospital with a 70 pound weight loss of which she is gained approximately 15 pounds back during the COVID pandemic.  She is undergone evaluation of her left upper extremity stent at  was reportedly acceptable.  She is having some chronic right shoulder and arm pain otherwise.  She did state that while at  she was told she had a weakened heart albeit there was no echocardiogram she believes that a stated this from an EKG so she has a pending echocardiogram upcoming      SOCIAL HX  Social History     Socioeconomic History   • Marital status:      Spouse name: Not on file   • Number of children: Not on file   • Years of education: Not on file   • Highest education level: Not on file   Tobacco Use   • Smoking status: Former Smoker     Packs/day: 1.50     Years: 15.00     Pack years: 22.50     Start date: 1983     Last attempt to quit: 2013     Years since quittin.6   • Smokeless tobacco: Never Used   • Tobacco comment: use Ecigg now NO NICOTENE   Substance and Sexual Activity   • Alcohol use: Yes     Frequency: Monthly or less   • Drug use: No   • Sexual activity: Yes     Partners: Male     Birth control/protection: Post-menopausal     Comment: Menapause no cycle since 2013       FAMILY HX  Family History   Problem Relation Age of Onset   • Allergies Other    • ADD / ADHD Other    • Heart block Other    • Other Other         CEREBROVASCULAR ACCIDENT    • Hypertension  "Other    • Cancer Other         LUNG CANCER   • Hyperlipidemia Other    • Alcohol abuse Mother    • Depression Mother    • Early death Mother          age 59   • Heart disease Mother         Mother  of Massive Heart attack   • Hyperlipidemia Mother    • Hypertension Mother    • Miscarriages / Stillbirths Mother         Miscarriage   • Heart attack Mother          of  massive heart attack   • Alcohol abuse Father    • Cancer Father         Had Lung Cancer - had 1 lung till death   • Diabetes Father         Lived on insuline shots   • Early death Father          age 53   • Breast cancer Maternal Aunt    • Learning disabilities Son         Had Adhd   • Breast cancer Cousin    • Ovarian cancer Neg Hx    • Endometrial cancer Neg Hx    • Uterine cancer Neg Hx    • Colon cancer Neg Hx        Vitals:    20 1507   Weight: 98.9 kg (218 lb)   Height: 167.6 cm (66\")       PHYSICAL EXAMINATION:    Diagnostic Data:  Procedures  Lab Results   Component Value Date    TRIG 95 2019    HDL 43 2019     Lab Results   Component Value Date    GLUCOSE 120 (H) 2019    BUN 19 2019    CREATININE 0.57 2019     2019    K 4.5 2019     2019    CO2 26.3 2019     Lab Results   Component Value Date    HGBA1C 6.10 (H) 2019     Lab Results   Component Value Date    WBC 6.11 2019    HGB 13.9 2019    HCT 45.6 2019     2019       ASSESSMENT:   Diagnosis Plan   1. Essential hypertension     2. Mixed hyperlipidemia     3. Type 2 diabetes mellitus without complication, without long-term current use of insulin (CMS/[facility])         PLAN:  1. HTN well controlled, continue antihypertensives  2. Lipids slightly elevated on fenofibrate  3. DM: pt counseled on the importance of tight glycemic control to prevent complications of diabetes on multiple body symptoms. Pt counseled that cardiac risk goes up with a1c above 7. Pt counseled to " avoid high carb foods such as bread, pasta, potatoes, sweets.   4. Carotid duplex was WNL.  Continue to monitor.  5.  Follow-up echocardiogram to be performed in 2-week    This patient has consented to a telehealth visit via phone. The visit was scheduled as a telehealth visit to comply with patient safety concerns in accordance with CDC recommendations.  All vitals recorded within this visit are reported by the patient.  I spent  20 minutes in total including but not limited to the 15 minutes spent in direct conversation with this patient.       Will see pt in 1 year.       5/1/2020  15:53    Robert Peralta MD, FACC

## 2020-05-05 RX ORDER — GABAPENTIN 600 MG/1
600 TABLET ORAL 3 TIMES DAILY
Qty: 270 TABLET | Refills: 0 | Status: SHIPPED | OUTPATIENT
Start: 2020-05-05 | End: 2020-09-08

## 2020-05-05 RX ORDER — FENOFIBRATE 145 MG/1
145 TABLET, COATED ORAL DAILY
Qty: 90 TABLET | Refills: 2 | Status: SHIPPED | OUTPATIENT
Start: 2020-05-05 | End: 2020-08-09 | Stop reason: SDUPTHER

## 2020-05-05 RX ORDER — TIZANIDINE 4 MG/1
4 TABLET ORAL NIGHTLY
Qty: 90 TABLET | Refills: 0 | Status: SHIPPED | OUTPATIENT
Start: 2020-05-05 | End: 2020-08-02 | Stop reason: SDUPTHER

## 2020-05-05 RX ORDER — BUSPIRONE HYDROCHLORIDE 15 MG/1
15 TABLET ORAL 3 TIMES DAILY
Qty: 270 TABLET | Refills: 1 | Status: SHIPPED | OUTPATIENT
Start: 2020-05-05 | End: 2020-11-02

## 2020-05-11 ENCOUNTER — HOSPITAL ENCOUNTER (OUTPATIENT)
Dept: CARDIOLOGY | Facility: HOSPITAL | Age: 50
Discharge: HOME | End: 2020-05-11
Admitting: INTERNAL MEDICINE

## 2020-05-11 VITALS — WEIGHT: 218 LBS | BODY MASS INDEX: 35.03 KG/M2 | HEIGHT: 66 IN

## 2020-05-11 DIAGNOSIS — I74.8 THROMBOSIS OF SUBCLAVIAN ARTERY (HCC): ICD-10-CM

## 2020-05-11 DIAGNOSIS — E78.5 HYPERLIPIDEMIA, UNSPECIFIED HYPERLIPIDEMIA TYPE: ICD-10-CM

## 2020-05-11 DIAGNOSIS — R09.89 BRUIT OF LEFT CAROTID ARTERY: ICD-10-CM

## 2020-05-11 PROCEDURE — 93306 TTE W/DOPPLER COMPLETE: CPT

## 2020-05-11 PROCEDURE — 93306 TTE W/DOPPLER COMPLETE: CPT | Performed by: INTERNAL MEDICINE

## 2020-05-11 RX ORDER — ATORVASTATIN CALCIUM 80 MG/1
80 TABLET, FILM COATED ORAL NIGHTLY
Qty: 90 TABLET | Refills: 1 | Status: SHIPPED | OUTPATIENT
Start: 2020-05-11 | End: 2020-08-09 | Stop reason: SDUPTHER

## 2020-05-11 RX ORDER — ATORVASTATIN CALCIUM 80 MG/1
80 TABLET, FILM COATED ORAL NIGHTLY
COMMUNITY
Start: 2020-02-13 | End: 2020-05-11 | Stop reason: SDUPTHER

## 2020-05-12 LAB
BH CV ECHO MEAS - AO MAX PG (FULL): 8.8 MMHG
BH CV ECHO MEAS - AO MAX PG: 11.4 MMHG
BH CV ECHO MEAS - AO MEAN PG (FULL): 4.2 MMHG
BH CV ECHO MEAS - AO MEAN PG: 5.6 MMHG
BH CV ECHO MEAS - AO V2 MAX: 168.9 CM/SEC
BH CV ECHO MEAS - AO V2 MEAN: 110 CM/SEC
BH CV ECHO MEAS - AO V2 VTI: 36 CM
BH CV ECHO MEAS - AVA(I,A): 1.9 CM^2
BH CV ECHO MEAS - AVA(I,D): 1.9 CM^2
BH CV ECHO MEAS - AVA(V,A): 1.8 CM^2
BH CV ECHO MEAS - AVA(V,D): 1.8 CM^2
BH CV ECHO MEAS - BSA(HAYCOCK): 2.2 M^2
BH CV ECHO MEAS - BSA: 2.1 M^2
BH CV ECHO MEAS - BZI_BMI: 35.2 KILOGRAMS/M^2
BH CV ECHO MEAS - BZI_METRIC_HEIGHT: 167.6 CM
BH CV ECHO MEAS - BZI_METRIC_WEIGHT: 98.9 KG
BH CV ECHO MEAS - EDV(CUBED): 105.5 ML
BH CV ECHO MEAS - EDV(MOD-SP4): 115 ML
BH CV ECHO MEAS - EDV(TEICH): 103.7 ML
BH CV ECHO MEAS - EF(CUBED): 59.5 %
BH CV ECHO MEAS - EF(MOD-SP4): 40 %
BH CV ECHO MEAS - EF(TEICH): 51.1 %
BH CV ECHO MEAS - ESV(CUBED): 42.7 ML
BH CV ECHO MEAS - ESV(MOD-SP4): 69 ML
BH CV ECHO MEAS - ESV(TEICH): 50.7 ML
BH CV ECHO MEAS - FS: 26 %
BH CV ECHO MEAS - IVS/LVPW: 0.97
BH CV ECHO MEAS - IVSD: 1.1 CM
BH CV ECHO MEAS - LA DIMENSION: 3.5 CM
BH CV ECHO MEAS - LAD MAJOR: 5 CM
BH CV ECHO MEAS - LAT PEAK E' VEL: 9.2 CM/SEC
BH CV ECHO MEAS - LATERAL E/E' RATIO: 10.3
BH CV ECHO MEAS - LV DIASTOLIC VOL/BSA (35-75): 55.4 ML/M^2
BH CV ECHO MEAS - LV MASS(C)D: 185.8 GRAMS
BH CV ECHO MEAS - LV MASS(C)DI: 89.5 GRAMS/M^2
BH CV ECHO MEAS - LV MAX PG: 2.7 MMHG
BH CV ECHO MEAS - LV MEAN PG: 1.4 MMHG
BH CV ECHO MEAS - LV SYSTOLIC VOL/BSA (12-30): 33.3 ML/M^2
BH CV ECHO MEAS - LV V1 MAX: 81.4 CM/SEC
BH CV ECHO MEAS - LV V1 MEAN: 54.4 CM/SEC
BH CV ECHO MEAS - LV V1 VTI: 18.6 CM
BH CV ECHO MEAS - LVIDD: 4.7 CM
BH CV ECHO MEAS - LVIDS: 3.5 CM
BH CV ECHO MEAS - LVLD AP4: 7.9 CM
BH CV ECHO MEAS - LVLS AP4: 7.3 CM
BH CV ECHO MEAS - LVOT AREA (M): 3.8 CM^2
BH CV ECHO MEAS - LVOT AREA: 3.6 CM^2
BH CV ECHO MEAS - LVOT DIAM: 2.2 CM
BH CV ECHO MEAS - LVPWD: 1.1 CM
BH CV ECHO MEAS - MED PEAK E' VEL: 7 CM/SEC
BH CV ECHO MEAS - MEDIAL E/E' RATIO: 13.5
BH CV ECHO MEAS - MV A MAX VEL: 126.4 CM/SEC
BH CV ECHO MEAS - MV DEC TIME: 0.16 SEC
BH CV ECHO MEAS - MV E MAX VEL: 96.7 CM/SEC
BH CV ECHO MEAS - MV E/A: 0.77
BH CV ECHO MEAS - PA ACC SLOPE: 599 CM/SEC^2
BH CV ECHO MEAS - PA ACC TIME: 0.15 SEC
BH CV ECHO MEAS - PA MAX PG: 4 MMHG
BH CV ECHO MEAS - PA PR(ACCEL): 9.3 MMHG
BH CV ECHO MEAS - PA V2 MAX: 100.4 CM/SEC
BH CV ECHO MEAS - SI(CUBED): 30.3 ML/M^2
BH CV ECHO MEAS - SI(LVOT): 32.5 ML/M^2
BH CV ECHO MEAS - SI(MOD-SP4): 22.2 ML/M^2
BH CV ECHO MEAS - SI(TEICH): 25.5 ML/M^2
BH CV ECHO MEAS - SV(CUBED): 62.8 ML
BH CV ECHO MEAS - SV(LVOT): 67.5 ML
BH CV ECHO MEAS - SV(MOD-SP4): 46 ML
BH CV ECHO MEAS - SV(TEICH): 53 ML
BH CV ECHO MEASUREMENTS AVERAGE E/E' RATIO: 11.94
BH CV VAS BP RIGHT ARM: NORMAL MMHG
LEFT ATRIUM VOLUME INDEX: 24.1 ML/M^2
LEFT ATRIUM VOLUME: 50 ML
LV EF 2D ECHO EST: 50 %
MAXIMAL PREDICTED HEART RATE: 170 BPM
STRESS TARGET HR: 145 BPM

## 2020-05-13 ENCOUNTER — TELEPHONE (OUTPATIENT)
Dept: CARDIOLOGY | Facility: CLINIC | Age: 50
End: 2020-05-13

## 2020-05-13 NOTE — TELEPHONE ENCOUNTER
Attempted to contact patient about most recent echo results. Per  EF is 50% and we want the patient to be above 55. We want the patient to exercise and monitor BP's.

## 2020-05-14 ENCOUNTER — TELEPHONE (OUTPATIENT)
Dept: CARDIOLOGY | Facility: CLINIC | Age: 50
End: 2020-05-14

## 2020-05-14 NOTE — TELEPHONE ENCOUNTER
LVM regarding most recent results of echo per . Pt EF is 50% and we want it to be above 55%. Pt needs to stay on top of monitoring BP and increase exercise endurance.

## 2020-05-18 RX ORDER — AMLODIPINE BESYLATE 2.5 MG/1
2.5 TABLET ORAL DAILY
Qty: 90 TABLET | Refills: 1 | Status: SHIPPED | OUTPATIENT
Start: 2020-05-18 | End: 2020-11-24

## 2020-05-18 RX ORDER — CLOPIDOGREL BISULFATE 75 MG/1
75 TABLET ORAL DAILY
Qty: 90 TABLET | Refills: 2 | Status: SHIPPED | OUTPATIENT
Start: 2020-05-18 | End: 2021-03-03

## 2020-05-18 RX ORDER — POTASSIUM CHLORIDE 20 MEQ/1
20 TABLET, EXTENDED RELEASE ORAL DAILY
Qty: 90 TABLET | Refills: 2 | Status: SHIPPED | OUTPATIENT
Start: 2020-05-18 | End: 2022-02-05 | Stop reason: HOSPADM

## 2020-05-21 DIAGNOSIS — G71.11 MYOTONIC DYSTROPHY, TYPE 2 (HCC): ICD-10-CM

## 2020-05-21 DIAGNOSIS — G47.19 DAYTIME HYPERSOMNOLENCE: ICD-10-CM

## 2020-05-21 RX ORDER — MODAFINIL 100 MG/1
100 TABLET ORAL DAILY
Qty: 30 TABLET | Refills: 0 | Status: SHIPPED | OUTPATIENT
Start: 2020-05-21 | End: 2020-06-16 | Stop reason: SDUPTHER

## 2020-05-21 NOTE — TELEPHONE ENCOUNTER
PT WAS ORIGINALLY SEEN BY DR. BLANC ON 2/18/20 AND HAD A FOLLOW UP SCHEDULED IN MAY TO DISCUSS THE NEW MEDICATION. SHE WAS NEVER ABLE TO  THE MEDICATION BECAUSE SHE STATED AT THE TIME, HER INSURANCE WOULDN'T COVER IT. SHE IS REQUESTING IT BE SENT BACK OVER TO THE [facility] ON FILE IN [place] AND SCHEDULED A FOLLOW UP TO DISCUSS HOW THE MEDICATION IS WORKING FOR HER. SHE IS REQUESTING IT BE SENT IN ASAP

## 2020-06-01 DIAGNOSIS — G71.11 MYOTONIC DYSTROPHY, TYPE 2 (HCC): ICD-10-CM

## 2020-06-02 RX ORDER — HYDROCODONE BITARTRATE AND ACETAMINOPHEN 10; 325 MG/1; MG/1
1 TABLET ORAL EVERY 6 HOURS PRN
Qty: 120 TABLET | Refills: 0 | Status: SHIPPED | OUTPATIENT
Start: 2020-06-02 | End: 2020-07-01 | Stop reason: SDUPTHER

## 2020-06-03 DIAGNOSIS — M25.562 CHRONIC PAIN OF LEFT KNEE: ICD-10-CM

## 2020-06-03 DIAGNOSIS — Z96.652 CHRONIC KNEE PAIN AFTER TOTAL REPLACEMENT OF LEFT KNEE JOINT: Primary | ICD-10-CM

## 2020-06-03 DIAGNOSIS — G89.29 CHRONIC PAIN OF LEFT KNEE: ICD-10-CM

## 2020-06-03 DIAGNOSIS — G89.29 CHRONIC KNEE PAIN AFTER TOTAL REPLACEMENT OF LEFT KNEE JOINT: Primary | ICD-10-CM

## 2020-06-03 DIAGNOSIS — M25.562 CHRONIC KNEE PAIN AFTER TOTAL REPLACEMENT OF LEFT KNEE JOINT: Primary | ICD-10-CM

## 2020-06-16 DIAGNOSIS — G47.19 DAYTIME HYPERSOMNOLENCE: ICD-10-CM

## 2020-06-16 DIAGNOSIS — G71.11 MYOTONIC DYSTROPHY, TYPE 2 (HCC): ICD-10-CM

## 2020-06-16 RX ORDER — MODAFINIL 100 MG/1
100 TABLET ORAL DAILY
Qty: 30 TABLET | Refills: 0 | Status: SHIPPED | OUTPATIENT
Start: 2020-06-16 | End: 2020-06-17 | Stop reason: SDUPTHER

## 2020-06-17 ENCOUNTER — TELEPHONE (OUTPATIENT)
Dept: NEUROLOGY | Facility: CLINIC | Age: 50
End: 2020-06-17

## 2020-06-17 DIAGNOSIS — G47.19 DAYTIME HYPERSOMNOLENCE: ICD-10-CM

## 2020-06-17 DIAGNOSIS — G71.11 MYOTONIC DYSTROPHY, TYPE 2 (HCC): ICD-10-CM

## 2020-06-17 RX ORDER — MODAFINIL 100 MG/1
100 TABLET ORAL DAILY
Qty: 30 TABLET | Refills: 0 | Status: SHIPPED | OUTPATIENT
Start: 2020-06-17 | End: 2020-07-20

## 2020-06-17 NOTE — TELEPHONE ENCOUNTER
I have sent treatment but somehow it went to the printer.  Can you please check and fax it to the pharmacy?  Thanks

## 2020-06-17 NOTE — TELEPHONE ENCOUNTER
----- Message from Hafsa Barron sent at 6/16/2020 10:51 AM EDT -----  Regarding: Prescription Question  Contact: [phone]  I need a refill for my modafinil 100 mg tablet that Dr Lambert has me on. I am due to run out this week please.

## 2020-06-19 DIAGNOSIS — G71.11 MYOTONIC DYSTROPHY, TYPE 2 (HCC): ICD-10-CM

## 2020-06-19 DIAGNOSIS — G47.19 DAYTIME HYPERSOMNOLENCE: ICD-10-CM

## 2020-06-19 RX ORDER — MODAFINIL 100 MG/1
100 TABLET ORAL DAILY
Qty: 30 TABLET | Refills: 0 | Status: CANCELLED | OUTPATIENT
Start: 2020-06-19

## 2020-06-19 NOTE — TELEPHONE ENCOUNTER
Looks like  attempted to fill this 2 days ago, but it printed.   Called in a 30 day supply to her pharmacy. Thanks!

## 2020-07-01 DIAGNOSIS — G71.11 MYOTONIC DYSTROPHY, TYPE 2 (HCC): ICD-10-CM

## 2020-07-01 RX ORDER — HYDROCODONE BITARTRATE AND ACETAMINOPHEN 10; 325 MG/1; MG/1
1 TABLET ORAL EVERY 6 HOURS PRN
Qty: 120 TABLET | Refills: 0 | Status: SHIPPED | OUTPATIENT
Start: 2020-07-01 | End: 2020-07-30 | Stop reason: SDUPTHER

## 2020-07-12 DIAGNOSIS — G71.11 MYOTONIC DYSTROPHY, TYPE 2 (HCC): ICD-10-CM

## 2020-07-13 RX ORDER — DICLOFENAC SODIUM 75 MG/1
75 TABLET, DELAYED RELEASE ORAL 2 TIMES DAILY
Qty: 180 TABLET | Refills: 1 | Status: SHIPPED | OUTPATIENT
Start: 2020-07-13 | End: 2021-02-01 | Stop reason: SDUPTHER

## 2020-07-13 RX ORDER — BACLOFEN 10 MG/1
10 TABLET ORAL 3 TIMES DAILY
Qty: 270 TABLET | Refills: 1 | Status: SHIPPED | OUTPATIENT
Start: 2020-07-13 | End: 2021-04-02 | Stop reason: SDUPTHER

## 2020-07-15 ENCOUNTER — TELEMEDICINE (OUTPATIENT)
Dept: NEUROLOGY | Facility: CLINIC | Age: 50
End: 2020-07-15

## 2020-07-15 DIAGNOSIS — G47.19 DAYTIME HYPERSOMNOLENCE: ICD-10-CM

## 2020-07-15 DIAGNOSIS — G71.11 MYOTONIC DYSTROPHY, TYPE 2 (HCC): Primary | ICD-10-CM

## 2020-07-15 PROCEDURE — 99214 OFFICE O/P EST MOD 30 MIN: CPT | Performed by: PSYCHIATRY & NEUROLOGY

## 2020-07-15 NOTE — PROGRESS NOTES
Subjective:    CC: Hafsa Barron is followed for myotonic dystrophy and daytime hypersomnolence.    HPI:  Initial visit: 2/18/2020: Patient is a 49-year-old female with known history of type II myotonic dystrophy referred to clinic to establish care.  She reports that she started having symptoms back in 2003 soon after delivery.  At that time she noted some leg weakness which progressively became worse and in 2009, she was seen by [facility] neuromuscular department and underwent detailed neurological examination, which was suggestive of proximal muscle weakness and possibility of myopathy.  Following this, EMG was performed which showed diffuse myotonic discharges and eventually genetic testing was done which confirmed the diagnosis of type II myotonic dystrophy.  She has followed with  neuroscience since year 2009 and was followed initially by Dr. Marina and then Dr. Lynn.  Since her diagnosis in 2009, she reports that there is been slight worsening in bilateral proximal muscle weakness in foot and lower extremities.  In year 2013, she reports that she had a stroke which affected her left upper and lower extremity strength.  Currently she was uses cane for shorter distances and electrical scooter for longer distances.  She is established with ophthalmology and has regular eye examination as well as is established with cardiology to monitor her heart health.  She does have history of obstructive sleep apnea and uses CPAP machine regularly.  Initially, she reports that it had helped her significantly but now she reports that even after using it regularly, she wakes up tired and reports extreme daytime somnolence, tiredness and fatigue.  She was prescribed Nuvigil by her sleep physician which initially helped and then it stopped working.  She has never tried Provigil for daytime somnolence.  She denies any problems with vision.  Currently she takes baclofen 10 mg 3 times a day, tizanidine and cyclobenzaprine only at  night.  She also takes gabapentin for paresthesias and muscle spasms.  She does report poor sleep quality.  She is on BuSpar 15 mg 3 times a day and venlafaxine 37.5 mg daily for mood    7/15/2020: This is a follow-up done through video.  Since her last visit, she reports that bilateral proximal muscle weakness remains stable without any worsening.  She has been taking Nuvigil 100 mg daily now for last 2 months and she reports that it has helped her tremendously in keeping daytime hypersomnolence, fatigue and tiredness under good control.  She is physically more active now.    The following portions of the patient's history were reviewed and updated as of 07/15/2020: allergies, social history and problem list.       Current Outpatient Medications:   •  albuterol (PROAIR HFA) 108 (90 BASE) MCG/ACT inhaler, Inhale 1 puff Every 6 (Six) Hours As Needed for Wheezing., Disp: 90 inhaler, Rfl: 2  •  albuterol (PROVENTIL) (2.5 MG/3ML) 0.083% nebulizer solution, Take 2.5 mg by nebulization Every 4 (Four) Hours As Needed for Wheezing., Disp: 90 vial, Rfl: 1  •  amLODIPine (NORVASC) 2.5 MG tablet, Take 1 tablet by mouth Daily., Disp: 90 tablet, Rfl: 1  •  aspirin 81 MG EC tablet, Take 81 mg by mouth Daily., Disp: , Rfl:   •  atorvastatin (LIPITOR) 80 MG tablet, Take 1 tablet by mouth Every Night., Disp: 90 tablet, Rfl: 1  •  baclofen (LIORESAL) 10 MG tablet, Take 1 tablet by mouth 3 (Three) Times a Day., Disp: 270 tablet, Rfl: 1  •  Blood Glucose Monitoring Suppl (ONE TOUCH ULTRA 2) w/Device kit, , Disp: , Rfl:   •  brompheniramine-pseudoephedrine-DM 30-2-10 MG/5ML syrup, Take 2.5 mL by mouth 4 (Four) Times a Day As Needed for Allergies., Disp: 118 mL, Rfl: 0  •  busPIRone (BUSPAR) 15 MG tablet, Take 1 tablet by mouth 3 (Three) Times a Day., Disp: 270 tablet, Rfl: 1  •  calcium carbonate-vitamin d (CALCIUM 600+D HIGH POTENCY) 600-400 MG-UNIT per tablet, Take 1 tablet by mouth Daily., Disp: 90 tablet, Rfl: 2  •  clopidogrel  (PLAVIX) 75 MG tablet, Take 1 tablet by mouth Daily., Disp: 90 tablet, Rfl: 2  •  cyclobenzaprine (FLEXERIL) 10 MG tablet, Take 1 tablet by mouth 3 (Three) Times a Day As Needed for Muscle Spasms., Disp: 270 tablet, Rfl: 2  •  diclofenac (VOLTAREN) 75 MG EC tablet, Take 1 tablet by mouth 2 (Two) Times a Day., Disp: 180 tablet, Rfl: 1  •  fenofibrate (TRICOR) 145 MG tablet, Take 1 tablet by mouth Daily., Disp: 90 tablet, Rfl: 2  •  fluticasone (FLONASE ALLERGY RELIEF) 50 MCG/ACT nasal spray, into each nostril., Disp: , Rfl:   •  gabapentin (NEURONTIN) 600 MG tablet, Take 1 tablet by mouth 3 (Three) Times a Day., Disp: 270 tablet, Rfl: 0  •  HYDROcodone-acetaminophen (NORCO)  MG per tablet, Take 1 tablet by mouth Every 6 (Six) Hours As Needed for Moderate Pain ., Disp: 120 tablet, Rfl: 0  •  loratadine (CLARITIN) 10 MG tablet, Take 1 tablet by mouth Daily., Disp: 30 tablet, Rfl: 11  •  modafinil (PROVIGIL) 100 MG tablet, Take 1 tablet by mouth Daily., Disp: 30 tablet, Rfl: 0  •  montelukast (SINGULAIR) 10 MG tablet, Take 1 tablet by mouth Every Evening., Disp: 90 tablet, Rfl: 1  •  nitrofurantoin, macrocrystal-monohydrate, (MACROBID) 100 MG capsule, Take 1 capsule by mouth 2 (Two) Times a Day., Disp: 10 capsule, Rfl: 0  •  ondansetron (ZOFRAN) 8 MG tablet, , Disp: , Rfl: 1  •  ONE TOUCH ULTRA TEST test strip, , Disp: , Rfl:   •  pantoprazole (PROTONIX) 40 MG EC tablet, Take 1 tablet by mouth Daily., Disp: 90 tablet, Rfl: 1  •  potassium chloride (KLOR-CON) 20 MEQ CR tablet, Take 1 tablet by mouth Daily., Disp: 90 tablet, Rfl: 2  •  spironolactone (ALDACTONE) 50 MG tablet, Take 1 tablet by mouth Daily., Disp: 90 tablet, Rfl: 1  •  tiZANidine (ZANAFLEX) 4 MG tablet, Take 1 tablet by mouth Every Night., Disp: 90 tablet, Rfl: 0  •  venlafaxine XR (EFFEXOR-XR) 37.5 MG 24 hr capsule, Take 1 capsule by mouth Daily., Disp: 90 capsule, Rfl: 1   Past Medical History:   Diagnosis Date   • Allergic    • Allergy    • Anxiety     • Asthma Allergies induced   • Depression    • Fibromyalgia, primary    • GERD (gastroesophageal reflux disease)    • Headache    • History of blood clots    • Hyperlipidemia    • Hypertension    • IBS (irritable bowel syndrome)    • Internal hemorrhoid    • Kidney stone    • Low back pain    • Muscular dystrophy (CMS/HCC)    • Muscular dystrophy (CMS/HCC)     II   • Myotonia    • Obesity    • Stroke (CMS/HCC) 2013    resdual- left sided weakness.    • Type 2 diabetes mellitus without complication, without long-term current use of insulin (CMS/[facility])       Past Surgical History:   Procedure Laterality Date   • CHOLECYSTECTOMY  2004   • COLONOSCOPY  2017   • D&C WITH SUCTION     • LYMPH NODE BIOPSY     • SUBCLAVIAN ARTERY STENT  08/2018    x2      Family History   Problem Relation Age of Onset   • Allergies Other    • ADD / ADHD Other    • Heart block Other    • Other Other         CEREBROVASCULAR ACCIDENT    • Hypertension Other    • Cancer Other         LUNG CANCER   • Hyperlipidemia Other    • Alcohol abuse Mother    • Depression Mother    • Early death Mother          age 59   • Heart disease Mother         Mother  of Massive Heart attack   • Hyperlipidemia Mother    • Hypertension Mother    • Miscarriages / Stillbirths Mother         Miscarriage   • Heart attack Mother          of  massive heart attack   • Alcohol abuse Father    • Cancer Father         Had Lung Cancer  had 1 lung till death   • Diabetes Father         Lived on insuline shots   • Early death Father          age 53   • Breast cancer Maternal Aunt    • Learning disabilities Son         Had Adhd   • Breast cancer Cousin    • Ovarian cancer Neg Hx    • Endometrial cancer Neg Hx    • Uterine cancer Neg Hx    • Colon cancer Neg Hx         Review of Systems  Objective:    LMP  (LMP Unknown)     Neurology Exam:  General apperance: NAD.     Mental status: Alert, awake and oriented to time place and person.    Recent  and Remote memory: Can recall 3/3 objects at 5 minutes. Can recall historical events.     Attention span and Concentration: Serial 7s: Normal.     Fund of knowledge:  Normal.     Language and Speech: No aphasia or dysarthria.    Naming , Repitition and Comprehension:  Can name objects, repeat a sentence and follow commands. Speech is clear and fluent with good repetition, comprehension, and naming.    Cranial Nerves:   Cranial nerves II to XII grossly intact.    Motor:  Moves all 4 extremities spontaneously and denies any weakness.    Sensory: Unable to assess.    DTRs: Unable to assess.    Babinski: Unable to assess.    Co-ordination: Normal finger-to-nose.    Rhomberg: Negative.    Gait: Normal.    Cardiovascular: Unable to assess.    Ophthalmoscopic exam: Unable to assess.      Assessment and Plan:  1. Myotonic dystrophy, type 2 (CMS/HCC)  2. Daytime hypersomnolence  -Bilateral proximal muscle weakness remains stable without any worsening.  She has responded well to Nuvigil 100 mg daily dose and it has helped her significantly with fatigue, daytime hypersomnolence.  Continue with the same dose for now and I will see her back in 6 months for follow-up.       Unable to complete visit using a video connection to the patient. A phone visit was used to complete this visits. Total time of discussion was 30 minutes.    Return in about 6 months (around 1/15/2021).

## 2020-07-18 DIAGNOSIS — G47.19 DAYTIME HYPERSOMNOLENCE: ICD-10-CM

## 2020-07-18 DIAGNOSIS — G71.11 MYOTONIC DYSTROPHY, TYPE 2 (HCC): ICD-10-CM

## 2020-07-19 DIAGNOSIS — G47.19 DAYTIME HYPERSOMNOLENCE: ICD-10-CM

## 2020-07-19 DIAGNOSIS — G71.11 MYOTONIC DYSTROPHY, TYPE 2 (HCC): ICD-10-CM

## 2020-07-20 RX ORDER — MODAFINIL 100 MG/1
TABLET ORAL
Qty: 30 TABLET | Refills: 0 | Status: SHIPPED | OUTPATIENT
Start: 2020-07-20 | End: 2020-07-21 | Stop reason: SDUPTHER

## 2020-07-20 NOTE — TELEPHONE ENCOUNTER
Do you mind to refill patients Provigil 100mg due to insurance issues?  Thank you,   Nelson is attached.

## 2020-07-21 DIAGNOSIS — G71.11 MYOTONIC DYSTROPHY, TYPE 2 (HCC): ICD-10-CM

## 2020-07-21 DIAGNOSIS — G47.19 DAYTIME HYPERSOMNOLENCE: ICD-10-CM

## 2020-07-21 RX ORDER — MODAFINIL 100 MG/1
100 TABLET ORAL DAILY
Qty: 30 TABLET | Refills: 0 | Status: SHIPPED | OUTPATIENT
Start: 2020-07-21 | End: 2020-08-20 | Stop reason: SDUPTHER

## 2020-07-22 RX ORDER — MODAFINIL 100 MG/1
100 TABLET ORAL DAILY
Qty: 30 TABLET | Refills: 0 | OUTPATIENT
Start: 2020-07-22

## 2020-07-30 DIAGNOSIS — G71.11 MYOTONIC DYSTROPHY, TYPE 2 (HCC): ICD-10-CM

## 2020-07-31 RX ORDER — HYDROCODONE BITARTRATE AND ACETAMINOPHEN 10; 325 MG/1; MG/1
1 TABLET ORAL EVERY 6 HOURS PRN
Qty: 120 TABLET | Refills: 0 | Status: SHIPPED | OUTPATIENT
Start: 2020-07-31 | End: 2020-08-24 | Stop reason: SDUPTHER

## 2020-08-03 RX ORDER — TIZANIDINE 4 MG/1
4 TABLET ORAL NIGHTLY
Qty: 90 TABLET | Refills: 0 | Status: SHIPPED | OUTPATIENT
Start: 2020-08-03 | End: 2020-11-02

## 2020-08-07 ENCOUNTER — PATIENT MESSAGE (OUTPATIENT)
Dept: INTERNAL MEDICINE | Facility: CLINIC | Age: 50
End: 2020-08-07

## 2020-08-07 ENCOUNTER — TELEPHONE (OUTPATIENT)
Dept: INTERNAL MEDICINE | Facility: CLINIC | Age: 50
End: 2020-08-07

## 2020-08-07 NOTE — TELEPHONE ENCOUNTER
Communicating with patient through [employer]. Advised orders have been faxed to [facility] for a replacement battery.

## 2020-08-07 NOTE — TELEPHONE ENCOUNTER
Patient requested a call back from Pilar. Patient stated she needs a new battery for her power scooter. Patient was informed that a written order needs to be placed for this with [facility] in [place]. Patient would like to know if this could be taken care of today, 8/7/20, so that she can get this as soon as possible as her battery is completely dead.    [facility] fax number [phone]    Please call and advise. Patient call back [phone]

## 2020-08-10 RX ORDER — FENOFIBRATE 145 MG/1
145 TABLET, COATED ORAL DAILY
Qty: 90 TABLET | Refills: 2 | Status: SHIPPED | OUTPATIENT
Start: 2020-08-10 | End: 2021-06-04

## 2020-08-10 RX ORDER — ATORVASTATIN CALCIUM 80 MG/1
80 TABLET, FILM COATED ORAL NIGHTLY
Qty: 90 TABLET | Refills: 1 | Status: SHIPPED | OUTPATIENT
Start: 2020-08-10 | End: 2020-11-15 | Stop reason: SDUPTHER

## 2020-08-20 DIAGNOSIS — G71.11 MYOTONIC DYSTROPHY, TYPE 2 (HCC): ICD-10-CM

## 2020-08-20 DIAGNOSIS — G47.19 DAYTIME HYPERSOMNOLENCE: ICD-10-CM

## 2020-08-20 RX ORDER — MODAFINIL 100 MG/1
100 TABLET ORAL DAILY
Qty: 30 TABLET | Refills: 2 | Status: SHIPPED | OUTPATIENT
Start: 2020-08-20 | End: 2020-08-24 | Stop reason: SDUPTHER

## 2020-08-24 ENCOUNTER — TELEPHONE (OUTPATIENT)
Dept: NEUROLOGY | Facility: CLINIC | Age: 50
End: 2020-08-24

## 2020-08-24 DIAGNOSIS — G71.11 MYOTONIC DYSTROPHY, TYPE 2 (HCC): ICD-10-CM

## 2020-08-24 DIAGNOSIS — G47.19 DAYTIME HYPERSOMNOLENCE: ICD-10-CM

## 2020-08-24 RX ORDER — MODAFINIL 200 MG/1
200 TABLET ORAL DAILY
Qty: 30 TABLET | Refills: 3 | Status: SHIPPED | OUTPATIENT
Start: 2020-08-24 | End: 2020-12-24

## 2020-08-24 RX ORDER — HYDROCODONE BITARTRATE AND ACETAMINOPHEN 10; 325 MG/1; MG/1
1 TABLET ORAL EVERY 6 HOURS PRN
Qty: 120 TABLET | Refills: 0 | Status: SHIPPED | OUTPATIENT
Start: 2020-08-24 | End: 2020-09-29 | Stop reason: SDUPTHER

## 2020-08-24 NOTE — TELEPHONE ENCOUNTER
----- Message from Hafsa Barron sent at 8/23/2020 10:14 PM EDT -----  Regarding: Prescription Question  Contact: [phone]  Hi,    Dr Lambert has me on Modafinil 100 Mg.  I know in the past when I've been on other medications many times after being on them for some time they stop having their great effectiveness. I'm noticing that now after 5-6 months in it I am noticing that I'm starting to sleep longer and longer as I did before I was on it. I'm hoping it's just a matter of the dose being increased so that I can start having the good results with it again. If you could please talk to the doctor and let me know ASAP if he can increase it as I am due to get it refilled in the next few days.    Thank you,  Hafsa Barron

## 2020-08-25 ENCOUNTER — OFFICE VISIT (OUTPATIENT)
Dept: INTERNAL MEDICINE | Facility: CLINIC | Age: 50
End: 2020-08-25

## 2020-08-25 ENCOUNTER — LAB (OUTPATIENT)
Dept: LAB | Facility: HOSPITAL | Age: 50
End: 2020-08-25

## 2020-08-25 VITALS
DIASTOLIC BLOOD PRESSURE: 76 MMHG | WEIGHT: 216 LBS | HEART RATE: 92 BPM | BODY MASS INDEX: 34.72 KG/M2 | OXYGEN SATURATION: 98 % | SYSTOLIC BLOOD PRESSURE: 126 MMHG | RESPIRATION RATE: 16 BRPM | HEIGHT: 66 IN | TEMPERATURE: 98.4 F

## 2020-08-25 DIAGNOSIS — E78.5 HYPERLIPIDEMIA, UNSPECIFIED HYPERLIPIDEMIA TYPE: ICD-10-CM

## 2020-08-25 DIAGNOSIS — Z13.21 ENCOUNTER FOR VITAMIN DEFICIENCY SCREENING: ICD-10-CM

## 2020-08-25 DIAGNOSIS — I10 ESSENTIAL HYPERTENSION: ICD-10-CM

## 2020-08-25 DIAGNOSIS — Z13.0 SCREENING FOR BLOOD DISEASE: ICD-10-CM

## 2020-08-25 DIAGNOSIS — Z13.29 THYROID DISORDER SCREENING: ICD-10-CM

## 2020-08-25 DIAGNOSIS — E11.9 TYPE 2 DIABETES MELLITUS WITHOUT COMPLICATION, WITHOUT LONG-TERM CURRENT USE OF INSULIN (HCC): ICD-10-CM

## 2020-08-25 DIAGNOSIS — Z11.59 SCREENING FOR VIRAL DISEASE: ICD-10-CM

## 2020-08-25 DIAGNOSIS — E55.9 VITAMIN D DEFICIENCY: ICD-10-CM

## 2020-08-25 DIAGNOSIS — Z12.39 SCREENING FOR BREAST CANCER: ICD-10-CM

## 2020-08-25 DIAGNOSIS — Z11.59 NEED FOR HEPATITIS C SCREENING TEST: ICD-10-CM

## 2020-08-25 DIAGNOSIS — Z23 NEED FOR SHINGLES VACCINE: ICD-10-CM

## 2020-08-25 DIAGNOSIS — Z00.00 WELLNESS EXAMINATION: Primary | ICD-10-CM

## 2020-08-25 DIAGNOSIS — Z12.31 ENCOUNTER FOR SCREENING MAMMOGRAM FOR MALIGNANT NEOPLASM OF BREAST: ICD-10-CM

## 2020-08-25 DIAGNOSIS — Z23 NEED FOR TDAP VACCINATION: ICD-10-CM

## 2020-08-25 PROCEDURE — 90471 IMMUNIZATION ADMIN: CPT | Performed by: NURSE PRACTITIONER

## 2020-08-25 PROCEDURE — 90715 TDAP VACCINE 7 YRS/> IM: CPT | Performed by: NURSE PRACTITIONER

## 2020-08-25 PROCEDURE — G0402 INITIAL PREVENTIVE EXAM: HCPCS | Performed by: NURSE PRACTITIONER

## 2020-08-25 RX ORDER — POTASSIUM CHLORIDE 1500 MG/1
20 TABLET, FILM COATED, EXTENDED RELEASE ORAL DAILY
COMMUNITY
Start: 2020-05-19 | End: 2021-02-17

## 2020-08-25 NOTE — PROGRESS NOTES
Subjective   Chief Complaint   Patient presents with   • Annual Exam       Hafsa Barron is a 49 y.o. female here today for annual exam.    Overall healthy: {YES NO:64597}  Regular exercise:  {YES NO:70845}  Diet is well balanced:  {YES NO:84150}  Vitamin Supplement:  {YES NO:63669}  Alcohol intake:  {YES NO:34306}   Tobacco use:  {YES NO:96983}    Cardiovascular risk is low:  {YES NO:26784}   Menstrual cycle regular:  {YES NO:16433}  PAP:  {YES NO:53318}  Concern for STDs:  {YES NO:92890}  Mammogram:  {YES NO:00584}  Last colon screening:  {YES NO:27266}  Regular dental exam:  {YES NO:05462}   Regular eye exam:  {YES NO:58404}  Immunizations up to date:  {YES NO:06493}  Wear a seatbelt regularly:  {YES NO:04691}  Wear sunscreen regularly when outdoors:  {YES NO:21051}    Review of Systems   Constitutional: Negative for activity change, appetite change, chills, diaphoresis, fatigue, fever, unexpected weight gain and unexpected weight loss.   HENT: Negative for ear discharge, ear pain, mouth sores, nosebleeds, sinus pressure, sneezing and sore throat.    Eyes: Negative for pain, discharge and itching.   Respiratory: Negative for cough, chest tightness, shortness of breath and wheezing.    Cardiovascular: Negative for chest pain, palpitations and leg swelling.   Gastrointestinal: Negative for abdominal pain, constipation, diarrhea, nausea and vomiting.   Endocrine: Negative for heat intolerance, polydipsia and polyphagia.   Genitourinary: Negative for dysuria, flank pain, frequency, hematuria and urgency.   Musculoskeletal: Negative for arthralgias, back pain, gait problem, joint swelling, myalgias, neck pain and neck stiffness.   Skin: Negative for color change, pallor and rash.   Allergic/Immunologic: Negative for immunocompromised state.   Neurological: Negative for seizures, speech difficulty, weakness and numbness.   Hematological: Negative for adenopathy.   Psychiatric/Behavioral: Negative for agitation,  decreased concentration, sleep disturbance, suicidal ideas and depressed mood. The patient is not nervous/anxious.        Past Medical History:   Diagnosis Date   • Allergic    • Allergy    • Anxiety    • Asthma Allergies induced   • Depression    • Fibromyalgia, primary    • GERD (gastroesophageal reflux disease)    • Headache    • History of blood clots    • Hyperlipidemia    • Hypertension    • IBS (irritable bowel syndrome)    • Internal hemorrhoid    • Kidney stone    • Low back pain    • Muscular dystrophy (CMS/[facility])    • Muscular dystrophy (CMS/[facility])     II   • Myotonia    • Obesity    • Stroke (CMS/[facility]) 2013    resdual- left sided weakness.    • Type 2 diabetes mellitus without complication, without long-term current use of insulin (CMS/[facility])      Past Surgical History:   Procedure Laterality Date   • CHOLECYSTECTOMY  2004   • COLONOSCOPY  2017   • D&C WITH SUCTION     • LYMPH NODE BIOPSY     • SUBCLAVIAN ARTERY STENT  08/2018    x2     Family History   Problem Relation Age of Onset   • Allergies Other    • ADD / ADHD Other    • Heart block Other    • Other Other         CEREBROVASCULAR ACCIDENT    • Hypertension Other    • Cancer Other         LUNG CANCER   • Hyperlipidemia Other    • Alcohol abuse Mother    • Depression Mother    • Early death Mother          age 59   • Heart disease Mother         Mother  of Massive Heart attack   • Hyperlipidemia Mother    • Hypertension Mother    • Miscarriages / Stillbirths Mother         Miscarriage   • Heart attack Mother          of  massive heart attack   • Alcohol abuse Father    • Cancer Father         Had Lung Cancer - had 1 lung till death   • Diabetes Father         Lived on insuline shots   • Early death Father          age 53   • Breast cancer Maternal Aunt    • Learning disabilities Son         Had Adhd   • Breast cancer Cousin    • Ovarian cancer Neg Hx    • Endometrial cancer Neg Hx    • Uterine cancer Neg Hx    • Colon  cancer Neg Hx      Social History     Tobacco Use   Smoking Status Former Smoker   • Packs/day: 1.50   • Years: 15.00   • Pack years: 22.50   • Start date: 1983   • Last attempt to quit: 2013   • Years since quittin.9   Smokeless Tobacco Never Used   Tobacco Comment    use [employer] now NO NICOTENE      Social History     Substance and Sexual Activity   Alcohol Use Yes   • Frequency: Monthly or less      Allergies   Allergen Reactions   • Penicillins Anaphylaxis and Shortness Of Breath       Current Outpatient Medications on File Prior to Visit   Medication Sig   • albuterol (PROAIR HFA) 108 (90 BASE) MCG/ACT inhaler Inhale 1 puff Every 6 (Six) Hours As Needed for Wheezing.   • albuterol (PROVENTIL) (2.5 MG/3ML) 0.083% nebulizer solution Take 2.5 mg by nebulization Every 4 (Four) Hours As Needed for Wheezing.   • amLODIPine (NORVASC) 2.5 MG tablet Take 1 tablet by mouth Daily.   • aspirin 81 MG EC tablet Take 81 mg by mouth Daily.   • atorvastatin (LIPITOR) 80 MG tablet Take 1 tablet by mouth Every Night.   • baclofen (LIORESAL) 10 MG tablet Take 1 tablet by mouth 3 (Three) Times a Day.   • brompheniramine-pseudoephedrine-DM 30-2-10 MG/5ML syrup Take 2.5 mL by mouth 4 (Four) Times a Day As Needed for Allergies.   • busPIRone (BUSPAR) 15 MG tablet Take 1 tablet by mouth 3 (Three) Times a Day.   • calcium carbonate-vitamin d (CALCIUM 600+D HIGH POTENCY) 600-400 MG-UNIT per tablet Take 1 tablet by mouth Daily.   • clopidogrel (PLAVIX) 75 MG tablet Take 1 tablet by mouth Daily.   • cyclobenzaprine (FLEXERIL) 10 MG tablet Take 1 tablet by mouth 3 (Three) Times a Day As Needed for Muscle Spasms.   • diclofenac (VOLTAREN) 75 MG EC tablet Take 1 tablet by mouth 2 (Two) Times a Day.   • fenofibrate (TRICOR) 145 MG tablet Take 1 tablet by mouth Daily.   • fluticasone (FLONASE ALLERGY RELIEF) 50 MCG/ACT nasal spray into each nostril.   • gabapentin (NEURONTIN) 600 MG tablet Take 1 tablet by mouth 3 (Three) Times a  "Day.   • HYDROcodone-acetaminophen (NORCO)  MG per tablet Take 1 tablet by mouth Every 6 (Six) Hours As Needed for Moderate Pain .   • loratadine (CLARITIN) 10 MG tablet Take 1 tablet by mouth Daily.   • modafinil (PROVIGIL) 200 MG tablet Take 1 tablet by mouth Daily.   • montelukast (SINGULAIR) 10 MG tablet Take 1 tablet by mouth Every Evening.   • ondansetron (ZOFRAN) 8 MG tablet    • pantoprazole (PROTONIX) 40 MG EC tablet Take 1 tablet by mouth Daily.   • potassium chloride (KLOR-CON) 20 MEQ CR tablet Take 1 tablet by mouth Daily.   • spironolactone (ALDACTONE) 50 MG tablet Take 1 tablet by mouth Daily.   • tiZANidine (ZANAFLEX) 4 MG tablet Take 1 tablet by mouth Every Night.   • venlafaxine XR (EFFEXOR-XR) 37.5 MG 24 hr capsule Take 1 capsule by mouth Daily.   • Blood Glucose Monitoring Suppl (ONE TOUCH ULTRA 2) w/Device kit    • nitrofurantoin, macrocrystal-monohydrate, (MACROBID) 100 MG capsule Take 1 capsule by mouth 2 (Two) Times a Day. (Patient taking differently: Take  by mouth 2 (Two) Times a Day.)   • ONE TOUCH ULTRA TEST test strip    • potassium chloride ER (K-TAB) 20 MEQ tablet controlled-release ER tablet Take 20 mEq by mouth Daily.     No current facility-administered medications on file prior to visit.        Objective   Vitals:    08/25/20 0939   BP: 126/76   Pulse: 92   Resp: 16   Temp: 98.4 °F (36.9 °C)   SpO2: 98%   Weight: 98 kg (216 lb)   Height: 167.6 cm (66\")   PainSc: 0-No pain     Body mass index is 34.86 kg/m².    Physical Exam   Constitutional: She is oriented to person, place, and time. She appears well-developed and well-nourished.   HENT:   Head: Normocephalic and atraumatic.   Right Ear: Tympanic membrane, external ear and ear canal normal.   Left Ear: Tympanic membrane, external ear and ear canal normal.   Nose: Nose normal.   Mouth/Throat: Uvula is midline, oropharynx is clear and moist and mucous membranes are normal.   Eyes: Pupils are equal, round, and reactive to light. " Conjunctivae, EOM and lids are normal.   Neck: Trachea normal. No thyromegaly present.   Cardiovascular: Normal rate, regular rhythm and normal heart sounds.   Pulses:       Carotid pulses are 2+ on the right side, and 2+ on the left side.  Pulmonary/Chest: Effort normal and breath sounds normal. No respiratory distress.   Abdominal: Soft. Bowel sounds are normal. There is no tenderness.   Neurological: She is alert and oriented to person, place, and time. She has normal strength. GCS eye subscore is 4. GCS verbal subscore is 5. GCS motor subscore is 6.   Skin: Skin is warm and dry. Capillary refill takes 2 to 3 seconds. Turgor is normal.   Psychiatric: She has a normal mood and affect. Her speech is normal and behavior is normal. Thought content normal. Cognition and memory are normal.   Vitals reviewed.        Assessment/Plan     Current Outpatient Medications:   •  albuterol (PROAIR HFA) 108 (90 BASE) MCG/ACT inhaler, Inhale 1 puff Every 6 (Six) Hours As Needed for Wheezing., Disp: 90 inhaler, Rfl: 2  •  albuterol (PROVENTIL) (2.5 MG/3ML) 0.083% nebulizer solution, Take 2.5 mg by nebulization Every 4 (Four) Hours As Needed for Wheezing., Disp: 90 vial, Rfl: 1  •  amLODIPine (NORVASC) 2.5 MG tablet, Take 1 tablet by mouth Daily., Disp: 90 tablet, Rfl: 1  •  aspirin 81 MG EC tablet, Take 81 mg by mouth Daily., Disp: , Rfl:   •  atorvastatin (LIPITOR) 80 MG tablet, Take 1 tablet by mouth Every Night., Disp: 90 tablet, Rfl: 1  •  baclofen (LIORESAL) 10 MG tablet, Take 1 tablet by mouth 3 (Three) Times a Day., Disp: 270 tablet, Rfl: 1  •  brompheniramine-pseudoephedrine-DM 30-2-10 MG/5ML syrup, Take 2.5 mL by mouth 4 (Four) Times a Day As Needed for Allergies., Disp: 118 mL, Rfl: 0  •  busPIRone (BUSPAR) 15 MG tablet, Take 1 tablet by mouth 3 (Three) Times a Day., Disp: 270 tablet, Rfl: 1  •  calcium carbonate-vitamin d (CALCIUM 600+D HIGH POTENCY) 600-400 MG-UNIT per tablet, Take 1 tablet by mouth Daily., Disp: 90  tablet, Rfl: 2  •  clopidogrel (PLAVIX) 75 MG tablet, Take 1 tablet by mouth Daily., Disp: 90 tablet, Rfl: 2  •  cyclobenzaprine (FLEXERIL) 10 MG tablet, Take 1 tablet by mouth 3 (Three) Times a Day As Needed for Muscle Spasms., Disp: 270 tablet, Rfl: 2  •  diclofenac (VOLTAREN) 75 MG EC tablet, Take 1 tablet by mouth 2 (Two) Times a Day., Disp: 180 tablet, Rfl: 1  •  fenofibrate (TRICOR) 145 MG tablet, Take 1 tablet by mouth Daily., Disp: 90 tablet, Rfl: 2  •  fluticasone (FLONASE ALLERGY RELIEF) 50 MCG/ACT nasal spray, into each nostril., Disp: , Rfl:   •  gabapentin (NEURONTIN) 600 MG tablet, Take 1 tablet by mouth 3 (Three) Times a Day., Disp: 270 tablet, Rfl: 0  •  HYDROcodone-acetaminophen (NORCO)  MG per tablet, Take 1 tablet by mouth Every 6 (Six) Hours As Needed for Moderate Pain ., Disp: 120 tablet, Rfl: 0  •  loratadine (CLARITIN) 10 MG tablet, Take 1 tablet by mouth Daily., Disp: 30 tablet, Rfl: 11  •  modafinil (PROVIGIL) 200 MG tablet, Take 1 tablet by mouth Daily., Disp: 30 tablet, Rfl: 3  •  montelukast (SINGULAIR) 10 MG tablet, Take 1 tablet by mouth Every Evening., Disp: 90 tablet, Rfl: 1  •  ondansetron (ZOFRAN) 8 MG tablet, , Disp: , Rfl: 1  •  pantoprazole (PROTONIX) 40 MG EC tablet, Take 1 tablet by mouth Daily., Disp: 90 tablet, Rfl: 1  •  potassium chloride (KLOR-CON) 20 MEQ CR tablet, Take 1 tablet by mouth Daily., Disp: 90 tablet, Rfl: 2  •  spironolactone (ALDACTONE) 50 MG tablet, Take 1 tablet by mouth Daily., Disp: 90 tablet, Rfl: 1  •  tiZANidine (ZANAFLEX) 4 MG tablet, Take 1 tablet by mouth Every Night., Disp: 90 tablet, Rfl: 0  •  venlafaxine XR (EFFEXOR-XR) 37.5 MG 24 hr capsule, Take 1 capsule by mouth Daily., Disp: 90 capsule, Rfl: 1  •  Blood Glucose Monitoring Suppl (ONE TOUCH ULTRA 2) w/Device kit, , Disp: , Rfl:   •  nitrofurantoin, macrocrystal-monohydrate, (MACROBID) 100 MG capsule, Take 1 capsule by mouth 2 (Two) Times a Day. (Patient taking differently: Take  by  mouth 2 (Two) Times a Day.), Disp: 10 capsule, Rfl: 0  •  ONE TOUCH ULTRA TEST test strip, , Disp: , Rfl:   •  potassium chloride ER (K-TAB) 20 MEQ tablet controlled-release ER tablet, Take 20 mEq by mouth Daily., Disp: , Rfl:     Problem List Items Addressed This Visit     None               Counseling was given to {[id number]} for the following topics: {[facility]:86506}.      Plan of care reviewed with the patient at the conclusion of today's visit.  Education was provided regarding diagnosis, management, and any prescribed or recommended OTC medications.  Patient verbalized understanding of and agreement with management plan.     No follow-ups on file.      Adina Zurita, TAWANNA    Please note that portions of this note were completed with a voice recognition program. Efforts were made to edit the dictations, but occasionally words are mistranscribed.

## 2020-08-25 NOTE — PROGRESS NOTES
The ABCs of the Annual Wellness Visit  [place] to Medicare Visit    Chief Complaint   Patient presents with   • Annual Exam       Subjective   History of Present Illness:  Hafsa Barron is a 49 y.o. female who presents for a  Welcome to Medicare Visit. She is overall doing well. She continues to have some left sided weakness related to past stroke. Blood sugars and blood pressure are stable and at goal. She is following a heart healthy low cholesterol diet and trying to be physically active. No shortness of breath or chest pain.     HEALTH RISK ASSESSMENT    Recent Hospitalizations:  No hospitalization(s) within the last year.    Current Medical Providers:  Patient Care Team:  Jocy Snow MD as PCP - General (Internal Medicine)  Geetha Lamar APRN as Nurse Practitioner (Family Medicine)  Silke Marcelino DO as Consulting Physician (Obstetrics and Gynecology)    Smoking Status:  Social History     Tobacco Use   Smoking Status Former Smoker   • Packs/day: 1.50   • Years: 15.00   • Pack years: 22.50   • Start date: 1983   • Last attempt to quit: 2013   • Years since quittin.9   Smokeless Tobacco Never Used   Tobacco Comment    use [employer] now NO NICOTENE       Alcohol Consumption:  Social History     Substance and Sexual Activity   Alcohol Use Yes   • Frequency: Monthly or less       Depression Screen:   PHQ-2/PHQ-9 Depression Screening 3/13/2019   Little interest or pleasure in doing things 0   Feeling down, depressed, or hopeless 0   Total Score 0       Fall Risk Screen:  STEADI Fall Risk Assessment has not been completed.    Health Habits and Functional and Cognitive Screening:  No flowsheet data found.      Does the patient have evidence of cognitive impairment? No    Asprin use counseling:Taking ASA appropriately as indicated    Visual Acuity:    No exam data present    Age-appropriate Screening Schedule:  Refer to the list below for future screening recommendations based on  patient's age, sex and/or medical conditions. Orders for these recommended tests are listed in the plan section. The patient has been provided with a written plan.    Health Maintenance   Topic Date Due   • TDAP/TD VACCINES (1 - Tdap) 12/19/1980   • DIABETIC FOOT EXAM  05/03/2016   • URINE MICROALBUMIN  05/04/2019   • DIABETIC EYE EXAM  06/18/2019   • ZOSTER VACCINE (1 of 2) 12/19/2019   • HEMOGLOBIN A1C  02/12/2020   • LIPID PANEL  03/13/2020   • INFLUENZA VACCINE  08/01/2020   • MAMMOGRAM  08/26/2021   • PAP SMEAR  04/12/2022   • COLONOSCOPY  07/01/2025   • PNEUMOCOCCAL VACCINE (19-64 MEDIUM RISK)  Completed          The following portions of the patient's history were reviewed and updated as appropriate: allergies, current medications, past family history, past medical history, past social history, past surgical history and problem list.    Outpatient Medications Prior to Visit   Medication Sig Dispense Refill   • albuterol (PROAIR HFA) 108 (90 BASE) MCG/ACT inhaler Inhale 1 puff Every 6 (Six) Hours As Needed for Wheezing. 90 inhaler 2   • albuterol (PROVENTIL) (2.5 MG/3ML) 0.083% nebulizer solution Take 2.5 mg by nebulization Every 4 (Four) Hours As Needed for Wheezing. 90 vial 1   • amLODIPine (NORVASC) 2.5 MG tablet Take 1 tablet by mouth Daily. 90 tablet 1   • aspirin 81 MG EC tablet Take 81 mg by mouth Daily.     • atorvastatin (LIPITOR) 80 MG tablet Take 1 tablet by mouth Every Night. 90 tablet 1   • baclofen (LIORESAL) 10 MG tablet Take 1 tablet by mouth 3 (Three) Times a Day. 270 tablet 1   • brompheniramine-pseudoephedrine-DM 30-2-10 MG/5ML syrup Take 2.5 mL by mouth 4 (Four) Times a Day As Needed for Allergies. 118 mL 0   • busPIRone (BUSPAR) 15 MG tablet Take 1 tablet by mouth 3 (Three) Times a Day. 270 tablet 1   • calcium carbonate-vitamin d (CALCIUM 600+D HIGH POTENCY) 600-400 MG-UNIT per tablet Take 1 tablet by mouth Daily. 90 tablet 2   • clopidogrel (PLAVIX) 75 MG tablet Take 1 tablet by mouth  Daily. 90 tablet 2   • cyclobenzaprine (FLEXERIL) 10 MG tablet Take 1 tablet by mouth 3 (Three) Times a Day As Needed for Muscle Spasms. 270 tablet 2   • diclofenac (VOLTAREN) 75 MG EC tablet Take 1 tablet by mouth 2 (Two) Times a Day. 180 tablet 1   • fenofibrate (TRICOR) 145 MG tablet Take 1 tablet by mouth Daily. 90 tablet 2   • fluticasone (FLONASE ALLERGY RELIEF) 50 MCG/ACT nasal spray into each nostril.     • gabapentin (NEURONTIN) 600 MG tablet Take 1 tablet by mouth 3 (Three) Times a Day. 270 tablet 0   • HYDROcodone-acetaminophen (NORCO)  MG per tablet Take 1 tablet by mouth Every 6 (Six) Hours As Needed for Moderate Pain . 120 tablet 0   • loratadine (CLARITIN) 10 MG tablet Take 1 tablet by mouth Daily. 30 tablet 11   • modafinil (PROVIGIL) 200 MG tablet Take 1 tablet by mouth Daily. 30 tablet 3   • montelukast (SINGULAIR) 10 MG tablet Take 1 tablet by mouth Every Evening. 90 tablet 1   • ondansetron (ZOFRAN) 8 MG tablet   1   • pantoprazole (PROTONIX) 40 MG EC tablet Take 1 tablet by mouth Daily. 90 tablet 1   • potassium chloride (KLOR-CON) 20 MEQ CR tablet Take 1 tablet by mouth Daily. 90 tablet 2   • spironolactone (ALDACTONE) 50 MG tablet Take 1 tablet by mouth Daily. 90 tablet 1   • tiZANidine (ZANAFLEX) 4 MG tablet Take 1 tablet by mouth Every Night. 90 tablet 0   • venlafaxine XR (EFFEXOR-XR) 37.5 MG 24 hr capsule Take 1 capsule by mouth Daily. 90 capsule 1   • Blood Glucose Monitoring Suppl (ONE TOUCH ULTRA 2) w/Device kit      • nitrofurantoin, macrocrystal-monohydrate, (MACROBID) 100 MG capsule Take 1 capsule by mouth 2 (Two) Times a Day. (Patient taking differently: Take  by mouth 2 (Two) Times a Day.) 10 capsule 0   • ONE TOUCH ULTRA TEST test strip      • potassium chloride ER (K-TAB) 20 MEQ tablet controlled-release ER tablet Take 20 mEq by mouth Daily.       No facility-administered medications prior to visit.        Patient Active Problem List   Diagnosis   • Fibromyalgia   •  Hypertension   • Sciatica   • Seasonal allergic rhinitis   • Hyperlipidemia   • Type 2 diabetes mellitus without complication, without long-term current use of insulin (CMS/[facility])   • H/O: stroke   • Myotonic dystrophy, type 2 (CMS/[facility])   • High risk medication use   • Left arm weakness   • Chronic pain   • Bruit of left carotid artery   • Anxiety and depression   • Thrombosis of Left subclavian artery (CMS/[facility]) - in Aug 2018 - Stent in place       Advanced Care Planning:  ACP discussion was held with the patient during this visit. Patient does not have an advance directive, information provided.    Review of Systems   Constitutional: Negative for activity change, appetite change, chills, diaphoresis, fatigue, fever, unexpected weight gain and unexpected weight loss.   HENT: Negative for ear discharge, ear pain, mouth sores, nosebleeds, sinus pressure, sneezing and sore throat.    Eyes: Negative for pain, discharge and itching.   Respiratory: Negative for cough, chest tightness, shortness of breath and wheezing.    Cardiovascular: Negative for chest pain, palpitations and leg swelling.   Gastrointestinal: Negative for abdominal pain, constipation, diarrhea, nausea and vomiting.   Endocrine: Negative for heat intolerance, polydipsia and polyphagia.   Genitourinary: Negative for dysuria, flank pain, frequency, hematuria and urgency.   Musculoskeletal: Positive for arthralgias, gait problem and myalgias. Negative for back pain, joint swelling, neck pain and neck stiffness.   Skin: Negative for color change, pallor and rash.   Allergic/Immunologic: Negative for immunocompromised state.   Neurological: Negative for seizures, speech difficulty, weakness and numbness.   Hematological: Negative for adenopathy.   Psychiatric/Behavioral: Negative for agitation, decreased concentration, sleep disturbance, suicidal ideas and depressed mood. The patient is not nervous/anxious.        Compared to one year ago, the patient feels her  "physical health is better.  Compared to one year ago, the patient feels her mental health is the same.    Reviewed chart for potential of high risk medication in the elderly: yes  Reviewed chart for potential of harmful drug interactions in the elderly:yes    Objective         Vitals:    08/25/20 0939   BP: 126/76   Pulse: 92   Resp: 16   Temp: 98.4 °F (36.9 °C)   SpO2: 98%   Weight: 98 kg (216 lb)   Height: 167.6 cm (66\")   PainSc: 0-No pain       Body mass index is 34.86 kg/m².  Discussed the patient's BMI with her. The BMI is above average; BMI management plan is completed.    Physical Exam   Constitutional: She is oriented to person, place, and time. She appears well-developed and well-nourished.   HENT:   Head: Normocephalic and atraumatic.   Right Ear: Tympanic membrane, external ear and ear canal normal.   Left Ear: Tympanic membrane, external ear and ear canal normal.   Nose: Nose normal.   Mouth/Throat: Uvula is midline, oropharynx is clear and moist and mucous membranes are normal.   Eyes: Pupils are equal, round, and reactive to light. Conjunctivae, EOM and lids are normal.   Neck: Trachea normal. No thyromegaly present.   Cardiovascular: Normal rate, regular rhythm and normal heart sounds.   Pulses:       Carotid pulses are 2+ on the right side, and 2+ on the left side.  Pulmonary/Chest: Effort normal and breath sounds normal. No respiratory distress.   Abdominal: Soft. Bowel sounds are normal. There is no tenderness.   Musculoskeletal:   Left lower extremity weakness related to prior stroke. Using cane to ambulate.    Neurological: She is alert and oriented to person, place, and time. She has normal strength. GCS eye subscore is 4. GCS verbal subscore is 5. GCS motor subscore is 6.   Skin: Skin is warm and dry. Capillary refill takes 2 to 3 seconds. Turgor is normal.   Psychiatric: She has a normal mood and affect. Her speech is normal and behavior is normal. Thought content normal. Cognition and memory " are normal.   Vitals reviewed.            Procedures    Assessment/Plan   Medicare Risks and Personalized Health Plan  CMS Preventative Services Quick Reference  Advance Directive Discussion  Breast Cancer/Mammogram Screening  Depression/Dysphoria  Inactivity/Sedentary  Obesity/Overweight     The above risks/problems have been discussed with the patient.  Pertinent information has been shared with the patient in the After Visit Summary.  Follow up plans and orders are seen below in the Assessment/Plan Section.    Diagnoses and all orders for this visit:    1. Wellness examination (Primary)  Patient will continue to eat a well-balanced diet, drink adequate amount of water, exercise at least 3 times weekly, and get adequate rest.  Suggested a multivitamin daily.  Discussed future preventative screenings and immunizations.  -     SARS-CoV-2 Antibodies (Roche); Future  -     Hepatitis C Antibody; Future  -     Hemoglobin A1c; Future  -     CBC (No Diff); Future  -     Comprehensive Metabolic Panel; Future  -     Lipid Panel; Future  -     TSH; Future  -     Vitamin D 25 Hydroxy; Future  -     Vitamin B12; Future    2. Essential hypertension  Chronic issue stable requiring medication management and monitoring. Will eat well balanced heart healthy diet, drink adequate water, increase physical activity, and get adequate rest. Monitor blood pressures daily and contact office for any readings consistently above 140/90. Patient will report any associated symptoms such as headaches, blurry vision, or nausea. Patient will go to ER for any chest pressure or chest pain.   -     CBC (No Diff)  -     Comprehensive Metabolic Panel  -     Lipid Panel  -     Hemoglobin A1c; Future  -     CBC (No Diff); Future  -     Comprehensive Metabolic Panel; Future  -     Lipid Panel; Future  -     TSH; Future    3. Type 2 diabetes mellitus without complication, without long-term current use of insulin (CMS/[facility])  Chronic issue stable requiring  medication management and monitoring. Will eat well balanced heart healthy diabetic diet, drink adequate water, increase physical activity, and get adequate rest. Will monitor blood sugars daily and keep log for next appt. Will contact office earlier if blood sugars are averaging above 250.   -     CBC (No Diff)  -     Comprehensive Metabolic Panel  -     Hemoglobin A1c  -     Hemoglobin A1c; Future    4. Hyperlipidemia, unspecified hyperlipidemia type  Chronic stable requiring medication management, lifestyle changes, and monitoring. Discussed how this being unstable increases risk of cardiovascular disease and adverse events. Will follow a hearth healthy diet low in cholesterol and fat. Discussed increasing fiber intake. Suggested fish oil or omega 3 supplement of 1200mg twice daily. Educated on how these help lower triglycerides and LDL. Will drink adequate water and increase physical activity as tolerated. Discussed importance of medication compliance.   -     Lipid Panel; Future    5. Thyroid disorder screening  -     TSH Rfx On Abnormal To Free T4  -     TSH; Future    6. Screening for blood disease  -     SARS-CoV-2 Antibodies (Roche); Future  -     Hepatitis C Antibody; Future  -     Hemoglobin A1c; Future  -     CBC (No Diff); Future  -     Comprehensive Metabolic Panel; Future  -     Lipid Panel; Future  -     TSH; Future  -     Vitamin D 25 Hydroxy; Future  -     Vitamin B12; Future    7. Encounter for vitamin deficiency screening  -     Vitamin B12 & Folate  -     Vitamin D 25 Hydroxy  -     Vitamin D 25 Hydroxy; Future  -     Vitamin B12; Future    8. Vitamin D deficiency  -     Vitamin D 25 Hydroxy  -     Vitamin D 25 Hydroxy; Future    9. Screening for breast cancer  -     Mammo Screening Digital Tomosynthesis Bilateral With CAD; Future    10. Encounter for screening mammogram for malignant neoplasm of breast   -     Mammo Screening Digital Tomosynthesis Bilateral With CAD; Future    11. Screening for  viral disease  -     SARS-CoV-2 Antibodies (Roche); Future  -     SARS-CoV-2 Antibodies (Roche)    12. Need for hepatitis C screening test  -     Hepatitis C Antibody  -     Hepatitis C Antibody; Future    13. Need for shingles vaccine  -     Zoster Vac Recomb Adjuvanted 50 MCG/0.5ML reconstituted suspension; Inject 0.5 mL into the appropriate muscle as directed by prescriber 1 (One) Time for 1 dose. Get 2nd injection in 2-6 months.  Dispense: 1 each; Refill: 1    14. Need for Tdap vaccination  -     Tdap Vaccine Greater Than or Equal To 6yo IM        Follow Up:  Return if symptoms worsen or fail to improve.     An After Visit Summary and PPPS were given to the patient.

## 2020-08-26 ENCOUNTER — LAB (OUTPATIENT)
Dept: LAB | Facility: HOSPITAL | Age: 50
End: 2020-08-26

## 2020-08-26 DIAGNOSIS — I10 ESSENTIAL HYPERTENSION: ICD-10-CM

## 2020-08-26 DIAGNOSIS — Z11.59 NEED FOR HEPATITIS C SCREENING TEST: ICD-10-CM

## 2020-08-26 DIAGNOSIS — Z13.29 THYROID DISORDER SCREENING: ICD-10-CM

## 2020-08-26 DIAGNOSIS — Z13.21 ENCOUNTER FOR VITAMIN DEFICIENCY SCREENING: ICD-10-CM

## 2020-08-26 DIAGNOSIS — E11.9 TYPE 2 DIABETES MELLITUS WITHOUT COMPLICATION, WITHOUT LONG-TERM CURRENT USE OF INSULIN (HCC): ICD-10-CM

## 2020-08-26 DIAGNOSIS — E55.9 VITAMIN D DEFICIENCY: ICD-10-CM

## 2020-08-26 DIAGNOSIS — E78.5 HYPERLIPIDEMIA, UNSPECIFIED HYPERLIPIDEMIA TYPE: ICD-10-CM

## 2020-08-26 DIAGNOSIS — Z13.0 SCREENING FOR BLOOD DISEASE: ICD-10-CM

## 2020-08-26 DIAGNOSIS — Z00.00 WELLNESS EXAMINATION: ICD-10-CM

## 2020-08-26 LAB
25(OH)D3 SERPL-MCNC: 57.4 NG/ML (ref 30–100)
ALBUMIN SERPL-MCNC: 4.2 G/DL (ref 3.5–5.2)
ALBUMIN/GLOB SERPL: 2.1 G/DL
ALP SERPL-CCNC: 68 U/L (ref 39–117)
ALT SERPL W P-5'-P-CCNC: 34 U/L (ref 1–33)
ANION GAP SERPL CALCULATED.3IONS-SCNC: 8.5 MMOL/L (ref 5–15)
AST SERPL-CCNC: 29 U/L (ref 1–32)
BILIRUB SERPL-MCNC: 0.4 MG/DL (ref 0–1.2)
BUN SERPL-MCNC: 21 MG/DL (ref 6–20)
BUN/CREAT SERPL: 45.7 (ref 7–25)
CALCIUM SPEC-SCNC: 9.9 MG/DL (ref 8.6–10.5)
CHLORIDE SERPL-SCNC: 106 MMOL/L (ref 98–107)
CHOLEST SERPL-MCNC: 131 MG/DL (ref 0–200)
CO2 SERPL-SCNC: 27.5 MMOL/L (ref 22–29)
CREAT SERPL-MCNC: 0.46 MG/DL (ref 0.57–1)
DEPRECATED RDW RBC AUTO: 42.9 FL (ref 37–54)
ERYTHROCYTE [DISTWIDTH] IN BLOOD BY AUTOMATED COUNT: 13 % (ref 12.3–15.4)
GFR SERPL CREATININE-BSD FRML MDRD: 144 ML/MIN/1.73
GLOBULIN UR ELPH-MCNC: 2 GM/DL
GLUCOSE SERPL-MCNC: 127 MG/DL (ref 65–99)
HBA1C MFR BLD: 6.5 % (ref 4.8–5.6)
HCT VFR BLD AUTO: 37.7 % (ref 34–46.6)
HCV AB SER DONR QL: NORMAL
HDLC SERPL-MCNC: 51 MG/DL (ref 40–60)
HGB BLD-MCNC: 12.9 G/DL (ref 12–15.9)
LDLC SERPL CALC-MCNC: 63 MG/DL (ref 0–100)
LDLC/HDLC SERPL: 1.24 {RATIO}
MCH RBC QN AUTO: 31 PG (ref 26.6–33)
MCHC RBC AUTO-ENTMCNC: 34.2 G/DL (ref 31.5–35.7)
MCV RBC AUTO: 90.6 FL (ref 79–97)
PLATELET # BLD AUTO: 216 10*3/MM3 (ref 140–450)
PMV BLD AUTO: 11.7 FL (ref 6–12)
POTASSIUM SERPL-SCNC: 4.2 MMOL/L (ref 3.5–5.2)
PROT SERPL-MCNC: 6.2 G/DL (ref 6–8.5)
RBC # BLD AUTO: 4.16 10*6/MM3 (ref 3.77–5.28)
SODIUM SERPL-SCNC: 142 MMOL/L (ref 136–145)
TRIGL SERPL-MCNC: 85 MG/DL (ref 0–150)
TSH SERPL DL<=0.05 MIU/L-ACNC: 1.07 UIU/ML (ref 0.27–4.2)
VIT B12 BLD-MCNC: 610 PG/ML (ref 211–946)
VLDLC SERPL-MCNC: 17 MG/DL (ref 5–40)
WBC # BLD AUTO: 4.81 10*3/MM3 (ref 3.4–10.8)

## 2020-08-26 PROCEDURE — 80061 LIPID PANEL: CPT

## 2020-08-26 PROCEDURE — 85027 COMPLETE CBC AUTOMATED: CPT

## 2020-08-26 PROCEDURE — 83036 HEMOGLOBIN GLYCOSYLATED A1C: CPT

## 2020-08-26 PROCEDURE — 82607 VITAMIN B-12: CPT

## 2020-08-26 PROCEDURE — 82306 VITAMIN D 25 HYDROXY: CPT

## 2020-08-26 PROCEDURE — 86769 SARS-COV-2 COVID-19 ANTIBODY: CPT

## 2020-08-26 PROCEDURE — 36415 COLL VENOUS BLD VENIPUNCTURE: CPT

## 2020-08-26 PROCEDURE — 86803 HEPATITIS C AB TEST: CPT

## 2020-08-26 PROCEDURE — 80053 COMPREHEN METABOLIC PANEL: CPT

## 2020-08-26 PROCEDURE — 84443 ASSAY THYROID STIM HORMONE: CPT

## 2020-08-27 ENCOUNTER — TELEPHONE (OUTPATIENT)
Dept: INTERNAL MEDICINE | Facility: CLINIC | Age: 50
End: 2020-08-27

## 2020-08-27 NOTE — TELEPHONE ENCOUNTER
Spoke with pt and advised of provider message. She verbalized good understanding, thanked our office and we ended the call.

## 2020-08-27 NOTE — TELEPHONE ENCOUNTER
----- Message from TAWANNA Solomon sent at 8/27/2020  1:06 PM EDT -----  Overall labs look good. Blood sugars are about the same. Let me know if you have any questions.

## 2020-08-28 LAB — SARS-COV-2 AB SERPL QL IA: NEGATIVE

## 2020-08-28 NOTE — PROGRESS NOTES
Blood pressure is still a little higher than I would like to see it (would prefer systolic below 140). Would like to increase the dose of the lisinopril to 10 mg daily and then have her return in two weeks for blood pressure check. (ok to take 2 of the 5 mg tablets until she can get to the pharmacy to get the 10 mg tabs). Please verify pharmacy.    My chart message:    Negative COVID antibody test. Please let me know if you have any questions.

## 2020-09-08 RX ORDER — GABAPENTIN 600 MG/1
TABLET ORAL
Qty: 270 TABLET | Refills: 0 | Status: SHIPPED | OUTPATIENT
Start: 2020-09-08 | End: 2021-02-01 | Stop reason: SDUPTHER

## 2020-09-29 DIAGNOSIS — G71.11 MYOTONIC DYSTROPHY, TYPE 2 (HCC): ICD-10-CM

## 2020-09-29 RX ORDER — HYDROCODONE BITARTRATE AND ACETAMINOPHEN 10; 325 MG/1; MG/1
1 TABLET ORAL EVERY 6 HOURS PRN
Qty: 120 TABLET | Refills: 0 | Status: SHIPPED | OUTPATIENT
Start: 2020-09-29 | End: 2020-10-31 | Stop reason: SDUPTHER

## 2020-09-29 RX ORDER — NYSTATIN 100000 [USP'U]/G
POWDER TOPICAL 3 TIMES DAILY
Qty: 60 G | Refills: 2 | Status: SHIPPED | OUTPATIENT
Start: 2020-09-29 | End: 2022-02-05 | Stop reason: HOSPADM

## 2020-10-05 RX ORDER — VENLAFAXINE HYDROCHLORIDE 37.5 MG/1
CAPSULE, EXTENDED RELEASE ORAL
Qty: 90 CAPSULE | Refills: 3 | Status: SHIPPED | OUTPATIENT
Start: 2020-10-05 | End: 2020-12-14 | Stop reason: SDUPTHER

## 2020-10-05 RX ORDER — MONTELUKAST SODIUM 10 MG/1
TABLET ORAL
Qty: 90 TABLET | Refills: 3 | Status: SHIPPED | OUTPATIENT
Start: 2020-10-05 | End: 2021-05-03 | Stop reason: SDUPTHER

## 2020-10-31 DIAGNOSIS — G71.11 MYOTONIC DYSTROPHY, TYPE 2 (HCC): ICD-10-CM

## 2020-11-02 RX ORDER — TIZANIDINE 4 MG/1
TABLET ORAL
Qty: 90 TABLET | Refills: 1 | Status: SHIPPED | OUTPATIENT
Start: 2020-11-02 | End: 2021-04-08 | Stop reason: SDUPTHER

## 2020-11-02 RX ORDER — HYDROCODONE BITARTRATE AND ACETAMINOPHEN 10; 325 MG/1; MG/1
1 TABLET ORAL EVERY 6 HOURS PRN
Qty: 120 TABLET | Refills: 0 | Status: SHIPPED | OUTPATIENT
Start: 2020-11-02 | End: 2020-11-30 | Stop reason: SDUPTHER

## 2020-11-02 RX ORDER — BUSPIRONE HYDROCHLORIDE 15 MG/1
TABLET ORAL
Qty: 270 TABLET | Refills: 1 | Status: SHIPPED | OUTPATIENT
Start: 2020-11-02 | End: 2021-04-02 | Stop reason: SDUPTHER

## 2020-11-16 RX ORDER — ATORVASTATIN CALCIUM 80 MG/1
80 TABLET, FILM COATED ORAL NIGHTLY
Qty: 90 TABLET | Refills: 3 | Status: SHIPPED | OUTPATIENT
Start: 2020-11-16 | End: 2021-12-20

## 2020-11-24 RX ORDER — AMLODIPINE BESYLATE 2.5 MG/1
TABLET ORAL
Qty: 90 TABLET | Refills: 3 | Status: SHIPPED | OUTPATIENT
Start: 2020-11-24 | End: 2020-12-14 | Stop reason: SDUPTHER

## 2020-11-30 DIAGNOSIS — G71.11 MYOTONIC DYSTROPHY, TYPE 2 (HCC): ICD-10-CM

## 2020-12-01 RX ORDER — HYDROCODONE BITARTRATE AND ACETAMINOPHEN 10; 325 MG/1; MG/1
1 TABLET ORAL EVERY 6 HOURS PRN
Qty: 120 TABLET | Refills: 0 | Status: SHIPPED | OUTPATIENT
Start: 2020-12-01 | End: 2021-01-01 | Stop reason: SDUPTHER

## 2020-12-06 PROCEDURE — U0004 COV-19 TEST NON-CDC HGH THRU: HCPCS | Performed by: NURSE PRACTITIONER

## 2020-12-14 RX ORDER — AMLODIPINE BESYLATE 2.5 MG/1
2.5 TABLET ORAL DAILY
Qty: 90 TABLET | Refills: 3 | Status: SHIPPED | OUTPATIENT
Start: 2020-12-14 | End: 2021-09-20

## 2020-12-14 RX ORDER — SPIRONOLACTONE 50 MG/1
50 TABLET, FILM COATED ORAL DAILY
Qty: 90 TABLET | Refills: 3 | Status: SHIPPED | OUTPATIENT
Start: 2020-12-14 | End: 2021-05-03 | Stop reason: SDUPTHER

## 2020-12-14 RX ORDER — VENLAFAXINE HYDROCHLORIDE 37.5 MG/1
37.5 CAPSULE, EXTENDED RELEASE ORAL DAILY
Qty: 90 CAPSULE | Refills: 3 | Status: SHIPPED | OUTPATIENT
Start: 2020-12-14 | End: 2021-05-03 | Stop reason: SDUPTHER

## 2020-12-16 RX ORDER — FLUCONAZOLE 150 MG/1
150 TABLET ORAL ONCE
Qty: 1 TABLET | Refills: 0 | Status: SHIPPED | OUTPATIENT
Start: 2020-12-16 | End: 2020-12-16

## 2020-12-22 ENCOUNTER — HOSPITAL ENCOUNTER (OUTPATIENT)
Dept: MAMMOGRAPHY | Facility: HOSPITAL | Age: 50
Discharge: HOME | End: 2020-12-22
Admitting: NURSE PRACTITIONER

## 2020-12-22 DIAGNOSIS — Z12.31 ENCOUNTER FOR SCREENING MAMMOGRAM FOR MALIGNANT NEOPLASM OF BREAST: ICD-10-CM

## 2020-12-22 DIAGNOSIS — Z12.39 SCREENING FOR BREAST CANCER: ICD-10-CM

## 2020-12-22 PROCEDURE — 77067 SCR MAMMO BI INCL CAD: CPT | Performed by: RADIOLOGY

## 2020-12-22 PROCEDURE — 77067 SCR MAMMO BI INCL CAD: CPT

## 2020-12-22 PROCEDURE — 77063 BREAST TOMOSYNTHESIS BI: CPT

## 2020-12-22 PROCEDURE — 77063 BREAST TOMOSYNTHESIS BI: CPT | Performed by: RADIOLOGY

## 2020-12-24 DIAGNOSIS — G71.11 MYOTONIC DYSTROPHY, TYPE 2 (HCC): ICD-10-CM

## 2020-12-24 DIAGNOSIS — G47.19 DAYTIME HYPERSOMNOLENCE: ICD-10-CM

## 2020-12-24 RX ORDER — MODAFINIL 200 MG/1
TABLET ORAL
Qty: 30 TABLET | Refills: 2 | Status: SHIPPED | OUTPATIENT
Start: 2020-12-24 | End: 2021-02-18

## 2021-01-01 DIAGNOSIS — G71.11 MYOTONIC DYSTROPHY, TYPE 2 (HCC): ICD-10-CM

## 2021-01-04 RX ORDER — HYDROCODONE BITARTRATE AND ACETAMINOPHEN 10; 325 MG/1; MG/1
1 TABLET ORAL EVERY 6 HOURS PRN
Qty: 120 TABLET | Refills: 0 | Status: SHIPPED | OUTPATIENT
Start: 2021-01-04 | End: 2021-02-01 | Stop reason: SDUPTHER

## 2021-01-18 ENCOUNTER — TELEMEDICINE (OUTPATIENT)
Dept: NEUROLOGY | Facility: CLINIC | Age: 51
End: 2021-01-18

## 2021-01-18 DIAGNOSIS — G71.11 MYOTONIC DYSTROPHY, TYPE 2 (HCC): Primary | ICD-10-CM

## 2021-01-18 DIAGNOSIS — G47.19 DAYTIME HYPERSOMNOLENCE: ICD-10-CM

## 2021-01-18 PROCEDURE — 99213 OFFICE O/P EST LOW 20 MIN: CPT | Performed by: PSYCHIATRY & NEUROLOGY

## 2021-01-18 RX ORDER — MODAFINIL 100 MG/1
100 TABLET ORAL DAILY
Qty: 30 TABLET | Refills: 5 | Status: SHIPPED | OUTPATIENT
Start: 2021-01-18 | End: 2021-07-29 | Stop reason: SDUPTHER

## 2021-01-18 NOTE — PROGRESS NOTES
Subjective:    CC: Hafsa Barron is in clinic today for follow up for      HPI:  Initial visit: 2/18/2020: Patient is a 49-year-old female with known history of type II myotonic dystrophy referred to clinic to establish care.  She reports that she started having symptoms back in 2003 soon after delivery.  At that time she noted some leg weakness which progressively became worse and in 2009, she was seen by [facility] and underwent detailed neurological examination, which was suggestive of proximal muscle weakness and possibility of myopathy.  Following this, EMG was performed which showed diffuse myotonic discharges and eventually genetic testing was done which confirmed the diagnosis of type II myotonic dystrophy.  She has followed with [facility] since year 2009 and was followed initially by Dr. Marina and then Dr. Lynn.  Since her diagnosis in 2009, she reports that there is been slight worsening in bilateral proximal muscle weakness in foot and lower extremities.  In year 2013, she reports that she had a stroke which affected her left upper and lower extremity strength.  Currently she was uses cane for shorter distances and electrical scooter for longer distances.  She is established with ophthalmology and has regular eye examination as well as is established with cardiology to monitor her heart health.  She does have history of obstructive sleep apnea and uses CPAP machine regularly.  Initially, she reports that it had helped her significantly but now she reports that even after using it regularly, she wakes up tired and reports extreme daytime somnolence, tiredness and fatigue.  She was prescribed Nuvigil by her sleep physician which initially helped and then it stopped working.  She has never tried Provigil for daytime somnolence.  She denies any problems with vision.  Currently she takes baclofen 10 mg 3 times a day, tizanidine and cyclobenzaprine only at night.  She also takes  gabapentin for paresthesias and muscle spasms.  She does report poor sleep quality.  She is on BuSpar 15 mg 3 times a day and venlafaxine 37.5 mg daily for mood    7/15/2020: This is a follow-up done through video.  Since her last visit, she reports that bilateral proximal muscle weakness remains stable without any worsening.  She has been taking Nuvigil 100 mg daily now for last 2 months and she reports that it has helped her tremendously in keeping daytime hypersomnolence, fatigue and tiredness under good control.  She is physically more active now.    1/18/2021: This is a follow-up done through video.  Since her last visit in July, she reports that after increasing the dose of Nuvigil to 200 mg daily, she did really well until about [place] week following which she reports that she has started feeling daytime somnolence, tiredness and fatigue again.  She continues to take 200 mg dose but reports that it has not been as effective as it used to be.  As far as myotonic dystrophy is concerned, it remains stable without any worsening in muscle strength.    The following portions of the patient's history were reviewed and updated as of 01/18/2021: allergies, social history and problem list.       Current Outpatient Medications:   •  albuterol (PROAIR HFA) 108 (90 BASE) MCG/ACT inhaler, Inhale 1 puff Every 6 (Six) Hours As Needed for Wheezing., Disp: 90 inhaler, Rfl: 2  •  albuterol (PROVENTIL) (2.5 MG/3ML) 0.083% nebulizer solution, Take 2.5 mg by nebulization Every 4 (Four) Hours As Needed for Wheezing., Disp: 90 vial, Rfl: 1  •  amLODIPine (NORVASC) 2.5 MG tablet, Take 1 tablet by mouth Daily., Disp: 90 tablet, Rfl: 3  •  aspirin 81 MG EC tablet, Take 81 mg by mouth Daily., Disp: , Rfl:   •  atorvastatin (LIPITOR) 80 MG tablet, Take 1 tablet by mouth Every Night., Disp: 90 tablet, Rfl: 3  •  baclofen (LIORESAL) 10 MG tablet, Take 1 tablet by mouth 3 (Three) Times a Day., Disp: 270 tablet, Rfl: 1  •  Blood Glucose  Monitoring Suppl (ONE TOUCH ULTRA 2) w/Device kit, , Disp: , Rfl:   •  brompheniramine-pseudoephedrine-DM 30-2-10 MG/5ML syrup, Take 2.5 mL by mouth 4 (Four) Times a Day As Needed for Allergies., Disp: 118 mL, Rfl: 0  •  busPIRone (BUSPAR) 15 MG tablet, TAKE ONE TABLET BY MOUTH THREE TIMES A DAY, Disp: 270 tablet, Rfl: 1  •  calcium carbonate-vitamin d (CALCIUM 600+D HIGH POTENCY) 600-400 MG-UNIT per tablet, Take 1 tablet by mouth Daily., Disp: 90 tablet, Rfl: 2  •  clopidogrel (PLAVIX) 75 MG tablet, Take 1 tablet by mouth Daily., Disp: 90 tablet, Rfl: 2  •  cyclobenzaprine (FLEXERIL) 10 MG tablet, Take 1 tablet by mouth 3 (Three) Times a Day As Needed for Muscle Spasms., Disp: 270 tablet, Rfl: 2  •  diclofenac (VOLTAREN) 75 MG EC tablet, Take 1 tablet by mouth 2 (Two) Times a Day., Disp: 180 tablet, Rfl: 1  •  fenofibrate (TRICOR) 145 MG tablet, Take 1 tablet by mouth Daily., Disp: 90 tablet, Rfl: 2  •  fluticasone (FLONASE ALLERGY RELIEF) 50 MCG/ACT nasal spray, into each nostril., Disp: , Rfl:   •  gabapentin (NEURONTIN) 600 MG tablet, TAKE ONE TABLET BY MOUTH THREE TIMES A DAY, Disp: 270 tablet, Rfl: 0  •  HYDROcodone-acetaminophen (NORCO)  MG per tablet, Take 1 tablet by mouth Every 6 (Six) Hours As Needed for Moderate Pain ., Disp: 120 tablet, Rfl: 0  •  loratadine (CLARITIN) 10 MG tablet, Take 1 tablet by mouth Daily., Disp: 30 tablet, Rfl: 11  •  modafinil (PROVIGIL) 100 MG tablet, Take 1 tablet by mouth Daily., Disp: 30 tablet, Rfl: 5  •  modafinil (PROVIGIL) 200 MG tablet, TAKE ONE TABLET BY MOUTH DAILY, Disp: 30 tablet, Rfl: 2  •  montelukast (SINGULAIR) 10 MG tablet, TAKE ONE TABLET BY MOUTH EVERY EVENING, Disp: 90 tablet, Rfl: 3  •  nitrofurantoin, macrocrystal-monohydrate, (MACROBID) 100 MG capsule, Take 1 capsule by mouth 2 (Two) Times a Day. (Patient taking differently: Take  by mouth 2 (Two) Times a Day.), Disp: 10 capsule, Rfl: 0  •  nystatin (MYCOSTATIN) 534392 UNIT/GM powder, Apply   topically to the appropriate area as directed 3 (Three) Times a Day., Disp: 60 g, Rfl: 2  •  ondansetron (ZOFRAN) 8 MG tablet, , Disp: , Rfl: 1  •  ONE TOUCH ULTRA TEST test strip, , Disp: , Rfl:   •  pantoprazole (PROTONIX) 40 MG EC tablet, Take 1 tablet by mouth Daily., Disp: 90 tablet, Rfl: 1  •  potassium chloride (KLOR-CON) 20 MEQ CR tablet, Take 1 tablet by mouth Daily., Disp: 90 tablet, Rfl: 2  •  potassium chloride ER (K-TAB) 20 MEQ tablet controlled-release ER tablet, Take 20 mEq by mouth Daily., Disp: , Rfl:   •  spironolactone (ALDACTONE) 50 MG tablet, Take 1 tablet by mouth Daily., Disp: 90 tablet, Rfl: 3  •  tiZANidine (ZANAFLEX) 4 MG tablet, TAKE ONE TABLET BY MOUTH ONCE NIGHTLY, Disp: 90 tablet, Rfl: 1  •  venlafaxine XR (EFFEXOR-XR) 37.5 MG 24 hr capsule, Take 1 capsule by mouth Daily., Disp: 90 capsule, Rfl: 3   Past Medical History:   Diagnosis Date   • Allergic    • Allergy    • Anxiety    • Asthma Allergies induced   • Depression    • Fibromyalgia, primary    • GERD (gastroesophageal reflux disease)    • Headache    • History of blood clots    • Hyperlipidemia    • Hypertension    • IBS (irritable bowel syndrome)    • Internal hemorrhoid    • Kidney stone    • Low back pain    • Muscular dystrophy (CMS/HCC)    • Muscular dystrophy (CMS/HCC)     II   • Myotonia    • Obesity    • Stroke (CMS/HCC) 08/31/2013    resdual- left sided weakness.    • Type 2 diabetes mellitus without complication, without long-term current use of insulin (CMS/HCC)       Past Surgical History:   Procedure Laterality Date   • CHOLECYSTECTOMY  07/2004   • COLONOSCOPY  07/2017   • D&C WITH SUCTION     • LYMPH NODE BIOPSY     • SUBCLAVIAN ARTERY STENT  08/2018    x2      Family History   Problem Relation Age of Onset   • Allergies Other    • ADD / ADHD Other    • Heart block Other    • Other Other         CEREBROVASCULAR ACCIDENT    • Hypertension Other    • Cancer Other         LUNG CANCER   • Hyperlipidemia Other    •  Alcohol abuse Mother    • Depression Mother    • Early death Mother          age 59   • Heart disease Mother         Mother  of Massive Heart attack   • Hyperlipidemia Mother    • Hypertension Mother    • Miscarriages / Stillbirths Mother         Miscarriage   • Heart attack Mother          of  massive heart attack   • Alcohol abuse Father    • Cancer Father         Had Lung Cancer - had 1 lung till death   • Diabetes Father         Lived on insuline shots   • Early death Father          age 53   • Breast cancer Maternal Aunt    • Learning disabilities Son         Had Adhd   • Breast cancer Cousin    • Ovarian cancer Neg Hx    • Endometrial cancer Neg Hx    • Uterine cancer Neg Hx    • Colon cancer Neg Hx         Review of Systems  Objective:    LMP  (LMP Unknown)     Neurology Exam:  General apperance: NAD.     Mental status: Alert, awake and oriented to time place and person.    Recent and Remote memory: Can recall 3/3 objects at 5 minutes. Can recall historical events.     Attention span and Concentration: Serial 7s: Normal.     Fund of knowledge:  Normal.     Language and Speech: No aphasia or dysarthria.    Naming , Repitition and Comprehension:  Can name objects, repeat a sentence and follow commands. Speech is clear and fluent with good repetition, comprehension, and naming.    CN II to XII: Intact.    Opthalmoscopic Exam: No papilledema.    Motor:  Right UE muscle strength 5/5. Normal tone.     Left UE muscle strength 5/5. Normal tone.      Right LE muscle strength5/5. Normal tone.     Left LE muscle strength 5/5. Normal tone.      Sensory: Normal light touch, vibration and pinprick sensation bilaterally.    DTRs: 2+ bilaterally.    Babinski: Negative bilaterally.    Co-ordination: Normal finger-to-nose, heel to shin B/L.    Rhomberg: Negative.    Gait: Normal.    Cardiovascular: Regular rate and rhythm without murmur, gallop or rub.    Assessment and Plan:  1. Myotonic dystrophy, type  2 (CMS/HCC)  Stable without any worsening in muscle strength.  2. Daytime hypersomnolence  I am going to increase Provigil dose to total of 300 mg daily dose and see how she does.  I have advised her to call back with response in 2 weeks it basically sits couple of 30 at this point will be made.  Another option is to add Ritalin to Provigil to help with daytime hypersomnolence.  I will plan to see her back in clinic in 6 months for follow-up.  - modafinil (PROVIGIL) 100 MG tablet; Take 1 tablet by mouth Daily.  Dispense: 30 tablet; Refill: 5     This is a follow-up done through video and total time spent was 25 minutes.    No follow-ups on file.

## 2021-02-01 DIAGNOSIS — G71.11 MYOTONIC DYSTROPHY, TYPE 2 (HCC): ICD-10-CM

## 2021-02-02 RX ORDER — HYDROCODONE BITARTRATE AND ACETAMINOPHEN 10; 325 MG/1; MG/1
1 TABLET ORAL EVERY 6 HOURS PRN
Qty: 120 TABLET | Refills: 0 | Status: SHIPPED | OUTPATIENT
Start: 2021-02-02 | End: 2021-03-02 | Stop reason: SDUPTHER

## 2021-02-02 RX ORDER — GABAPENTIN 600 MG/1
600 TABLET ORAL 3 TIMES DAILY
Qty: 270 TABLET | Refills: 0 | Status: SHIPPED | OUTPATIENT
Start: 2021-02-02 | End: 2021-06-28

## 2021-02-02 RX ORDER — DICLOFENAC SODIUM 75 MG/1
75 TABLET, DELAYED RELEASE ORAL 2 TIMES DAILY
Qty: 180 TABLET | Refills: 1 | Status: SHIPPED | OUTPATIENT
Start: 2021-02-02 | End: 2021-05-03 | Stop reason: SDUPTHER

## 2021-02-17 ENCOUNTER — TELEPHONE (OUTPATIENT)
Dept: NEUROLOGY | Facility: CLINIC | Age: 51
End: 2021-02-17

## 2021-02-17 DIAGNOSIS — G71.11 MYOTONIC DYSTROPHY, TYPE 2 (HCC): ICD-10-CM

## 2021-02-17 DIAGNOSIS — G47.19 DAYTIME HYPERSOMNOLENCE: ICD-10-CM

## 2021-02-17 RX ORDER — POTASSIUM CHLORIDE 1500 MG/1
TABLET, FILM COATED, EXTENDED RELEASE ORAL
Qty: 90 TABLET | Refills: 2 | Status: SHIPPED | OUTPATIENT
Start: 2021-02-17 | End: 2021-11-16

## 2021-02-17 NOTE — TELEPHONE ENCOUNTER
DELETE AFTER REVIEWING: Telephone encounter to be sent to the clinical pool.    Pharmacy Name: [facility] [address] [place] [address] [address] AT [address] [phone] [place] [phone]      Pharmacy representative name: JOSE    Pharmacy representative phone number: [phone] EXT 2    What medication are you calling in regards to: MODAFINIL    What question does the pharmacy have: PT HAD SENT REQUEST TO PHARMACY TO FILL BOTH THE 100MG AND 200MG OF MODAFINIL MEDICATION. JOSE WAS CALLING TO CONFIRM THAT THE 100MG WAS THE ONLY ONCE NEEDING TO BE REFILLED AS IT WAS THE MOST RECENT, PRESCRIBED BY DR. BLANC.    Who is the provider that prescribed the medication: DR. BLANC    Additional notes: NONE

## 2021-02-18 RX ORDER — MODAFINIL 200 MG/1
TABLET ORAL
Qty: 30 TABLET | Refills: 2 | Status: SHIPPED | OUTPATIENT
Start: 2021-02-18 | End: 2021-05-24

## 2021-02-18 NOTE — TELEPHONE ENCOUNTER
[facility] WOULD LIKE SOME FURTHER CLARIFICATION ABOUT THE MODAFINIL. PT PICKED UP 100MG YESTERDAY THEN THERE IS ANOTHER PRESCRIPTION THEY SAID THEY RECEIVED TO FILL A 200MG FOR MODAFINIL. CAN SOMEONE PLEASE CALL [facility] TO CLARIFY. PLEASE ADVISE.    [facility] PH: [phone] OPTION 2

## 2021-03-02 DIAGNOSIS — G71.11 MYOTONIC DYSTROPHY, TYPE 2 (HCC): ICD-10-CM

## 2021-03-02 RX ORDER — HYDROCODONE BITARTRATE AND ACETAMINOPHEN 10; 325 MG/1; MG/1
1 TABLET ORAL EVERY 6 HOURS PRN
Qty: 120 TABLET | Refills: 0 | Status: SHIPPED | OUTPATIENT
Start: 2021-03-02 | End: 2021-04-01 | Stop reason: SDUPTHER

## 2021-03-03 RX ORDER — CLOPIDOGREL BISULFATE 75 MG/1
TABLET ORAL
Qty: 90 TABLET | Refills: 3 | Status: SHIPPED | OUTPATIENT
Start: 2021-03-03 | End: 2022-02-28

## 2021-04-01 DIAGNOSIS — G71.11 MYOTONIC DYSTROPHY, TYPE 2 (HCC): ICD-10-CM

## 2021-04-01 RX ORDER — HYDROCODONE BITARTRATE AND ACETAMINOPHEN 10; 325 MG/1; MG/1
1 TABLET ORAL EVERY 6 HOURS PRN
Qty: 120 TABLET | Refills: 0 | Status: SHIPPED | OUTPATIENT
Start: 2021-04-01 | End: 2021-05-03 | Stop reason: SDUPTHER

## 2021-04-02 RX ORDER — BUSPIRONE HYDROCHLORIDE 15 MG/1
15 TABLET ORAL 3 TIMES DAILY
Qty: 270 TABLET | Refills: 1 | Status: SHIPPED | OUTPATIENT
Start: 2021-04-02 | End: 2021-09-27

## 2021-04-02 RX ORDER — BUSPIRONE HYDROCHLORIDE 15 MG/1
15 TABLET ORAL 3 TIMES DAILY
Qty: 270 TABLET | Refills: 1 | Status: CANCELLED | OUTPATIENT
Start: 2021-04-02

## 2021-04-02 RX ORDER — BACLOFEN 10 MG/1
10 TABLET ORAL 3 TIMES DAILY
Qty: 270 TABLET | Refills: 1 | Status: SHIPPED | OUTPATIENT
Start: 2021-04-02 | End: 2021-12-20

## 2021-04-02 NOTE — TELEPHONE ENCOUNTER
Caller: Hafsa Barron    Relationship: Self    Best call back number:[phone]    Medication needed:   Requested Prescriptions     Pending Prescriptions Disp Refills   • busPIRone (BUSPAR) 15 MG tablet 270 tablet 1     Sig: Take 1 tablet by mouth 3 (Three) Times a Day.       When do you need the refill by: AS SOON AS POSSIBLE    What additional details did the patient provide when requesting the medication: PATIENT STATED SHE REQUESTED Tuesday, 3/30.  PATIENT IS OUT.    Does the patient have less than a 3 day supply:  [x] Yes  [] No    What is the patient's preferred pharmacy: [address] [phone] [facility] [phone]

## 2021-04-09 RX ORDER — TIZANIDINE 4 MG/1
4 TABLET ORAL NIGHTLY
Qty: 90 TABLET | Refills: 1 | Status: SHIPPED | OUTPATIENT
Start: 2021-04-09 | End: 2021-11-17

## 2021-04-14 ENCOUNTER — APPOINTMENT (OUTPATIENT)
Dept: MRI IMAGING | Facility: HOSPITAL | Age: 51
End: 2021-04-14

## 2021-04-14 ENCOUNTER — APPOINTMENT (OUTPATIENT)
Dept: GENERAL RADIOLOGY | Facility: HOSPITAL | Age: 51
End: 2021-04-14

## 2021-04-14 ENCOUNTER — APPOINTMENT (OUTPATIENT)
Dept: CT IMAGING | Facility: HOSPITAL | Age: 51
End: 2021-04-14

## 2021-04-14 ENCOUNTER — HOSPITAL ENCOUNTER (EMERGENCY)
Facility: HOSPITAL | Age: 51
Discharge: HOME | End: 2021-04-15
Attending: EMERGENCY MEDICINE | Admitting: EMERGENCY MEDICINE

## 2021-04-14 DIAGNOSIS — Z86.79 HISTORY OF HYPERTENSION: ICD-10-CM

## 2021-04-14 DIAGNOSIS — Z86.39 HISTORY OF HYPERLIPIDEMIA: ICD-10-CM

## 2021-04-14 DIAGNOSIS — Z87.891 FORMER SMOKER: ICD-10-CM

## 2021-04-14 DIAGNOSIS — I69.30 HISTORY OF CEREBROVASCULAR ACCIDENT (CVA) WITH RESIDUAL DEFICIT: ICD-10-CM

## 2021-04-14 DIAGNOSIS — R20.0 NUMBNESS AND TINGLING IN LEFT ARM: Primary | ICD-10-CM

## 2021-04-14 DIAGNOSIS — E11.65 TYPE 2 DIABETES MELLITUS WITH HYPERGLYCEMIA, WITHOUT LONG-TERM CURRENT USE OF INSULIN (HCC): ICD-10-CM

## 2021-04-14 DIAGNOSIS — R20.2 NUMBNESS AND TINGLING IN LEFT ARM: Primary | ICD-10-CM

## 2021-04-14 LAB
ALBUMIN SERPL-MCNC: 4.5 G/DL (ref 3.5–5.2)
ALBUMIN/GLOB SERPL: 1.8 G/DL
ALP SERPL-CCNC: 114 U/L (ref 39–117)
ALT SERPL W P-5'-P-CCNC: 44 U/L (ref 1–33)
ANION GAP SERPL CALCULATED.3IONS-SCNC: 12 MMOL/L (ref 5–15)
AST SERPL-CCNC: 38 U/L (ref 1–32)
BASOPHILS # BLD AUTO: 0.07 10*3/MM3 (ref 0–0.2)
BASOPHILS # BLD AUTO: 0.08 10*3/MM3 (ref 0–0.2)
BASOPHILS NFR BLD AUTO: 1.1 % (ref 0–1.5)
BASOPHILS NFR BLD AUTO: 1.2 % (ref 0–1.5)
BILIRUB SERPL-MCNC: 0.2 MG/DL (ref 0–1.2)
BUN SERPL-MCNC: 15 MG/DL (ref 6–20)
BUN/CREAT SERPL: 23.4 (ref 7–25)
CALCIUM SPEC-SCNC: 9.6 MG/DL (ref 8.6–10.5)
CHLORIDE SERPL-SCNC: 104 MMOL/L (ref 98–107)
CO2 SERPL-SCNC: 25 MMOL/L (ref 22–29)
CREAT SERPL-MCNC: 0.64 MG/DL (ref 0.57–1)
DEPRECATED RDW RBC AUTO: 42.9 FL (ref 37–54)
DEPRECATED RDW RBC AUTO: 43 FL (ref 37–54)
EOSINOPHIL # BLD AUTO: 0.09 10*3/MM3 (ref 0–0.4)
EOSINOPHIL # BLD AUTO: 0.1 10*3/MM3 (ref 0–0.4)
EOSINOPHIL NFR BLD AUTO: 1.5 % (ref 0.3–6.2)
EOSINOPHIL NFR BLD AUTO: 1.5 % (ref 0.3–6.2)
ERYTHROCYTE [DISTWIDTH] IN BLOOD BY AUTOMATED COUNT: 13.3 % (ref 12.3–15.4)
ERYTHROCYTE [DISTWIDTH] IN BLOOD BY AUTOMATED COUNT: 13.5 % (ref 12.3–15.4)
GFR SERPL CREATININE-BSD FRML MDRD: 98 ML/MIN/1.73
GLOBULIN UR ELPH-MCNC: 2.5 GM/DL
GLUCOSE SERPL-MCNC: 245 MG/DL (ref 65–99)
HCT VFR BLD AUTO: 41.1 % (ref 34–46.6)
HCT VFR BLD AUTO: 43.6 % (ref 34–46.6)
HGB BLD-MCNC: 13.7 G/DL (ref 12–15.9)
HGB BLD-MCNC: 14.6 G/DL (ref 12–15.9)
HOLD SPECIMEN: NORMAL
HOLD SPECIMEN: NORMAL
IMM GRANULOCYTES # BLD AUTO: 0.03 10*3/MM3 (ref 0–0.05)
IMM GRANULOCYTES # BLD AUTO: 0.05 10*3/MM3 (ref 0–0.05)
IMM GRANULOCYTES NFR BLD AUTO: 0.5 % (ref 0–0.5)
IMM GRANULOCYTES NFR BLD AUTO: 0.8 % (ref 0–0.5)
INR PPP: 0.97 (ref 0.85–1.16)
LYMPHOCYTES # BLD AUTO: 1.89 10*3/MM3 (ref 0.7–3.1)
LYMPHOCYTES # BLD AUTO: 2.05 10*3/MM3 (ref 0.7–3.1)
LYMPHOCYTES NFR BLD AUTO: 29.2 % (ref 19.6–45.3)
LYMPHOCYTES NFR BLD AUTO: 33.6 % (ref 19.6–45.3)
MCH RBC QN AUTO: 29.4 PG (ref 26.6–33)
MCH RBC QN AUTO: 29.6 PG (ref 26.6–33)
MCHC RBC AUTO-ENTMCNC: 33.3 G/DL (ref 31.5–35.7)
MCHC RBC AUTO-ENTMCNC: 33.5 G/DL (ref 31.5–35.7)
MCV RBC AUTO: 87.7 FL (ref 79–97)
MCV RBC AUTO: 88.8 FL (ref 79–97)
MONOCYTES # BLD AUTO: 0.39 10*3/MM3 (ref 0.1–0.9)
MONOCYTES # BLD AUTO: 0.48 10*3/MM3 (ref 0.1–0.9)
MONOCYTES NFR BLD AUTO: 6 % (ref 5–12)
MONOCYTES NFR BLD AUTO: 7.9 % (ref 5–12)
NEUTROPHILS NFR BLD AUTO: 3.38 10*3/MM3 (ref 1.7–7)
NEUTROPHILS NFR BLD AUTO: 3.96 10*3/MM3 (ref 1.7–7)
NEUTROPHILS NFR BLD AUTO: 55.4 % (ref 42.7–76)
NEUTROPHILS NFR BLD AUTO: 61.3 % (ref 42.7–76)
NRBC BLD AUTO-RTO: 0 /100 WBC (ref 0–0.2)
NRBC BLD AUTO-RTO: 0 /100 WBC (ref 0–0.2)
NT-PROBNP SERPL-MCNC: 31.9 PG/ML (ref 0–900)
PLATELET # BLD AUTO: 213 10*3/MM3 (ref 140–450)
PLATELET # BLD AUTO: 247 10*3/MM3 (ref 140–450)
PMV BLD AUTO: 11.1 FL (ref 6–12)
PMV BLD AUTO: 11.3 FL (ref 6–12)
POTASSIUM SERPL-SCNC: 4.1 MMOL/L (ref 3.5–5.2)
PROT SERPL-MCNC: 7 G/DL (ref 6–8.5)
PROTHROMBIN TIME: 12.6 SECONDS (ref 11.4–14.4)
RBC # BLD AUTO: 4.63 10*6/MM3 (ref 3.77–5.28)
RBC # BLD AUTO: 4.97 10*6/MM3 (ref 3.77–5.28)
SODIUM SERPL-SCNC: 141 MMOL/L (ref 136–145)
TROPONIN T SERPL-MCNC: <0.01 NG/ML (ref 0–0.03)
WBC # BLD AUTO: 6.1 10*3/MM3 (ref 3.4–10.8)
WBC # BLD AUTO: 6.47 10*3/MM3 (ref 3.4–10.8)
WHOLE BLOOD HOLD SPECIMEN: NORMAL
WHOLE BLOOD HOLD SPECIMEN: NORMAL

## 2021-04-14 PROCEDURE — 85610 PROTHROMBIN TIME: CPT | Performed by: PHYSICIAN ASSISTANT

## 2021-04-14 PROCEDURE — 83880 ASSAY OF NATRIURETIC PEPTIDE: CPT | Performed by: PHYSICIAN ASSISTANT

## 2021-04-14 PROCEDURE — 99284 EMERGENCY DEPT VISIT MOD MDM: CPT

## 2021-04-14 PROCEDURE — 85025 COMPLETE CBC W/AUTO DIFF WBC: CPT | Performed by: PHYSICIAN ASSISTANT

## 2021-04-14 PROCEDURE — 70450 CT HEAD/BRAIN W/O DYE: CPT

## 2021-04-14 PROCEDURE — 80053 COMPREHEN METABOLIC PANEL: CPT | Performed by: EMERGENCY MEDICINE

## 2021-04-14 PROCEDURE — 93005 ELECTROCARDIOGRAM TRACING: CPT | Performed by: EMERGENCY MEDICINE

## 2021-04-14 PROCEDURE — 84484 ASSAY OF TROPONIN QUANT: CPT | Performed by: PHYSICIAN ASSISTANT

## 2021-04-14 PROCEDURE — 93005 ELECTROCARDIOGRAM TRACING: CPT

## 2021-04-14 PROCEDURE — 70551 MRI BRAIN STEM W/O DYE: CPT

## 2021-04-14 PROCEDURE — 93005 ELECTROCARDIOGRAM TRACING: CPT | Performed by: PHYSICIAN ASSISTANT

## 2021-04-14 PROCEDURE — 85025 COMPLETE CBC W/AUTO DIFF WBC: CPT

## 2021-04-14 PROCEDURE — 71045 X-RAY EXAM CHEST 1 VIEW: CPT

## 2021-04-14 RX ORDER — SODIUM CHLORIDE 0.9 % (FLUSH) 0.9 %
10 SYRINGE (ML) INJECTION AS NEEDED
Status: DISCONTINUED | OUTPATIENT
Start: 2021-04-14 | End: 2021-04-15 | Stop reason: HOSPADM

## 2021-04-15 VITALS
BODY MASS INDEX: 38.57 KG/M2 | SYSTOLIC BLOOD PRESSURE: 167 MMHG | HEART RATE: 98 BPM | OXYGEN SATURATION: 96 % | RESPIRATION RATE: 18 BRPM | DIASTOLIC BLOOD PRESSURE: 90 MMHG | HEIGHT: 66 IN | WEIGHT: 240 LBS | TEMPERATURE: 98.1 F

## 2021-04-15 LAB — HOLD SPECIMEN: NORMAL

## 2021-04-15 RX ORDER — HYDROCODONE BITARTRATE AND ACETAMINOPHEN 10; 325 MG/1; MG/1
1 TABLET ORAL ONCE
Status: COMPLETED | OUTPATIENT
Start: 2021-04-15 | End: 2021-04-15

## 2021-04-15 RX ADMIN — HYDROCODONE BITARTRATE AND ACETAMINOPHEN 1 TABLET: 10; 325 TABLET ORAL at 00:26

## 2021-04-15 NOTE — ED PROVIDER NOTES
"Subjective   This is a 50-year-old female that presents to the ER with increased tingling and numbness to the left upper extremity that started yesterday at 11 AM.  Patient reports sensation of \"pins-and-needles\".  Patient has history of right-sided CVA in August 2013 with residual left-sided deficits.  Patient also has history of muscular dystrophy and reports bilateral lower extremity weakness.  She is followed for stroke and history of muscular dystrophy by the neurologist, Dr. Edwards.  She takes Plavix and aspirin daily.  Patient denies any chest pain or shortness of breath.  She denies any recent symptoms of illness such as URI symptoms, loss of taste or smell, or fever/chills.  Patient reports history of subclavian artery stent in 8/2018.  At that time, patient had increased pain and discoloration of fingertips on the left hand.  She was transferred from our facility to  for intervention.  Patient denies any recent speech changes or trouble thought forming.  Patient denies any particular neck pain.  She does look at a computer frequently but denies any personal history of herniated disks to the C-spine.  Patient denies any low back pain.  She denies any urinary or bowel changes or incontinence.  She denies any recent medication changes.  Past medical history is significant for hyperlipidemia, muscular dystrophy, anxiety, fibromyalgia, history of right posterior frontal CVA with residual left-sided deficits, hypertension, IBS, type 2 diabetes mellitus, and former smoking history.  Patient denies any discoloration to hands or swelling to extremities.  She contacted her PCP and they advised her to come to the ER for further assessment.      History provided by:  Patient  Neurologic Problem  The patient's primary symptoms include focal sensory loss (tingling and numbness to left upper extremity.  Sensation of \"pins and needles\") and slurred speech. The patient's pertinent negatives include no altered mental " status, loss of balance, memory loss, near-syncope, syncope, visual change or weakness. This is a new problem. The current episode started yesterday (Yesterday morning at 1100). The problem is unchanged. There was upper extremity (Left upper extremity) focality noted. Pertinent negatives include no abdominal pain, auditory change, back pain, bladder incontinence, bowel incontinence, chest pain, confusion, diaphoresis, dizziness, fatigue, fever, headaches, light-headedness, nausea, neck pain, palpitations, shortness of breath, vertigo or vomiting. Past treatments include aspirin (Patient's usual Plavix and aspirin). Her past medical history is significant for a CVA (History of right-sided CVA with residual left-sided deficits.).       Review of Systems   Constitutional: Negative.  Negative for activity change, appetite change, chills, diaphoresis, fatigue and fever.   HENT: Negative for congestion, postnasal drip, rhinorrhea, sinus pressure, sinus pain and sneezing.    Respiratory: Negative.  Negative for chest tightness and shortness of breath.    Cardiovascular: Negative.  Negative for chest pain, palpitations, leg swelling and near-syncope.   Gastrointestinal: Negative.  Negative for abdominal pain, bowel incontinence, constipation, diarrhea, nausea and vomiting.   Genitourinary: Negative.  Negative for bladder incontinence.   Musculoskeletal: Negative.  Negative for back pain, gait problem and neck pain.   Neurological: Positive for numbness (Numbness and tingling to left upper extremity, new in onset.). Negative for dizziness, vertigo, syncope, facial asymmetry, speech difficulty, weakness, light-headedness, headaches and loss of balance.        History of right posterior frontal CVA in 2013 with left-sided residual deficits.  History of muscular dystrophy.   Psychiatric/Behavioral: Negative for confusion and memory loss.   All other systems reviewed and are negative.      Past Medical History:   Diagnosis Date    • Allergic    • Allergy    • Anxiety    • Asthma Allergies induced   • Depression    • Fibromyalgia, primary    • GERD (gastroesophageal reflux disease)    • Headache    • History of blood clots    • Hyperlipidemia    • Hypertension    • IBS (irritable bowel syndrome)    • Internal hemorrhoid    • Kidney stone    • Low back pain    • Muscular dystrophy (CMS/[facility])    • Muscular dystrophy (CMS/[facility])     II   • Myotonia    • Obesity    • Stroke (CMS/[facility]) 2013    resdual- left sided weakness.    • Type 2 diabetes mellitus without complication, without long-term current use of insulin (CMS/[facility])        Allergies   Allergen Reactions   • Penicillins Anaphylaxis and Shortness Of Breath       Past Surgical History:   Procedure Laterality Date   • CHOLECYSTECTOMY  2004   • COLONOSCOPY  2017   • D&C WITH SUCTION     • LYMPH NODE BIOPSY     • SUBCLAVIAN ARTERY STENT  08/2018    x2       Family History   Problem Relation Age of Onset   • Allergies Other    • ADD / ADHD Other    • Heart block Other    • Other Other         CEREBROVASCULAR ACCIDENT    • Hypertension Other    • Cancer Other         LUNG CANCER   • Hyperlipidemia Other    • Alcohol abuse Mother    • Depression Mother    • Early death Mother          age 59   • Heart disease Mother         Mother  of Massive Heart attack   • Hyperlipidemia Mother    • Hypertension Mother    • Miscarriages / Stillbirths Mother         Miscarriage   • Heart attack Mother          of  massive heart attack   • Alcohol abuse Father    • Cancer Father         Had Lung Cancer - had 1 lung till death   • Diabetes Father         Lived on insuline shots   • Early death Father          age 53   • Breast cancer Maternal Aunt    • Learning disabilities Son         Had Adhd   • Breast cancer Cousin    • Ovarian cancer Neg Hx    • Endometrial cancer Neg Hx    • Uterine cancer Neg Hx    • Colon cancer Neg Hx        Social History     Socioeconomic History   •  Marital status:      Spouse name: Not on file   • Number of children: Not on file   • Years of education: Not on file   • Highest education level: Not on file   Tobacco Use   • Smoking status: Former Smoker     Packs/day: 1.50     Years: 15.00     Pack years: 22.50     Start date: 1983     Quit date: 2013     Years since quittin.6   • Smokeless tobacco: Never Used   • Tobacco comment: use Ecigg now NO NICOTENE   Substance and Sexual Activity   • Alcohol use: Yes   • Drug use: No   • Sexual activity: Yes     Partners: Male     Birth control/protection: Post-menopausal     Comment: Menapause no cycle since 2013           Objective   Physical Exam  Vitals and nursing note reviewed.   Constitutional:       Appearance: Normal appearance.   HENT:      Head: Normocephalic and atraumatic.      Right Ear: Tympanic membrane normal.      Left Ear: Tympanic membrane normal.      Nose: Nose normal.      Mouth/Throat:      Mouth: Mucous membranes are moist.      Pharynx: Oropharynx is clear.   Eyes:      Extraocular Movements: Extraocular movements intact.      Conjunctiva/sclera: Conjunctivae normal.      Pupils: Pupils are equal, round, and reactive to light.   Neck:      Vascular: No carotid bruit.      Comments: No C-spine or cervical myofascial tenderness.  No carotid bruits noted bilaterally.  Cardiovascular:      Rate and Rhythm: Normal rate and regular rhythm.  No extrasystoles are present.     Pulses: Normal pulses.           Radial pulses are 2+ on the right side and 2+ on the left side.        Dorsalis pedis pulses are 2+ on the right side and 2+ on the left side.        Posterior tibial pulses are 2+ on the right side and 2+ on the left side.      Heart sounds: Normal heart sounds. No murmur heard.        Comments: Regular rate and rhythm.  No ectopy.  No pedal edema to upper or lower extremities.  No discoloration noted to the hands or feet.  Strong bilateral DP and PT pulses and strong  bilateral radial and ulnar pulses and brisk capillary refill.  No evidence of neurovascular compromise.  Pulmonary:      Effort: Pulmonary effort is normal. No tachypnea or accessory muscle usage.      Breath sounds: Normal breath sounds. No decreased air movement. No decreased breath sounds, wheezing or rhonchi.      Comments: Lungs are clear to auscultation bilaterally.  Abdominal:      General: Bowel sounds are normal.      Palpations: Abdomen is soft.   Musculoskeletal:         General: Normal range of motion.      Cervical back: Normal range of motion and neck supple. No spinous process tenderness or muscular tenderness.      Right lower leg: No edema.      Left lower leg: No edema.   Skin:     General: Skin is warm and dry.   Neurological:      General: No focal deficit present.      Mental Status: She is alert.      Comments: Speech is clear and concentrated.  Smile is equal and tongue is midline.  No evidence of expressive aphasia.   strength right side is 5 out of 5 and  strength left side is 4 out of 5.  Equal  strength 4 out of 5 to bilateral lower extremities.  Patient reports chronic lower extremity weakness secondary to muscular dystrophy.   Psychiatric:         Mood and Affect: Mood normal.         Speech: Speech normal.         Behavior: Behavior normal. Behavior is cooperative.         Cognition and Memory: Cognition normal.      Comments: Patient friendly and talkative.         Procedures           ED Course  ED Course as of Apr 15 0526   Thu Apr 15, 2021   0015 EKG shows normal sinus rhythm.  There is no acute ST-T wave changes consistent with ischemia.  CBC and chemistries were essentially normal except for serum glucose of 245 and mild elevation of LFTs.  ALT 44 and AST 38.  Troponin was less than 0.010.  BNP is 31.  CT of the brain without contrast reveals ill-defined areas of low attenuation with some volume loss in the right frontal lobe consistent with stroke history.  Appearance  is suggestive of late subacute to chronic stroke.  Mild nonspecific prominence of the lateral ventricles which could be developmental.  I discussed the case with stroke navigator team and they recommended MRI without contrast.  MRI revealed no acute intracranial abnormality.  No evidence of acute ischemia or other restricted diffusion.  Old right posterior frontal lobe infarct.  Patient is stable for discharge to home and she can follow-up closely with her neurologist, Dr. Edwards, for recheck.  Also advised patient that she needs to follow-up with PCP.  She may benefit from outpatient CT of the cervical spine without contrast to rule out herniated cervical disks causing radicular symptoms to left upper extremity.    [FC]   0524 Patient needs to continue with Plavix and aspirin as directed.  Continue with all other current medical management.  Return to the ER sooner if any worsening symptoms.  There is no evidence of neurovascular compromise to upper or lower extremities.    [FC]      ED Course User Index  [FC] Olga Win PA-C                 Recent Results (from the past 24 hour(s))   Light Blue Top    Collection Time: 04/14/21  8:41 PM   Result Value Ref Range    Extra Tube hold for add-on    Protime-INR    Collection Time: 04/14/21  8:41 PM    Specimen: Blood   Result Value Ref Range    Protime 12.6 11.4 - 14.4 Seconds    INR 0.97 0.85 - 1.16   Comprehensive Metabolic Panel    Collection Time: 04/14/21  8:42 PM    Specimen: Blood   Result Value Ref Range    Glucose 245 (H) 65 - 99 mg/dL    BUN 15 6 - 20 mg/dL    Creatinine 0.64 0.57 - 1.00 mg/dL    Sodium 141 136 - 145 mmol/L    Potassium 4.1 3.5 - 5.2 mmol/L    Chloride 104 98 - 107 mmol/L    CO2 25.0 22.0 - 29.0 mmol/L    Calcium 9.6 8.6 - 10.5 mg/dL    Total Protein 7.0 6.0 - 8.5 g/dL    Albumin 4.50 3.50 - 5.20 g/dL    ALT (SGPT) 44 (H) 1 - 33 U/L    AST (SGOT) 38 (H) 1 - 32 U/L    Alkaline Phosphatase 114 39 - 117 U/L    Total Bilirubin 0.2 0.0 - 1.2  mg/dL    eGFR Non African Amer 98 >60 mL/min/1.73    Globulin 2.5 gm/dL    A/G Ratio 1.8 g/dL    BUN/Creatinine Ratio 23.4 7.0 - 25.0    Anion Gap 12.0 5.0 - 15.0 mmol/L   Green Top (Gel)    Collection Time: 04/14/21  8:42 PM   Result Value Ref Range    Extra Tube Hold for add-ons.    Lavender Top    Collection Time: 04/14/21  8:42 PM   Result Value Ref Range    Extra Tube hold for add-on    Gold Top - SST    Collection Time: 04/14/21  8:42 PM   Result Value Ref Range    Extra Tube Hold for add-ons.    Gray Top    Collection Time: 04/14/21  8:42 PM   Result Value Ref Range    Extra Tube Hold for add-ons.    CBC Auto Differential    Collection Time: 04/14/21  8:42 PM    Specimen: Blood   Result Value Ref Range    WBC 6.47 3.40 - 10.80 10*3/mm3    RBC 4.97 3.77 - 5.28 10*6/mm3    Hemoglobin 14.6 12.0 - 15.9 g/dL    Hematocrit 43.6 34.0 - 46.6 %    MCV 87.7 79.0 - 97.0 fL    MCH 29.4 26.6 - 33.0 pg    MCHC 33.5 31.5 - 35.7 g/dL    RDW 13.5 12.3 - 15.4 %    RDW-SD 42.9 37.0 - 54.0 fl    MPV 11.3 6.0 - 12.0 fL    Platelets 247 140 - 450 10*3/mm3    Neutrophil % 61.3 42.7 - 76.0 %    Lymphocyte % 29.2 19.6 - 45.3 %    Monocyte % 6.0 5.0 - 12.0 %    Eosinophil % 1.5 0.3 - 6.2 %    Basophil % 1.2 0.0 - 1.5 %    Immature Grans % 0.8 (H) 0.0 - 0.5 %    Neutrophils, Absolute 3.96 1.70 - 7.00 10*3/mm3    Lymphocytes, Absolute 1.89 0.70 - 3.10 10*3/mm3    Monocytes, Absolute 0.39 0.10 - 0.90 10*3/mm3    Eosinophils, Absolute 0.10 0.00 - 0.40 10*3/mm3    Basophils, Absolute 0.08 0.00 - 0.20 10*3/mm3    Immature Grans, Absolute 0.05 0.00 - 0.05 10*3/mm3    nRBC 0.0 0.0 - 0.2 /100 WBC   Troponin    Collection Time: 04/14/21  8:42 PM    Specimen: Blood   Result Value Ref Range    Troponin T <0.010 0.000 - 0.030 ng/mL   BNP    Collection Time: 04/14/21  8:42 PM    Specimen: Blood   Result Value Ref Range    proBNP 31.9 0.0 - 900.0 pg/mL   CBC Auto Differential    Collection Time: 04/14/21 10:25 PM    Specimen: Blood   Result Value  Ref Range    WBC 6.10 3.40 - 10.80 10*3/mm3    RBC 4.63 3.77 - 5.28 10*6/mm3    Hemoglobin 13.7 12.0 - 15.9 g/dL    Hematocrit 41.1 34.0 - 46.6 %    MCV 88.8 79.0 - 97.0 fL    MCH 29.6 26.6 - 33.0 pg    MCHC 33.3 31.5 - 35.7 g/dL    RDW 13.3 12.3 - 15.4 %    RDW-SD 43.0 37.0 - 54.0 fl    MPV 11.1 6.0 - 12.0 fL    Platelets 213 140 - 450 10*3/mm3    Neutrophil % 55.4 42.7 - 76.0 %    Lymphocyte % 33.6 19.6 - 45.3 %    Monocyte % 7.9 5.0 - 12.0 %    Eosinophil % 1.5 0.3 - 6.2 %    Basophil % 1.1 0.0 - 1.5 %    Immature Grans % 0.5 0.0 - 0.5 %    Neutrophils, Absolute 3.38 1.70 - 7.00 10*3/mm3    Lymphocytes, Absolute 2.05 0.70 - 3.10 10*3/mm3    Monocytes, Absolute 0.48 0.10 - 0.90 10*3/mm3    Eosinophils, Absolute 0.09 0.00 - 0.40 10*3/mm3    Basophils, Absolute 0.07 0.00 - 0.20 10*3/mm3    Immature Grans, Absolute 0.03 0.00 - 0.05 10*3/mm3    nRBC 0.0 0.0 - 0.2 /100 WBC     Note: In addition to lab results from this visit, the labs listed above may include labs taken at another facility or during a different encounter within the last 24 hours. Please correlate lab times with ED admission and discharge times for further clarification of the services performed during this visit.    MRI Brain Without Contrast   Final Result   1.  No acute intracranial abnormality is demonstrated. No evidence of acute ischemia or other restricted diffusion.   2.  Old right posterior frontal lobe infarct.   3.  Minimal scattered chronic small vessel type white matter changes.      Signer Name: Marcel Groves MD    Signed: 4/15/2021 12:00 AM    Workstation Name: [email or web address]     [facility]      XR Chest 1 View   Final Result   No acute cardiopulmonary findings.      Signer Name: Luisa Marc MD    Signed: 4/14/2021 10:07 PM    Workstation Name: [email or web address]     [facility] [place]      CT Head Without Contrast   Final Result      1. Ill-defined areas of low-attenuation with some volume loss in  the right frontal lobe superolaterally consistent with stroke history. Appearance is suggestive of late subacute to chronic stroke but I do not have any comparison study. No acute   hemorrhage or mass effect is appreciated.   2. Mild nonspecific prominence of the lateral ventricles could be developmental. Most helpful would be comparison to the prior imaging      Signer Name: Luisa Marc MD    Signed: 4/14/2021 8:04 PM    Workstation Name: [email or web address]     [facility]        Vitals:    04/14/21 2300 04/15/21 0026 04/15/21 0045 04/15/21 0111   BP: (!) 170/105 (!) 123/110 167/90 167/90   BP Location:    Right arm   Patient Position:    Sitting   Pulse: 106 97 100 98   Resp:    18   Temp:       TempSrc:       SpO2: 96% 96% 97% 96%   Weight:       Height:         Medications   HYDROcodone-acetaminophen (NORCO)  MG per tablet 1 tablet (1 tablet Oral Given 4/15/21 0026)     ECG/EMG Results (last 24 hours)     Procedure Component Value Units Date/Time    ECG 12 Lead [[id number]] Collected: 04/14/21 1905     Updated: 04/14/21 1906    ECG 12 Lead [[id number]] Collected: 04/14/21 2220     Updated: 04/14/21 2219        ECG 12 Lead         ECG 12 Lead                                          MDM    Final diagnoses:   Numbness and tingling in left arm   History of cerebrovascular accident (CVA) with residual deficit   History of hypertension   History of hyperlipidemia   Type 2 diabetes mellitus with hyperglycemia, without long-term current use of insulin (CMS/[facility])   Former smoker       ED Disposition  ED Disposition     ED Disposition Condition Comment    Discharge Stable           Fausto Edwards MD  [address]  [facility] [address] [phone]    Call in 1 day  Call in the morning for first available follow-up    [facility]  [address] [address] [phone]    If symptoms worsen    Jocy Snow  MD [facility]  [address]  [address] [address] [phone]    Schedule an appointment as soon as possible for a visit in 2 days           Medication List      Changed    nitrofurantoin (macrocrystal-monohydrate) 100 MG capsule  Commonly known as: MACROBID  Take 1 capsule by mouth 2 (Two) Times a Day.  What changed: how much to take             Olga Win PA-C  04/15/21 0549

## 2021-04-15 NOTE — DISCHARGE INSTRUCTIONS
ER evaluation revealed previous stroke to the right posterior frontal lobe.  MRI of the brain without contrast revealed no evidence of acute stroke.  All other work-up, including EKG, troponin, and labs were essentially normal except for elevated serum glucose.  Blood pressure is stable.  Recommend first available follow-up with neurologist, Dr. Edwarsd, for recheck.  Continue with Norco as directed for chronic pain, and recommend close PCP follow-up for recheck on diabetes mellitus.  Continue with all other current medical management.  Return to the ER sooner if worsening symptoms.

## 2021-04-17 LAB
QT INTERVAL: 342 MS
QT INTERVAL: 382 MS
QTC INTERVAL: 477 MS
QTC INTERVAL: 487 MS

## 2021-05-03 DIAGNOSIS — G71.11 MYOTONIC DYSTROPHY, TYPE 2 (HCC): ICD-10-CM

## 2021-05-04 RX ORDER — MONTELUKAST SODIUM 10 MG/1
10 TABLET ORAL EVERY EVENING
Qty: 90 TABLET | Refills: 3 | Status: SHIPPED | OUTPATIENT
Start: 2021-05-04 | End: 2022-05-06

## 2021-05-04 RX ORDER — VENLAFAXINE HYDROCHLORIDE 37.5 MG/1
37.5 CAPSULE, EXTENDED RELEASE ORAL DAILY
Qty: 90 CAPSULE | Refills: 3 | Status: SHIPPED | OUTPATIENT
Start: 2021-05-04 | End: 2022-05-20

## 2021-05-04 RX ORDER — DICLOFENAC SODIUM 75 MG/1
75 TABLET, DELAYED RELEASE ORAL 2 TIMES DAILY
Qty: 180 TABLET | Refills: 3 | Status: SHIPPED | OUTPATIENT
Start: 2021-05-04 | End: 2022-05-04 | Stop reason: SDUPTHER

## 2021-05-04 RX ORDER — SPIRONOLACTONE 50 MG/1
50 TABLET, FILM COATED ORAL DAILY
Qty: 90 TABLET | Refills: 3 | Status: SHIPPED | OUTPATIENT
Start: 2021-05-04 | End: 2022-05-20

## 2021-05-04 RX ORDER — HYDROCODONE BITARTRATE AND ACETAMINOPHEN 10; 325 MG/1; MG/1
1 TABLET ORAL EVERY 6 HOURS PRN
Qty: 120 TABLET | Refills: 0 | Status: SHIPPED | OUTPATIENT
Start: 2021-05-04 | End: 2021-05-24 | Stop reason: SDUPTHER

## 2021-05-24 DIAGNOSIS — G71.11 MYOTONIC DYSTROPHY, TYPE 2 (HCC): ICD-10-CM

## 2021-05-24 DIAGNOSIS — G47.19 DAYTIME HYPERSOMNOLENCE: ICD-10-CM

## 2021-05-24 RX ORDER — MODAFINIL 200 MG/1
TABLET ORAL
Qty: 30 TABLET | Refills: 1 | Status: SHIPPED | OUTPATIENT
Start: 2021-05-24 | End: 2021-07-29 | Stop reason: SDUPTHER

## 2021-05-24 RX ORDER — HYDROCODONE BITARTRATE AND ACETAMINOPHEN 10; 325 MG/1; MG/1
1 TABLET ORAL EVERY 6 HOURS PRN
Qty: 120 TABLET | Refills: 0 | Status: SHIPPED | OUTPATIENT
Start: 2021-05-24 | End: 2021-07-01 | Stop reason: SDUPTHER

## 2021-06-04 RX ORDER — FENOFIBRATE 145 MG/1
TABLET, COATED ORAL
Qty: 90 TABLET | Refills: 1 | Status: SHIPPED | OUTPATIENT
Start: 2021-06-04 | End: 2021-12-03

## 2021-06-04 RX ORDER — PROMETHAZINE HYDROCHLORIDE 12.5 MG/1
12.5 TABLET ORAL EVERY 6 HOURS PRN
Qty: 30 TABLET | Refills: 1 | Status: SHIPPED | OUTPATIENT
Start: 2021-06-04 | End: 2021-07-01 | Stop reason: SDUPTHER

## 2021-06-23 ENCOUNTER — OFFICE VISIT (OUTPATIENT)
Dept: INTERNAL MEDICINE | Facility: CLINIC | Age: 51
End: 2021-06-23

## 2021-06-23 VITALS
DIASTOLIC BLOOD PRESSURE: 78 MMHG | BODY MASS INDEX: 40.18 KG/M2 | SYSTOLIC BLOOD PRESSURE: 142 MMHG | HEIGHT: 66 IN | WEIGHT: 250 LBS | TEMPERATURE: 97.7 F

## 2021-06-23 DIAGNOSIS — B00.1 COLD SORE: ICD-10-CM

## 2021-06-23 DIAGNOSIS — E11.9 TYPE 2 DIABETES MELLITUS WITHOUT COMPLICATION, WITHOUT LONG-TERM CURRENT USE OF INSULIN (HCC): ICD-10-CM

## 2021-06-23 DIAGNOSIS — R11.0 NAUSEA: Primary | ICD-10-CM

## 2021-06-23 DIAGNOSIS — B37.31 YEAST VAGINITIS: ICD-10-CM

## 2021-06-23 LAB
BILIRUB BLD-MCNC: NEGATIVE MG/DL
CLARITY, POC: ABNORMAL
COLOR UR: ABNORMAL
GLUCOSE UR STRIP-MCNC: ABNORMAL MG/DL
HBA1C MFR BLD: 10.2 %
KETONES UR QL: ABNORMAL
LEUKOCYTE EST, POC: ABNORMAL
NITRITE UR-MCNC: NEGATIVE MG/ML
PH UR: 5 [PH] (ref 5–8)
PROT UR STRIP-MCNC: NEGATIVE MG/DL
RBC # UR STRIP: NEGATIVE /UL
SP GR UR: 1.02 (ref 1–1.03)
UROBILINOGEN UR QL: NORMAL

## 2021-06-23 PROCEDURE — 83036 HEMOGLOBIN GLYCOSYLATED A1C: CPT | Performed by: INTERNAL MEDICINE

## 2021-06-23 PROCEDURE — 99214 OFFICE O/P EST MOD 30 MIN: CPT | Performed by: INTERNAL MEDICINE

## 2021-06-23 PROCEDURE — 81003 URINALYSIS AUTO W/O SCOPE: CPT | Performed by: INTERNAL MEDICINE

## 2021-06-23 RX ORDER — FLUCONAZOLE 150 MG/1
150 TABLET ORAL ONCE
Qty: 1 TABLET | Refills: 0 | Status: SHIPPED | OUTPATIENT
Start: 2021-06-23 | End: 2021-06-23

## 2021-06-23 RX ORDER — VALACYCLOVIR HYDROCHLORIDE 1 G/1
2000 TABLET, FILM COATED ORAL 2 TIMES DAILY
Qty: 4 TABLET | Refills: 0 | Status: SHIPPED | OUTPATIENT
Start: 2021-06-23 | End: 2022-02-05 | Stop reason: HOSPADM

## 2021-06-23 NOTE — PROGRESS NOTES
"Subjective   Hafsa Barron is a 50 y.o. female here for nausea, recurrent yeast vaginitis, lesion on lip.  Patient says over the last month or so she has had random episodes of severe nausea which will normally last about 15 minutes and then dissipate.  There is no vomiting.  It is not necessarily related to eating, it can happen in between meals.  She is on a PPI and she takes that at nighttime, several hours after she has eaten.  She says she does get acid reflux from time to time, but does not consider it to be uncontrolled.  She has gained some weight back from her gastric sleeve procedure.  She has been drinking regular sodas to help with her nausea as she finds it settles her stomach and she does not like diet sodas.  She has also had recurrent yeast vaginitis, thinks she may have that now.  Also has a left lip lesion that she thought was a cold sore but will not go away.  It does appear blisterlike.  She has history of cold sores.  She has been using a topical over-the-counter ointment on it and it has not worked.    I have reviewed the following portions of the patient's history and confirmed they are accurate: current medications, past medical history, past social history and problem list     I have personally completed the patient's review of systems.    Review of Systems:  General: negative  Neuro: negative  Skin: Lesion  : See HPI  GI: See HPI    Objective   /78   Temp 97.7 °F (36.5 °C) (Temporal)   Ht 167.6 cm (66\")   Wt 113 kg (250 lb)   LMP  (LMP Unknown)   BMI 40.35 kg/m²     Physical Exam  Vitals reviewed.   Constitutional:       Appearance: She is well-developed.   Pulmonary:      Effort: Pulmonary effort is normal.   Skin:     General: Skin is warm and dry.      Comments: 2 small pinpoint erythematous slightly raised lesions on the left upper lip/labial border, the left most 1 appears to be vesicular   Neurological:      Mental Status: She is alert and oriented to person, place, and " time.   Psychiatric:         Behavior: Behavior normal.         Thought Content: Thought content normal.         Judgment: Judgment normal.         Assessment/Plan   Diagnoses and all orders for this visit:    1. Nausea (Primary)  -May be due to uncontrolled diabetes versus GERD versus issue s/p gastric sleeve.  Have asked her to reach out to her bariatric surgeon regarding these symptoms and will treat uncontrolled diabetes and go from there.  May ultimately need EGD if symptoms do not improve    2. Type 2 diabetes mellitus without complication, without long-term current use of insulin (CMS/[facility])  -     metFORMIN (Glucophage) 1000 MG tablet; Take 1 tablet by mouth 2 (Two) Times a Day With Meals.  Dispense: 60 tablet; Refill: 5  -     POCT urinalysis dipstick, automated  -     POC Glycosylated Hemoglobin (Hb A1C): 10.2%, vastly uncontrolled.  Advised patient that she is going to have to completely cut out sugary beverages and we should also start Metformin back given how high the A1c is.  I think it may also be contributing to the nausea and certainly contributing to the recurrent yeast vaginitis so would be a good idea to go ahead and treat.  Goal for patient is to get off Metformin as she was previously    3. Yeast vaginitis  -     fluconazole (Diflucan) 150 MG tablet; Take 1 tablet by mouth 1 (One) Time for 1 dose.  Dispense: 1 tablet; Refill: 0  -Recurrent due to uncontrolled diabetes  ->1000 glucose in the urine which I explained to patient causes recurrent yeast infections    4. Cold sore  -     valACYclovir (Valtrex) 1000 MG tablet; Take 2 tablets by mouth 2 (Two) Times a Day.  Dispense: 4 tablet; Refill: 0.  If this course of Valtrex does not cure lesion, will have patient sent to dermatology given chronicity of lesion             Magdalena Bran MA    Please note that portions of this note were completed with a voice recognition program. Efforts were made to edit the dictations, but occasionally words  are mistranscribed.  Answers for HPI/ROS submitted by the patient on 6/19/2021  Please describe your symptoms.: Extreme nausea on & off throughout the day  on and off approx a month  Have you had these symptoms before?: No  How long have you been having these symptoms?: Greater than 2 weeks  Please list any medications you are currently taking for this condition.: Just  The prescription called in for me last week  Please describe any probable cause for these symptoms. : Not sure if it can be related to gastric surgery.  What is the primary reason for your visit?: Other

## 2021-06-28 RX ORDER — GABAPENTIN 600 MG/1
TABLET ORAL
Qty: 270 TABLET | Refills: 1 | Status: SHIPPED | OUTPATIENT
Start: 2021-06-28 | End: 2022-02-22

## 2021-07-01 DIAGNOSIS — G71.11 MYOTONIC DYSTROPHY, TYPE 2 (HCC): ICD-10-CM

## 2021-07-02 RX ORDER — PROMETHAZINE HYDROCHLORIDE 12.5 MG/1
12.5 TABLET ORAL EVERY 6 HOURS PRN
Qty: 30 TABLET | Refills: 1 | Status: SHIPPED | OUTPATIENT
Start: 2021-07-02 | End: 2022-02-05 | Stop reason: HOSPADM

## 2021-07-02 RX ORDER — HYDROCODONE BITARTRATE AND ACETAMINOPHEN 10; 325 MG/1; MG/1
1 TABLET ORAL EVERY 6 HOURS PRN
Qty: 120 TABLET | Refills: 0 | Status: SHIPPED | OUTPATIENT
Start: 2021-07-02 | End: 2021-07-30 | Stop reason: SDUPTHER

## 2021-07-29 DIAGNOSIS — G47.19 DAYTIME HYPERSOMNOLENCE: ICD-10-CM

## 2021-07-29 DIAGNOSIS — G71.11 MYOTONIC DYSTROPHY, TYPE 2 (HCC): ICD-10-CM

## 2021-07-29 RX ORDER — MODAFINIL 200 MG/1
200 TABLET ORAL DAILY
Qty: 30 TABLET | Refills: 1 | Status: SHIPPED | OUTPATIENT
Start: 2021-07-29 | End: 2021-10-12 | Stop reason: SDUPTHER

## 2021-07-29 RX ORDER — MODAFINIL 100 MG/1
100 TABLET ORAL DAILY
Qty: 30 TABLET | Refills: 5 | Status: SHIPPED | OUTPATIENT
Start: 2021-07-29 | End: 2021-10-12 | Stop reason: SDUPTHER

## 2021-07-30 DIAGNOSIS — G71.11 MYOTONIC DYSTROPHY, TYPE 2 (HCC): ICD-10-CM

## 2021-08-02 RX ORDER — HYDROCODONE BITARTRATE AND ACETAMINOPHEN 10; 325 MG/1; MG/1
1 TABLET ORAL EVERY 6 HOURS PRN
Qty: 120 TABLET | Refills: 0 | Status: SHIPPED | OUTPATIENT
Start: 2021-08-02 | End: 2021-09-11 | Stop reason: SDUPTHER

## 2021-08-12 RX ORDER — NITROFURANTOIN 25; 75 MG/1; MG/1
100 CAPSULE ORAL 2 TIMES DAILY
Qty: 10 CAPSULE | Refills: 0 | Status: SHIPPED | OUTPATIENT
Start: 2021-08-12 | End: 2022-02-05 | Stop reason: HOSPADM

## 2021-08-25 DIAGNOSIS — N39.0 FREQUENT UTI: Primary | ICD-10-CM

## 2021-08-25 RX ORDER — SULFAMETHOXAZOLE AND TRIMETHOPRIM 800; 160 MG/1; MG/1
1 TABLET ORAL 2 TIMES DAILY
Qty: 14 TABLET | Refills: 0 | Status: SHIPPED | OUTPATIENT
Start: 2021-08-25 | End: 2021-09-02

## 2021-09-02 ENCOUNTER — HOSPITAL ENCOUNTER (EMERGENCY)
Facility: HOSPITAL | Age: 51
Discharge: HOME | End: 2021-09-02
Attending: EMERGENCY MEDICINE | Admitting: EMERGENCY MEDICINE

## 2021-09-02 ENCOUNTER — APPOINTMENT (OUTPATIENT)
Dept: GENERAL RADIOLOGY | Facility: HOSPITAL | Age: 51
End: 2021-09-02

## 2021-09-02 VITALS
BODY MASS INDEX: 39.37 KG/M2 | TEMPERATURE: 98.4 F | DIASTOLIC BLOOD PRESSURE: 67 MMHG | WEIGHT: 245 LBS | OXYGEN SATURATION: 98 % | HEART RATE: 92 BPM | RESPIRATION RATE: 16 BRPM | HEIGHT: 66 IN | SYSTOLIC BLOOD PRESSURE: 153 MMHG

## 2021-09-02 DIAGNOSIS — R26.2 IMPAIRED AMBULATION: ICD-10-CM

## 2021-09-02 DIAGNOSIS — M76.60 PAIN IN ACHILLES TENDON: ICD-10-CM

## 2021-09-02 DIAGNOSIS — Z86.69 HISTORY OF MUSCULAR DYSTROPHY: ICD-10-CM

## 2021-09-02 DIAGNOSIS — Z87.39 HISTORY OF FIBROMYALGIA: ICD-10-CM

## 2021-09-02 DIAGNOSIS — M62.89 MYOTONIA: ICD-10-CM

## 2021-09-02 DIAGNOSIS — M25.572 ACUTE LEFT ANKLE PAIN: Primary | ICD-10-CM

## 2021-09-02 PROCEDURE — 99283 EMERGENCY DEPT VISIT LOW MDM: CPT

## 2021-09-02 PROCEDURE — 73610 X-RAY EXAM OF ANKLE: CPT

## 2021-09-02 RX ORDER — OXYCODONE HYDROCHLORIDE AND ACETAMINOPHEN 5; 325 MG/1; MG/1
1 TABLET ORAL ONCE
Status: COMPLETED | OUTPATIENT
Start: 2021-09-02 | End: 2021-09-02

## 2021-09-02 RX ORDER — OXYCODONE HYDROCHLORIDE AND ACETAMINOPHEN 5; 325 MG/1; MG/1
1 TABLET ORAL EVERY 4 HOURS PRN
Qty: 12 TABLET | Refills: 0 | Status: SHIPPED | OUTPATIENT
Start: 2021-09-02 | End: 2022-02-05 | Stop reason: HOSPADM

## 2021-09-02 RX ADMIN — OXYCODONE HYDROCHLORIDE AND ACETAMINOPHEN 1 TABLET: 5; 325 TABLET ORAL at 04:19

## 2021-09-02 NOTE — DISCHARGE INSTRUCTIONS
ER evaluation revealed normal x-rays of the left ankle.  Concern for Achilles tendon rupture.  Recommend close orthopedic evaluation with Dr. Lamar.  Recommend MRI to rule out Achilles tendon injury.  Recommend no weightbearing and wear the boot as needed for stabilization and crutches.  Rx for Percocet 5 mg by mouth every 4-6 hours as needed for moderate pain dispense 12 no refills.  Recommend elevation is much as possible.  Return to the ER if worsening symptoms.

## 2021-09-03 RX ORDER — CYCLOBENZAPRINE HCL 10 MG
10 TABLET ORAL 3 TIMES DAILY PRN
Qty: 270 TABLET | Refills: 2 | Status: SHIPPED | OUTPATIENT
Start: 2021-09-03 | End: 2022-03-24 | Stop reason: SDUPTHER

## 2021-09-10 ENCOUNTER — APPOINTMENT (OUTPATIENT)
Dept: PREADMISSION TESTING | Facility: HOSPITAL | Age: 51
End: 2021-09-10

## 2021-09-10 ENCOUNTER — TRANSCRIBE ORDERS (OUTPATIENT)
Dept: LAB | Facility: HOSPITAL | Age: 51
End: 2021-09-10

## 2021-09-10 ENCOUNTER — LAB (OUTPATIENT)
Dept: LAB | Facility: HOSPITAL | Age: 51
End: 2021-09-10

## 2021-09-10 DIAGNOSIS — S86.012A RUPTURE OF LEFT ACHILLES TENDON, INITIAL ENCOUNTER: Primary | ICD-10-CM

## 2021-09-10 DIAGNOSIS — S86.012A RUPTURE OF LEFT ACHILLES TENDON, INITIAL ENCOUNTER: ICD-10-CM

## 2021-09-10 DIAGNOSIS — E11.9 TYPE 2 DIABETES MELLITUS WITHOUT COMPLICATION, UNSPECIFIED WHETHER LONG TERM INSULIN USE (HCC): ICD-10-CM

## 2021-09-10 PROCEDURE — 36415 COLL VENOUS BLD VENIPUNCTURE: CPT

## 2021-09-11 DIAGNOSIS — G71.11 MYOTONIC DYSTROPHY, TYPE 2 (HCC): ICD-10-CM

## 2021-09-12 ENCOUNTER — APPOINTMENT (OUTPATIENT)
Dept: PREADMISSION TESTING | Facility: HOSPITAL | Age: 51
End: 2021-09-12

## 2021-09-13 ENCOUNTER — OFFICE VISIT (OUTPATIENT)
Dept: UROLOGY | Facility: CLINIC | Age: 51
End: 2021-09-13

## 2021-09-13 VITALS
SYSTOLIC BLOOD PRESSURE: 144 MMHG | DIASTOLIC BLOOD PRESSURE: 78 MMHG | BODY MASS INDEX: 37.8 KG/M2 | HEART RATE: 113 BPM | HEIGHT: 66 IN | OXYGEN SATURATION: 96 % | WEIGHT: 235.2 LBS | TEMPERATURE: 92.6 F

## 2021-09-13 DIAGNOSIS — N39.0 RECURRENT UTI (URINARY TRACT INFECTION): ICD-10-CM

## 2021-09-13 DIAGNOSIS — Z87.440 HISTORY OF UTI: Primary | ICD-10-CM

## 2021-09-13 DIAGNOSIS — N20.0 NEPHROLITHIASIS: ICD-10-CM

## 2021-09-13 DIAGNOSIS — R39.15 URINARY URGENCY: ICD-10-CM

## 2021-09-13 DIAGNOSIS — N39.41 URGENCY INCONTINENCE: ICD-10-CM

## 2021-09-13 LAB
BILIRUB BLD-MCNC: NEGATIVE MG/DL
CLARITY, POC: CLEAR
COLOR UR: YELLOW
GLUCOSE UR STRIP-MCNC: NEGATIVE MG/DL
KETONES UR QL: NEGATIVE
LEUKOCYTE EST, POC: ABNORMAL
NITRITE UR-MCNC: NEGATIVE MG/ML
PH UR: 6 [PH] (ref 5–8)
PROT UR STRIP-MCNC: NEGATIVE MG/DL
RBC # UR STRIP: NEGATIVE /UL
SP GR UR: 1.02 (ref 1–1.03)
UROBILINOGEN UR QL: ABNORMAL

## 2021-09-13 PROCEDURE — 99204 OFFICE O/P NEW MOD 45 MIN: CPT | Performed by: STUDENT IN AN ORGANIZED HEALTH CARE EDUCATION/TRAINING PROGRAM

## 2021-09-13 PROCEDURE — 51798 US URINE CAPACITY MEASURE: CPT | Performed by: STUDENT IN AN ORGANIZED HEALTH CARE EDUCATION/TRAINING PROGRAM

## 2021-09-13 PROCEDURE — 81003 URINALYSIS AUTO W/O SCOPE: CPT | Performed by: STUDENT IN AN ORGANIZED HEALTH CARE EDUCATION/TRAINING PROGRAM

## 2021-09-13 RX ORDER — ESTRADIOL 0.1 MG/G
2 CREAM VAGINAL DAILY
Qty: 42.5 G | Refills: 12 | Status: SHIPPED | OUTPATIENT
Start: 2021-09-13 | End: 2021-09-15

## 2021-09-13 RX ORDER — HYDROCODONE BITARTRATE AND ACETAMINOPHEN 10; 325 MG/1; MG/1
1 TABLET ORAL EVERY 6 HOURS PRN
Qty: 120 TABLET | Refills: 0 | Status: SHIPPED | OUTPATIENT
Start: 2021-09-13 | End: 2021-10-11 | Stop reason: SDUPTHER

## 2021-09-13 RX ORDER — OXYBUTYNIN CHLORIDE 5 MG/1
5 TABLET, EXTENDED RELEASE ORAL DAILY
Qty: 90 TABLET | Refills: 3 | Status: SHIPPED | OUTPATIENT
Start: 2021-09-13 | End: 2022-09-27 | Stop reason: SDUPTHER

## 2021-09-13 NOTE — PROGRESS NOTES
LUTS Female Office Visit      Patient Name: Hafsa Barron  : 1970   MRN: [id number]     Chief Complaint:  Lower Urinary Tract Symptoms.   Chief Complaint   Patient presents with   • New Patient   • Recurrent UTI       Referring Provider: Jocy Snow*    History of Present Illness: Mr. Barron is a 50 y.o. female with a past medical history of fibromyalgia GERD, morbid obesity status post gastric sleeve procedure, muscular dystrophy, prior stroke , type 2 diabetes on Metformin, irritable bowel syndrome, hypertension, hyperlipidemia, previous nephrolithiasis, who presents for the evaluation of recurrent urinary tract infection.  Patient reports over the last few years having multiple UTIs every few months, she reports her symptoms are mainly burning, urgency, frequency and pelvic pain.  She says she occasionally has fevers with her UTIs.    In the [facility] system there is only 1 culture proven UTI in 2019 which was positive for E. coli.  She reports she has not been having urine cultured and simply calls in with symptoms and receives antibiotics for treatment.  She is just completing a course of Bactrim which was prescribed 1 week ago.    View of the patient's dipstick urinalyses reveal multiple urines positive for leukocyte esterase, and greater than 1000 mg/dL of glucose.  Her last hemoglobin A1c was 10.2 in 2021.  She reports she was drinking a lot of sugary Coca-Cola's but is switched to sugar-free drinks in the meantime.    The patient also endorses daytime urinary urgency, frequency and sensation of incomplete emptying.  He has rare nocturia rarely 1x, occasional nocturnal enuresis.  She does not wear pads or diapers.    Patient states she had a kidney stone 14 to 15 years ago which she passed on her own.  She has never had urologic surgery previously.  She has no recent abdominal imaging to assess for kidney stone burden.    He is postmenopausal since her late 40s after her  stroke, she has never had a hysterectomy.  She follows with a gynecologist and will be undergoing routine yearly follow-up in the next couple of months.    Has 2 children born vaginally, 5 miscarriages.     Patient had a right-sided CVA 2013, she is on aspirin and Plavix, she has some mild left-sided weakness and requires a walker for ambulation.  She also has a history of muscular dystrophy and reports muscle weakness globally.  She lost 75 pounds after gastric sleeve procedure but has gained most of that back if not all of it.    She denies constipation, she reports she mainly has loose bowels every day.  She has been diagnosed with irritable bowel syndrome.    Urinalysis in clinic today negative except for small leukocyte esterase.  Negative for blood, nitrite.    Postvoid residual bladder scan 0 mL today in clinic.    Subjective      Review of System: Review of Systems   Constitutional: Negative for chills, fatigue, fever and unexpected weight change.   HENT: Negative for sore throat.    Eyes: Negative for visual disturbance.   Respiratory: Negative for cough, chest tightness and shortness of breath.    Cardiovascular: Negative for chest pain and leg swelling.   Gastrointestinal: Positive for nausea. Negative for blood in stool, constipation, diarrhea, rectal pain and vomiting.   Genitourinary: Positive for dysuria, flank pain, frequency and urgency. Negative for decreased urine volume, difficulty urinating, enuresis, genital sores and hematuria.   Musculoskeletal: Positive for back pain and joint swelling.   Skin: Negative for rash and wound.   Neurological: Negative for seizures, speech difficulty, weakness and headaches.   Psychiatric/Behavioral: Negative for confusion, sleep disturbance and suicidal ideas. The patient is not nervous/anxious.       I have reviewed the ROS documented by my clinical staff, I have updated appropriately and I agree. Ge Ace MD    Past Medical History:  Past Medical  History:   Diagnosis Date   • Allergic    • Allergy    • Anxiety    • Asthma Allergies induced   • Depression    • Fibromyalgia, primary    • GERD (gastroesophageal reflux disease)    • Headache    • History of blood clots    • History of UTI    • Hyperlipidemia    • Hypertension    • IBS (irritable bowel syndrome)    • Internal hemorrhoid    • Kidney stone    • Low back pain    • Muscular dystrophy (CMS/[facility])    • Muscular dystrophy (CMS/[facility])     II   • Myotonia    • Obesity    • Stroke (CMS/[facility]) 08/31/2013    resdual- left sided weakness.    • Type 2 diabetes mellitus without complication, without long-term current use of insulin (CMS/HCC)        Past Surgical History:  Past Surgical History:   Procedure Laterality Date   • CHOLECYSTECTOMY  07/2004   • COLONOSCOPY  07/2017   • D & C WITH SUCTION     • LYMPH NODE BIOPSY     • SUBCLAVIAN ARTERY STENT  08/2018    x2   • WISDOM TOOTH EXTRACTION         Medications:    Current Outpatient Medications:   •  albuterol (PROAIR HFA) 108 (90 BASE) MCG/ACT inhaler, Inhale 1 puff Every 6 (Six) Hours As Needed for Wheezing., Disp: 90 inhaler, Rfl: 2  •  albuterol (PROVENTIL) (2.5 MG/3ML) 0.083% nebulizer solution, Take 2.5 mg by nebulization Every 4 (Four) Hours As Needed for Wheezing., Disp: 90 vial, Rfl: 1  •  amLODIPine (NORVASC) 2.5 MG tablet, Take 1 tablet by mouth Daily., Disp: 90 tablet, Rfl: 3  •  aspirin 81 MG EC tablet, Take 81 mg by mouth Daily., Disp: , Rfl:   •  atorvastatin (LIPITOR) 80 MG tablet, Take 1 tablet by mouth Every Night., Disp: 90 tablet, Rfl: 3  •  baclofen (LIORESAL) 10 MG tablet, Take 1 tablet by mouth 3 (Three) Times a Day., Disp: 270 tablet, Rfl: 1  •  Blood Glucose Monitoring Suppl (ONE TOUCH ULTRA 2) w/Device kit, , Disp: , Rfl:   •  busPIRone (BUSPAR) 15 MG tablet, Take 1 tablet by mouth 3 (Three) Times a Day., Disp: 270 tablet, Rfl: 1  •  calcium carbonate-vitamin d (CALCIUM 600+D HIGH POTENCY) 600-400 MG-UNIT per tablet, Take 1 tablet by mouth  Daily., Disp: 90 tablet, Rfl: 2  •  clopidogrel (PLAVIX) 75 MG tablet, TAKE ONE TABLET BY MOUTH DAILY, Disp: 90 tablet, Rfl: 3  •  cyclobenzaprine (FLEXERIL) 10 MG tablet, Take 1 tablet by mouth 3 (Three) Times a Day As Needed for Muscle Spasms., Disp: 270 tablet, Rfl: 2  •  diclofenac (VOLTAREN) 75 MG EC tablet, Take 1 tablet by mouth 2 (Two) Times a Day., Disp: 180 tablet, Rfl: 3  •  fenofibrate (TRICOR) 145 MG tablet, TAKE ONE TABLET BY MOUTH DAILY, Disp: 90 tablet, Rfl: 1  •  fluticasone (FLONASE ALLERGY RELIEF) 50 MCG/ACT nasal spray, into each nostril., Disp: , Rfl:   •  gabapentin (NEURONTIN) 600 MG tablet, TAKE ONE TABLET BY MOUTH THREE TIMES A DAY, Disp: 270 tablet, Rfl: 1  •  HYDROcodone-acetaminophen (NORCO)  MG per tablet, Take 1 tablet by mouth Every 6 (Six) Hours As Needed for Moderate Pain ., Disp: 120 tablet, Rfl: 0  •  loratadine (CLARITIN) 10 MG tablet, Take 1 tablet by mouth Daily., Disp: 30 tablet, Rfl: 11  •  metFORMIN (Glucophage) 1000 MG tablet, Take 1 tablet by mouth 2 (Two) Times a Day With Meals., Disp: 60 tablet, Rfl: 5  •  modafinil (PROVIGIL) 100 MG tablet, Take 1 tablet by mouth Daily., Disp: 30 tablet, Rfl: 5  •  modafinil (PROVIGIL) 200 MG tablet, Take 1 tablet by mouth Daily., Disp: 30 tablet, Rfl: 1  •  montelukast (SINGULAIR) 10 MG tablet, Take 1 tablet by mouth Every Evening., Disp: 90 tablet, Rfl: 3  •  nitrofurantoin, macrocrystal-monohydrate, (Macrobid) 100 MG capsule, Take 1 capsule by mouth 2 (Two) Times a Day., Disp: 10 capsule, Rfl: 0  •  nystatin (MYCOSTATIN) 911340 UNIT/GM powder, Apply  topically to the appropriate area as directed 3 (Three) Times a Day., Disp: 60 g, Rfl: 2  •  ondansetron (ZOFRAN) 8 MG tablet, , Disp: , Rfl: 1  •  ONE TOUCH ULTRA TEST test strip, , Disp: , Rfl:   •  oxyCODONE-acetaminophen (PERCOCET) 5-325 MG per tablet, Take 1 tablet by mouth Every 4 (Four) Hours As Needed for Moderate Pain ., Disp: 12 tablet, Rfl: 0  •  pantoprazole (PROTONIX)  40 MG EC tablet, Take 1 tablet by mouth Daily., Disp: 90 tablet, Rfl: 1  •  potassium chloride (KLOR-CON) 20 MEQ CR tablet, Take 1 tablet by mouth Daily., Disp: 90 tablet, Rfl: 2  •  potassium chloride ER (K-TAB) 20 MEQ tablet controlled-release ER tablet, TAKE 1 TABLET BY MOUTH EVERY DAY, Disp: 90 tablet, Rfl: 2  •  promethazine (PHENERGAN) 12.5 MG tablet, Take 1 tablet by mouth Every 6 (Six) Hours As Needed for Nausea or Vomiting., Disp: 30 tablet, Rfl: 1  •  spironolactone (ALDACTONE) 50 MG tablet, Take 1 tablet by mouth Daily., Disp: 90 tablet, Rfl: 3  •  tiZANidine (ZANAFLEX) 4 MG tablet, Take 1 tablet by mouth Every Night., Disp: 90 tablet, Rfl: 1  •  valACYclovir (Valtrex) 1000 MG tablet, Take 2 tablets by mouth 2 (Two) Times a Day., Disp: 4 tablet, Rfl: 0  •  venlafaxine XR (EFFEXOR-XR) 37.5 MG 24 hr capsule, Take 1 capsule by mouth Daily., Disp: 90 capsule, Rfl: 3  •  estradiol (ESTRACE) 0.1 MG/GM vaginal cream, Insert 2 g into the vagina Daily. Use Monday, Wednesday, Friday prior to bedtime, Disp: 42.5 g, Rfl: 12  •  oxybutynin XL (DITROPAN-XL) 5 MG 24 hr tablet, Take 1 tablet by mouth Daily., Disp: 90 tablet, Rfl: 3    Allergies:  Allergies   Allergen Reactions   • Penicillins Anaphylaxis and Shortness Of Breath       Social History:  Social History     Socioeconomic History   • Marital status:      Spouse name: Not on file   • Number of children: Not on file   • Years of education: Not on file   • Highest education level: Not on file   Tobacco Use   • Smoking status: Former Smoker     Packs/day: 1.50     Years: 15.00     Pack years: 22.50     Start date: 1983     Quit date: 2013     Years since quittin.0   • Smokeless tobacco: Never Used   • Tobacco comment: use Ecigg now NO NICOTENE   Vaping Use   • Vaping Use: Some days   • Substances: Flavoring   • Devices: Pre-filled pod   Substance and Sexual Activity   • Alcohol use: Yes   • Drug use: No   • Sexual activity: Yes     Partners:  "Male     Birth control/protection: Post-menopausal     Comment: Menapause no cycle since 2013       Family History:  Family History   Problem Relation Age of Onset   • Allergies Other    • ADD / ADHD Other    • Heart block Other    • Other Other         CEREBROVASCULAR ACCIDENT    • Hypertension Other    • Cancer Other         LUNG CANCER   • Hyperlipidemia Other    • Alcohol abuse Mother    • Depression Mother    • Early death Mother          age 59   • Heart disease Mother         Mother  of Massive Heart attack   • Hyperlipidemia Mother    • Hypertension Mother    • Miscarriages / Stillbirths Mother         Miscarriage   • Heart attack Mother          of  massive heart attack   • Alcohol abuse Father    • Cancer Father         Had Lung Cancer  had 1 lung till death   • Diabetes Father         Lived on insuline shots   • Early death Father          age 53   • Breast cancer Maternal Aunt    • Learning disabilities Son         Had Adhd   • Breast cancer Cousin    • Ovarian cancer Neg Hx    • Endometrial cancer Neg Hx    • Uterine cancer Neg Hx    • Colon cancer Neg Hx            Post void residual bladder scan:    00 mL     Objective     Physical Exam:   Vital Signs:   Vitals:    21 1146   BP: 144/78   Pulse: 113   Temp: 92.6 °F (33.7 °C)   TempSrc: Temporal   SpO2: 96%   Weight: 107 kg (235 lb 3.2 oz)   Height: 167.6 cm (66\")   PainSc: 8  Comment: L foot pain     Body mass index is 37.96 kg/m².     Physical Exam  Vitals and nursing note reviewed. Exam conducted with a chaperone present.   Constitutional:       Appearance: Normal appearance.   HENT:      Head: Normocephalic and atraumatic.      Mouth/Throat:      Mouth: Mucous membranes are moist.      Pharynx: Oropharynx is clear.   Eyes:      Extraocular Movements: Extraocular movements intact.      Conjunctiva/sclera: Conjunctivae normal.   Cardiovascular:      Rate and Rhythm: Normal rate and regular rhythm.   Pulmonary:      " Effort: Pulmonary effort is normal. No respiratory distress.   Abdominal:      Palpations: Abdomen is soft.      Tenderness: There is no abdominal tenderness. There is no right CVA tenderness or left CVA tenderness.   Genitourinary:     General: Normal vulva.      Vagina: No vaginal discharge.      Comments: Mild to moderate vaginal atrophy noted  Mild anterior prolapse (grade 1 cystocele)  No apical prolapse noted  Cervix visible without lesions  No vaginal discharge noted   No urethral hypermobility   No incontinence on valsalva or cough  Musculoskeletal:         General: Normal range of motion.      Cervical back: Normal range of motion.   Skin:     General: Skin is warm and dry.   Neurological:      General: No focal deficit present.      Mental Status: She is alert and oriented to person, place, and time.   Psychiatric:         Mood and Affect: Mood normal.         Behavior: Behavior normal.         Labs:   Brief Urine Lab Results  (Last result in the past 365 days)      Color   Clarity   Blood   Leuk Est   Nitrite   Protein   CREAT   Urine HCG        09/13/21 1219 Yellow Clear Negative Small (1+) Negative Negative                    Lab Results   Component Value Date    GLUCOSE 245 (H) 04/14/2021    CALCIUM 9.6 04/14/2021     04/14/2021    K 4.1 04/14/2021    CO2 25.0 04/14/2021     04/14/2021    BUN 15 04/14/2021    CREATININE 0.64 04/14/2021    EGFRIFNONA 98 04/14/2021    BCR 23.4 04/14/2021    ANIONGAP 12.0 04/14/2021       Lab Results   Component Value Date    WBC 6.10 04/14/2021    HGB 13.7 04/14/2021    HCT 41.1 04/14/2021    MCV 88.8 04/14/2021     04/14/2021       Images:   XR Ankle 3+ View Left    Result Date: 9/2/2021  1. No acute fracture. Nonspecific soft tissue prominence. Probable sequela of remote trauma or accessory ossicle at the medial malleolus. Correlate with patient's point tenderness. Signer Name: Reji Tee MD  Signed: 9/2/2021 2:51 AM  Workstation Name:  [email or web address]  [facility]      Measures:   Tobacco:   Hafsa Barron  reports that she quit smoking about 8 years ago. She started smoking about 37 years ago. She has a 22.50 pack-year smoking history. She has never used smokeless tobacco.       Urine Incontinence: (NOUI)  Patient reports that she is experiencing mild urinary urgency.       Assessment / Plan      Assessment:  Mrs. Barron is a 50 y.o. female with fibromyalgia GERD, morbid obesity status post gastric sleeve procedure, muscular dystrophy, prior stroke 2013, type 2 diabetes on Metformin, irritable bowel syndrome, hypertension, hyperlipidemia, previous nephrolithiasis, who presents for the evaluation of recurrent urinary tract infection.  Patient reports over the last few years having multiple UTIs every few months, she reports her symptoms are mainly burning, urgency, frequency and pelvic pain.  She says she occasionally has fevers with her UTIs.    I discussed with the patient that in order to truly diagnose her with recurrent urinary tract infection, she needs to have culture proven UTIs.  X stressed the importance of having cultures performed when concern for UTI arises.  For that reason I ordered a standing order for urine culture.  I also discussed risk factors for UTI which includes nephrolithiasis, obesity, vaginal atrophy given postmenopausal state, poorly controlled diabetes.  Patient has all these risk factors and will be important to mitigate each 1.    Given she is postmenopausal and has vaginal atrophy, vaginal estrogen cream was discussed and she is amenable to treatment.  I described a vaginal estrogen cream can normalize blood flow and pH of the periurethral and vaginal tissues which may help to prevent UTI in the postmenopausal woman.  For history of nephrolithiasis I would like to obtain repeat abdominal imaging prior to her next follow-up in 3 months to assess kidney stone burden.  Given likelihood of kidney  stones serving as reservoir for recurrent bacteriuria, elective kidney stone treatment can be discussed based on imaging findings.    Patient has significant urinary urgency, frequency and mild urinary urgency incontinence, we discussed medical treatment with anticholinergics and she is amenable to this we will start her on low-dose oxybutynin 5 mg XL.    Her postvoid bladder scan in clinic today was 0 mL, she has a history of stroke as well as muscular dystrophy therefore there is a potential for neurogenic bladder and incomplete emptying, this was not able to be demonstrated today but this is something we need to keep in mind.  She is obese and therefore bladder scanning can be less sensitive in this situation.  If she has recurrent UTIs in the future, a post void cath will be necessary to rule out incomplete emptying.      Diagnoses and all orders for this visit:    1. History of UTI (Primary)  -     POC Urinalysis Dipstick, Automated    2. Recurrent UTI (urinary tract infection)  -     Urine Culture - Urine, Urine, Clean Catch; Standing  -     estradiol (ESTRACE) 0.1 MG/GM vaginal cream; Insert 2 g into the vagina Daily. Use Monday, Wednesday, Friday prior to bedtime  Dispense: 42.5 g; Refill: 12    3. Urinary urgency  -     oxybutynin XL (DITROPAN-XL) 5 MG 24 hr tablet; Take 1 tablet by mouth Daily.  Dispense: 90 tablet; Refill: 3    4. Nephrolithiasis  -     CT Abdomen Pelvis Stone Protocol; Future           Follow Up:   Return in about 3 months (around 12/13/2021) for Follow Up after Imaging.    I spent approximately 45 minutes providing clinical care for this patient; including review of patient's chart and provider documentation, face to face time spent with patient in examination room (obtaining history, performing physical exam, discussing diagnosis and management options), placing orders, and completing patient documentation.     Ge Ace MD  [facility] [place]

## 2021-09-14 ENCOUNTER — TRANSCRIBE ORDERS (OUTPATIENT)
Dept: LAB | Facility: HOSPITAL | Age: 51
End: 2021-09-14

## 2021-09-14 ENCOUNTER — TELEPHONE (OUTPATIENT)
Dept: UROLOGY | Facility: CLINIC | Age: 51
End: 2021-09-14

## 2021-09-14 DIAGNOSIS — E13.9 DIABETES MELLITUS OF OTHER TYPE WITHOUT COMPLICATION, UNSPECIFIED WHETHER LONG TERM INSULIN USE (HCC): Primary | ICD-10-CM

## 2021-09-14 NOTE — TELEPHONE ENCOUNTER
PT IS CALLING AND STATES THAT THE ESTRADIOL CREAM IS TOO EXPENSIVE FOR HER TO GET EACH MONTH. SHE WOULD LIKE TO KNOW IF THERE IS SOMETHING ELSE THAT SHE CAN TRY THAT WILL BE CHEAPER?

## 2021-09-15 DIAGNOSIS — N95.2 VAGINAL ATROPHY: ICD-10-CM

## 2021-09-15 DIAGNOSIS — N39.0 RECURRENT UTI: Primary | ICD-10-CM

## 2021-09-15 NOTE — TELEPHONE ENCOUNTER
Informed pt Dr. Ace sent a new rx for her, name of the cream is Premarin.  Dr. Ace canceled the prior rx he had sent out.  Patient voiced understanding.    Pt will contact us, if she has any issues with the new rx.

## 2021-09-16 ENCOUNTER — LAB (OUTPATIENT)
Dept: LAB | Facility: HOSPITAL | Age: 51
End: 2021-09-16

## 2021-09-16 DIAGNOSIS — E13.9 DIABETES MELLITUS OF OTHER TYPE WITHOUT COMPLICATION, UNSPECIFIED WHETHER LONG TERM INSULIN USE (HCC): ICD-10-CM

## 2021-09-16 PROCEDURE — 83036 HEMOGLOBIN GLYCOSYLATED A1C: CPT

## 2021-09-16 PROCEDURE — 36415 COLL VENOUS BLD VENIPUNCTURE: CPT

## 2021-09-17 LAB — HBA1C MFR BLD: 6.63 % (ref 4.8–5.6)

## 2021-09-20 RX ORDER — AMLODIPINE BESYLATE 2.5 MG/1
TABLET ORAL
Qty: 30 TABLET | Refills: 11 | Status: SHIPPED | OUTPATIENT
Start: 2021-09-20 | End: 2022-09-21

## 2021-09-21 ENCOUNTER — HOSPITAL ENCOUNTER (OUTPATIENT)
Dept: CT IMAGING | Facility: HOSPITAL | Age: 51
Discharge: HOME | End: 2021-09-21
Admitting: STUDENT IN AN ORGANIZED HEALTH CARE EDUCATION/TRAINING PROGRAM

## 2021-09-21 DIAGNOSIS — N20.0 NEPHROLITHIASIS: ICD-10-CM

## 2021-09-21 PROCEDURE — 74176 CT ABD & PELVIS W/O CONTRAST: CPT

## 2021-09-24 ENCOUNTER — HOSPITAL ENCOUNTER (OUTPATIENT)
Dept: GENERAL RADIOLOGY | Facility: HOSPITAL | Age: 51
Discharge: HOME | End: 2021-09-24

## 2021-09-24 ENCOUNTER — PRE-ADMISSION TESTING (OUTPATIENT)
Dept: PREADMISSION TESTING | Facility: HOSPITAL | Age: 51
End: 2021-09-24

## 2021-09-24 LAB
ANION GAP SERPL CALCULATED.3IONS-SCNC: 11 MMOL/L (ref 5–15)
BUN SERPL-MCNC: 16 MG/DL (ref 6–20)
BUN/CREAT SERPL: 29.6 (ref 7–25)
CALCIUM SPEC-SCNC: 10.3 MG/DL (ref 8.6–10.5)
CHLORIDE SERPL-SCNC: 102 MMOL/L (ref 98–107)
CO2 SERPL-SCNC: 28 MMOL/L (ref 22–29)
CREAT SERPL-MCNC: 0.54 MG/DL (ref 0.57–1)
DEPRECATED RDW RBC AUTO: 45.4 FL (ref 37–54)
ERYTHROCYTE [DISTWIDTH] IN BLOOD BY AUTOMATED COUNT: 13.7 % (ref 12.3–15.4)
GFR SERPL CREATININE-BSD FRML MDRD: 119 ML/MIN/1.73
GLUCOSE SERPL-MCNC: 124 MG/DL (ref 65–99)
HCT VFR BLD AUTO: 46.6 % (ref 34–46.6)
HGB BLD-MCNC: 15.6 G/DL (ref 12–15.9)
MCH RBC QN AUTO: 30.4 PG (ref 26.6–33)
MCHC RBC AUTO-ENTMCNC: 33.5 G/DL (ref 31.5–35.7)
MCV RBC AUTO: 90.8 FL (ref 79–97)
PLATELET # BLD AUTO: 300 10*3/MM3 (ref 140–450)
PMV BLD AUTO: 10.8 FL (ref 6–12)
POTASSIUM SERPL-SCNC: 4.2 MMOL/L (ref 3.5–5.2)
RBC # BLD AUTO: 5.13 10*6/MM3 (ref 3.77–5.28)
SODIUM SERPL-SCNC: 141 MMOL/L (ref 136–145)
WBC # BLD AUTO: 7.56 10*3/MM3 (ref 3.4–10.8)

## 2021-09-24 PROCEDURE — 85027 COMPLETE CBC AUTOMATED: CPT

## 2021-09-24 PROCEDURE — 71046 X-RAY EXAM CHEST 2 VIEWS: CPT

## 2021-09-24 PROCEDURE — 80048 BASIC METABOLIC PNL TOTAL CA: CPT

## 2021-09-24 PROCEDURE — 36415 COLL VENOUS BLD VENIPUNCTURE: CPT

## 2021-09-24 NOTE — PAT
Patient instructed to drink 20 ounces (or until full) of Gatorade and it needs to be completed 1 hour (for Main OR patients) or 2 hours (scheduled  section patients) before given arrival time for procedure (NO RED Gatorade)    Patient verbalized understanding.    Patient directed to Radiology Department for CXR after Pre Admission Testing Appointment.     EKG in chart from 2021.

## 2021-09-26 ENCOUNTER — APPOINTMENT (OUTPATIENT)
Dept: PREADMISSION TESTING | Facility: HOSPITAL | Age: 51
End: 2021-09-26

## 2021-09-26 LAB — SARS-COV-2 RNA PNL SPEC NAA+PROBE: NOT DETECTED

## 2021-09-26 PROCEDURE — C9803 HOPD COVID-19 SPEC COLLECT: HCPCS

## 2021-09-26 PROCEDURE — U0005 INFEC AGEN DETEC AMPLI PROBE: HCPCS

## 2021-09-26 PROCEDURE — U0004 COV-19 TEST NON-CDC HGH THRU: HCPCS

## 2021-09-27 ENCOUNTER — TELEPHONE (OUTPATIENT)
Dept: UROLOGY | Facility: CLINIC | Age: 51
End: 2021-09-27

## 2021-09-27 DIAGNOSIS — N95.2 VAGINAL ATROPHY: Primary | ICD-10-CM

## 2021-09-27 RX ORDER — BUSPIRONE HYDROCHLORIDE 15 MG/1
TABLET ORAL
Qty: 270 TABLET | Refills: 1 | Status: SHIPPED | OUTPATIENT
Start: 2021-09-27 | End: 2022-03-22

## 2021-09-27 RX ORDER — CONJUGATED ESTROGENS 0.62 MG/G
CREAM VAGINAL DAILY
Qty: 30 G | Refills: 5 | Status: SHIPPED | OUTPATIENT
Start: 2021-09-27

## 2021-09-27 NOTE — TELEPHONE ENCOUNTER
Left message for pharmacist at [facility] pharmacy, per Dr. Ace the number of grams should be:  0.5 grams three times weekly

## 2021-09-27 NOTE — PROGRESS NOTES
Called patient with CT scan results, tiny 1 to 2 mm kidney stone on the right side, left side unremarkable.  Discussed how this stone can simply be observed and will likely pass on its own if it were to drop into the ureter.  Patient also has not picked up her vaginal estrogen cream as it never made it to the pharmacy, so I reordered this for the patient today and she will  at the pharmacy today.  See her back in 3 months for reevaluation.

## 2021-09-27 NOTE — TELEPHONE ENCOUNTER
-Pharmacist from [facility] pharmacy call, regarding the Premarin vaginal cream.    -Pharmacist needs to know how many grams of the Premarin cream should the patient use?    Dr. Ace, please advise.  Thank you.

## 2021-10-10 DIAGNOSIS — G71.11 MYOTONIC DYSTROPHY, TYPE 2 (HCC): ICD-10-CM

## 2021-10-10 RX ORDER — HYDROCODONE BITARTRATE AND ACETAMINOPHEN 10; 325 MG/1; MG/1
1 TABLET ORAL EVERY 6 HOURS PRN
Qty: 120 TABLET | Refills: 0 | Status: CANCELLED | OUTPATIENT
Start: 2021-10-10

## 2021-10-11 DIAGNOSIS — G47.19 DAYTIME HYPERSOMNOLENCE: ICD-10-CM

## 2021-10-11 DIAGNOSIS — G71.11 MYOTONIC DYSTROPHY, TYPE 2 (HCC): ICD-10-CM

## 2021-10-11 RX ORDER — MODAFINIL 100 MG/1
100 TABLET ORAL DAILY
Qty: 30 TABLET | Refills: 5 | Status: CANCELLED | OUTPATIENT
Start: 2021-10-11 | End: 2022-10-11

## 2021-10-11 RX ORDER — HYDROCODONE BITARTRATE AND ACETAMINOPHEN 10; 325 MG/1; MG/1
1 TABLET ORAL EVERY 6 HOURS PRN
Qty: 120 TABLET | Refills: 0 | Status: SHIPPED | OUTPATIENT
Start: 2021-10-11 | End: 2021-12-07 | Stop reason: SDUPTHER

## 2021-10-11 RX ORDER — MODAFINIL 200 MG/1
TABLET ORAL
Qty: 30 TABLET | OUTPATIENT
Start: 2021-10-11

## 2021-10-11 RX ORDER — MODAFINIL 200 MG/1
200 TABLET ORAL DAILY
Qty: 30 TABLET | Refills: 1 | Status: CANCELLED | OUTPATIENT
Start: 2021-10-11

## 2021-10-12 ENCOUNTER — TELEPHONE (OUTPATIENT)
Dept: NEUROLOGY | Facility: CLINIC | Age: 51
End: 2021-10-12

## 2021-10-12 DIAGNOSIS — G47.19 DAYTIME HYPERSOMNOLENCE: ICD-10-CM

## 2021-10-12 DIAGNOSIS — G71.11 MYOTONIC DYSTROPHY, TYPE 2 (HCC): ICD-10-CM

## 2021-10-12 RX ORDER — MODAFINIL 100 MG/1
100 TABLET ORAL DAILY
Qty: 30 TABLET | Refills: 3 | Status: SHIPPED | OUTPATIENT
Start: 2021-10-12 | End: 2022-02-28 | Stop reason: SDUPTHER

## 2021-10-12 RX ORDER — MODAFINIL 200 MG/1
200 TABLET ORAL DAILY
Qty: 30 TABLET | Refills: 3 | Status: SHIPPED | OUTPATIENT
Start: 2021-10-12 | End: 2022-02-28 | Stop reason: SDUPTHER

## 2021-10-12 NOTE — TELEPHONE ENCOUNTER
Provider: asael    Caller: SONAM    Relationship to Patient: SELF    Pharmacy:   *UPDATE*  [address]    Phone Number: [phone]    Reason for Call:   NEEDING REFILL ON PROVIFIL 100 & 200 MG. PT IS COMPLETELY OUT OF MEDICATION. PLEASE ADVISE AND UPDATE PHARMACY IN PT'S CHART

## 2021-10-19 RX ORDER — AZITHROMYCIN 250 MG/1
TABLET, FILM COATED ORAL
Qty: 6 TABLET | Refills: 0 | Status: SHIPPED | OUTPATIENT
Start: 2021-10-19 | End: 2022-02-05 | Stop reason: HOSPADM

## 2021-11-16 RX ORDER — POTASSIUM CHLORIDE 1500 MG/1
TABLET, FILM COATED, EXTENDED RELEASE ORAL
Qty: 90 TABLET | Refills: 2 | Status: SHIPPED | OUTPATIENT
Start: 2021-11-16 | End: 2022-09-08 | Stop reason: SDUPTHER

## 2021-11-17 RX ORDER — TIZANIDINE 4 MG/1
TABLET ORAL
Qty: 90 TABLET | Refills: 1 | Status: SHIPPED | OUTPATIENT
Start: 2021-11-17 | End: 2022-05-25

## 2021-12-03 RX ORDER — FENOFIBRATE 145 MG/1
TABLET, COATED ORAL
Qty: 90 TABLET | Refills: 1 | Status: SHIPPED | OUTPATIENT
Start: 2021-12-03 | End: 2022-06-20

## 2021-12-07 DIAGNOSIS — G71.11 MYOTONIC DYSTROPHY, TYPE 2 (HCC): ICD-10-CM

## 2021-12-07 RX ORDER — HYDROCODONE BITARTRATE AND ACETAMINOPHEN 10; 325 MG/1; MG/1
1 TABLET ORAL EVERY 6 HOURS PRN
Qty: 120 TABLET | Refills: 0 | Status: SHIPPED | OUTPATIENT
Start: 2021-12-07 | End: 2022-01-07 | Stop reason: SDUPTHER

## 2021-12-19 DIAGNOSIS — E11.9 TYPE 2 DIABETES MELLITUS WITHOUT COMPLICATION, WITHOUT LONG-TERM CURRENT USE OF INSULIN (HCC): ICD-10-CM

## 2021-12-20 RX ORDER — ATORVASTATIN CALCIUM 80 MG/1
TABLET, FILM COATED ORAL
Qty: 30 TABLET | Refills: 11 | Status: SHIPPED | OUTPATIENT
Start: 2021-12-20 | End: 2022-12-19

## 2021-12-20 RX ORDER — BACLOFEN 10 MG/1
TABLET ORAL
Qty: 270 TABLET | Refills: 1 | Status: SHIPPED | OUTPATIENT
Start: 2021-12-20 | End: 2022-03-15 | Stop reason: SDUPTHER

## 2022-01-07 DIAGNOSIS — G71.11 MYOTONIC DYSTROPHY, TYPE 2: ICD-10-CM

## 2022-01-07 RX ORDER — HYDROCODONE BITARTRATE AND ACETAMINOPHEN 10; 325 MG/1; MG/1
1 TABLET ORAL EVERY 6 HOURS PRN
Qty: 120 TABLET | Refills: 0 | Status: SHIPPED | OUTPATIENT
Start: 2022-01-07 | End: 2022-02-17 | Stop reason: SDUPTHER

## 2022-01-25 PROCEDURE — U0005 INFEC AGEN DETEC AMPLI PROBE: HCPCS | Performed by: PHYSICIAN ASSISTANT

## 2022-01-25 PROCEDURE — U0004 COV-19 TEST NON-CDC HGH THRU: HCPCS | Performed by: PHYSICIAN ASSISTANT

## 2022-01-27 ENCOUNTER — TELEPHONE (OUTPATIENT)
Dept: INTERNAL MEDICINE | Facility: CLINIC | Age: 52
End: 2022-01-27

## 2022-01-27 NOTE — TELEPHONE ENCOUNTER
Caller: Hafsa Barron    Relationship: Self    Best call back number: [phone]    What orders are you requesting (i.e. lab or imaging): COVID INFUSION     In what timeframe would the patient need to come in: ASAP    Where will you receive your lab/imaging services:   [address] PH: [phone]    Additional notes: PATIENT WENT TO THE [facility] ON 01/24/22 &  RECEIVED HER POSITIVE COVID RESULTS 01/25/22 AND SHE IS REQUESTING AN INFUSION. SHE CALLED [facility] AND WAS TOLD THEY WILL BE GIVING INFUSIONS TOMORROW 01/28/22 BUT THEY NEED TO RECEIVED AN ORDER AND DOCUMENTATION FROM HER PROVIDER ASA. SHE WAS INFORMED OUR CLINICAL DEPARTMENT NEEDED TO CALL THEM BEFORE THE END OF THE DAY. PATIENT IS HIGH RISK      PLEASE ADVISE AND CALL PATIENT WITH ANY QUESTIONS [phone]

## 2022-01-31 ENCOUNTER — TELEPHONE (OUTPATIENT)
Dept: INTERNAL MEDICINE | Facility: CLINIC | Age: 52
End: 2022-01-31

## 2022-01-31 ENCOUNTER — APPOINTMENT (OUTPATIENT)
Dept: CT IMAGING | Facility: HOSPITAL | Age: 52
End: 2022-01-31

## 2022-01-31 ENCOUNTER — HOSPITAL ENCOUNTER (INPATIENT)
Facility: HOSPITAL | Age: 52
LOS: 4 days | Discharge: HOME | End: 2022-02-05
Attending: EMERGENCY MEDICINE | Admitting: INTERNAL MEDICINE

## 2022-01-31 DIAGNOSIS — G71.00 MUSCULAR DYSTROPHY: ICD-10-CM

## 2022-01-31 DIAGNOSIS — R09.02 HYPOXEMIA REQUIRING SUPPLEMENTAL OXYGEN: ICD-10-CM

## 2022-01-31 DIAGNOSIS — E11.69 DIABETES MELLITUS TYPE 2 IN OBESE: ICD-10-CM

## 2022-01-31 DIAGNOSIS — U07.1 ACUTE RESPIRATORY DISEASE DUE TO COVID-19 VIRUS: Primary | ICD-10-CM

## 2022-01-31 DIAGNOSIS — J06.9 ACUTE RESPIRATORY DISEASE DUE TO COVID-19 VIRUS: Primary | ICD-10-CM

## 2022-01-31 DIAGNOSIS — E66.9 DIABETES MELLITUS TYPE 2 IN OBESE: ICD-10-CM

## 2022-01-31 DIAGNOSIS — Z99.81 HYPOXEMIA REQUIRING SUPPLEMENTAL OXYGEN: ICD-10-CM

## 2022-01-31 LAB
ALBUMIN SERPL-MCNC: 3.9 G/DL (ref 3.5–5.2)
ALBUMIN/GLOB SERPL: 1.1 G/DL
ALP SERPL-CCNC: 87 U/L (ref 39–117)
ALT SERPL W P-5'-P-CCNC: 37 U/L (ref 1–33)
ANION GAP SERPL CALCULATED.3IONS-SCNC: 14 MMOL/L (ref 5–15)
AST SERPL-CCNC: 68 U/L (ref 1–32)
B-HCG UR QL: NEGATIVE
BASOPHILS # BLD AUTO: 0.01 10*3/MM3 (ref 0–0.2)
BASOPHILS NFR BLD AUTO: 0.2 % (ref 0–1.5)
BILIRUB SERPL-MCNC: 0.5 MG/DL (ref 0–1.2)
BUN SERPL-MCNC: 26 MG/DL (ref 6–20)
BUN/CREAT SERPL: 23 (ref 7–25)
CALCIUM SPEC-SCNC: 9.1 MG/DL (ref 8.6–10.5)
CHLORIDE SERPL-SCNC: 103 MMOL/L (ref 98–107)
CO2 SERPL-SCNC: 24 MMOL/L (ref 22–29)
CREAT SERPL-MCNC: 1.13 MG/DL (ref 0.57–1)
CRP SERPL-MCNC: 13.86 MG/DL (ref 0–0.5)
D-LACTATE SERPL-SCNC: 2 MMOL/L (ref 0.5–2)
DEPRECATED RDW RBC AUTO: 43.5 FL (ref 37–54)
EOSINOPHIL # BLD AUTO: 0 10*3/MM3 (ref 0–0.4)
EOSINOPHIL NFR BLD AUTO: 0 % (ref 0.3–6.2)
ERYTHROCYTE [DISTWIDTH] IN BLOOD BY AUTOMATED COUNT: 13.6 % (ref 12.3–15.4)
EXPIRATION DATE: NORMAL
FERRITIN SERPL-MCNC: 445.7 NG/ML (ref 13–150)
GFR SERPL CREATININE-BSD FRML MDRD: 51 ML/MIN/1.73
GLOBULIN UR ELPH-MCNC: 3.4 GM/DL
GLUCOSE SERPL-MCNC: 110 MG/DL (ref 65–99)
HCT VFR BLD AUTO: 38 % (ref 34–46.6)
HGB BLD-MCNC: 13.2 G/DL (ref 12–15.9)
HOLD SPECIMEN: NORMAL
HOLD SPECIMEN: NORMAL
IMM GRANULOCYTES # BLD AUTO: 0.03 10*3/MM3 (ref 0–0.05)
IMM GRANULOCYTES NFR BLD AUTO: 0.6 % (ref 0–0.5)
INTERNAL NEGATIVE CONTROL: NEGATIVE
INTERNAL POSITIVE CONTROL: POSITIVE
LARGE PLATELETS: NORMAL
LDH SERPL-CCNC: 512 U/L (ref 135–214)
LYMPHOCYTES # BLD AUTO: 1.19 10*3/MM3 (ref 0.7–3.1)
LYMPHOCYTES NFR BLD AUTO: 21.8 % (ref 19.6–45.3)
Lab: NORMAL
MCH RBC QN AUTO: 30.1 PG (ref 26.6–33)
MCHC RBC AUTO-ENTMCNC: 34.7 G/DL (ref 31.5–35.7)
MCV RBC AUTO: 86.8 FL (ref 79–97)
MONOCYTES # BLD AUTO: 0.36 10*3/MM3 (ref 0.1–0.9)
MONOCYTES NFR BLD AUTO: 6.6 % (ref 5–12)
NEUTROPHILS NFR BLD AUTO: 3.86 10*3/MM3 (ref 1.7–7)
NEUTROPHILS NFR BLD AUTO: 70.8 % (ref 42.7–76)
NRBC BLD AUTO-RTO: 0 /100 WBC (ref 0–0.2)
NT-PROBNP SERPL-MCNC: 122.4 PG/ML (ref 0–900)
PLATELET # BLD AUTO: 247 10*3/MM3 (ref 140–450)
PMV BLD AUTO: 11.1 FL (ref 6–12)
POTASSIUM SERPL-SCNC: 3.2 MMOL/L (ref 3.5–5.2)
PROCALCITONIN SERPL-MCNC: 0.2 NG/ML (ref 0–0.25)
PROT SERPL-MCNC: 7.3 G/DL (ref 6–8.5)
RBC # BLD AUTO: 4.38 10*6/MM3 (ref 3.77–5.28)
RBC MORPH BLD: NORMAL
SODIUM SERPL-SCNC: 141 MMOL/L (ref 136–145)
TROPONIN T SERPL-MCNC: <0.01 NG/ML (ref 0–0.03)
WBC MORPH BLD: NORMAL
WBC NRBC COR # BLD: 5.45 10*3/MM3 (ref 3.4–10.8)
WHOLE BLOOD HOLD SPECIMEN: NORMAL
WHOLE BLOOD HOLD SPECIMEN: NORMAL

## 2022-01-31 PROCEDURE — 85025 COMPLETE CBC W/AUTO DIFF WBC: CPT | Performed by: EMERGENCY MEDICINE

## 2022-01-31 PROCEDURE — 83880 ASSAY OF NATRIURETIC PEPTIDE: CPT | Performed by: EMERGENCY MEDICINE

## 2022-01-31 PROCEDURE — 71275 CT ANGIOGRAPHY CHEST: CPT

## 2022-01-31 PROCEDURE — 93005 ELECTROCARDIOGRAM TRACING: CPT

## 2022-01-31 PROCEDURE — 93005 ELECTROCARDIOGRAM TRACING: CPT | Performed by: EMERGENCY MEDICINE

## 2022-01-31 PROCEDURE — 84145 PROCALCITONIN (PCT): CPT | Performed by: EMERGENCY MEDICINE

## 2022-01-31 PROCEDURE — 99285 EMERGENCY DEPT VISIT HI MDM: CPT

## 2022-01-31 PROCEDURE — 86140 C-REACTIVE PROTEIN: CPT | Performed by: EMERGENCY MEDICINE

## 2022-01-31 PROCEDURE — 83036 HEMOGLOBIN GLYCOSYLATED A1C: CPT | Performed by: INTERNAL MEDICINE

## 2022-01-31 PROCEDURE — 83615 LACTATE (LD) (LDH) ENZYME: CPT | Performed by: EMERGENCY MEDICINE

## 2022-01-31 PROCEDURE — 80053 COMPREHEN METABOLIC PANEL: CPT | Performed by: EMERGENCY MEDICINE

## 2022-01-31 PROCEDURE — 81025 URINE PREGNANCY TEST: CPT | Performed by: EMERGENCY MEDICINE

## 2022-01-31 PROCEDURE — 83605 ASSAY OF LACTIC ACID: CPT | Performed by: EMERGENCY MEDICINE

## 2022-01-31 PROCEDURE — 0 IOPAMIDOL PER 1 ML: Performed by: EMERGENCY MEDICINE

## 2022-01-31 PROCEDURE — 85007 BL SMEAR W/DIFF WBC COUNT: CPT | Performed by: EMERGENCY MEDICINE

## 2022-01-31 PROCEDURE — 84484 ASSAY OF TROPONIN QUANT: CPT | Performed by: EMERGENCY MEDICINE

## 2022-01-31 PROCEDURE — 82728 ASSAY OF FERRITIN: CPT | Performed by: EMERGENCY MEDICINE

## 2022-01-31 RX ORDER — SODIUM CHLORIDE 0.9 % (FLUSH) 0.9 %
10 SYRINGE (ML) INJECTION AS NEEDED
Status: DISCONTINUED | OUTPATIENT
Start: 2022-01-31 | End: 2022-02-05 | Stop reason: HOSPADM

## 2022-01-31 RX ADMIN — IOPAMIDOL 150 ML: 755 INJECTION, SOLUTION INTRAVENOUS at 23:32

## 2022-01-31 NOTE — TELEPHONE ENCOUNTER
That would be negligent of me. She has never used O2 and I couldn't get it sent to her in time. With O2 lower than 85% I rec she go to ER. Tell her not to play around with this, there have been many to die in their sleep or of sudden death due to covid. I want her to go to ER, I don't want her to die. She needs monitoring while on O2. Remind her she has a disease that puts her at high risk (muscular dystrophy).

## 2022-01-31 NOTE — TELEPHONE ENCOUNTER
Caller: Hafsa Barron    Relationship: Self    Best call back number: [phone]    What is the best time to reach you: ANYTIME    Who are you requesting to speak with (clinical staff, provider,  specific staff member): CLINICAL STAFF    Do you know the name of the person who called: SELF    What was the call regarding: PATIENT STATES THAT SHE WOULD LIKE A CALL FROM THE NURSE.    Do you require a callback: YES

## 2022-02-01 PROBLEM — J12.82 PNEUMONIA DUE TO COVID-19 VIRUS: Status: ACTIVE | Noted: 2022-02-01

## 2022-02-01 PROBLEM — J06.9 ACUTE RESPIRATORY DISEASE DUE TO COVID-19 VIRUS: Status: ACTIVE | Noted: 2022-02-01

## 2022-02-01 PROBLEM — U07.1 ACUTE RESPIRATORY DISEASE DUE TO COVID-19 VIRUS: Status: ACTIVE | Noted: 2022-02-01

## 2022-02-01 PROBLEM — J96.01 ACUTE RESPIRATORY FAILURE WITH HYPOXIA: Status: ACTIVE | Noted: 2022-02-01

## 2022-02-01 PROBLEM — G47.33 OSA (OBSTRUCTIVE SLEEP APNEA): Status: ACTIVE | Noted: 2022-02-01

## 2022-02-01 LAB
ALBUMIN SERPL-MCNC: 3.6 G/DL (ref 3.5–5.2)
ALBUMIN SERPL-MCNC: 3.6 G/DL (ref 3.5–5.2)
ALP SERPL-CCNC: 76 U/L (ref 39–117)
ALP SERPL-CCNC: 83 U/L (ref 39–117)
ALT SERPL W P-5'-P-CCNC: 32 U/L (ref 1–33)
ALT SERPL W P-5'-P-CCNC: 32 U/L (ref 1–33)
AST SERPL-CCNC: 59 U/L (ref 1–32)
AST SERPL-CCNC: 65 U/L (ref 1–32)
BILIRUB CONJ SERPL-MCNC: 0.2 MG/DL (ref 0–0.3)
BILIRUB CONJ SERPL-MCNC: 0.2 MG/DL (ref 0–0.3)
BILIRUB INDIRECT SERPL-MCNC: 0.4 MG/DL
BILIRUB INDIRECT SERPL-MCNC: 0.4 MG/DL
BILIRUB SERPL-MCNC: 0.6 MG/DL (ref 0–1.2)
BILIRUB SERPL-MCNC: 0.6 MG/DL (ref 0–1.2)
CREAT SERPL-MCNC: 0.58 MG/DL (ref 0.57–1)
CREAT SERPL-MCNC: 0.68 MG/DL (ref 0.57–1)
GFR SERPL CREATININE-BSD FRML MDRD: 109 ML/MIN/1.73
GFR SERPL CREATININE-BSD FRML MDRD: 91 ML/MIN/1.73
HBA1C MFR BLD: 6.2 % (ref 4.8–5.6)
HOLD SPECIMEN: NORMAL
PROT SERPL-MCNC: 6.6 G/DL (ref 6–8.5)
PROT SERPL-MCNC: 6.8 G/DL (ref 6–8.5)
QT INTERVAL: 356 MS
QTC INTERVAL: 481 MS
TSH SERPL DL<=0.05 MIU/L-ACNC: 0.96 UIU/ML (ref 0.27–4.2)

## 2022-02-01 PROCEDURE — 94799 UNLISTED PULMONARY SVC/PX: CPT

## 2022-02-01 PROCEDURE — 94640 AIRWAY INHALATION TREATMENT: CPT

## 2022-02-01 PROCEDURE — XW033E5 INTRODUCTION OF REMDESIVIR ANTI-INFECTIVE INTO PERIPHERAL VEIN, PERCUTANEOUS APPROACH, NEW TECHNOLOGY GROUP 5: ICD-10-PCS | Performed by: INTERNAL MEDICINE

## 2022-02-01 PROCEDURE — 99223 1ST HOSP IP/OBS HIGH 75: CPT | Performed by: INTERNAL MEDICINE

## 2022-02-01 PROCEDURE — 25010000002 REMDESIVIR 100 MG/20ML SOLUTION 1 EACH VIAL: Performed by: INTERNAL MEDICINE

## 2022-02-01 PROCEDURE — 25010000002 ENOXAPARIN PER 10 MG: Performed by: INTERNAL MEDICINE

## 2022-02-01 PROCEDURE — 84443 ASSAY THYROID STIM HORMONE: CPT | Performed by: INTERNAL MEDICINE

## 2022-02-01 PROCEDURE — 80076 HEPATIC FUNCTION PANEL: CPT | Performed by: INTERNAL MEDICINE

## 2022-02-01 PROCEDURE — 25010000002 DEXAMETHASONE: Performed by: INTERNAL MEDICINE

## 2022-02-01 PROCEDURE — 94660 CPAP INITIATION&MGMT: CPT

## 2022-02-01 PROCEDURE — 82565 ASSAY OF CREATININE: CPT | Performed by: INTERNAL MEDICINE

## 2022-02-01 RX ORDER — ACETAMINOPHEN 650 MG/1
650 SUPPOSITORY RECTAL EVERY 4 HOURS PRN
Status: DISCONTINUED | OUTPATIENT
Start: 2022-02-01 | End: 2022-02-05 | Stop reason: HOSPADM

## 2022-02-01 RX ORDER — CETIRIZINE HYDROCHLORIDE 10 MG/1
10 TABLET ORAL DAILY
Status: DISCONTINUED | OUTPATIENT
Start: 2022-02-01 | End: 2022-02-05 | Stop reason: HOSPADM

## 2022-02-01 RX ORDER — POTASSIUM CHLORIDE 750 MG/1
40 CAPSULE, EXTENDED RELEASE ORAL ONCE
Status: COMPLETED | OUTPATIENT
Start: 2022-02-01 | End: 2022-02-01

## 2022-02-01 RX ORDER — FLUTICASONE PROPIONATE 50 MCG
1 SPRAY, SUSPENSION (ML) NASAL 2 TIMES DAILY
Status: DISCONTINUED | OUTPATIENT
Start: 2022-02-01 | End: 2022-02-05 | Stop reason: HOSPADM

## 2022-02-01 RX ORDER — AMLODIPINE BESYLATE 5 MG/1
2.5 TABLET ORAL DAILY
Status: DISCONTINUED | OUTPATIENT
Start: 2022-02-01 | End: 2022-02-05 | Stop reason: HOSPADM

## 2022-02-01 RX ORDER — CYCLOBENZAPRINE HCL 10 MG
10 TABLET ORAL 3 TIMES DAILY PRN
Status: DISCONTINUED | OUTPATIENT
Start: 2022-02-01 | End: 2022-02-05 | Stop reason: HOSPADM

## 2022-02-01 RX ORDER — SPIRONOLACTONE 25 MG/1
50 TABLET ORAL DAILY
Status: DISCONTINUED | OUTPATIENT
Start: 2022-02-01 | End: 2022-02-05 | Stop reason: HOSPADM

## 2022-02-01 RX ORDER — SODIUM CHLORIDE, SODIUM LACTATE, POTASSIUM CHLORIDE, CALCIUM CHLORIDE 600; 310; 30; 20 MG/100ML; MG/100ML; MG/100ML; MG/100ML
75 INJECTION, SOLUTION INTRAVENOUS CONTINUOUS
Status: ACTIVE | OUTPATIENT
Start: 2022-02-01 | End: 2022-02-01

## 2022-02-01 RX ORDER — ASPIRIN 81 MG/1
81 TABLET ORAL DAILY
Status: DISCONTINUED | OUTPATIENT
Start: 2022-02-01 | End: 2022-02-05 | Stop reason: HOSPADM

## 2022-02-01 RX ORDER — MODAFINIL 100 MG/1
100 TABLET ORAL DAILY
Status: DISCONTINUED | OUTPATIENT
Start: 2022-02-01 | End: 2022-02-01

## 2022-02-01 RX ORDER — SODIUM CHLORIDE 0.9 % (FLUSH) 0.9 %
10 SYRINGE (ML) INJECTION EVERY 12 HOURS SCHEDULED
Status: DISCONTINUED | OUTPATIENT
Start: 2022-02-01 | End: 2022-02-05 | Stop reason: HOSPADM

## 2022-02-01 RX ORDER — ALBUTEROL SULFATE 90 UG/1
2 AEROSOL, METERED RESPIRATORY (INHALATION) ONCE
Status: COMPLETED | OUTPATIENT
Start: 2022-02-01 | End: 2022-02-01

## 2022-02-01 RX ORDER — ALBUTEROL SULFATE 90 UG/1
2 AEROSOL, METERED RESPIRATORY (INHALATION) EVERY 4 HOURS PRN
Status: DISCONTINUED | OUTPATIENT
Start: 2022-02-01 | End: 2022-02-05 | Stop reason: HOSPADM

## 2022-02-01 RX ORDER — POTASSIUM CHLORIDE 1.5 G/1.77G
20 POWDER, FOR SOLUTION ORAL DAILY
Status: DISCONTINUED | OUTPATIENT
Start: 2022-02-01 | End: 2022-02-02

## 2022-02-01 RX ORDER — CLOPIDOGREL BISULFATE 75 MG/1
75 TABLET ORAL DAILY
Status: DISCONTINUED | OUTPATIENT
Start: 2022-02-01 | End: 2022-02-02

## 2022-02-01 RX ORDER — HYDROCODONE BITARTRATE AND ACETAMINOPHEN 10; 325 MG/1; MG/1
1 TABLET ORAL EVERY 6 HOURS PRN
Status: DISCONTINUED | OUTPATIENT
Start: 2022-02-01 | End: 2022-02-05 | Stop reason: HOSPADM

## 2022-02-01 RX ORDER — ATORVASTATIN CALCIUM 40 MG/1
80 TABLET, FILM COATED ORAL NIGHTLY
Status: DISCONTINUED | OUTPATIENT
Start: 2022-02-01 | End: 2022-02-05 | Stop reason: HOSPADM

## 2022-02-01 RX ORDER — TIZANIDINE 4 MG/1
4 TABLET ORAL NIGHTLY
Status: DISCONTINUED | OUTPATIENT
Start: 2022-02-01 | End: 2022-02-05 | Stop reason: HOSPADM

## 2022-02-01 RX ORDER — PANTOPRAZOLE SODIUM 40 MG/1
40 TABLET, DELAYED RELEASE ORAL
Status: DISCONTINUED | OUTPATIENT
Start: 2022-02-01 | End: 2022-02-05 | Stop reason: HOSPADM

## 2022-02-01 RX ORDER — MODAFINIL 100 MG/1
300 TABLET ORAL DAILY
Status: DISCONTINUED | OUTPATIENT
Start: 2022-02-01 | End: 2022-02-05 | Stop reason: HOSPADM

## 2022-02-01 RX ORDER — MODAFINIL 100 MG/1
200 TABLET ORAL DAILY
Status: DISCONTINUED | OUTPATIENT
Start: 2022-02-01 | End: 2022-02-01

## 2022-02-01 RX ORDER — OXYCODONE HYDROCHLORIDE AND ACETAMINOPHEN 5; 325 MG/1; MG/1
1 TABLET ORAL ONCE
Status: COMPLETED | OUTPATIENT
Start: 2022-02-01 | End: 2022-02-01

## 2022-02-01 RX ORDER — ONDANSETRON 2 MG/ML
4 INJECTION INTRAMUSCULAR; INTRAVENOUS EVERY 6 HOURS PRN
Status: DISCONTINUED | OUTPATIENT
Start: 2022-02-01 | End: 2022-02-05 | Stop reason: HOSPADM

## 2022-02-01 RX ORDER — SODIUM CHLORIDE 0.9 % (FLUSH) 0.9 %
10 SYRINGE (ML) INJECTION AS NEEDED
Status: DISCONTINUED | OUTPATIENT
Start: 2022-02-01 | End: 2022-02-05 | Stop reason: HOSPADM

## 2022-02-01 RX ORDER — ACETAMINOPHEN 325 MG/1
650 TABLET ORAL ONCE
Status: DISCONTINUED | OUTPATIENT
Start: 2022-02-01 | End: 2022-02-01

## 2022-02-01 RX ORDER — NALOXONE HCL 0.4 MG/ML
0.4 VIAL (ML) INJECTION AS NEEDED
Status: DISCONTINUED | OUTPATIENT
Start: 2022-02-01 | End: 2022-02-05 | Stop reason: HOSPADM

## 2022-02-01 RX ORDER — ACETAMINOPHEN 325 MG/1
650 TABLET ORAL EVERY 4 HOURS PRN
Status: DISCONTINUED | OUTPATIENT
Start: 2022-02-01 | End: 2022-02-05 | Stop reason: HOSPADM

## 2022-02-01 RX ORDER — ACETAMINOPHEN 160 MG/5ML
650 SOLUTION ORAL EVERY 4 HOURS PRN
Status: DISCONTINUED | OUTPATIENT
Start: 2022-02-01 | End: 2022-02-05 | Stop reason: HOSPADM

## 2022-02-01 RX ORDER — GABAPENTIN 300 MG/1
600 CAPSULE ORAL EVERY 8 HOURS PRN
Status: DISCONTINUED | OUTPATIENT
Start: 2022-02-01 | End: 2022-02-05 | Stop reason: HOSPADM

## 2022-02-01 RX ORDER — DEXAMETHASONE SODIUM PHOSPHATE 4 MG/ML
6 INJECTION, SOLUTION INTRA-ARTICULAR; INTRALESIONAL; INTRAMUSCULAR; INTRAVENOUS; SOFT TISSUE DAILY
Status: DISCONTINUED | OUTPATIENT
Start: 2022-02-02 | End: 2022-02-02

## 2022-02-01 RX ORDER — MONTELUKAST SODIUM 10 MG/1
10 TABLET ORAL EVERY EVENING
Status: DISCONTINUED | OUTPATIENT
Start: 2022-02-01 | End: 2022-02-05 | Stop reason: HOSPADM

## 2022-02-01 RX ORDER — ECHINACEA PURPUREA EXTRACT 125 MG
2 TABLET ORAL AS NEEDED
Status: DISCONTINUED | OUTPATIENT
Start: 2022-02-01 | End: 2022-02-05 | Stop reason: HOSPADM

## 2022-02-01 RX ORDER — OXYBUTYNIN CHLORIDE 5 MG/1
5 TABLET, EXTENDED RELEASE ORAL DAILY
Status: DISCONTINUED | OUTPATIENT
Start: 2022-02-01 | End: 2022-02-05 | Stop reason: HOSPADM

## 2022-02-01 RX ADMIN — SPIRONOLACTONE 50 MG: 50 TABLET ORAL at 09:42

## 2022-02-01 RX ADMIN — BUSPIRONE HYDROCHLORIDE 15 MG: 10 TABLET ORAL at 09:41

## 2022-02-01 RX ADMIN — MODAFINIL 300 MG: 100 TABLET ORAL at 10:12

## 2022-02-01 RX ADMIN — ENOXAPARIN SODIUM 100 MG: 100 INJECTION SUBCUTANEOUS at 12:11

## 2022-02-01 RX ADMIN — SODIUM CHLORIDE, POTASSIUM CHLORIDE, SODIUM LACTATE AND CALCIUM CHLORIDE 75 ML/HR: 600; 310; 30; 20 INJECTION, SOLUTION INTRAVENOUS at 01:54

## 2022-02-01 RX ADMIN — PANTOPRAZOLE SODIUM 40 MG: 40 TABLET, DELAYED RELEASE ORAL at 05:52

## 2022-02-01 RX ADMIN — SODIUM CHLORIDE, PRESERVATIVE FREE 10 ML: 5 INJECTION INTRAVENOUS at 21:12

## 2022-02-01 RX ADMIN — ENOXAPARIN SODIUM 100 MG: 100 INJECTION SUBCUTANEOUS at 02:00

## 2022-02-01 RX ADMIN — HYDROCODONE BITARTRATE AND ACETAMINOPHEN 1 TABLET: 10; 325 TABLET ORAL at 21:17

## 2022-02-01 RX ADMIN — POTASSIUM CHLORIDE 20 MEQ: 1.5 FOR SOLUTION ORAL at 09:42

## 2022-02-01 RX ADMIN — ASPIRIN 81 MG: 81 TABLET, COATED ORAL at 09:41

## 2022-02-01 RX ADMIN — BUSPIRONE HYDROCHLORIDE 15 MG: 10 TABLET ORAL at 21:10

## 2022-02-01 RX ADMIN — SODIUM CHLORIDE, PRESERVATIVE FREE 10 ML: 5 INJECTION INTRAVENOUS at 09:42

## 2022-02-01 RX ADMIN — POTASSIUM CHLORIDE 40 MEQ: 10 CAPSULE, COATED, EXTENDED RELEASE ORAL at 01:57

## 2022-02-01 RX ADMIN — ATORVASTATIN CALCIUM 80 MG: 40 TABLET, FILM COATED ORAL at 21:10

## 2022-02-01 RX ADMIN — FLUTICASONE PROPIONATE 1 SPRAY: 50 SPRAY, METERED NASAL at 09:41

## 2022-02-01 RX ADMIN — MINERAL OIL AND PETROLATUM: 150; 830 OINTMENT OPHTHALMIC at 23:25

## 2022-02-01 RX ADMIN — REMDESIVIR 200 MG: 100 INJECTION, POWDER, LYOPHILIZED, FOR SOLUTION INTRAVENOUS at 01:54

## 2022-02-01 RX ADMIN — HYDROCODONE BITARTRATE AND ACETAMINOPHEN 1 TABLET: 10; 325 TABLET ORAL at 05:55

## 2022-02-01 RX ADMIN — Medication 1 TABLET: at 09:41

## 2022-02-01 RX ADMIN — DEXAMETHASONE SODIUM PHOSPHATE 10 MG: 10 INJECTION INTRAMUSCULAR; INTRAVENOUS at 02:01

## 2022-02-01 RX ADMIN — GABAPENTIN 600 MG: 300 CAPSULE ORAL at 09:43

## 2022-02-01 RX ADMIN — MINERAL OIL AND PETROLATUM: 150; 830 OINTMENT OPHTHALMIC at 02:00

## 2022-02-01 RX ADMIN — OXYCODONE HYDROCHLORIDE AND ACETAMINOPHEN 1 TABLET: 5; 325 TABLET ORAL at 01:58

## 2022-02-01 RX ADMIN — CETIRIZINE HYDROCHLORIDE 10 MG: 10 TABLET, FILM COATED ORAL at 09:42

## 2022-02-01 RX ADMIN — MONTELUKAST SODIUM 10 MG: 10 TABLET, COATED ORAL at 21:10

## 2022-02-01 RX ADMIN — OXYBUTYNIN CHLORIDE 5 MG: 5 TABLET, EXTENDED RELEASE ORAL at 09:42

## 2022-02-01 RX ADMIN — TIZANIDINE 4 MG: 4 TABLET ORAL at 21:10

## 2022-02-01 RX ADMIN — ALBUTEROL SULFATE 2 PUFF: 90 AEROSOL, METERED RESPIRATORY (INHALATION) at 02:14

## 2022-02-01 NOTE — ED PROVIDER NOTES
Subjective   Ms. Hafsa Barron is a 51 y.o. female who presents to the ED with c/o shortness of breath. She was diagnosed with COVID 5 days ago after her symptoms onset 7 days ago. She has back pain. Her oxygen saturation has been decreasing and returning to normal repeatedly. She has has history of muscular dystrophy which she states has been worse since she has had COVID. She has been taking Dayquil and Nyquil as well as Tylenol and Motrin. She received an infusion of monoclonal antibodies at [place]. She has history of sleep apnea and wears CPAP at night. She has had decreased appetite and will have diarrhea soon after she eats. There are no other acute symptoms at this time.      Shortness of Breath  Severity:  Moderate  Onset quality:  Sudden  Duration:  7 days  Timing:  Constant  Progression:  Unchanged  Chronicity:  New  Relieved by:  Nothing  Worsened by:  Nothing      Review of Systems   Constitutional: Positive for appetite change (decreased).   Respiratory: Positive for shortness of breath.    Musculoskeletal: Positive for back pain.   All other systems reviewed and are negative.      Past Medical History:   Diagnosis Date   • Allergic    • Allergy    • Anxiety    • Asthma Allergies induced   • Depression    • Fibromyalgia, primary    • GERD (gastroesophageal reflux disease)    • Headache    • History of blood clots    • History of UTI    • Hyperlipidemia    • Hypertension    • IBS (irritable bowel syndrome)    • Internal hemorrhoid    • Kidney stone    • Low back pain    • Muscular dystrophy (HCC)    • Muscular dystrophy (HCC)     II   • Myotonia    • Obesity    • Stroke (HCC) 08/31/2013    resdual- left sided weakness.    • Type 2 diabetes mellitus without complication, without long-term current use of insulin ([facility])        Allergies   Allergen Reactions   • Penicillins Anaphylaxis and Shortness Of Breath       Past Surgical History:   Procedure Laterality Date   • CHOLECYSTECTOMY  07/2004   •  COLONOSCOPY  2017   • D & C WITH SUCTION     • LYMPH NODE BIOPSY     • SUBCLAVIAN ARTERY STENT  08/2018    x2   • WISDOM TOOTH EXTRACTION         Family History   Problem Relation Age of Onset   • Allergies Other    • ADD / ADHD Other    • Heart block Other    • Other Other         CEREBROVASCULAR ACCIDENT    • Hypertension Other    • Cancer Other         LUNG CANCER   • Hyperlipidemia Other    • Alcohol abuse Mother    • Depression Mother    • Early death Mother          age 59   • Heart disease Mother         Mother  of Massive Heart attack   • Hyperlipidemia Mother    • Hypertension Mother    • Miscarriages / Stillbirths Mother         Miscarriage   • Heart attack Mother          of  massive heart attack   • Alcohol abuse Father    • Cancer Father         Had Lung Cancer  had 1 lung till death   • Diabetes Father         Lived on insuline shots   • Early death Father          age 53   • Breast cancer Maternal Aunt    • Learning disabilities Son         Had Adhd   • Breast cancer Cousin    • Ovarian cancer Neg Hx    • Endometrial cancer Neg Hx    • Uterine cancer Neg Hx    • Colon cancer Neg Hx        Social History     Socioeconomic History   • Marital status:    Tobacco Use   • Smoking status: Former Smoker     Packs/day: 1.50     Years: 15.00     Pack years: 22.50     Start date: 1983     Quit date: 2013     Years since quittin.4   • Smokeless tobacco: Never Used   • Tobacco comment: use Ecigg now NO NICOTENE   Vaping Use   • Vaping Use: Some days   • Substances: Flavoring   • Devices: Pre-filled pod   Substance and Sexual Activity   • Alcohol use: Yes   • Drug use: No   • Sexual activity: Yes     Partners: Male     Birth control/protection: Post-menopausal     Comment: Menapause no cycle since 2013         Objective   Physical Exam  Vitals and nursing note reviewed.   Constitutional:       General: She is not in acute distress.     Appearance: She is  well-developed.   HENT:      Head: Normocephalic and atraumatic.      Nose: Nose normal.   Eyes:      General: No scleral icterus.     Conjunctiva/sclera: Conjunctivae normal.   Cardiovascular:      Rate and Rhythm: Normal rate and regular rhythm.      Heart sounds: Normal heart sounds. No murmur heard.      Pulmonary:      Effort: Pulmonary effort is normal. No respiratory distress.      Breath sounds: Rales (bilateral lung bases) present.   Abdominal:      Palpations: Abdomen is soft.      Tenderness: There is no abdominal tenderness.   Musculoskeletal:         General: Normal range of motion.      Cervical back: Normal range of motion and neck supple.      Right lower leg: Edema present.      Left lower leg: Edema present.   Skin:     General: Skin is warm and dry.   Neurological:      Mental Status: She is alert and oriented to person, place, and time.   Psychiatric:         Behavior: Behavior normal.         Procedures         ED Course  ED Course as of 02/01/22 0047   Mon Jan 31, 2022   2130 Creatinine(!): 1.13 [RS]   Tue Feb 01, 2022   0021 Patient's oxygen saturation has been reasonable while here in the hospital.  However, we got her up to go to the restroom, her oxygen quickly dropped down to 82% with increased work of breathing.  We will plan admission to the hospital for further evaluation and management.  Hospitalist paged for admission. [RS]   0028 SpO2(!)(S): 82 % [RS]   0034 Case discussed with the hospitalist who is agreeable with the plan for admission. [RS]      ED Course User Index  [RS] Shane Gould MD     Recent Results (from the past 24 hour(s))   Comprehensive Metabolic Panel    Collection Time: 01/31/22  8:51 PM    Specimen: Blood   Result Value Ref Range    Glucose 110 (H) 65 - 99 mg/dL    BUN 26 (H) 6 - 20 mg/dL    Creatinine 1.13 (H) 0.57 - 1.00 mg/dL    Sodium 141 136 - 145 mmol/L    Potassium 3.2 (L) 3.5 - 5.2 mmol/L    Chloride 103 98 - 107 mmol/L    CO2 24.0 22.0 - 29.0  mmol/L    Calcium 9.1 8.6 - 10.5 mg/dL    Total Protein 7.3 6.0 - 8.5 g/dL    Albumin 3.90 3.50 - 5.20 g/dL    ALT (SGPT) 37 (H) 1 - 33 U/L    AST (SGOT) 68 (H) 1 - 32 U/L    Alkaline Phosphatase 87 39 - 117 U/L    Total Bilirubin 0.5 0.0 - 1.2 mg/dL    eGFR Non African Amer 51 (L) >60 mL/min/1.73    Globulin 3.4 gm/dL    A/G Ratio 1.1 g/dL    BUN/Creatinine Ratio 23.0 7.0 - 25.0    Anion Gap 14.0 5.0 - 15.0 mmol/L   BNP    Collection Time: 01/31/22  8:51 PM    Specimen: Blood   Result Value Ref Range    proBNP 122.4 0.0 - 900.0 pg/mL   Troponin    Collection Time: 01/31/22  8:51 PM    Specimen: Blood   Result Value Ref Range    Troponin T <0.010 0.000 - 0.030 ng/mL   Green Top (Gel)    Collection Time: 01/31/22  8:51 PM   Result Value Ref Range    Extra Tube Hold for add-ons.    Lavender Top    Collection Time: 01/31/22  8:51 PM   Result Value Ref Range    Extra Tube hold for add-on    Gold Top - SST    Collection Time: 01/31/22  8:51 PM   Result Value Ref Range    Extra Tube Hold for add-ons.    Light Blue Top    Collection Time: 01/31/22  8:51 PM   Result Value Ref Range    Extra Tube hold for add-on    CBC Auto Differential    Collection Time: 01/31/22  8:51 PM    Specimen: Blood   Result Value Ref Range    WBC 5.45 3.40 - 10.80 10*3/mm3    RBC 4.38 3.77 - 5.28 10*6/mm3    Hemoglobin 13.2 12.0 - 15.9 g/dL    Hematocrit 38.0 34.0 - 46.6 %    MCV 86.8 79.0 - 97.0 fL    MCH 30.1 26.6 - 33.0 pg    MCHC 34.7 31.5 - 35.7 g/dL    RDW 13.6 12.3 - 15.4 %    RDW-SD 43.5 37.0 - 54.0 fl    MPV 11.1 6.0 - 12.0 fL    Platelets 247 140 - 450 10*3/mm3    Neutrophil % 70.8 42.7 - 76.0 %    Lymphocyte % 21.8 19.6 - 45.3 %    Monocyte % 6.6 5.0 - 12.0 %    Eosinophil % 0.0 (L) 0.3 - 6.2 %    Basophil % 0.2 0.0 - 1.5 %    Immature Grans % 0.6 (H) 0.0 - 0.5 %    Neutrophils, Absolute 3.86 1.70 - 7.00 10*3/mm3    Lymphocytes, Absolute 1.19 0.70 - 3.10 10*3/mm3    Monocytes, Absolute 0.36 0.10 - 0.90 10*3/mm3    Eosinophils, Absolute  0.00 0.00 - 0.40 10*3/mm3    Basophils, Absolute 0.01 0.00 - 0.20 10*3/mm3    Immature Grans, Absolute 0.03 0.00 - 0.05 10*3/mm3    nRBC 0.0 0.0 - 0.2 /100 WBC   Lactate Dehydrogenase    Collection Time: 01/31/22  8:51 PM    Specimen: Blood   Result Value Ref Range     (H) 135 - 214 U/L   Procalcitonin    Collection Time: 01/31/22  8:51 PM    Specimen: Blood   Result Value Ref Range    Procalcitonin 0.20 0.00 - 0.25 ng/mL   C-reactive Protein    Collection Time: 01/31/22  8:51 PM    Specimen: Blood   Result Value Ref Range    C-Reactive Protein 13.86 (H) 0.00 - 0.50 mg/dL   Lactic Acid, Plasma    Collection Time: 01/31/22  8:51 PM    Specimen: Blood   Result Value Ref Range    Lactate 2.0 0.5 - 2.0 mmol/L   Ferritin    Collection Time: 01/31/22  8:51 PM    Specimen: Blood   Result Value Ref Range    Ferritin 445.70 (H) 13.00 - 150.00 ng/mL   Scan Slide    Collection Time: 01/31/22  8:51 PM    Specimen: Blood   Result Value Ref Range    RBC Morphology Normal Normal    WBC Morphology Normal Normal    Large Platelets Mod/2+ None Seen   POCT pregnancy, urine    Collection Time: 01/31/22  9:16 PM    Specimen: Urine   Result Value Ref Range    HCG, Urine, QL Negative Negative    Lot Number [id number]     Internal Positive Control Positive Positive, Passed    Internal Negative Control Negative Negative, Passed    Expiration Date 05-      Note: In addition to lab results from this visit, the labs listed above may include labs taken at another facility or during a different encounter within the last 24 hours. Please correlate lab times with ED admission and discharge times for further clarification of the services performed during this visit.    CT Chest Pulmonary Embolism   Final Result      No CTA evidence of pulmonary embolism or acute aortic pathology.      Extensive groundglass and patchy opacity throughout both lungs, most consistent with a widespread inflammatory or infectious process such as Covid.       Ultimately enlarged mediastinal and hilar lymph nodes may be reactive, though are nonspecific. Attention to these on follow-up chest CT in 2 months recommended.      RECOMMENDATION:      Follow-up chest CT in 2 months as above.      Electronically signed by:  Mk Da Silva M.D.     1/31/2022 10:07 PM Mountain Time        Vitals:    01/31/22 2300 02/01/22 0000 02/01/22 0027 02/01/22 0030   BP: 115/77 126/71     BP Location:       Patient Position:       Pulse: 103 101     Resp:       Temp:       TempSrc:       SpO2: 96% 95% (S) (!) 82% 91%   Weight:       Height:         Medications   sodium chloride 0.9 % flush 10 mL (has no administration in time range)   dexamethasone (DECADRON) IVPB 10 mg (has no administration in time range)   dexamethasone (DECADRON) injection 6 mg (has no administration in time range)   pantoprazole (PROTONIX) EC tablet 40 mg (has no administration in time range)   potassium chloride (MICRO-K) CR capsule 40 mEq (has no administration in time range)   lactated ringers infusion (has no administration in time range)   albuterol sulfate HFA (PROVENTIL HFA;VENTOLIN HFA;PROAIR HFA) inhaler 2 puff (has no administration in time range)   albuterol sulfate HFA (PROVENTIL HFA;VENTOLIN HFA;PROAIR HFA) inhaler 2 puff (has no administration in time range)   iopamidol (ISOVUE-370) 76 % injection 150 mL (150 mL Intravenous Given 1/31/22 2332)     ECG/EMG Results (last 24 hours)     Procedure Component Value Units Date/Time    ECG 12 Lead [[id number]] Collected: 01/31/22 2040     Updated: 01/31/22 2037        ECG 12 Lead                                                    MDM  Number of Diagnoses or Management Options     Amount and/or Complexity of Data Reviewed  Clinical lab tests: reviewed  Tests in the radiology section of CPT®: reviewed  Discuss the patient with other providers: yes  Independent visualization of images, tracings, or specimens: yes    Admit    Final diagnoses:   Acute respiratory  disease due to COVID-19 virus   Hypoxemia requiring supplemental oxygen   Muscular dystrophy (HCC)   Diabetes mellitus type 2 in obese (HCC)       Documentation assistance provided by kolby Craig.  Information recorded by the scribe was done at my direction and has been verified and validated by me.     Yovany Craig  01/31/22 2246       Shane Gould MD  02/01/22 2348

## 2022-02-01 NOTE — ED NOTES
Pt requesting to use bathroom. Pt stated she doesn't want to use bedside commode at this time. This nurse check pt oxygen on RA and oxygen saturation is 82%. This nurse instructed pt that she would need to use a bed side commode from here on out because of her oxygen saturation being low. Pt verbalized understanding. Pt currently on stretcher with bed locked and in lowest position. Call light with in reach. Oxygen on 2 L at this pt saturation is 92%. MD notified. Will continue to monitor.      Dona Youngblood RN  02/01/22 0026

## 2022-02-01 NOTE — PLAN OF CARE
Problem: Adult Inpatient Plan of Care  Goal: Plan of Care Review  Outcome: Ongoing, Progressing  Flowsheets (Taken 2/1/2022 1639)  Progress: no change  Plan of Care Reviewed With: patient  Outcome Summary: Pt received from the ED with O2 at 4 liters NC. She desats quickly with getting out of bed. O2 sats in the 70 -80's noted while up. Wears C pap at night. Machine brought to room by respiratory.  Pt requested a new IV be placed. IV placed in the left forearm.  Will continue to monitor progress.  Goal: Absence of Hospital-Acquired Illness or Injury  Intervention: Identify and Manage Fall Risk  Recent Flowsheet Documentation  Taken 2/1/2022 1634 by Mary Beth Sharma, RN  Safety Promotion/Fall Prevention:   clutter free environment maintained   assistive device/personal items within reach  Taken 2/1/2022 1432 by Mary Beth Sharma, RN  Safety Promotion/Fall Prevention:   assistive device/personal items within reach   clutter free environment maintained   toileting scheduled   safety round/check completed  Intervention: Prevent Skin Injury  Recent Flowsheet Documentation  Taken 2/1/2022 1634 by Mary Beth Sharma, RN  Body Position: supine  Skin Protection:   adhesive use limited   incontinence pads utilized  Taken 2/1/2022 1432 by Mary Beth Sharma RN  Body Position: supine  Skin Protection:   adhesive use limited   incontinence pads utilized  Goal: Readiness for Transition of Care  Intervention: Mutually Develop Transition Plan  Recent Flowsheet Documentation  Taken 2/1/2022 1427 by Mary Beth Sharma, RN  Equipment Currently Used at Home:   shower chair   cpap   wheelchair, motorized   Goal Outcome Evaluation:  Plan of Care Reviewed With: patient        Progress: no change  Outcome Summary: Pt received from the ED with O2 at 4 liters NC. She desats quickly with getting out of bed. O2 sats in the 70 -80's noted while up. Wears C pap at night. Machine brought to room by respiratory.  Pt requested a new IV be  placed. IV placed in the left forearm.  Will continue to monitor progress.

## 2022-02-01 NOTE — H&P
[facility] Medicine Services  HISTORY AND PHYSICAL    Patient Name: Hafsa Barron  : 1970  MRN: [id number]  Primary Care Physician: Jocy Snow MD  Date of admission: 2022      Subjective   Subjective     Chief Complaint:  Shortness of air, hypoxia, fever and chills    HPI:  Hafsa Barron is a 51 y.o. female   with past medical history of chronic pain syndrome on chronic opioids/gabapentin, GIORGIO, myotonic dystrophy type II, hypertension, hyperlipidemia, fibromyalgia, type 2 diabetes non-insulin-dependent on Metformin, obesity with BMI of 34.5, history of stroke who presents to the ER with complaints of cough, fever, chills and shortness of breath with hypoxia.    Patient reports that she was diagnosed with Covid outpatient on 2022.  She has been taking DayQuil and NyQuil with Tylenol and Motrin for symptoms of fever, chills, generalized aches and pains, decreased appetite, diarrhea, and acute on chronic back pain.  Patient received monoclonal antibodies today while at [place] but unfortunately has continued to have worsening shortness of air. Rated 5/10 in severity, worse with exertion, improved with rest, no other alleviating factors.  Reports cough that is nonproductive.    Patient is unvaccinated.  She reports that she did not receive the vaccine as she was scared about consequences.She also reports that her  is adamantly against the vaccine which also had a large reason for her choice.          COVID Details:    Symptoms:    [] NONE [x] Fever [x]  Cough [x] Shortness of breath [] Change in taste/smell      Review of Systems   Gen- reports fevers, chills  CV- No chest pain, palpitations  Resp- reports cough, dyspnea  GI-ports nausea reports diarrhea, denies abd pain      All other systems reviewed and are negative.     Personal History     Past Medical History:   Diagnosis Date   • Allergic    • Allergy    • Anxiety    • Asthma Allergies  induced   • Depression    • Fibromyalgia, primary    • GERD (gastroesophageal reflux disease)    • Headache    • History of blood clots    • History of UTI    • Hyperlipidemia    • Hypertension    • IBS (irritable bowel syndrome)    • Internal hemorrhoid    • Kidney stone    • Low back pain    • Muscular dystrophy ([facility])    • Muscular dystrophy ([facility])     II   • Myotonia    • Obesity    • Stroke ([facility]) 08/31/2013    resdual- left sided weakness.    • Type 2 diabetes mellitus without complication, without long-term current use of insulin ([facility])        Past Surgical History:   Procedure Laterality Date   • CHOLECYSTECTOMY  07/2004   • COLONOSCOPY  07/2017   • D & C WITH SUCTION     • LYMPH NODE BIOPSY     • SUBCLAVIAN ARTERY STENT  08/2018    x2   • WISDOM TOOTH EXTRACTION         Family History:  family history includes ADD / ADHD in an other family member; Alcohol abuse in her father and mother; Allergies in an other family member; Breast cancer in her cousin and maternal aunt; Cancer in her father and another family member; Depression in her mother; Diabetes in her father; Early death in her father and mother; Heart attack in her mother; Heart block in an other family member; Heart disease in her mother; Hyperlipidemia in her mother and another family member; Hypertension in her mother and another family member; Learning disabilities in her son; Miscarriages / Stillbirths in her mother; Other in an other family member. Otherwise pertinent FHx was reviewed and unremarkable.     Social History:  reports that she quit smoking about 8 years ago. She started smoking about 38 years ago. She has a 22.50 pack-year smoking history. She has never used smokeless tobacco. She reports current alcohol use. She reports that she does not use drugs.  Social History     Social History Narrative   • Not on file       Medications:  Available home medication information reviewed.  (Not in a hospital admission)      Allergies   Allergen  Reactions   • Penicillins Anaphylaxis and Shortness Of Breath       Objective   Objective     Vital Signs:   Temp:  [100.4 °F (38 °C)] 100.4 °F (38 °C)  Heart Rate:  [101-115] 101  Resp:  [20] 20  BP: (115-133)/(71-82) 126/71  Flow (L/min):  [2] 2       Physical Exam   Constitutional: Awake, alert, appears acutely ill  Eyes: PERRLA, sclerae anicteric, no conjunctival injection  HENT: NCAT, mucous membranes dry  Neck: Supple, no thyromegaly, no lymphadenopathy, trachea midline  Respiratory: Clear to auscultation bilaterally, mildly labored respirations   Cardiovascular: RRR, no murmurs, rubs, or gallops, palpable pedal pulses bilaterally  Gastrointestinal: Positive bowel sounds, soft, nontender, nondistended  Musculoskeletal: No bilateral ankle edema, no clubbing or cyanosis to extremities  Psychiatric: Appropriate affect, cooperative  Neurologic: Oriented x 3, strength symmetric in all extremities, Cranial Nerves grossly intact to confrontation, speech clear  Skin: No rashes      Result Review:  I have personally reviewed the results from the time of this admission to 2/1/2022 00:51 EST and agree with these findings:  [x]  Laboratory  []  Microbiology  [x]  Radiology  []  EKG/Telemetry   []  Cardiology/Vascular   []  Pathology  []  Old records  []  Other:  Most notable findings include: CRP 13, , potassium slightly low at 3.2, creatinine elevated from baseline at 1.1      LAB RESULTS:      Lab 01/31/22 2051   WBC 5.45   HEMOGLOBIN 13.2   HEMATOCRIT 38.0   PLATELETS 247   NEUTROS ABS 3.86   IMMATURE GRANS (ABS) 0.03   LYMPHS ABS 1.19   MONOS ABS 0.36   EOS ABS 0.00   MCV 86.8   CRP 13.86*   PROCALCITONIN 0.20   LACTATE 2.0   *         Lab 01/31/22 2051   SODIUM 141   POTASSIUM 3.2*   CHLORIDE 103   CO2 24.0   ANION GAP 14.0   BUN 26*   CREATININE 1.13*   GLUCOSE 110*   CALCIUM 9.1         Lab 01/31/22 2051   TOTAL PROTEIN 7.3   ALBUMIN 3.90   GLOBULIN 3.4   ALT (SGPT) 37*   AST (SGOT) 68*   BILIRUBIN  0.5   ALK PHOS 87         Lab 01/31/22 2051   PROBNP 122.4   TROPONIN T <0.010             Lab 01/31/22 2051   FERRITIN 445.70*         UA    Urinalysis 6/23/21 9/13/21   Ketones, UA 15 mg/dL (A) Negative   Leukocytes, UA 75 Zac/ul (A) Small (1+) (A)   (A) Abnormal value              Microbiology Results (last 10 days)     Procedure Component Value - Date/Time    COVID-19 PCR, [facility], NP SWAB IN [facility] TRANSPORT MEDIA/ORAL SWISH 24-30 HR TAT - Swab, Nasopharynx [[id number]]  (Abnormal) Collected: 01/25/22 1618    Lab Status: Final result Specimen: Swab from Nasopharynx Updated: 01/26/22 1213     SARS-CoV-2 EMBER Detected          No radiology results from the last 24 hrs    Results for orders placed during the hospital encounter of 05/11/20    Adult Transthoracic Echo Complete W/ Cont if Necessary Per Protocol    Interpretation Summary  · Estimated EF = 50%.  · Mild mitral valve regurgitation is present  · Left ventricular systolic function is mildly decreased.      Assessment/Plan   Assessment & Plan     Active Hospital Problems    Diagnosis  POA   • Pneumonia due to COVID-19 virus [U07.1, J12.82]  Unknown   • Acute respiratory failure with hypoxia (HCC) [J96.01]  Unknown   • Chronic pain [G89.29]  Yes   • Fibromyalgia [M79.7]  Yes   • Hypertension [I10]  Yes   • Hyperlipidemia [E78.5]  Yes   • Type 2 diabetes mellitus without complication, without long-term current use of insulin (HCC) [E11.9]  Yes   • Myotonic dystrophy, type 2 (HCC) [G71.11]  Yes   • H/O: stroke [Z86.73]  Not Applicable     Patient is a 51-year-old female with past medical history of chronic pain syndrome on chronic opioids/gabapentin, myotonic dystrophy type II, hypertension, hyperlipidemia, fibromyalgia, type 2 diabetes non-insulin-dependent on Metformin, obesity with BMI of 34.5, history of stroke who presents to the ER with complaints of cough, fever, chills and shortness of breath with hypoxia.    1.  Bilateral extensive COVID-19  pneumonia  2.  Acute hypoxic respiratory insufficiency  3.  Elevated creatinine with suspected volume contraction  4.  Chronic pain syndrome/fibromyalgia  5.  Essential hypertension  6.  Type 2 diabetes  7.  Obesity with BMI 34.5, POA  8.  Myotonic dystrophy  9.  History of stroke  10.  GIORGIO on CPAP    --current o2 requirement at admission of 2 L, CPAP at night  --Start dexamethasone 6mg q24h  --Start remdesevir  --tylenol for fever  --pulm consult given severity of illness on CT, may be candidate for actemra over next several days.   --d-dimer, ferritin, ldh, crp  --serial covid 19 labs  --dvt ppx  --Explained risks and benefits of the vaccine to the patient.      DVT prophylaxis: Full dose Lovenox every 12 hours per new protocol protocol      CODE STATUS:  Full code  There are no questions and answers to display.       Admission Status:  I believe this patient meets INPATIENT status due to covid 19 pna, hypoxic respiratory insufficiency.  I feel patient’s risk for adverse outcomes and need for care warrant INPATIENT evaluation and I predict the patient’s care encounter to likely last beyond 2 midnights.      Chava Salter,   02/01/22

## 2022-02-01 NOTE — PAYOR COMM NOTE
"Ref # [id number]  Brenna Giraldo RN, BSN  Phone # [phone]  Fax # [phone]  Hafsa Barron (51 y.o. Female)             Date of Birth Social Security Number Address Home Phone MRN    1970  [address] [place] [address] [address] [place] [address] [phone] [id number]    Anabaptist Marital Status             Orthodoxy        Admission Date Admission Type Admitting Provider Attending Provider Department, Room/Bed    22 Emergency Rhiannon Jay II, Rhiannon Corado II, DO [facility] Emergency Department,     Discharge Date Discharge Disposition Discharge Destination                         Attending Provider: Rhiannon Jay II, DO    Allergies: Penicillins    Isolation: Contact Air   Infection: COVID (confirmed) (22)   Code Status: CPR   Advance Care Planning Activity    Ht: 167.6 cm (66\")   Wt: 97.1 kg (214 lb)    Admission Cmt: None   Principal Problem: None                Active Insurance as of 2022     Primary Coverage     Payor Plan Insurance Group Employer/Plan Group    AETNA MEDICARE REPLACEMENT AETNA MEDICARE REPLACEMENT [id number]     Payor Plan Address Payor Plan Phone Number Payor Plan Fax Number Effective Dates    [address] [phone]  2022 - None Entered    [facility] [address]       Subscriber Name Subscriber Birth Date Member ID       Hafsa Barron 1970 [id number]                      History & Physical      Chava Salter DO at 22 [facility]              [facility] Medicine Services  HISTORY AND PHYSICAL    Patient Name: Hafsa Barron  : 1970  MRN: [id number]  Primary Care Physician: Jocy Snow MD  Date of admission: 2022      Subjective   Subjective     Chief Complaint:  Shortness of air, hypoxia, fever and chills    HPI:  Hafsa Barron is a 51 y.o. female   with past medical history of chronic pain syndrome on chronic opioids/gabapentin, GIORGIO, myotonic dystrophy type " II, hypertension, hyperlipidemia, fibromyalgia, type 2 diabetes non-insulin-dependent on Metformin, obesity with BMI of 34.5, history of stroke who presents to the ER with complaints of cough, fever, chills and shortness of breath with hypoxia.    Patient reports that she was diagnosed with Covid outpatient on 1/24/2022.  She has been taking DayQuil and NyQuil with Tylenol and Motrin for symptoms of fever, chills, generalized aches and pains, decreased appetite, diarrhea, and acute on chronic back pain.  Patient received monoclonal antibodies today while at [facility] but unfortunately has continued to have worsening shortness of air. Rated 5/10 in severity, worse with exertion, improved with rest, no other alleviating factors.  Reports cough that is nonproductive.    Patient is unvaccinated.  She reports that she did not receive the vaccine as she was scared about consequences.She also reports that her  is adamantly against the vaccine which also had a large reason for her choice.          COVID Details:    Symptoms:    [] NONE [x] Fever [x]  Cough [x] Shortness of breath [] Change in taste/smell      Review of Systems   Gen- reports fevers, chills  CV- No chest pain, palpitations  Resp- reports cough, dyspnea  GI-ports nausea reports diarrhea, denies abd pain      All other systems reviewed and are negative.     Personal History     Past Medical History:   Diagnosis Date   • Allergic    • Allergy    • Anxiety    • Asthma Allergies induced   • Depression    • Fibromyalgia, primary    • GERD (gastroesophageal reflux disease)    • Headache    • History of blood clots    • History of UTI    • Hyperlipidemia    • Hypertension    • IBS (irritable bowel syndrome)    • Internal hemorrhoid    • Kidney stone    • Low back pain    • Muscular dystrophy (HCC)    • Muscular dystrophy (HCC)     II   • Myotonia    • Obesity    • Stroke ([facility]) 08/31/2013    resdual- left sided weakness.    • Type 2 diabetes mellitus without  complication, without long-term current use of insulin (HCC)        Past Surgical History:   Procedure Laterality Date   • CHOLECYSTECTOMY  07/2004   • COLONOSCOPY  07/2017   • D & C WITH SUCTION     • LYMPH NODE BIOPSY     • SUBCLAVIAN ARTERY STENT  08/2018    x2   • WISDOM TOOTH EXTRACTION         Family History:  family history includes ADD / ADHD in an other family member; Alcohol abuse in her father and mother; Allergies in an other family member; Breast cancer in her cousin and maternal aunt; Cancer in her father and another family member; Depression in her mother; Diabetes in her father; Early death in her father and mother; Heart attack in her mother; Heart block in an other family member; Heart disease in her mother; Hyperlipidemia in her mother and another family member; Hypertension in her mother and another family member; Learning disabilities in her son; Miscarriages / Stillbirths in her mother; Other in an other family member. Otherwise pertinent FHx was reviewed and unremarkable.     Social History:  reports that she quit smoking about 8 years ago. She started smoking about 38 years ago. She has a 22.50 pack-year smoking history. She has never used smokeless tobacco. She reports current alcohol use. She reports that she does not use drugs.  Social History     Social History Narrative   • Not on file       Medications:  Available home medication information reviewed.  (Not in a hospital admission)      Allergies   Allergen Reactions   • Penicillins Anaphylaxis and Shortness Of Breath       Objective   Objective     Vital Signs:   Temp:  [100.4 °F (38 °C)] 100.4 °F (38 °C)  Heart Rate:  [101-115] 101  Resp:  [20] 20  BP: (115-133)/(71-82) 126/71  Flow (L/min):  [2] 2       Physical Exam   Constitutional: Awake, alert, appears acutely ill  Eyes: PERRLA, sclerae anicteric, no conjunctival injection  HENT: NCAT, mucous membranes dry  Neck: Supple, no thyromegaly, no lymphadenopathy, trachea  midline  Respiratory: Clear to auscultation bilaterally, mildly labored respirations   Cardiovascular: RRR, no murmurs, rubs, or gallops, palpable pedal pulses bilaterally  Gastrointestinal: Positive bowel sounds, soft, nontender, nondistended  Musculoskeletal: No bilateral ankle edema, no clubbing or cyanosis to extremities  Psychiatric: Appropriate affect, cooperative  Neurologic: Oriented x 3, strength symmetric in all extremities, Cranial Nerves grossly intact to confrontation, speech clear  Skin: No rashes      Result Review:  I have personally reviewed the results from the time of this admission to 2/1/2022 00:51 EST and agree with these findings:  [x]  Laboratory  []  Microbiology  [x]  Radiology  []  EKG/Telemetry   []  Cardiology/Vascular   []  Pathology  []  Old records  []  Other:  Most notable findings include: CRP 13, , potassium slightly low at 3.2, creatinine elevated from baseline at 1.1      LAB RESULTS:      Lab 01/31/22 2051   WBC 5.45   HEMOGLOBIN 13.2   HEMATOCRIT 38.0   PLATELETS 247   NEUTROS ABS 3.86   IMMATURE GRANS (ABS) 0.03   LYMPHS ABS 1.19   MONOS ABS 0.36   EOS ABS 0.00   MCV 86.8   CRP 13.86*   PROCALCITONIN 0.20   LACTATE 2.0   *         Lab 01/31/22 2051   SODIUM 141   POTASSIUM 3.2*   CHLORIDE 103   CO2 24.0   ANION GAP 14.0   BUN 26*   CREATININE 1.13*   GLUCOSE 110*   CALCIUM 9.1         Lab 01/31/22 2051   TOTAL PROTEIN 7.3   ALBUMIN 3.90   GLOBULIN 3.4   ALT (SGPT) 37*   AST (SGOT) 68*   BILIRUBIN 0.5   ALK PHOS 87         Lab 01/31/22 2051   PROBNP 122.4   TROPONIN T <0.010             Lab 01/31/22 2051   FERRITIN 445.70*         UA    Urinalysis 6/23/21 9/13/21   Ketones, UA 15 mg/dL (A) Negative   Leukocytes, UA 75 Zac/ul (A) Small (1+) (A)   (A) Abnormal value              Microbiology Results (last 10 days)     Procedure Component Value - Date/Time    COVID-19 PCR, [facility], NP SWAB IN LEXAR VIRAL TRANSPORT MEDIA/ORAL SWISH 24-30 HR TAT - Swab,  Nasopharynx [[id number]]  (Abnormal) Collected: 01/25/22 1618    Lab Status: Final result Specimen: Swab from Nasopharynx Updated: 01/26/22 1213     SARS-CoV-2 EMBER Detected          No radiology results from the last 24 hrs    Results for orders placed during the hospital encounter of 05/11/20    Adult Transthoracic Echo Complete W/ Cont if Necessary Per Protocol    Interpretation Summary  · Estimated EF = 50%.  · Mild mitral valve regurgitation is present  · Left ventricular systolic function is mildly decreased.      Assessment/Plan   Assessment & Plan     Active Hospital Problems    Diagnosis  POA   • Pneumonia due to COVID-19 virus [U07.1, J12.82]  Unknown   • Acute respiratory failure with hypoxia (HCC) [J96.01]  Unknown   • Chronic pain [G89.29]  Yes   • Fibromyalgia [M79.7]  Yes   • Hypertension [I10]  Yes   • Hyperlipidemia [E78.5]  Yes   • Type 2 diabetes mellitus without complication, without long-term current use of insulin (HCC) [E11.9]  Yes   • Myotonic dystrophy, type 2 (HCC) [G71.11]  Yes   • H/O: stroke [Z86.73]  Not Applicable     Patient is a 51-year-old female with past medical history of chronic pain syndrome on chronic opioids/gabapentin, myotonic dystrophy type II, hypertension, hyperlipidemia, fibromyalgia, type 2 diabetes non-insulin-dependent on Metformin, obesity with BMI of 34.5, history of stroke who presents to the ER with complaints of cough, fever, chills and shortness of breath with hypoxia.    1.  Bilateral extensive COVID-19 pneumonia  2.  Acute hypoxic respiratory insufficiency  3.  Elevated creatinine with suspected volume contraction  4.  Chronic pain syndrome/fibromyalgia  5.  Essential hypertension  6.  Type 2 diabetes  7.  Obesity with BMI 34.5, POA  8.  Myotonic dystrophy  9.  History of stroke  10.  GIORGIO on CPAP    --current o2 requirement at admission of 2 L, CPAP at night  --Start dexamethasone 6mg q24h  --Start remdesevir  --tylenol for fever  --pulm consult given severity  of illness on CT, may be candidate for actemra over next several days.   --d-dimer, ferritin, ldh, crp  --serial covid 19 labs  --dvt ppx  --Explained risks and benefits of the vaccine to the patient.      DVT prophylaxis: Full dose Lovenox every 12 hours per new protocol protocol      CODE STATUS:  Full code  There are no questions and answers to display.       Admission Status:  I believe this patient meets INPATIENT status due to covid 19 pna, hypoxic respiratory insufficiency.  I feel patient’s risk for adverse outcomes and need for care warrant INPATIENT evaluation and I predict the patient’s care encounter to likely last beyond 2 midnights.      Chava Salter DO  02/01/22      Electronically signed by Chava Salter DO at 02/01/22 0215          Emergency Department Notes      Shane Gould MD at 01/31/22 2048          Subjective   Ms. Hafsa Barron is a 51 y.o. female who presents to the ED with c/o shortness of breath. She was diagnosed with COVID 5 days ago after her symptoms onset 7 days ago. She has back pain. Her oxygen saturation has been decreasing and returning to normal repeatedly. She has has history of muscular dystrophy which she states has been worse since she has had COVID. She has been taking Dayquil and Nyquil as well as Tylenol and Motrin. She received an infusion of monoclonal antibodies at [facility]. She has history of sleep apnea and wears CPAP at night. She has had decreased appetite and will have diarrhea soon after she eats. There are no other acute symptoms at this time.      Shortness of Breath  Severity:  Moderate  Onset quality:  Sudden  Duration:  7 days  Timing:  Constant  Progression:  Unchanged  Chronicity:  New  Relieved by:  Nothing  Worsened by:  Nothing      Review of Systems   Constitutional: Positive for appetite change (decreased).   Respiratory: Positive for shortness of breath.    Musculoskeletal: Positive for back pain.   All other systems reviewed and are  negative.      Past Medical History:   Diagnosis Date   • Allergic    • Allergy    • Anxiety    • Asthma Allergies induced   • Depression    • Fibromyalgia, primary    • GERD (gastroesophageal reflux disease)    • Headache    • History of blood clots    • History of UTI    • Hyperlipidemia    • Hypertension    • IBS (irritable bowel syndrome)    • Internal hemorrhoid    • Kidney stone    • Low back pain    • Muscular dystrophy ([facility])    • Muscular dystrophy ([facility])     II   • Myotonia    • Obesity    • Stroke ([facility]) 2013    resdual- left sided weakness.    • Type 2 diabetes mellitus without complication, without long-term current use of insulin ([facility])        Allergies   Allergen Reactions   • Penicillins Anaphylaxis and Shortness Of Breath       Past Surgical History:   Procedure Laterality Date   • CHOLECYSTECTOMY  2004   • COLONOSCOPY  2017   • D & C WITH SUCTION     • LYMPH NODE BIOPSY     • SUBCLAVIAN ARTERY STENT  08/2018    x2   • WISDOM TOOTH EXTRACTION         Family History   Problem Relation Age of Onset   • Allergies Other    • ADD / ADHD Other    • Heart block Other    • Other Other         CEREBROVASCULAR ACCIDENT    • Hypertension Other    • Cancer Other         LUNG CANCER   • Hyperlipidemia Other    • Alcohol abuse Mother    • Depression Mother    • Early death Mother          age 59   • Heart disease Mother         Mother  of Massive Heart attack   • Hyperlipidemia Mother    • Hypertension Mother    • Miscarriages / Stillbirths Mother         Miscarriage   • Heart attack Mother          of  massive heart attack   • Alcohol abuse Father    • Cancer Father         Had Lung Cancer - had 1 lung till death   • Diabetes Father         Lived on insuline shots   • Early death Father          age 53   • Breast cancer Maternal Aunt    • Learning disabilities Son         Had Adhd   • Breast cancer Cousin    • Ovarian cancer Neg Hx    • Endometrial cancer Neg Hx    • Uterine cancer  Neg Hx    • Colon cancer Neg Hx        Social History     Socioeconomic History   • Marital status:    Tobacco Use   • Smoking status: Former Smoker     Packs/day: 1.50     Years: 15.00     Pack years: 22.50     Start date: 1983     Quit date: 2013     Years since quittin.4   • Smokeless tobacco: Never Used   • Tobacco comment: use Ecigg now NO NICOTENE   Vaping Use   • Vaping Use: Some days   • Substances: Flavoring   • Devices: Pre-filled pod   Substance and Sexual Activity   • Alcohol use: Yes   • Drug use: No   • Sexual activity: Yes     Partners: Male     Birth control/protection: Post-menopausal     Comment: Menapause no cycle since 2013         Objective   Physical Exam  Vitals and nursing note reviewed.   Constitutional:       General: She is not in acute distress.     Appearance: She is well-developed.   HENT:      Head: Normocephalic and atraumatic.      Nose: Nose normal.   Eyes:      General: No scleral icterus.     Conjunctiva/sclera: Conjunctivae normal.   Cardiovascular:      Rate and Rhythm: Normal rate and regular rhythm.      Heart sounds: Normal heart sounds. No murmur heard.      Pulmonary:      Effort: Pulmonary effort is normal. No respiratory distress.      Breath sounds: Rales (bilateral lung bases) present.   Abdominal:      Palpations: Abdomen is soft.      Tenderness: There is no abdominal tenderness.   Musculoskeletal:         General: Normal range of motion.      Cervical back: Normal range of motion and neck supple.      Right lower leg: Edema present.      Left lower leg: Edema present.   Skin:     General: Skin is warm and dry.   Neurological:      Mental Status: She is alert and oriented to person, place, and time.   Psychiatric:         Behavior: Behavior normal.         Procedures        ED Course  ED Course as of 22 0047   Mon 2022   2130 Creatinine(!): 1.13 [RS]   Tue 2022   0021 Patient's oxygen saturation has been reasonable  while here in the hospital.  However, we got her up to go to the restroom, her oxygen quickly dropped down to 82% with increased work of breathing.  We will plan admission to the hospital for further evaluation and management.  Hospitalist paged for admission. [RS]   0028 SpO2(!)(S): 82 % [RS]   0034 Case discussed with the hospitalist who is agreeable with the plan for admission. [RS]      ED Course User Index  [RS] Shane Gould MD     Recent Results (from the past 24 hour(s))   Comprehensive Metabolic Panel    Collection Time: 01/31/22  8:51 PM    Specimen: Blood   Result Value Ref Range    Glucose 110 (H) 65 - 99 mg/dL    BUN 26 (H) 6 - 20 mg/dL    Creatinine 1.13 (H) 0.57 - 1.00 mg/dL    Sodium 141 136 - 145 mmol/L    Potassium 3.2 (L) 3.5 - 5.2 mmol/L    Chloride 103 98 - 107 mmol/L    CO2 24.0 22.0 - 29.0 mmol/L    Calcium 9.1 8.6 - 10.5 mg/dL    Total Protein 7.3 6.0 - 8.5 g/dL    Albumin 3.90 3.50 - 5.20 g/dL    ALT (SGPT) 37 (H) 1 - 33 U/L    AST (SGOT) 68 (H) 1 - 32 U/L    Alkaline Phosphatase 87 39 - 117 U/L    Total Bilirubin 0.5 0.0 - 1.2 mg/dL    eGFR Non African Amer 51 (L) >60 mL/min/1.73    Globulin 3.4 gm/dL    A/G Ratio 1.1 g/dL    BUN/Creatinine Ratio 23.0 7.0 - 25.0    Anion Gap 14.0 5.0 - 15.0 mmol/L   BNP    Collection Time: 01/31/22  8:51 PM    Specimen: Blood   Result Value Ref Range    proBNP 122.4 0.0 - 900.0 pg/mL   Troponin    Collection Time: 01/31/22  8:51 PM    Specimen: Blood   Result Value Ref Range    Troponin T <0.010 0.000 - 0.030 ng/mL   Green Top (Gel)    Collection Time: 01/31/22  8:51 PM   Result Value Ref Range    Extra Tube Hold for add-ons.    Lavender Top    Collection Time: 01/31/22  8:51 PM   Result Value Ref Range    Extra Tube hold for add-on    Gold Top - SST    Collection Time: 01/31/22  8:51 PM   Result Value Ref Range    Extra Tube Hold for add-ons.    Light Blue Top    Collection Time: 01/31/22  8:51 PM   Result Value Ref Range    Extra Tube hold for  add-on    CBC Auto Differential    Collection Time: 01/31/22  8:51 PM    Specimen: Blood   Result Value Ref Range    WBC 5.45 3.40 - 10.80 10*3/mm3    RBC 4.38 3.77 - 5.28 10*6/mm3    Hemoglobin 13.2 12.0 - 15.9 g/dL    Hematocrit 38.0 34.0 - 46.6 %    MCV 86.8 79.0 - 97.0 fL    MCH 30.1 26.6 - 33.0 pg    MCHC 34.7 31.5 - 35.7 g/dL    RDW 13.6 12.3 - 15.4 %    RDW-SD 43.5 37.0 - 54.0 fl    MPV 11.1 6.0 - 12.0 fL    Platelets 247 140 - 450 10*3/mm3    Neutrophil % 70.8 42.7 - 76.0 %    Lymphocyte % 21.8 19.6 - 45.3 %    Monocyte % 6.6 5.0 - 12.0 %    Eosinophil % 0.0 (L) 0.3 - 6.2 %    Basophil % 0.2 0.0 - 1.5 %    Immature Grans % 0.6 (H) 0.0 - 0.5 %    Neutrophils, Absolute 3.86 1.70 - 7.00 10*3/mm3    Lymphocytes, Absolute 1.19 0.70 - 3.10 10*3/mm3    Monocytes, Absolute 0.36 0.10 - 0.90 10*3/mm3    Eosinophils, Absolute 0.00 0.00 - 0.40 10*3/mm3    Basophils, Absolute 0.01 0.00 - 0.20 10*3/mm3    Immature Grans, Absolute 0.03 0.00 - 0.05 10*3/mm3    nRBC 0.0 0.0 - 0.2 /100 WBC   Lactate Dehydrogenase    Collection Time: 01/31/22  8:51 PM    Specimen: Blood   Result Value Ref Range     (H) 135 - 214 U/L   Procalcitonin    Collection Time: 01/31/22  8:51 PM    Specimen: Blood   Result Value Ref Range    Procalcitonin 0.20 0.00 - 0.25 ng/mL   C-reactive Protein    Collection Time: 01/31/22  8:51 PM    Specimen: Blood   Result Value Ref Range    C-Reactive Protein 13.86 (H) 0.00 - 0.50 mg/dL   Lactic Acid, Plasma    Collection Time: 01/31/22  8:51 PM    Specimen: Blood   Result Value Ref Range    Lactate 2.0 0.5 - 2.0 mmol/L   Ferritin    Collection Time: 01/31/22  8:51 PM    Specimen: Blood   Result Value Ref Range    Ferritin 445.70 (H) 13.00 - 150.00 ng/mL   Scan Slide    Collection Time: 01/31/22  8:51 PM    Specimen: Blood   Result Value Ref Range    RBC Morphology Normal Normal    WBC Morphology Normal Normal    Large Platelets Mod/2+ None Seen   POCT pregnancy, urine    Collection Time: 01/31/22  9:16  PM    Specimen: Urine   Result Value Ref Range    HCG, Urine, QL Negative Negative    Lot Number [id number]     Internal Positive Control Positive Positive, Passed    Internal Negative Control Negative Negative, Passed    Expiration Date 05-      Note: In addition to lab results from this visit, the labs listed above may include labs taken at another facility or during a different encounter within the last 24 hours. Please correlate lab times with ED admission and discharge times for further clarification of the services performed during this visit.    CT Chest Pulmonary Embolism   Final Result      No CTA evidence of pulmonary embolism or acute aortic pathology.      Extensive groundglass and patchy opacity throughout both lungs, most consistent with a widespread inflammatory or infectious process such as Covid.      Ultimately enlarged mediastinal and hilar lymph nodes may be reactive, though are nonspecific. Attention to these on follow-up chest CT in 2 months recommended.      RECOMMENDATION:      Follow-up chest CT in 2 months as above.      Electronically signed by:  Mk Da Silva M.D.     1/31/2022 10:07 PM Mountain Time        Vitals:    01/31/22 2300 02/01/22 0000 02/01/22 0027 02/01/22 0030   BP: 115/77 126/71     BP Location:       Patient Position:       Pulse: 103 101     Resp:       Temp:       TempSrc:       SpO2: 96% 95% (S) (!) 82% 91%   Weight:       Height:         Medications   sodium chloride 0.9 % flush 10 mL (has no administration in time range)   dexamethasone (DECADRON) IVPB 10 mg (has no administration in time range)   dexamethasone (DECADRON) injection 6 mg (has no administration in time range)   pantoprazole (PROTONIX) EC tablet 40 mg (has no administration in time range)   potassium chloride (MICRO-K) CR capsule 40 mEq (has no administration in time range)   lactated ringers infusion (has no administration in time range)   albuterol sulfate HFA (PROVENTIL HFA;VENTOLIN  HFA;PROAIR HFA) inhaler 2 puff (has no administration in time range)   albuterol sulfate HFA (PROVENTIL HFA;VENTOLIN HFA;PROAIR HFA) inhaler 2 puff (has no administration in time range)   iopamidol (ISOVUE-370) 76 % injection 150 mL (150 mL Intravenous Given 1/31/22 2332)     ECG/EMG Results (last 24 hours)     Procedure Component Value Units Date/Time    ECG 12 Lead [[id number]] Collected: 01/31/22 2040     Updated: 01/31/22 2037        ECG 12 Lead                                                    MDM  Number of Diagnoses or Management Options     Amount and/or Complexity of Data Reviewed  Clinical lab tests: reviewed  Tests in the radiology section of CPT®: reviewed  Discuss the patient with other providers: yes  Independent visualization of images, tracings, or specimens: yes    Admit    Final diagnoses:   Acute respiratory disease due to COVID-19 virus   Hypoxemia requiring supplemental oxygen   Muscular dystrophy (HCC)   Diabetes mellitus type 2 in obese (HCC)       Documentation assistance provided by kolby Craig.  Information recorded by the scribe was done at my direction and has been verified and validated by me.     Yovany Craig  01/31/22 2107       Shane Gould MD  02/01/22 0047      Electronically signed by Shane Gould MD at 02/01/22 0047     Dona Youngblood, RN at 02/01/22 0024        Pt requesting to use bathroom. Pt stated she doesn't want to use bedside commode at this time. This nurse check pt oxygen on RA and oxygen saturation is 82%. This nurse instructed pt that she would need to use a bed side commode from here on out because of her oxygen saturation being low. Pt verbalized understanding. Pt currently on stretcher with bed locked and in lowest position. Call light with in reach. Oxygen on 2 L at this pt saturation is 92%. MD notified. Will continue to monitor.      Dona Youngblood, SRIKANTH  02/01/22 0026      Electronically signed by Dona Youngblood RN at 02/01/22  0026         Current Facility-Administered Medications   Medication Dose Route Frequency Provider Last Rate Last Admin   • acetaminophen (TYLENOL) tablet 650 mg  650 mg Oral Q4H PRN Chava Salter DO        Or   • acetaminophen (TYLENOL) 160 MG/5ML solution 650 mg  650 mg Oral Q4H PRN Chava Salter, DO        Or   • acetaminophen (TYLENOL) suppository 650 mg  650 mg Rectal Q4H PRN Chava Salter, DO       • albuterol sulfate HFA (PROVENTIL HFA;VENTOLIN HFA;PROAIR HFA) inhaler 2 puff  2 puff Inhalation Q4H PRN Chava Salter, DO       • amLODIPine (NORVASC) tablet 2.5 mg  2.5 mg Oral Daily Chava Salter DO       • artificial tears ophthalmic ointment   Both Eyes Q4H Chava Salter DO   Given at 02/01/22 0200   • artificial tears ophthalmic ointment   Both Eyes PRN Chava Salter DO       • aspirin EC tablet 81 mg  81 mg Oral Daily Chava Salter DO   81 mg at 02/01/22 0941   • atorvastatin (LIPITOR) tablet 80 mg  80 mg Oral Nightly Chava Salter DO       • busPIRone (BUSPAR) tablet 15 mg  15 mg Oral TID Chava Salter DO   15 mg at 02/01/22 0941   • calcium carb-cholecalciferol 600-800 MG-UNIT tablet 1 tablet  1 tablet Oral Daily Chava Salter DO   1 tablet at 02/01/22 0941   • cetirizine (zyrTEC) tablet 10 mg  10 mg Oral Daily Chava Salter DO   10 mg at 02/01/22 0942   • clopidogrel (PLAVIX) tablet 75 mg  75 mg Oral Daily Chava Salter DO       • [START ON 2/2/2022] conjugated estrogens (PREMARIN) vaginal cream 1 g  1 g Vaginal Once per day on Mon Wed Fri Chava Salter DO       • cyclobenzaprine (FLEXERIL) tablet 10 mg  10 mg Oral TID PRN Chava Salter DO       • [START ON 2/2/2022] dexamethasone (DECADRON) injection 6 mg  6 mg Intravenous Daily Chava Salter, DO       • enoxaparin (LOVENOX) syringe 100 mg  1 mg/kg Subcutaneous Q12H Chava Salter,    100 mg at 02/01/22 1211   • fluticasone (FLONASE) 50 MCG/ACT nasal spray 1 spray  1 spray Each Nare BID Chvaa Salter DO   1 spray at  02/01/22 0941   • gabapentin (NEURONTIN) capsule 600 mg  600 mg Oral Q8H PRN Chava Salter, DO   600 mg at 02/01/22 0943   • HYDROcodone-acetaminophen (NORCO)  MG per tablet 1 tablet  1 tablet Oral Q6H PRN Chava Salter, DO   1 tablet at 02/01/22 0555   • lactated ringers infusion  75 mL/hr Intravenous Continuous Chava Salter, DO 75 mL/hr at 02/01/22 1148 75 mL/hr at 02/01/22 1148   • modafinil (PROVIGIL) tablet 300 mg  300 mg Oral Daily Chava Salter, DO   300 mg at 02/01/22 1012   • montelukast (SINGULAIR) tablet 10 mg  10 mg Oral Q PM Chava Salter, DO       • naloxone (NARCAN) injection 0.4 mg  0.4 mg Intravenous PRN Chava Salter, DO       • ondansetron (ZOFRAN) injection 4 mg  4 mg Intravenous Q6H PRN Chava Salter, DO       • oxybutynin XL (DITROPAN-XL) 24 hr tablet 5 mg  5 mg Oral Daily Chava Salter, DO   5 mg at 02/01/22 0942   • pantoprazole (PROTONIX) EC tablet 40 mg  40 mg Oral Q AM Chava Salter, DO   40 mg at 02/01/22 0552   • Pharmacy Consult - Remdesivir   Does not apply Continuous PRN Chava Salter, DO       • potassium chloride (KLOR-CON) packet 20 mEq  20 mEq Oral Daily Chava Salter,    20 mEq at 02/01/22 0942   • [START ON 2/2/2022] remdesivir 100 mg in sodium chloride 0.9 % 250 mL IVPB (powder vial)  100 mg Intravenous Q24H Chava Salter, DO       • sodium chloride 0.9 % flush 10 mL  10 mL Intravenous PRN Chava Salter, DO       • sodium chloride 0.9 % flush 10 mL  10 mL Intravenous Q12H Chava Salter, DO   10 mL at 02/01/22 0942   • sodium chloride 0.9 % flush 10 mL  10 mL Intravenous PRN Chava Salter, DO       • sodium chloride nasal spray 2 spray  2 spray Each Nare PRN Chava Salter DO       • spironolactone (ALDACTONE) tablet 50 mg  50 mg Oral Daily Chava Salter DO   50 mg at 02/01/22 0942   • tiZANidine (ZANAFLEX) tablet 4 mg  4 mg Oral Nightly Chava aSlter DO         Current Outpatient Medications   Medication Sig Dispense Refill   • albuterol (PROAIR  HFA) 108 (90 BASE) MCG/ACT inhaler Inhale 1 puff Every 6 (Six) Hours As Needed for Wheezing. 90 inhaler 2   • albuterol (PROVENTIL) (2.5 MG/3ML) 0.083% nebulizer solution Take 2.5 mg by nebulization Every 4 (Four) Hours As Needed for Wheezing. 90 vial 1   • amLODIPine (NORVASC) 2.5 MG tablet TAKE ONE TABLET BY MOUTH DAILY 30 tablet 11   • aspirin 81 MG EC tablet Take 81 mg by mouth Daily.     • atorvastatin (LIPITOR) 80 MG tablet TAKE ONE TABLET BY MOUTH ONCE NIGHTLY 30 tablet 11   • azithromycin (Zithromax Z-Adilson) 250 MG tablet Take 2 tablets the first day, then 1 tablet daily for 4 days. 6 tablet 0   • baclofen (LIORESAL) 10 MG tablet TAKE ONE TABLET BY MOUTH THREE TIMES A  tablet 1   • Blood Glucose Monitoring Suppl (ONE TOUCH ULTRA 2) w/Device kit      • busPIRone (BUSPAR) 15 MG tablet TAKE ONE TABLET BY MOUTH THREE TIMES A  tablet 1   • calcium carbonate-vitamin d (CALCIUM 600+D HIGH POTENCY) 600-400 MG-UNIT per tablet Take 1 tablet by mouth Daily. 90 tablet 2   • clopidogrel (PLAVIX) 75 MG tablet TAKE ONE TABLET BY MOUTH DAILY 90 tablet 3   • conjugated estrogens (Premarin) 0.625 MG/GM vaginal cream Insert  into the vagina Daily. Apply one finger tip strip of cream into vagina, three times weekly at night (M/W/F) 30 g 5   • cyclobenzaprine (FLEXERIL) 10 MG tablet Take 1 tablet by mouth 3 (Three) Times a Day As Needed for Muscle Spasms. 270 tablet 2   • diclofenac (VOLTAREN) 75 MG EC tablet Take 1 tablet by mouth 2 (Two) Times a Day. 180 tablet 3   • fenofibrate (TRICOR) 145 MG tablet TAKE ONE TABLET BY MOUTH DAILY 90 tablet 1   • fluticasone (FLONASE ALLERGY RELIEF) 50 MCG/ACT nasal spray into each nostril.     • gabapentin (NEURONTIN) 600 MG tablet TAKE ONE TABLET BY MOUTH THREE TIMES A  tablet 1   • HYDROcodone-acetaminophen (NORCO)  MG per tablet Take 1 tablet by mouth Every 6 (Six) Hours As Needed for Moderate Pain . 120 tablet 0   • loratadine (CLARITIN) 10 MG tablet Take 1  tablet by mouth Daily. 30 tablet 11   • metFORMIN (GLUCOPHAGE) 1000 MG tablet TAKE ONE TABLET BY MOUTH TWICE A DAY WITH MEALS 60 tablet 5   • modafinil (PROVIGIL) 100 MG tablet Take 1 tablet by mouth Daily. 30 tablet 3   • modafinil (PROVIGIL) 200 MG tablet Take 1 tablet by mouth Daily. 30 tablet 3   • montelukast (SINGULAIR) 10 MG tablet Take 1 tablet by mouth Every Evening. 90 tablet 3   • nitrofurantoin, macrocrystal-monohydrate, (Macrobid) 100 MG capsule Take 1 capsule by mouth 2 (Two) Times a Day. 10 capsule 0   • nystatin (MYCOSTATIN) 743109 UNIT/GM powder Apply  topically to the appropriate area as directed 3 (Three) Times a Day. 60 g 2   • ondansetron (ZOFRAN) 8 MG tablet   1   • ONE TOUCH ULTRA TEST test strip      • oxybutynin XL (DITROPAN-XL) 5 MG 24 hr tablet Take 1 tablet by mouth Daily. 90 tablet 3   • oxyCODONE-acetaminophen (PERCOCET) 5-325 MG per tablet Take 1 tablet by mouth Every 4 (Four) Hours As Needed for Moderate Pain . 12 tablet 0   • pantoprazole (PROTONIX) 40 MG EC tablet Take 1 tablet by mouth Daily. 90 tablet 1   • potassium chloride (KLOR-CON) 20 MEQ CR tablet Take 1 tablet by mouth Daily. 90 tablet 2   • potassium chloride ER (K-TAB) 20 MEQ tablet controlled-release ER tablet TAKE 1 TABLET BY MOUTH EVERY DAY 90 tablet 2   • promethazine (PHENERGAN) 12.5 MG tablet Take 1 tablet by mouth Every 6 (Six) Hours As Needed for Nausea or Vomiting. 30 tablet 1   • spironolactone (ALDACTONE) 50 MG tablet Take 1 tablet by mouth Daily. 90 tablet 3   • tiZANidine (ZANAFLEX) 4 MG tablet TAKE ONE TABLET BY MOUTH ONCE NIGHTLY 90 tablet 1   • valACYclovir (Valtrex) 1000 MG tablet Take 2 tablets by mouth 2 (Two) Times a Day. 4 tablet 0   • venlafaxine XR (EFFEXOR-XR) 37.5 MG 24 hr capsule Take 1 capsule by mouth Daily. 90 capsule 3     Lab Results (last 72 hours)     Procedure Component Value Units Date/Time    Creatinine, Serum [[id number]]  (Normal) Collected: 02/01/22 0556    Specimen: Blood from Arm,  Right Updated: 02/01/22 0644     Creatinine 0.58 mg/dL      eGFR Non African Amer 109 mL/min/1.73     Narrative:      GFR Normal >60  Chronic Kidney Disease <60  Kidney Failure <15      Hepatic Function Panel [[id number]]  (Abnormal) Collected: 02/01/22 0556    Specimen: Blood from Arm, Right Updated: 02/01/22 0644     Total Protein 6.8 g/dL      Albumin 3.60 g/dL      ALT (SGPT) 32 U/L      AST (SGOT) 65 U/L      Alkaline Phosphatase 83 U/L      Total Bilirubin 0.6 mg/dL      Bilirubin, Direct 0.2 mg/dL      Bilirubin, Indirect 0.4 mg/dL     Hemoglobin A1c [[id number]]  (Abnormal) Collected: 01/31/22 2051    Specimen: Blood Updated: 02/01/22 0456     Hemoglobin A1C 6.20 %     Narrative:      Hemoglobin A1C Ranges:    Increased Risk for Diabetes  5.7% to 6.4%  Diabetes                     >= 6.5%  Diabetic Goal                < 7.0%    TSH [[id number]]  (Normal) Collected: 02/01/22 0258    Specimen: Blood Updated: 02/01/22 0450     TSH 0.961 uIU/mL     Creatinine, Serum [[id number]]  (Normal) Collected: 02/01/22 0258    Specimen: Blood Updated: 02/01/22 0356     Creatinine 0.68 mg/dL      eGFR Non African Amer 91 mL/min/1.73     Narrative:      GFR Normal >60  Chronic Kidney Disease <60  Kidney Failure <15      Hepatic Function Panel [[id number]]  (Abnormal) Collected: 02/01/22 0258    Specimen: Blood Updated: 02/01/22 0356     Total Protein 6.6 g/dL      Albumin 3.60 g/dL      ALT (SGPT) 32 U/L      AST (SGOT) 59 U/L      Alkaline Phosphatase 76 U/L      Total Bilirubin 0.6 mg/dL      Bilirubin, Direct 0.2 mg/dL      Bilirubin, Indirect 0.4 mg/dL     Ellis Draw [[id number]] Collected: 01/31/22 2051    Specimen: Blood Updated: 02/01/22 0100    Narrative:      The following orders were created for panel order Ellis Draw.  Procedure                               Abnormality         Status                     ---------                               -----------         ------                     Green Top (Gel)[[id number]]                                   Final result               Lavender Top[[id number]]                                     Final result               Gold Top - SST[[id number]]                                   Final result               Green Top[[id number]]                                         Final result               Light Blue Top[[id number]]                                   Final result                 Please view results for these tests on the individual orders.    Green Top [[id number]] Collected: 01/31/22 2051    Specimen: Blood Updated: 02/01/22 0100     Extra Tube Hold for add-ons.     Comment: Auto resulted.       CBC & Differential [[id number]]  (Abnormal) Collected: 01/31/22 2051    Specimen: Blood Updated: 01/31/22 2213    Narrative:      The following orders were created for panel order CBC & Differential.  Procedure                               Abnormality         Status                     ---------                               -----------         ------                     CBC Auto Differential[[id number]]        Abnormal            Final result               Scan Slide[[id number]]                                       Final result                 Please view results for these tests on the individual orders.    CBC Auto Differential [[id number]]  (Abnormal) Collected: 01/31/22 2051    Specimen: Blood Updated: 01/31/22 2213     WBC 5.45 10*3/mm3      RBC 4.38 10*6/mm3      Hemoglobin 13.2 g/dL      Hematocrit 38.0 %      MCV 86.8 fL      MCH 30.1 pg      MCHC 34.7 g/dL      RDW 13.6 %      RDW-SD 43.5 fl      MPV 11.1 fL      Platelets 247 10*3/mm3      Neutrophil % 70.8 %      Lymphocyte % 21.8 %      Monocyte % 6.6 %      Eosinophil % 0.0 %      Basophil % 0.2 %      Immature Grans % 0.6 %      Neutrophils, Absolute 3.86 10*3/mm3      Lymphocytes, Absolute 1.19 10*3/mm3      Monocytes, Absolute 0.36 10*3/mm3      Eosinophils, Absolute 0.00 10*3/mm3      Basophils, Absolute 0.01 10*3/mm3      Immature  "Grans, Absolute 0.03 10*3/mm3      nRBC 0.0 /100 WBC     Narrative:      Appended report. These results have been appended to a previously verified report.    Scan Slide [[id number]] Collected: 01/31/22 2051    Specimen: Blood Updated: 01/31/22 2213     RBC Morphology Normal     WBC Morphology Normal     Large Platelets Mod/2+    Lavender Top [[id number]] Collected: 01/31/22 2051    Specimen: Blood Updated: 01/31/22 2200     Extra Tube hold for add-on     Comment: Auto resulted       Light Blue Top [[id number]] Collected: 01/31/22 2051    Specimen: Blood Updated: 01/31/22 2200     Extra Tube hold for add-on     Comment: Auto resulted       Green Top (Gel) [[id number]] Collected: 01/31/22 2051    Specimen: Blood Updated: 01/31/22 2200     Extra Tube Hold for add-ons.     Comment: Auto resulted.       Gold Top - SST [[id number]] Collected: 01/31/22 2051    Specimen: Blood Updated: 01/31/22 2200     Extra Tube Hold for add-ons.     Comment: Auto resulted.       Procalcitonin [[id number]]  (Normal) Collected: 01/31/22 2051    Specimen: Blood Updated: 01/31/22 2127     Procalcitonin 0.20 ng/mL     Narrative:      As a Marker for Sepsis (Non-Neonates):     1. <0.5 ng/mL represents a low risk of severe sepsis and/or septic shock.  2. >2 ng/mL represents a high risk of severe sepsis and/or septic shock.    As a Marker for Lower Respiratory Tract Infections that require antibiotic therapy:  PCT on Admission     Antibiotic Therapy             6-12 Hrs later  >0.5                          Strongly Recommended            >0.25 - <0.5             Recommended  0.1 - 0.25                  Discouraged                       Remeasure/reassess PCT  <0.1                         Strongly Discouraged         Remeasure/reassess PCT      As 28 day mortality risk marker: \"Change in Procalcitonin Result\" (>80% or <=80%) if Day 0 (or Day 1) and Day 4 values are available. Refer to http://www."[employer]s-pct-calculator.com/    Change in PCT <=80 %   A " decrease of PCT levels below or equal to 80% defines a positive change in PCT test result representing a higher risk for 28-day all-cause mortality of patients diagnosed with severe sepsis or septic shock.    Change in PCT >80 %   A decrease of PCT levels of more than 80% defines a negative change in PCT result representing a lower risk for 28-day all-cause mortality of patients diagnosed with severe sepsis or septic shock.                Lactate Dehydrogenase [[id number]]  (Abnormal) Collected: 01/31/22 2051    Specimen: Blood Updated: 01/31/22 2126      U/L     BNP [[id number]]  (Normal) Collected: 01/31/22 2051    Specimen: Blood Updated: 01/31/22 2126     proBNP 122.4 pg/mL     Narrative:      Among patients with dyspnea, NT-proBNP is highly sensitive for the detection of acute congestive heart failure. In addition NT-proBNP of <300 pg/ml effectively rules out acute congestive heart failure with 99% negative predictive value.    Results may be falsely decreased if patient taking Biotin.      Troponin [[id number]]  (Normal) Collected: 01/31/22 2051    Specimen: Blood Updated: 01/31/22 2126     Troponin T <0.010 ng/mL     Narrative:      Troponin T Reference Range:  <= 0.03 ng/mL-   Negative for AMI  >0.03 ng/mL-     Abnormal for myocardial necrosis.  Clinicians would have to utilize clinical acumen, EKG, Troponin and serial changes to determine if it is an Acute Myocardial Infarction or myocardial injury due to an underlying chronic condition.       Results may be falsely decreased if patient taking Biotin.      Ferritin [[id number]]  (Abnormal) Collected: 01/31/22 2051    Specimen: Blood Updated: 01/31/22 2126     Ferritin 445.70 ng/mL     Narrative:      Results may be falsely decreased if patient taking Biotin.      Comprehensive Metabolic Panel [[id number]]  (Abnormal) Collected: 01/31/22 2051    Specimen: Blood Updated: 01/31/22 2125     Glucose 110 mg/dL      BUN 26 mg/dL      Creatinine 1.13 mg/dL       Sodium 141 mmol/L      Potassium 3.2 mmol/L      Comment: Slight hemolysis detected by analyzer. Results may be affected.        Chloride 103 mmol/L      CO2 24.0 mmol/L      Calcium 9.1 mg/dL      Total Protein 7.3 g/dL      Albumin 3.90 g/dL      ALT (SGPT) 37 U/L      AST (SGOT) 68 U/L      Alkaline Phosphatase 87 U/L      Total Bilirubin 0.5 mg/dL      eGFR Non African Amer 51 mL/min/1.73      Globulin 3.4 gm/dL      Comment: Calculated Result        A/G Ratio 1.1 g/dL      BUN/Creatinine Ratio 23.0     Anion Gap 14.0 mmol/L     Narrative:      GFR Normal >60  Chronic Kidney Disease <60  Kidney Failure <15      C-reactive Protein [[id number]]  (Abnormal) Collected: 01/31/22 2051    Specimen: Blood Updated: 01/31/22 2125     C-Reactive Protein 13.86 mg/dL     Lactic Acid, Plasma [[id number]]  (Normal) Collected: 01/31/22 2051    Specimen: Blood Updated: 01/31/22 2120     Lactate 2.0 mmol/L      Comment: Falsely depressed results may occur on samples drawn from patients receiving N-Acetylcysteine (NAC) or Metamizole.       POCT pregnancy, urine [[id number]]  (Normal) Collected: 01/31/22 2116    Specimen: Urine Updated: 01/31/22 2117     HCG, Urine, QL Negative     Lot Number [id number]     Internal Positive Control Positive     Internal Negative Control Negative     Expiration Date 05-          Imaging Results (Last 72 Hours)     Procedure Component Value Units Date/Time    CT Chest Pulmonary Embolism [[id number]] Collected: 01/31/22 2358     Updated: 02/01/22 0008    Narrative:      EXAMINATION: CT ANGIOGRAM CHEST WITH IV CONTRAST       INDICATION: Chest pain.    COMPARISON: CT December 14, 2018    PROCEDURE: Multiple axial CT images were obtained of the chest after IV administration of contrast.  Coronal and sagittal reformations as well as MIP images were obtained. CT dose lowering techniques were used, to include: automated exposure control,   adjustment for patient size, and or use of iterative  reconstruction.    FINDINGS:    Cardiovascular: No filling defects are seen in the pulmonary arteries. No focal aortic aneurysm or evidence of aortic dissection is seen. There appears to be a left proximal subclavian artery stent.    Mediastinum/Nasima: There are several enlarged mediastinal and hilar lymph nodes measuring up to 12 mm in short axis diameter.    Lungs/Pleura :  There is extensive groundglass and patchy opacity throughout both lungs. No pneumothorax or pleural fluid collection.    Chest wall and Axilla: Unremarkable.    Bones:  No acute focal bony abnormality seen.    Upper abdomen: Cholecystectomy.       Impression:        No CTA evidence of pulmonary embolism or acute aortic pathology.    Extensive groundglass and patchy opacity throughout both lungs, most consistent with a widespread inflammatory or infectious process such as Covid.    Ultimately enlarged mediastinal and hilar lymph nodes may be reactive, though are nonspecific. Attention to these on follow-up chest CT in 2 months recommended.    RECOMMENDATION:    Follow-up chest CT in 2 months as above.    Electronically signed by:  Mk Da Silva M.D.    1/31/2022 10:07 PM Mountain Time        Operative/Procedure Notes (last 72 hours)  Notes from 01/29/22 1224 through 02/01/22 1224   No notes of this type exist for this encounter.            Physician Progress Notes (last 72 hours)      Rhiannon Jay II,  at 02/01/22 0834                [facility] Medicine Services  ADMISSION FOLLOW-UP NOTE          Patient admitted after midnight, H&P by my partner performed earlier on today's date reviewed.  Interim findings, labs, and charting also reviewed.        The [facility] Hospital Problem List has been managed and updated to include any new diagnoses:  Active Hospital Problems    Diagnosis  POA   • Pneumonia due to COVID-19 virus [U07.1, J12.82]  Unknown   • Acute respiratory failure with hypoxia (HCC) [J96.01]  Unknown   • GIORGIO  (obstructive sleep apnea) [G47.33]  Unknown   • Chronic pain [G89.29]  Yes   • Fibromyalgia [M79.7]  Yes   • Hypertension [I10]  Yes   • Hyperlipidemia [E78.5]  Yes   • Type 2 diabetes mellitus without complication, without long-term current use of insulin (HCC) [E11.9]  Yes   • Myotonic dystrophy, 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         ADDITIONAL PLAN:  - detailed assessment and plan from admission reviewed  - Patient admitted early this morning with severe COVID PNA. Continue Remdesivir/steroids.  - Already up to 6L O2 - will consult ID for [id number]/JAK2    Rhiannon Jay II, DO  02/01/22      Electronically signed by Rhiannon Jay II, DO at 02/01/22 2359

## 2022-02-01 NOTE — CASE MANAGEMENT/SOCIAL WORK
Discharge Planning Assessment  [facility]     Patient Name: Hafsa Barron  MRN: [id number]  Today's Date: 2/1/2022    Admit Date: 1/31/2022     Discharge Needs Assessment     Row Name 02/01/22 1501       Living Environment    Lives With spouse; child(dinesh), adult    Current Living Arrangements home/apartment/condo    Primary Care Provided by self    Provides Primary Care For no one    Family Caregiver if Needed child(dinesh), adult; spouse    Quality of Family Relationships unable to assess    Able to Return to Prior Arrangements yes       Resource/Environmental Concerns    Resource/Environmental Concerns none       Transition Planning    Patient/Family Anticipates Transition to home with family    Patient/Family Anticipated Services at Transition     Transportation Anticipated family or friend will provide       Discharge Needs Assessment    Readmission Within the Last 30 Days no previous admission in last 30 days    Equipment Currently Used at Home cpap; cane, straight; commode; pulse ox; shower chair; walker, rolling; wheelchair, motorized    Concerns to be Addressed discharge planning    Anticipated Changes Related to Illness none               Discharge Plan     Row Name 02/01/22 1502       Plan    Plan Home    Patient/Family in Agreement with Plan yes    Plan Comments Spoke with patient by phone to initiate discharge planning.  She lives with her  and son in [place].  She is independent with ADL's, using a rolling walker or motorized scooter to assist with mobility.  She also has a straight cane, shower chair, bedside commode and CPAP.  Her PCP is Jocy Snow.  She does not have an advanced directive.  Ms. Barron has RX coverage and has her scripts filled at [facility].  Her plan is to return home at discharge.  She denies any needs at this time.  CM will continue to follow.  Elizabeth Soriano RN [phone]    Final Discharge Disposition Code 01 - home or self-care               Continued Care and Services - Admitted Since 1/31/2022    Coordination has not been started for this encounter.          Demographic Summary     Row Name 02/01/22 1500       General Information    Admission Type inpatient    Arrived From emergency department    Referral Source admission list    Reason for Consult discharge planning    Preferred Language English     Used During This Interaction no               Functional Status     Row Name 02/01/22 1500       Functional Status    Usual Activity Tolerance fair    Current Activity Tolerance fair       Functional Status, IADL    Medications assistive equipment    Meal Preparation assistive equipment and person    Housekeeping assistive equipment and person    Laundry assistive equipment and person    Shopping assistive equipment and person               Psychosocial    No documentation.                Abuse/Neglect    No documentation.                Legal    No documentation.                Substance Abuse    No documentation.                Patient Forms    No documentation.                   Elizabeth Soriano RN

## 2022-02-01 NOTE — PROGRESS NOTES
[facility] Medicine Services  ADMISSION FOLLOW-UP NOTE          Patient admitted after midnight, H&P by my partner performed earlier on today's date reviewed.  Interim findings, labs, and charting also reviewed.        The [facility] Hospital Problem List has been managed and updated to include any new diagnoses:  Active Hospital Problems    Diagnosis  POA   • Pneumonia due to COVID-19 virus [U07.1, J12.82]  Unknown   • Acute respiratory failure with hypoxia (HCC) [J96.01]  Unknown   • GIORGIO (obstructive sleep apnea) [G47.33]  Unknown   • Chronic pain [G89.29]  Yes   • Fibromyalgia [M79.7]  Yes   • Hypertension [I10]  Yes   • Hyperlipidemia [E78.5]  Yes   • Type 2 diabetes mellitus without complication, without long-term current use of insulin (HCC) [E11.9]  Yes   • Myotonic dystrophy, 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         ADDITIONAL PLAN:  - detailed assessment and plan from admission reviewed  - Patient admitted early this morning with severe COVID PNA. Continue Remdesivir/steroids.  - Already up to 6L O2 - will consult ID for IL6/JAK2    Rhiannon Jay II, DO  02/01/22

## 2022-02-01 NOTE — CONSULTS
INFECTIOUS DISEASE CONSULT/INITIAL HOSPITAL VISIT    Hafsa Barron  1970  [id number]    Date of Consult: 2022    Admission Date: 2022      Requesting Provider: No ref. provider found  Evaluating Physician: Corey Thomson MD    Reason for Consultation: COVID    History of present illness:    Patient is a 51 y.o. female who is unvaccinated for COVID, with GIORGIO, myotonic dystrophy, htn, hyperlipidemia, fibromyalgia, DM2, obesity, h/o stroke presents to [facility] with  Fevers, chills, malaise.  Received monoclonal ab at [facility] but because of worsening dyspnea has been admitted to hospital.  Currently on 4 L of oxygen.    Past Medical History:   Diagnosis Date   • Allergic    • Allergy    • Anxiety    • Asthma Allergies induced   • Depression    • Fibromyalgia, primary    • GERD (gastroesophageal reflux disease)    • Headache    • History of blood clots    • History of UTI    • Hyperlipidemia    • Hypertension    • IBS (irritable bowel syndrome)    • Internal hemorrhoid    • Kidney stone    • Low back pain    • Muscular dystrophy ([facility])    • Muscular dystrophy ([facility])     II   • Myotonia    • Obesity    • Stroke ([facility]) 2013    resdual- left sided weakness.    • Type 2 diabetes mellitus without complication, without long-term current use of insulin ([facility])        Past Surgical History:   Procedure Laterality Date   • CHOLECYSTECTOMY  2004   • COLONOSCOPY  2017   • D & C WITH SUCTION     • LYMPH NODE BIOPSY     • SUBCLAVIAN ARTERY STENT  08/2018    x2   • WISDOM TOOTH EXTRACTION         Family History   Problem Relation Age of Onset   • Allergies Other    • ADD / ADHD Other    • Heart block Other    • Other Other         CEREBROVASCULAR ACCIDENT    • Hypertension Other    • Cancer Other         LUNG CANCER   • Hyperlipidemia Other    • Alcohol abuse Mother    • Depression Mother    • Early death Mother          age 59   • Heart disease Mother         Mother  of Massive Heart attack   •  Hyperlipidemia Mother    • Hypertension Mother    • Miscarriages / Stillbirths Mother         Miscarriage   • Heart attack Mother          of  massive heart attack   • Alcohol abuse Father    • Cancer Father         Had Lung Cancer - had 1 lung till death   • Diabetes Father         Lived on insuline shots   • Early death Father          age 53   • Breast cancer Maternal Aunt    • Learning disabilities Son         Had Adhd   • Breast cancer Cousin    • Ovarian cancer Neg Hx    • Endometrial cancer Neg Hx    • Uterine cancer Neg Hx    • Colon cancer Neg Hx        Social History     Socioeconomic History   • Marital status:    Tobacco Use   • Smoking status: Former Smoker     Packs/day: 1.50     Years: 15.00     Pack years: 22.50     Start date: 1983     Quit date: 2013     Years since quittin.4   • Smokeless tobacco: Never Used   • Tobacco comment: use Ecigg now NO NICOTENE   Vaping Use   • Vaping Use: Some days   • Substances: Flavoring   • Devices: Pre-filled pod   Substance and Sexual Activity   • Alcohol use: Yes   • Drug use: No   • Sexual activity: Yes     Partners: Male     Birth control/protection: Post-menopausal     Comment: Menapause no cycle since 2013       Allergies   Allergen Reactions   • Penicillins Anaphylaxis and Shortness Of Breath         Medication:    Current Facility-Administered Medications:   •  acetaminophen (TYLENOL) tablet 650 mg, 650 mg, Oral, Q4H PRN **OR** acetaminophen (TYLENOL) 160 MG/5ML solution 650 mg, 650 mg, Oral, Q4H PRN **OR** acetaminophen (TYLENOL) suppository 650 mg, 650 mg, Rectal, Q4H PRN, Chava Salter, DO  •  albuterol sulfate HFA (PROVENTIL HFA;VENTOLIN HFA;PROAIR HFA) inhaler 2 puff, 2 puff, Inhalation, Q4H PRN, Chava Salter, DO  •  amLODIPine (NORVASC) tablet 2.5 mg, 2.5 mg, Oral, Daily, Chava Salter, DO  •  artificial tears ophthalmic ointment, , Both Eyes, Q4H, Chava Salter, DO, Given at 22 0200  •  artificial  tears ophthalmic ointment, , Both Eyes, PRN, Chava Salter,   •  aspirin EC tablet 81 mg, 81 mg, Oral, Daily, Chava Salter, , 81 mg at 02/01/22 0941  •  atorvastatin (LIPITOR) tablet 80 mg, 80 mg, Oral, Nightly, Chava Salter,   •  busPIRone (BUSPAR) tablet 15 mg, 15 mg, Oral, TID, Chava Salter, , 15 mg at 02/01/22 0941  •  calcium carb-cholecalciferol 600-800 MG-UNIT tablet 1 tablet, 1 tablet, Oral, Daily, Chava Salter, , 1 tablet at 02/01/22 0941  •  cetirizine (zyrTEC) tablet 10 mg, 10 mg, Oral, Daily, Chava Salter, , 10 mg at 02/01/22 0942  •  clopidogrel (PLAVIX) tablet 75 mg, 75 mg, Oral, Daily, Chava Salter DO  •  [START ON 2/2/2022] conjugated estrogens (PREMARIN) vaginal cream 1 g, 1 g, Vaginal, Once per day on Mon Wed Fri, Chava Salter,   •  cyclobenzaprine (FLEXERIL) tablet 10 mg, 10 mg, Oral, TID PRN, Chava Salter DO  •  [START ON 2/2/2022] dexamethasone (DECADRON) injection 6 mg, 6 mg, Intravenous, Daily, Chava Salter,   •  enoxaparin (LOVENOX) syringe 100 mg, 1 mg/kg, Subcutaneous, Q12H, Chava Salter, , 100 mg at 02/01/22 1211  •  fluticasone (FLONASE) 50 MCG/ACT nasal spray 1 spray, 1 spray, Each Nare, BID, Chava Salter DO, 1 spray at 02/01/22 0941  •  gabapentin (NEURONTIN) capsule 600 mg, 600 mg, Oral, Q8H PRN, Chava Salter, , 600 mg at 02/01/22 0943  •  HYDROcodone-acetaminophen (NORCO)  MG per tablet 1 tablet, 1 tablet, Oral, Q6H PRN, Chava Salter DO, 1 tablet at 02/01/22 0555  •  modafinil (PROVIGIL) tablet 300 mg, 300 mg, Oral, Daily, Chava Salter, , 300 mg at 02/01/22 1012  •  montelukast (SINGULAIR) tablet 10 mg, 10 mg, Oral, Q PM, Chava Salter, DO  •  naloxone (NARCAN) injection 0.4 mg, 0.4 mg, Intravenous, PRN, Chava Salter, DO  •  ondansetron (ZOFRAN) injection 4 mg, 4 mg, Intravenous, Q6H PRN, Chava Salter, DO  •  oxybutynin XL (DITROPAN-XL) 24 hr tablet 5 mg, 5 mg, Oral, Daily, Chava Salter, , 5 mg at 02/01/22 0942  •   "pantoprazole (PROTONIX) EC tablet 40 mg, 40 mg, Oral, Q AM, Chava Salter DO, 40 mg at 02/01/22 0552  •  Pharmacy Consult - Remdesivir, , Does not apply, Continuous PRN, Chava Salter DO  •  potassium chloride (KLOR-CON) packet 20 mEq, 20 mEq, Oral, Daily, Chava Salter DO, 20 mEq at 02/01/22 0942  •  [COMPLETED] remdesivir 200 mg in sodium chloride 0.9 % 290 mL IVPB (powder vial), 200 mg, Intravenous, Q24H, Stopped at 02/01/22 0332 **FOLLOWED BY** [START ON 2/2/2022] remdesivir 100 mg in sodium chloride 0.9 % 250 mL IVPB (powder vial), 100 mg, Intravenous, Q24H, Chava Salter DO  •  sodium chloride 0.9 % flush 10 mL, 10 mL, Intravenous, PRN, Chava Salter DO  •  sodium chloride 0.9 % flush 10 mL, 10 mL, Intravenous, Q12H, Chava Salter DO, 10 mL at 02/01/22 0942  •  sodium chloride 0.9 % flush 10 mL, 10 mL, Intravenous, PRN, Chava Salter DO  •  sodium chloride nasal spray 2 spray, 2 spray, Each Nare, PRN, Chava Salter DO  •  spironolactone (ALDACTONE) tablet 50 mg, 50 mg, Oral, Daily, Chava Salter DO, 50 mg at 02/01/22 0942  •  tiZANidine (ZANAFLEX) tablet 4 mg, 4 mg, Oral, Nightly, Chava Salter DO    Antibiotics:  Anti-Infectives (From admission, onward)    Ordered     Dose/Rate Route Frequency Start Stop    02/01/22 0107  remdesivir 100 mg in sodium chloride 0.9 % 250 mL IVPB (powder vial)        Ordering Provider: Chava Salter DO   \"Followed by\" Linked Group Details    100 mg  over 60 Minutes Intravenous Every 24 Hours 02/02/22 0900 02/06/22 0859    02/01/22 0107  remdesivir 200 mg in sodium chloride 0.9 % 290 mL IVPB (powder vial)        Ordering Provider: Gaetano, Chava, DO   \"Followed by\" Linked Group Details    200 mg  over 60 Minutes Intravenous Every 24 Hours 02/01/22 0230 02/01/22 0332            Review of Systems:  Subjective fevers, hcills, cough, dyspnea, , nuasea.    Denies chest pain, abdominal pain    Rest of ROS were reviewed and were unremarkable.    Physical Exam: "   Vital Signs  Temp (24hrs), Av.2 °F (37.9 °C), Min:99.7 °F (37.6 °C), Max:100.4 °F (38 °C)    Temp  Min: 99.7 °F (37.6 °C)  Max: 100.4 °F (38 °C)  BP  Min: 115/77  Max: 136/74  Pulse  Min: 92  Max: 115  Resp  Min: 16  Max: 20  SpO2  Min: 82 %  Max: 99 %    GENERAL: Awake and alert, in no acute distress.   HEENT: Normocephalic, atraumatic.  PERRL. EOMI. No conjunctival injection. No icterus. wearking o2 by nasal cannula 4 L    HEART: RRR; No murmur  LUNGS: Clear to auscultation bilaterally   ABDOMEN: Soft, nontender, nondistended.   EXT:  No edema  :  Without Byrnes catheter.  MSK:  No joint deformity  SKIN: Warm and dry without cutaneous eruptions on Inspection/palpation.    NEURO: Oriented to PPT.  PSYCHIATRIC: Normal insight and judgement. Cooperative with PE    Laboratory Data    Results from last 7 days   Lab Units 22   WBC 10*3/mm3 5.45   HEMOGLOBIN g/dL 13.2   HEMATOCRIT % 38.0   PLATELETS 10*3/mm3 247     Results from last 7 days   Lab Units 2256 228 22   SODIUM mmol/L  --   --  141   POTASSIUM mmol/L  --   --  3.2*   CHLORIDE mmol/L  --   --  103   CO2 mmol/L  --   --  24.0   BUN mg/dL  --   --  26*   CREATININE mg/dL 0.58   < > 1.13*   GLUCOSE mg/dL  --   --  110*   CALCIUM mg/dL  --   --  9.1    < > = values in this interval not displayed.     Results from last 7 days   Lab Units 22  05   ALK PHOS U/L 83   BILIRUBIN mg/dL 0.6   BILIRUBIN DIRECT mg/dL 0.2   ALT (SGPT) U/L 32   AST (SGOT) U/L 65*         Results from last 7 days   Lab Units 22   CRP mg/dL 13.86*     Results from last 7 days   Lab Units 22   LACTATE mmol/L 2.0             Estimated Creatinine Clearance: 133.3 mL/min (by C-G formula based on SCr of 0.58 mg/dL).      Microbiology:  No results found for: ACANTHNAEG, AFBCX, BPERTUSSISCX, BLOODCX  No results found for: BCIDPCR, CXREFLEX, CSFCX, CULTURETIS  No results found for: CULTURES, HSVCX, URCX  No results  found for: EYECULTURE, GCCX, HSVCULTURE, LABHSV  No results found for: LEGIONELLA, MRSACX, MUMPSCX, MYCOPLASCX  No results found for: NOCARDIACX, STOOLCX  No results found for: THROATCX, UNSTIMCULT, URINECX, CULTURE, VZVCULTUR  No results found for: VIRALCULTU, WOUNDCX        Radiology:  Imaging Results (Last 72 Hours)     Procedure Component Value Units Date/Time    CT Chest Pulmonary Embolism [[id number]] Collected: 01/31/22 2358     Updated: 02/01/22 0008    Narrative:      EXAMINATION: CT ANGIOGRAM CHEST WITH IV CONTRAST       INDICATION: Chest pain.    COMPARISON: CT December 14, 2018    PROCEDURE: Multiple axial CT images were obtained of the chest after IV administration of contrast.  Coronal and sagittal reformations as well as MIP images were obtained. CT dose lowering techniques were used, to include: automated exposure control,   adjustment for patient size, and or use of iterative reconstruction.    FINDINGS:    Cardiovascular: No filling defects are seen in the pulmonary arteries. No focal aortic aneurysm or evidence of aortic dissection is seen. There appears to be a left proximal subclavian artery stent.    Mediastinum/Nasima: There are several enlarged mediastinal and hilar lymph nodes measuring up to 12 mm in short axis diameter.    Lungs/Pleura :  There is extensive groundglass and patchy opacity throughout both lungs. No pneumothorax or pleural fluid collection.    Chest wall and Axilla: Unremarkable.    Bones:  No acute focal bony abnormality seen.    Upper abdomen: Cholecystectomy.       Impression:        No CTA evidence of pulmonary embolism or acute aortic pathology.    Extensive groundglass and patchy opacity throughout both lungs, most consistent with a widespread inflammatory or infectious process such as Covid.    Ultimately enlarged mediastinal and hilar lymph nodes may be reactive, though are nonspecific. Attention to these on follow-up chest CT in 2 months  recommended.    RECOMMENDATION:    Follow-up chest CT in 2 months as above.    Electronically signed by:  Mk Da Silva M.D.    1/31/2022 10:07 PM Mountain Time            Impression:   COVID 19  Acute hypoxic resp failure  Myotonic dystrophy  DM II  fibromyalgia    PLAN/RECOMMENDATIONS:   Thank you for asking us to see Hafsa Barron, I recommend the following:     maintain euvolemia state    Cont dxms x 10 days    remdesivir x 5 days    Upon worsening consider IL6 inhibition/micah 1/2 inihibitor    F/u crp    Prone/rotation    Monitor o2 requirements; currently 2-4 L    Corey Thomson MD  2/1/2022  16:22 EST

## 2022-02-02 ENCOUNTER — APPOINTMENT (OUTPATIENT)
Dept: CARDIOLOGY | Facility: HOSPITAL | Age: 52
End: 2022-02-02

## 2022-02-02 PROBLEM — E43 SEVERE MALNUTRITION: Status: ACTIVE | Noted: 2022-02-02

## 2022-02-02 LAB
ALBUMIN SERPL-MCNC: 3.3 G/DL (ref 3.5–5.2)
ALP SERPL-CCNC: 86 U/L (ref 39–117)
ALT SERPL W P-5'-P-CCNC: 46 U/L (ref 1–33)
ANION GAP SERPL CALCULATED.3IONS-SCNC: 11 MMOL/L (ref 5–15)
AST SERPL-CCNC: 76 U/L (ref 1–32)
BASOPHILS # BLD AUTO: 0.02 10*3/MM3 (ref 0–0.2)
BASOPHILS NFR BLD AUTO: 0.4 % (ref 0–1.5)
BH CV LOWER VASCULAR LEFT COMMON FEMORAL AUGMENT: NORMAL
BH CV LOWER VASCULAR LEFT COMMON FEMORAL COMPETENT: NORMAL
BH CV LOWER VASCULAR LEFT COMMON FEMORAL COMPRESS: NORMAL
BH CV LOWER VASCULAR LEFT COMMON FEMORAL PHASIC: NORMAL
BH CV LOWER VASCULAR LEFT COMMON FEMORAL SPONT: NORMAL
BH CV LOWER VASCULAR LEFT DISTAL FEMORAL AUGMENT: NORMAL
BH CV LOWER VASCULAR LEFT DISTAL FEMORAL COMPETENT: NORMAL
BH CV LOWER VASCULAR LEFT DISTAL FEMORAL COMPRESS: NORMAL
BH CV LOWER VASCULAR LEFT DISTAL FEMORAL PHASIC: NORMAL
BH CV LOWER VASCULAR LEFT DISTAL FEMORAL SPONT: NORMAL
BH CV LOWER VASCULAR LEFT GASTRONEMIUS COMPRESS: NORMAL
BH CV LOWER VASCULAR LEFT GREATER SAPH AK COMPRESS: NORMAL
BH CV LOWER VASCULAR LEFT GREATER SAPH BK COMPRESS: NORMAL
BH CV LOWER VASCULAR LEFT LESSER SAPH COMPRESS: NORMAL
BH CV LOWER VASCULAR LEFT MID FEMORAL AUGMENT: NORMAL
BH CV LOWER VASCULAR LEFT MID FEMORAL COMPETENT: NORMAL
BH CV LOWER VASCULAR LEFT MID FEMORAL COMPRESS: NORMAL
BH CV LOWER VASCULAR LEFT MID FEMORAL PHASIC: NORMAL
BH CV LOWER VASCULAR LEFT MID FEMORAL SPONT: NORMAL
BH CV LOWER VASCULAR LEFT PERONEAL AUGMENT: NORMAL
BH CV LOWER VASCULAR LEFT PERONEAL COMPRESS: NORMAL
BH CV LOWER VASCULAR LEFT POPLITEAL AUGMENT: NORMAL
BH CV LOWER VASCULAR LEFT POPLITEAL COMPETENT: NORMAL
BH CV LOWER VASCULAR LEFT POPLITEAL COMPRESS: NORMAL
BH CV LOWER VASCULAR LEFT POPLITEAL PHASIC: NORMAL
BH CV LOWER VASCULAR LEFT POPLITEAL SPONT: NORMAL
BH CV LOWER VASCULAR LEFT POSTERIOR TIBIAL AUGMENT: NORMAL
BH CV LOWER VASCULAR LEFT POSTERIOR TIBIAL COMPRESS: NORMAL
BH CV LOWER VASCULAR LEFT PROFUNDA FEMORAL AUGMENT: NORMAL
BH CV LOWER VASCULAR LEFT PROFUNDA FEMORAL COMPETENT: NORMAL
BH CV LOWER VASCULAR LEFT PROFUNDA FEMORAL COMPRESS: NORMAL
BH CV LOWER VASCULAR LEFT PROFUNDA FEMORAL PHASIC: NORMAL
BH CV LOWER VASCULAR LEFT PROFUNDA FEMORAL SPONT: NORMAL
BH CV LOWER VASCULAR LEFT PROXIMAL FEMORAL AUGMENT: NORMAL
BH CV LOWER VASCULAR LEFT PROXIMAL FEMORAL COMPETENT: NORMAL
BH CV LOWER VASCULAR LEFT PROXIMAL FEMORAL COMPRESS: NORMAL
BH CV LOWER VASCULAR LEFT PROXIMAL FEMORAL PHASIC: NORMAL
BH CV LOWER VASCULAR LEFT PROXIMAL FEMORAL SPONT: NORMAL
BH CV LOWER VASCULAR LEFT SAPHENOFEMORAL JUNCTION AUGMENT: NORMAL
BH CV LOWER VASCULAR LEFT SAPHENOFEMORAL JUNCTION COMPETENT: NORMAL
BH CV LOWER VASCULAR LEFT SAPHENOFEMORAL JUNCTION COMPRESS: NORMAL
BH CV LOWER VASCULAR LEFT SAPHENOFEMORAL JUNCTION PHASIC: NORMAL
BH CV LOWER VASCULAR LEFT SAPHENOFEMORAL JUNCTION SPONT: NORMAL
BH CV LOWER VASCULAR RIGHT COMMON FEMORAL AUGMENT: NORMAL
BH CV LOWER VASCULAR RIGHT COMMON FEMORAL COMPETENT: NORMAL
BH CV LOWER VASCULAR RIGHT COMMON FEMORAL COMPRESS: NORMAL
BH CV LOWER VASCULAR RIGHT COMMON FEMORAL PHASIC: NORMAL
BH CV LOWER VASCULAR RIGHT COMMON FEMORAL SPONT: NORMAL
BH CV LOWER VASCULAR RIGHT DISTAL FEMORAL AUGMENT: NORMAL
BH CV LOWER VASCULAR RIGHT DISTAL FEMORAL COMPETENT: NORMAL
BH CV LOWER VASCULAR RIGHT DISTAL FEMORAL COMPRESS: NORMAL
BH CV LOWER VASCULAR RIGHT DISTAL FEMORAL PHASIC: NORMAL
BH CV LOWER VASCULAR RIGHT DISTAL FEMORAL SPONT: NORMAL
BH CV LOWER VASCULAR RIGHT GASTRONEMIUS COMPRESS: NORMAL
BH CV LOWER VASCULAR RIGHT GREATER SAPH AK COMPRESS: NORMAL
BH CV LOWER VASCULAR RIGHT GREATER SAPH BK COMPRESS: NORMAL
BH CV LOWER VASCULAR RIGHT LESSER SAPH COMPRESS: NORMAL
BH CV LOWER VASCULAR RIGHT MID FEMORAL AUGMENT: NORMAL
BH CV LOWER VASCULAR RIGHT MID FEMORAL COMPETENT: NORMAL
BH CV LOWER VASCULAR RIGHT MID FEMORAL COMPRESS: NORMAL
BH CV LOWER VASCULAR RIGHT MID FEMORAL PHASIC: NORMAL
BH CV LOWER VASCULAR RIGHT MID FEMORAL SPONT: NORMAL
BH CV LOWER VASCULAR RIGHT PERONEAL AUGMENT: NORMAL
BH CV LOWER VASCULAR RIGHT PERONEAL COMPRESS: NORMAL
BH CV LOWER VASCULAR RIGHT POPLITEAL AUGMENT: NORMAL
BH CV LOWER VASCULAR RIGHT POPLITEAL COMPETENT: NORMAL
BH CV LOWER VASCULAR RIGHT POPLITEAL COMPRESS: NORMAL
BH CV LOWER VASCULAR RIGHT POPLITEAL PHASIC: NORMAL
BH CV LOWER VASCULAR RIGHT POPLITEAL SPONT: NORMAL
BH CV LOWER VASCULAR RIGHT POSTERIOR TIBIAL AUGMENT: NORMAL
BH CV LOWER VASCULAR RIGHT POSTERIOR TIBIAL COMPRESS: NORMAL
BH CV LOWER VASCULAR RIGHT PROFUNDA FEMORAL AUGMENT: NORMAL
BH CV LOWER VASCULAR RIGHT PROFUNDA FEMORAL COMPETENT: NORMAL
BH CV LOWER VASCULAR RIGHT PROFUNDA FEMORAL COMPRESS: NORMAL
BH CV LOWER VASCULAR RIGHT PROFUNDA FEMORAL PHASIC: NORMAL
BH CV LOWER VASCULAR RIGHT PROFUNDA FEMORAL SPONT: NORMAL
BH CV LOWER VASCULAR RIGHT PROXIMAL FEMORAL AUGMENT: NORMAL
BH CV LOWER VASCULAR RIGHT PROXIMAL FEMORAL COMPETENT: NORMAL
BH CV LOWER VASCULAR RIGHT PROXIMAL FEMORAL COMPRESS: NORMAL
BH CV LOWER VASCULAR RIGHT PROXIMAL FEMORAL PHASIC: NORMAL
BH CV LOWER VASCULAR RIGHT PROXIMAL FEMORAL SPONT: NORMAL
BH CV LOWER VASCULAR RIGHT SAPHENOFEMORAL JUNCTION AUGMENT: NORMAL
BH CV LOWER VASCULAR RIGHT SAPHENOFEMORAL JUNCTION COMPETENT: NORMAL
BH CV LOWER VASCULAR RIGHT SAPHENOFEMORAL JUNCTION COMPRESS: NORMAL
BH CV LOWER VASCULAR RIGHT SAPHENOFEMORAL JUNCTION PHASIC: NORMAL
BH CV LOWER VASCULAR RIGHT SAPHENOFEMORAL JUNCTION SPONT: NORMAL
BILIRUB CONJ SERPL-MCNC: 0.3 MG/DL (ref 0–0.3)
BILIRUB INDIRECT SERPL-MCNC: 0.5 MG/DL
BILIRUB SERPL-MCNC: 0.8 MG/DL (ref 0–1.2)
BUN SERPL-MCNC: 19 MG/DL (ref 6–20)
BUN/CREAT SERPL: 41.3 (ref 7–25)
CALCIUM SPEC-SCNC: 9.5 MG/DL (ref 8.6–10.5)
CHLORIDE SERPL-SCNC: 102 MMOL/L (ref 98–107)
CO2 SERPL-SCNC: 27 MMOL/L (ref 22–29)
CREAT SERPL-MCNC: 0.46 MG/DL (ref 0.57–1)
CRP SERPL-MCNC: 17.81 MG/DL (ref 0–0.5)
D DIMER PPP FEU-MCNC: 0.93 MCGFEU/ML (ref 0–0.56)
DEPRECATED RDW RBC AUTO: 42.9 FL (ref 37–54)
EOSINOPHIL # BLD AUTO: 0.01 10*3/MM3 (ref 0–0.4)
EOSINOPHIL NFR BLD AUTO: 0.2 % (ref 0.3–6.2)
ERYTHROCYTE [DISTWIDTH] IN BLOOD BY AUTOMATED COUNT: 13.5 % (ref 12.3–15.4)
GFR SERPL CREATININE-BSD FRML MDRD: 143 ML/MIN/1.73
GLUCOSE SERPL-MCNC: 105 MG/DL (ref 65–99)
HCT VFR BLD AUTO: 33.9 % (ref 34–46.6)
HGB BLD-MCNC: 11.9 G/DL (ref 12–15.9)
IMM GRANULOCYTES # BLD AUTO: 0.03 10*3/MM3 (ref 0–0.05)
IMM GRANULOCYTES NFR BLD AUTO: 0.7 % (ref 0–0.5)
LYMPHOCYTES # BLD AUTO: 1.12 10*3/MM3 (ref 0.7–3.1)
LYMPHOCYTES NFR BLD AUTO: 24.8 % (ref 19.6–45.3)
MAGNESIUM SERPL-MCNC: 1.6 MG/DL (ref 1.6–2.6)
MAXIMAL PREDICTED HEART RATE: 168 BPM
MCH RBC QN AUTO: 30.3 PG (ref 26.6–33)
MCHC RBC AUTO-ENTMCNC: 35.1 G/DL (ref 31.5–35.7)
MCV RBC AUTO: 86.3 FL (ref 79–97)
MONOCYTES # BLD AUTO: 0.28 10*3/MM3 (ref 0.1–0.9)
MONOCYTES NFR BLD AUTO: 6.2 % (ref 5–12)
NEUTROPHILS NFR BLD AUTO: 3.05 10*3/MM3 (ref 1.7–7)
NEUTROPHILS NFR BLD AUTO: 67.7 % (ref 42.7–76)
NRBC BLD AUTO-RTO: 0 /100 WBC (ref 0–0.2)
PLAT MORPH BLD: NORMAL
PLATELET # BLD AUTO: 244 10*3/MM3 (ref 140–450)
PMV BLD AUTO: 10.7 FL (ref 6–12)
POTASSIUM SERPL-SCNC: 3.5 MMOL/L (ref 3.5–5.2)
PROT SERPL-MCNC: 6.3 G/DL (ref 6–8.5)
RBC # BLD AUTO: 3.93 10*6/MM3 (ref 3.77–5.28)
RBC MORPH BLD: NORMAL
SODIUM SERPL-SCNC: 140 MMOL/L (ref 136–145)
STRESS TARGET HR: 143 BPM
WBC MORPH BLD: NORMAL
WBC NRBC COR # BLD: 4.51 10*3/MM3 (ref 3.4–10.8)

## 2022-02-02 PROCEDURE — 83735 ASSAY OF MAGNESIUM: CPT | Performed by: INTERNAL MEDICINE

## 2022-02-02 PROCEDURE — 25010000002 MAGNESIUM SULFATE 2 GM/50ML SOLUTION: Performed by: INTERNAL MEDICINE

## 2022-02-02 PROCEDURE — 80076 HEPATIC FUNCTION PANEL: CPT | Performed by: INTERNAL MEDICINE

## 2022-02-02 PROCEDURE — 86140 C-REACTIVE PROTEIN: CPT | Performed by: INTERNAL MEDICINE

## 2022-02-02 PROCEDURE — 25010000002 ENOXAPARIN PER 10 MG: Performed by: INTERNAL MEDICINE

## 2022-02-02 PROCEDURE — 85025 COMPLETE CBC W/AUTO DIFF WBC: CPT | Performed by: INTERNAL MEDICINE

## 2022-02-02 PROCEDURE — 85379 FIBRIN DEGRADATION QUANT: CPT

## 2022-02-02 PROCEDURE — 80048 BASIC METABOLIC PNL TOTAL CA: CPT | Performed by: INTERNAL MEDICINE

## 2022-02-02 PROCEDURE — 93970 EXTREMITY STUDY: CPT

## 2022-02-02 PROCEDURE — 94799 UNLISTED PULMONARY SVC/PX: CPT

## 2022-02-02 PROCEDURE — 25010000002 DEXAMETHASONE PER 1 MG: Performed by: INTERNAL MEDICINE

## 2022-02-02 PROCEDURE — 85007 BL SMEAR W/DIFF WBC COUNT: CPT | Performed by: INTERNAL MEDICINE

## 2022-02-02 PROCEDURE — 25010000002 REMDESIVIR 100 MG/20ML SOLUTION 1 EACH VIAL: Performed by: INTERNAL MEDICINE

## 2022-02-02 PROCEDURE — 99233 SBSQ HOSP IP/OBS HIGH 50: CPT | Performed by: INTERNAL MEDICINE

## 2022-02-02 PROCEDURE — 25010000002 FUROSEMIDE PER 20 MG: Performed by: INTERNAL MEDICINE

## 2022-02-02 RX ORDER — MAGNESIUM SULFATE 1 G/100ML
1 INJECTION INTRAVENOUS AS NEEDED
Status: DISCONTINUED | OUTPATIENT
Start: 2022-02-02 | End: 2022-02-05 | Stop reason: HOSPADM

## 2022-02-02 RX ORDER — CLOPIDOGREL BISULFATE 75 MG/1
75 TABLET ORAL NIGHTLY
Status: DISCONTINUED | OUTPATIENT
Start: 2022-02-02 | End: 2022-02-05 | Stop reason: HOSPADM

## 2022-02-02 RX ORDER — DEXAMETHASONE SODIUM PHOSPHATE 4 MG/ML
6 INJECTION, SOLUTION INTRA-ARTICULAR; INTRALESIONAL; INTRAMUSCULAR; INTRAVENOUS; SOFT TISSUE DAILY
Status: DISCONTINUED | OUTPATIENT
Start: 2022-02-03 | End: 2022-02-05 | Stop reason: HOSPADM

## 2022-02-02 RX ORDER — POTASSIUM CHLORIDE 750 MG/1
20 CAPSULE, EXTENDED RELEASE ORAL NIGHTLY
Status: DISCONTINUED | OUTPATIENT
Start: 2022-02-02 | End: 2022-02-05 | Stop reason: HOSPADM

## 2022-02-02 RX ORDER — GUAIFENESIN/DEXTROMETHORPHAN 100-10MG/5
5 SYRUP ORAL EVERY 4 HOURS PRN
Status: DISCONTINUED | OUTPATIENT
Start: 2022-02-02 | End: 2022-02-05 | Stop reason: HOSPADM

## 2022-02-02 RX ORDER — FUROSEMIDE 10 MG/ML
20 INJECTION INTRAMUSCULAR; INTRAVENOUS ONCE
Status: COMPLETED | OUTPATIENT
Start: 2022-02-02 | End: 2022-02-02

## 2022-02-02 RX ORDER — MAGNESIUM SULFATE HEPTAHYDRATE 40 MG/ML
2 INJECTION, SOLUTION INTRAVENOUS AS NEEDED
Status: DISCONTINUED | OUTPATIENT
Start: 2022-02-02 | End: 2022-02-05 | Stop reason: HOSPADM

## 2022-02-02 RX ORDER — MAGNESIUM SULFATE HEPTAHYDRATE 40 MG/ML
4 INJECTION, SOLUTION INTRAVENOUS AS NEEDED
Status: DISCONTINUED | OUTPATIENT
Start: 2022-02-02 | End: 2022-02-05 | Stop reason: HOSPADM

## 2022-02-02 RX ADMIN — GUAIFENESIN AND DEXTROMETHORPHAN 5 ML: 100; 10 SYRUP ORAL at 14:46

## 2022-02-02 RX ADMIN — POTASSIUM CHLORIDE 20 MEQ: 750 CAPSULE, EXTENDED RELEASE ORAL at 21:10

## 2022-02-02 RX ADMIN — REMDESIVIR 100 MG: 100 INJECTION, POWDER, LYOPHILIZED, FOR SOLUTION INTRAVENOUS at 08:54

## 2022-02-02 RX ADMIN — MINERAL OIL AND PETROLATUM: 150; 830 OINTMENT OPHTHALMIC at 21:09

## 2022-02-02 RX ADMIN — SODIUM CHLORIDE, PRESERVATIVE FREE 10 ML: 5 INJECTION INTRAVENOUS at 21:10

## 2022-02-02 RX ADMIN — GUAIFENESIN AND DEXTROMETHORPHAN 5 ML: 100; 10 SYRUP ORAL at 10:27

## 2022-02-02 RX ADMIN — ATORVASTATIN CALCIUM 80 MG: 40 TABLET, FILM COATED ORAL at 21:09

## 2022-02-02 RX ADMIN — CETIRIZINE HYDROCHLORIDE 10 MG: 10 TABLET, FILM COATED ORAL at 08:54

## 2022-02-02 RX ADMIN — FLUTICASONE PROPIONATE 1 SPRAY: 50 SPRAY, METERED NASAL at 21:12

## 2022-02-02 RX ADMIN — Medication 1 TABLET: at 08:56

## 2022-02-02 RX ADMIN — HYDROCODONE BITARTRATE AND ACETAMINOPHEN 1 TABLET: 10; 325 TABLET ORAL at 10:39

## 2022-02-02 RX ADMIN — DEXAMETHASONE SODIUM PHOSPHATE 6 MG: 4 INJECTION, SOLUTION INTRA-ARTICULAR; INTRALESIONAL; INTRAMUSCULAR; INTRAVENOUS; SOFT TISSUE at 08:54

## 2022-02-02 RX ADMIN — MINERAL OIL AND PETROLATUM: 150; 830 OINTMENT OPHTHALMIC at 08:55

## 2022-02-02 RX ADMIN — GUAIFENESIN AND DEXTROMETHORPHAN 5 ML: 100; 10 SYRUP ORAL at 21:50

## 2022-02-02 RX ADMIN — TIZANIDINE 4 MG: 4 TABLET ORAL at 21:10

## 2022-02-02 RX ADMIN — BUSPIRONE HYDROCHLORIDE 15 MG: 10 TABLET ORAL at 08:53

## 2022-02-02 RX ADMIN — MODAFINIL 300 MG: 100 TABLET ORAL at 08:53

## 2022-02-02 RX ADMIN — FUROSEMIDE 20 MG: 10 INJECTION INTRAMUSCULAR; INTRAVENOUS at 14:46

## 2022-02-02 RX ADMIN — FLUTICASONE PROPIONATE 1 SPRAY: 50 SPRAY, METERED NASAL at 10:27

## 2022-02-02 RX ADMIN — ASPIRIN 81 MG: 81 TABLET, COATED ORAL at 08:54

## 2022-02-02 RX ADMIN — ENOXAPARIN SODIUM 100 MG: 100 INJECTION SUBCUTANEOUS at 10:39

## 2022-02-02 RX ADMIN — OXYBUTYNIN CHLORIDE 5 MG: 5 TABLET, EXTENDED RELEASE ORAL at 08:53

## 2022-02-02 RX ADMIN — BUSPIRONE HYDROCHLORIDE 15 MG: 10 TABLET ORAL at 21:09

## 2022-02-02 RX ADMIN — MINERAL OIL AND PETROLATUM: 150; 830 OINTMENT OPHTHALMIC at 00:52

## 2022-02-02 RX ADMIN — AMLODIPINE BESYLATE 2.5 MG: 5 TABLET ORAL at 10:41

## 2022-02-02 RX ADMIN — PANTOPRAZOLE SODIUM 40 MG: 40 TABLET, DELAYED RELEASE ORAL at 05:09

## 2022-02-02 RX ADMIN — MONTELUKAST SODIUM 10 MG: 10 TABLET, COATED ORAL at 21:10

## 2022-02-02 RX ADMIN — MAGNESIUM SULFATE HEPTAHYDRATE 2 G: 2 INJECTION, SOLUTION INTRAVENOUS at 16:50

## 2022-02-02 RX ADMIN — ENOXAPARIN SODIUM 100 MG: 100 INJECTION SUBCUTANEOUS at 00:51

## 2022-02-02 RX ADMIN — MINERAL OIL AND PETROLATUM: 150; 830 OINTMENT OPHTHALMIC at 05:09

## 2022-02-02 RX ADMIN — SODIUM CHLORIDE, PRESERVATIVE FREE 10 ML: 5 INJECTION INTRAVENOUS at 08:55

## 2022-02-02 RX ADMIN — MINERAL OIL AND PETROLATUM: 150; 830 OINTMENT OPHTHALMIC at 14:46

## 2022-02-02 RX ADMIN — BUSPIRONE HYDROCHLORIDE 15 MG: 10 TABLET ORAL at 14:47

## 2022-02-02 RX ADMIN — SPIRONOLACTONE 50 MG: 50 TABLET ORAL at 08:53

## 2022-02-02 RX ADMIN — CLOPIDOGREL BISULFATE 75 MG: 75 TABLET ORAL at 21:10

## 2022-02-02 NOTE — PROGRESS NOTES
INFECTIOUS DISEASE f/u   Hafsa Barron  1970  [id number]    Date of Consult: 2022    Admission Date: 2022      Requesting Provider: No ref. provider found  Evaluating Physician: Corey Thomson MD    Reason for Consultation: COVID    History of present illness:    Patient is a 51 y.o. female who is unvaccinated for COVID, with GIORGIO, myotonic dystrophy, htn, hyperlipidemia, fibromyalgia, DM2, obesity, h/o stroke presents to [facility] with  Fevers, chills, malaise.  Received monoclonal ab at [facility] but because of worsening dyspnea has been admitted to hospital.  Currently on 4 L of oxygen.      22; no events overnigth; has fever, hemodynamically stable on bipap last night currently on 4 L o2 by nc  Past Medical History:   Diagnosis Date   • Allergic    • Allergy    • Anxiety    • Asthma Allergies induced   • Depression    • Fibromyalgia, primary    • GERD (gastroesophageal reflux disease)    • Headache    • History of blood clots    • History of UTI    • Hyperlipidemia    • Hypertension    • IBS (irritable bowel syndrome)    • Internal hemorrhoid    • Kidney stone    • Low back pain    • Muscular dystrophy (HCC)    • Muscular dystrophy (HCC)     II   • Myotonia    • Obesity    • Stroke ([facility]) 2013    resdual- left sided weakness.    • Type 2 diabetes mellitus without complication, without long-term current use of insulin (HCC)        Past Surgical History:   Procedure Laterality Date   • CHOLECYSTECTOMY  2004   • COLONOSCOPY  2017   • D & C WITH SUCTION     • LYMPH NODE BIOPSY     • SUBCLAVIAN ARTERY STENT  08/2018    x2   • WISDOM TOOTH EXTRACTION         Family History   Problem Relation Age of Onset   • Allergies Other    • ADD / ADHD Other    • Heart block Other    • Other Other         CEREBROVASCULAR ACCIDENT    • Hypertension Other    • Cancer Other         LUNG CANCER   • Hyperlipidemia Other    • Alcohol abuse Mother    • Depression Mother    • Early death Mother          age 59    • Heart disease Mother         Mother  of Massive Heart attack   • Hyperlipidemia Mother    • Hypertension Mother    • Miscarriages / Stillbirths Mother         Miscarriage   • Heart attack Mother          of  massive heart attack   • Alcohol abuse Father    • Cancer Father         Had Lung Cancer - had 1 lung till death   • Diabetes Father         Lived on insuline shots   • Early death Father          age 53   • Breast cancer Maternal Aunt    • Learning disabilities Son         Had Adhd   • Breast cancer Cousin    • Ovarian cancer Neg Hx    • Endometrial cancer Neg Hx    • Uterine cancer Neg Hx    • Colon cancer Neg Hx        Social History     Socioeconomic History   • Marital status:    Tobacco Use   • Smoking status: Former Smoker     Packs/day: 1.50     Years: 15.00     Pack years: 22.50     Start date: 1983     Quit date: 2013     Years since quittin.4   • Smokeless tobacco: Never Used   • Tobacco comment: use Ecigg now NO NICOTENE   Vaping Use   • Vaping Use: Some days   • Substances: Flavoring   • Devices: Pre-filled pod   Substance and Sexual Activity   • Alcohol use: Yes   • Drug use: No   • Sexual activity: Yes     Partners: Male     Birth control/protection: Post-menopausal     Comment: Menapause no cycle since 2013       Allergies   Allergen Reactions   • Penicillins Anaphylaxis and Shortness Of Breath         Medication:    Current Facility-Administered Medications:   •  acetaminophen (TYLENOL) tablet 650 mg, 650 mg, Oral, Q4H PRN **OR** acetaminophen (TYLENOL) 160 MG/5ML solution 650 mg, 650 mg, Oral, Q4H PRN **OR** acetaminophen (TYLENOL) suppository 650 mg, 650 mg, Rectal, Q4H PRN, Chava Salter, DO  •  albuterol sulfate HFA (PROVENTIL HFA;VENTOLIN HFA;PROAIR HFA) inhaler 2 puff, 2 puff, Inhalation, Q4H PRN, Chava Salter, DO  •  amLODIPine (NORVASC) tablet 2.5 mg, 2.5 mg, Oral, Daily, Chava Salter, DO, 2.5 mg at 22 1041  •  artificial tears  ophthalmic ointment, , Both Eyes, Q4H, Chava Salter DO, Given at 02/02/22 0509  •  artificial tears ophthalmic ointment, , Both Eyes, PRN, Chava Salter DO, Given at 02/02/22 0855  •  aspirin EC tablet 81 mg, 81 mg, Oral, Daily, Chava Salter DO, 81 mg at 02/02/22 0854  •  atorvastatin (LIPITOR) tablet 80 mg, 80 mg, Oral, Nightly, Chava Salter DO, 80 mg at 02/01/22 2110  •  busPIRone (BUSPAR) tablet 15 mg, 15 mg, Oral, TID, Chava Salter DO, 15 mg at 02/02/22 0853  •  calcium carb-cholecalciferol 600-800 MG-UNIT tablet 1 tablet, 1 tablet, Oral, Daily, Chava Salter DO, 1 tablet at 02/02/22 0856  •  cetirizine (zyrTEC) tablet 10 mg, 10 mg, Oral, Daily, Chava Salter DO, 10 mg at 02/02/22 0854  •  clopidogrel (PLAVIX) tablet 75 mg, 75 mg, Oral, Nightly, Loyd Putnam MD  •  conjugated estrogens (PREMARIN) vaginal cream 1 g, 1 g, Vaginal, Once per day on Mon Wed Fri, Chava Salter DO  •  cyclobenzaprine (FLEXERIL) tablet 10 mg, 10 mg, Oral, TID PRN, Chava Salter DO  •  [START ON 2/3/2022] dexamethasone (DECADRON) injection 6 mg, 6 mg, Intravenous, Daily **OR** [START ON 2/3/2022] dexamethasone (DECADRON) tablet 6 mg, 6 mg, Oral, Daily, Loyd Putnam MD  •  enoxaparin (LOVENOX) syringe 100 mg, 1 mg/kg, Subcutaneous, Q12H, Chvaa Salter DO, 100 mg at 02/02/22 1039  •  fluticasone (FLONASE) 50 MCG/ACT nasal spray 1 spray, 1 spray, Each Nare, BID, Chava Salter DO, 1 spray at 02/02/22 1027  •  gabapentin (NEURONTIN) capsule 600 mg, 600 mg, Oral, Q8H PRN, Chava Salter DO, 600 mg at 02/01/22 0943  •  guaiFENesin-dextromethorphan (ROBITUSSIN DM) 100-10 MG/5ML syrup 5 mL, 5 mL, Oral, Q4H PRN, Loyd Putnam MD, 5 mL at 02/02/22 1027  •  HYDROcodone-acetaminophen (NORCO)  MG per tablet 1 tablet, 1 tablet, Oral, Q6H PRN, Chava Salter DO, 1 tablet at 02/02/22 1039  •  modafinil (PROVIGIL) tablet 300 mg, 300 mg, Oral, Daily, Chava Salter DO, 300 mg at 02/02/22 0853  •   "montelukast (SINGULAIR) tablet 10 mg, 10 mg, Oral, Q PM, Chava Salter, DO, 10 mg at 02/01/22 2110  •  naloxone (NARCAN) injection 0.4 mg, 0.4 mg, Intravenous, PRN, Chava Salter, DO  •  ondansetron (ZOFRAN) injection 4 mg, 4 mg, Intravenous, Q6H PRN, Chava Salter, DO  •  oxybutynin XL (DITROPAN-XL) 24 hr tablet 5 mg, 5 mg, Oral, Daily, Chava Salter, DO, 5 mg at 02/02/22 0853  •  pantoprazole (PROTONIX) EC tablet 40 mg, 40 mg, Oral, Q AM, Chava Salter, DO, 40 mg at 02/02/22 0509  •  Pharmacy Consult - Remdesivir, , Does not apply, Continuous PRN, Chava Salter, DO  •  potassium chloride (MICRO-K) CR capsule 20 mEq, 20 mEq, Oral, Nightly, Loyd Putnam MD  •  [COMPLETED] remdesivir 200 mg in sodium chloride 0.9 % 290 mL IVPB (powder vial), 200 mg, Intravenous, Q24H, Stopped at 02/01/22 0332 **FOLLOWED BY** remdesivir 100 mg in sodium chloride 0.9 % 250 mL IVPB (powder vial), 100 mg, Intravenous, Q24H, Chava Salter, DO, 100 mg at 02/02/22 0854  •  sodium chloride 0.9 % flush 10 mL, 10 mL, Intravenous, PRN, Chava Salter, DO  •  sodium chloride 0.9 % flush 10 mL, 10 mL, Intravenous, Q12H, Chava Salter, DO, 10 mL at 02/02/22 0855  •  sodium chloride 0.9 % flush 10 mL, 10 mL, Intravenous, PRN, Chava Salter, DO  •  sodium chloride nasal spray 2 spray, 2 spray, Each Nare, PRN, Chava Salter, DO  •  spironolactone (ALDACTONE) tablet 50 mg, 50 mg, Oral, Daily, Chava Salter, DO, 50 mg at 02/02/22 0853  •  tiZANidine (ZANAFLEX) tablet 4 mg, 4 mg, Oral, Nightly, Chava Salter DO, 4 mg at 02/01/22 2110    Antibiotics:  Anti-Infectives (From admission, onward)    Ordered     Dose/Rate Route Frequency Start Stop    02/01/22 0107  remdesivir 100 mg in sodium chloride 0.9 % 250 mL IVPB (powder vial)        Ordering Provider: Chava Salter DO   \"Followed by\" Linked Group Details    100 mg  over 60 Minutes Intravenous Every 24 Hours 02/02/22 0900 02/06/22 0859    02/01/22 0107  remdesivir 200 mg in sodium " "chloride 0.9 % 290 mL IVPB (powder vial)        Ordering Provider: Chava Salter DO   \"Followed by\" Linked Group Details    200 mg  over 60 Minutes Intravenous Every 24 Hours 22 0230 22 0332            Review of Systems:  See hpi    Physical Exam:   Vital Signs  Temp (24hrs), Av.9 °F (37.2 °C), Min:96.5 °F (35.8 °C), Max:100.7 °F (38.2 °C)    Temp  Min: 96.5 °F (35.8 °C)  Max: 100.7 °F (38.2 °C)  BP  Min: 102/49  Max: 135/78  Pulse  Min: 73  Max: 108  Resp  Min: 16  Max: 24  SpO2  Min: 92 %  Max: 95 %    GENERAL: Awake and alert, in no acute distress.   HEENT: Normocephalic, atraumatic.  PERRL. EOMI. No conjunctival injection. No icterus. wearking o2 by nasal cannula 4 L    HEART: RRR; No murmur  LUNGS: Clear to auscultation bilaterally   ABDOMEN: Soft, nontender  EXT:  No edema  :  Without Byrnes catheter.  MSK:  No joint deformity  SKIN: Warm and dry without cutaneous eruptions on Inspection/palpation.        Laboratory Data    Results from last 7 days   Lab Units 22  0657 22   WBC 10*3/mm3 4.51 5.45   HEMOGLOBIN g/dL 11.9* 13.2   HEMATOCRIT % 33.9* 38.0   PLATELETS 10*3/mm3 244 247     Results from last 7 days   Lab Units 22  0657   SODIUM mmol/L 140   POTASSIUM mmol/L 3.5   CHLORIDE mmol/L 102   CO2 mmol/L 27.0   BUN mg/dL 19   CREATININE mg/dL 0.46*   GLUCOSE mg/dL 105*   CALCIUM mg/dL 9.5     Results from last 7 days   Lab Units 22  0657   ALK PHOS U/L 86   BILIRUBIN mg/dL 0.8   BILIRUBIN DIRECT mg/dL 0.3   ALT (SGPT) U/L 46*   AST (SGOT) U/L 76*         Results from last 7 days   Lab Units 22  0657   CRP mg/dL 17.81*     Results from last 7 days   Lab Units 22   LACTATE mmol/L 2.0             Estimated Creatinine Clearance: 168 mL/min (A) (by C-G formula based on SCr of 0.46 mg/dL (L)).      Microbiology:  No results found for: ACANTHNAEG, AFBCX, BPERTUSSISCX, BLOODCX  No results found for: BCIDPCR, CXREFLEX, CSFCX, CULTURETIS  No results " found for: CULTURES, HSVCX, URCX  No results found for: EYECULTURE, GCCX, HSVCULTURE, LABHSV  No results found for: LEGIONELLA, MRSACX, MUMPSCX, MYCOPLASCX  No results found for: NOCARDIACX, STOOLCX  No results found for: THROATCX, UNSTIMCULT, URINECX, CULTURE, VZVCULTUR  No results found for: VIRALCULTU, WOUNDCX        Radiology:  Imaging Results (Last 72 Hours)     Procedure Component Value Units Date/Time    CT Chest Pulmonary Embolism [[id number]] Collected: 01/31/22 2358     Updated: 02/01/22 0008    Narrative:      EXAMINATION: CT ANGIOGRAM CHEST WITH IV CONTRAST       INDICATION: Chest pain.    COMPARISON: CT December 14, 2018    PROCEDURE: Multiple axial CT images were obtained of the chest after IV administration of contrast.  Coronal and sagittal reformations as well as MIP images were obtained. CT dose lowering techniques were used, to include: automated exposure control,   adjustment for patient size, and or use of iterative reconstruction.    FINDINGS:    Cardiovascular: No filling defects are seen in the pulmonary arteries. No focal aortic aneurysm or evidence of aortic dissection is seen. There appears to be a left proximal subclavian artery stent.    Mediastinum/Nasima: There are several enlarged mediastinal and hilar lymph nodes measuring up to 12 mm in short axis diameter.    Lungs/Pleura :  There is extensive groundglass and patchy opacity throughout both lungs. No pneumothorax or pleural fluid collection.    Chest wall and Axilla: Unremarkable.    Bones:  No acute focal bony abnormality seen.    Upper abdomen: Cholecystectomy.       Impression:        No CTA evidence of pulmonary embolism or acute aortic pathology.    Extensive groundglass and patchy opacity throughout both lungs, most consistent with a widespread inflammatory or infectious process such as Covid.    Ultimately enlarged mediastinal and hilar lymph nodes may be reactive, though are nonspecific. Attention to these on follow-up chest CT  in 2 months recommended.    RECOMMENDATION:    Follow-up chest CT in 2 months as above.    Electronically signed by:  Mk Da Silva M.D.    1/31/2022 10:07 PM Mountain Time            Impression:   COVID 19  Acute hypoxic resp failure  Myotonic dystrophy  DM II  fibromyalgia  crp 17  PLAN/RECOMMENDATIONS:   Thank you for asking us to see Hafsa Barron, I recommend the following:     maintain euvolemia state    Cont dxms x 10 days    remdesivir x 5 days    Upon worsening consider IL6 inhibition/micah 1/2 inihibitor    F/u crp    Prone/rotation    Monitor o2 requirements; currently 2-4 L    Patient oxygenation is same as yesterday; follow this and upon worsening consider adjunctive therapies    CRP is highly elevated, continue to follow    Corey Thomson MD  2/2/2022  11:03 EST

## 2022-02-02 NOTE — PLAN OF CARE
Goal Outcome Evaluation:  Plan of Care Reviewed With: patient, family           Outcome Summary: Pt rested well though the shift.Pt complained of pain at the beginning of the shift and received a percocet before she went to sleep.Pt said that she was having a hard time having a bowel movement but she had onw this shit.Pt remained continent of bowel and bladder this shift.Pt ambulated with assist of one to the bedside commode.

## 2022-02-02 NOTE — PROGRESS NOTES
Malnutrition Severity Assessment    Patient Name:  Hafsa Barron  YOB: 1970  MRN: [id number]  Admit Date:  1/31/2022    Patient meets criteria for : Severe Malnutrition due to acute illness     Malnutrition Severity Assessment  Malnutrition Type: Acute Disease or Injury - Related Malnutrition  Malnutrition Type (last 8 hours)     Malnutrition Severity Assessment     Row Name 02/02/22 1135       Malnutrition Severity Assessment    Malnutrition Type Acute Disease or Injury - Related Malnutrition    Row Name 02/02/22 1135       Insufficient Energy Intake     Insufficient Energy Intake Findings Severe    Insufficient Energy Intake  < or equal to 50% of est. energy requirement for > or equal to 5d)    Row Name 02/02/22 1135       Unintentional Weight Loss     Unintentional Weight Loss Findings Severe    Unintentional Weight Loss  Weight loss greater than 2% in one week    Row Name 02/02/22 1135       Criteria Met (Must meet criteria for severity in at least 2 of these categories: M Wasting, Fat Loss, Fluid, Secondary Signs, Wt. Status, Intake)    Patient meets criteria for  Severe Malnutrition                Electronically signed by:  Magaly Hidalgo RD  02/02/22 11:35 EST

## 2022-02-02 NOTE — PROGRESS NOTES
[facility] Medicine Services  PROGRESS NOTE    Patient Name: Hafsa Barron  : 1970  MRN: [id number]    Date of Admission: 2022  Primary Care Physician: Jocy Snow MD    Subjective   Subjective     CC:  covid pneumonia, hypoxia    HPI:  Still dyspneic, no hemoptysis, no sputum. No chest pain    ROS:  Gen- No fevers, chills  CV- No chest pain, palpitations  Resp- see above  GI- No N/V/D, abd pain        Objective   Objective     Vital Signs:   Temp:  [96.5 °F (35.8 °C)-100.7 °F (38.2 °C)] 98.6 °F (37 °C)  Heart Rate:  [] 94  Resp:  [16-24] 20  BP: (102-135)/(40-78) 126/63  Flow (L/min):  [4] 4     Physical Exam:  Constitutional:Alert, oriented x 3, nontoxic appearing, mildly dyspneic appearing at rest but no distress, no accessory muscle use  Psych:Normal/appropriate affect  HEENT:NCAT, oropharynx clear  Neck: neck supple, full range of motion  Neuro: Face symmetric, speech clear, equal , moves all extremities  Cardiac: RRR; No pretibial pitting edema  Resp: CTAB, normal effort  GI: abd soft, nontender, obese  Skin: No extremity rash  Musculoskeletal/extremities: no cyanosis of extremities; no significant ankle edema        Results Reviewed:  LAB RESULTS:      Lab 22  0657 22   WBC 4.51 5.45   HEMOGLOBIN 11.9* 13.2   HEMATOCRIT 33.9* 38.0   PLATELETS 244 247   NEUTROS ABS 3.05 3.86   IMMATURE GRANS (ABS) 0.03 0.03   LYMPHS ABS 1.12 1.19   MONOS ABS 0.28 0.36   EOS ABS 0.01 0.00   MCV 86.3 86.8   CRP 17.81* 13.86*   PROCALCITONIN  --  0.20   LACTATE  --  2.0   LDH  --  512*   D DIMER QUANT 0.93*  --          Lab 22  0657 22  0556 22  0258 22   SODIUM 140  --   --  141   POTASSIUM 3.5  --   --  3.2*   CHLORIDE 102  --   --  103   CO2 27.0  --   --  24.0   ANION GAP 11.0  --   --  14.0   BUN 19  --   --  26*   CREATININE 0.46* 0.58 0.68 1.13*   GLUCOSE 105*  --   --  110*   CALCIUM 9.5  --   --  9.1    HEMOGLOBIN A1C  --   --   --  6.20*   TSH  --   --  0.961  --          Lab 02/02/22  0657 02/01/22  0556 02/01/22  0258 01/31/22 2051   TOTAL PROTEIN 6.3 6.8 6.6 7.3   ALBUMIN 3.30* 3.60 3.60 3.90   GLOBULIN  --   --   --  3.4   ALT (SGPT) 46* 32 32 37*   AST (SGOT) 76* 65* 59* 68*   BILIRUBIN 0.8 0.6 0.6 0.5   INDIRECT BILIRUBIN 0.5 0.4 0.4  --    BILIRUBIN DIRECT 0.3 0.2 0.2  --    ALK PHOS 86 83 76 87         Lab 01/31/22 2051   PROBNP 122.4   TROPONIN T <0.010             Lab 01/31/22 2051   FERRITIN 445.70*         Brief Urine Lab Results  (Last result in the past 365 days)      Color   Clarity   Blood   Leuk Est   Nitrite   Protein   CREAT   Urine HCG        01/31/22 2116               Negative             Microbiology Results Abnormal     None          CT Chest Pulmonary Embolism    Result Date: 2/1/2022  EXAMINATION: CT ANGIOGRAM CHEST WITH IV CONTRAST   INDICATION: Chest pain. COMPARISON: CT December 14, 2018 PROCEDURE: Multiple axial CT images were obtained of the chest after IV administration of contrast.  Coronal and sagittal reformations as well as MIP images were obtained. CT dose lowering techniques were used, to include: automated exposure control, adjustment for patient size, and or use of iterative reconstruction. FINDINGS: Cardiovascular: No filling defects are seen in the pulmonary arteries. No focal aortic aneurysm or evidence of aortic dissection is seen. There appears to be a left proximal subclavian artery stent. Mediastinum/Nasima: There are several enlarged mediastinal and hilar lymph nodes measuring up to 12 mm in short axis diameter. Lungs/Pleura :  There is extensive groundglass and patchy opacity throughout both lungs. No pneumothorax or pleural fluid collection. Chest wall and Axilla: Unremarkable. Bones:  No acute focal bony abnormality seen. Upper abdomen: Cholecystectomy.     Impression: No CTA evidence of pulmonary embolism or acute aortic pathology. Extensive groundglass and  patchy opacity throughout both lungs, most consistent with a widespread inflammatory or infectious process such as Covid. Ultimately enlarged mediastinal and hilar lymph nodes may be reactive, though are nonspecific. Attention to these on follow-up chest CT in 2 months recommended. RECOMMENDATION: Follow-up chest CT in 2 months as above. Electronically signed by:  Mk Da Silva M.D.  1/31/2022 10:07 PM Mountain Time      Results for orders placed during the hospital encounter of 05/11/20    Adult Transthoracic Echo Complete W/ Cont if Necessary Per Protocol    Interpretation Summary  · Estimated EF = 50%.  · Mild mitral valve regurgitation is present  · Left ventricular systolic function is mildly decreased.      I have reviewed the medications:  Scheduled Meds:amLODIPine, 2.5 mg, Oral, Daily  artificial tears, , Both Eyes, Q4H  aspirin, 81 mg, Oral, Daily  atorvastatin, 80 mg, Oral, Nightly  busPIRone, 15 mg, Oral, TID  calcium carb-cholecalciferol, 1 tablet, Oral, Daily  cetirizine, 10 mg, Oral, Daily  clopidogrel, 75 mg, Oral, Nightly  conjugated estrogens, 1 g, Vaginal, Once per day on Mon Wed Fri  [START ON 2/3/2022] dexamethasone, 6 mg, Intravenous, Daily   Or  [START ON 2/3/2022] dexamethasone, 6 mg, Oral, Daily  enoxaparin, 1 mg/kg, Subcutaneous, Q12H  fluticasone, 1 spray, Each Nare, BID  modafinil, 300 mg, Oral, Daily  montelukast, 10 mg, Oral, Q PM  oxybutynin XL, 5 mg, Oral, Daily  pantoprazole, 40 mg, Oral, Q AM  potassium chloride, 20 mEq, Oral, Nightly  remdesivir, 100 mg, Intravenous, Q24H  sodium chloride, 10 mL, Intravenous, Q12H  spironolactone, 50 mg, Oral, Daily  tiZANidine, 4 mg, Oral, Nightly      Continuous Infusions:Pharmacy Consult - Remdesivir,       PRN Meds:.•  acetaminophen **OR** acetaminophen **OR** acetaminophen  •  albuterol sulfate HFA  •  artificial tears  •  cyclobenzaprine  •  gabapentin  •  guaiFENesin-dextromethorphan  •  HYDROcodone-acetaminophen  •  naloxone  •   ondansetron  •  Pharmacy Consult - Remdesivir  •  sodium chloride  •  sodium chloride  •  sodium chloride    Assessment/Plan   Assessment & Plan     Active Hospital Problems    Diagnosis  POA   • Pneumonia due to COVID-19 virus [U07.1, J12.82]  Yes   • Acute respiratory failure with hypoxia (HCC) [J96.01]  Yes   • GIORGIO (obstructive sleep apnea) [G47.33]  Yes   • Chronic pain [G89.29]  Yes   • Fibromyalgia [M79.7]  Yes   • Hypertension [I10]  Yes   • Hyperlipidemia [E78.5]  Yes   • Type 2 diabetes mellitus without complication, without long-term current use of insulin (HCC) [E11.9]  Yes   • Myotonic dystrophy, 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        Brief Hospital Course to date:  Hafsa Barron is a 51 y.o. female w/ hx obesity/giorgio, myotonic dystrophy, htn, dm2, chronic pain who tested + for covid on 1/24/22 as outpatient. Developed myalgias, joint pains. Received monoclonal abx at [facility] 1/31/22, but continued to have worsening dyspnea w/ nonproductive cough. Was hypoxic 82% on room air in ED, ct angio chest was negative for pe but showed extensive bilateral ggo c/w covid pneumonia, wbc was normal, procal was negative, probnp normal, crp was 13.8. initiated on remdesivir & decadron. Hypoxia worsened prompting ID consultation.     *Acute hypoxic resp failure due to bilateral covid 19 pneumonia  -initial + test 1/24/22, isolation 20 days  d-dimer mildly elevated 2/2/22  -initiated originally on full dose lovenox (per new protocol for covid 19 prophylaxis), but today backing off to traditional prophylactic dose lovenox as on DAPT  -will check venous duplex  -ct angio chest (on admission) negative for PE, did show b/l ggo  -day #2 remdesivir & decadron  -ID following: currently 3-4 liters; if o2 requirements worsen to 5-6 liters consideration for IL6 inhibitor/micah inhibitor   -lasix 20mg iv x 1, monitor renal  fxn    *DM2   -sliding scale humalog, titrate insulins prn    *HTN  -continue home meds    *Hx cva  -on asa, plavix    *Obesity/jhonatan  -cpap    *Hx myotonic dystrophy  -continue home meds    *chronic pain  -chronic opioids/neurontin    Am labs: covid progression, every other day d-dimer (next 2/4)    DVT prophylaxis:  Medical and mechanical DVT prophylaxis orders are present. currently on sq lovenox therapeutic dose, changing to prophylactic dose (since on dapt)       Disposition: I expect the patient to be discharged TBD    CODE STATUS:   Code Status and Medical Interventions:   Ordered at: 02/01/22 0102     Level Of Support Discussed With:    Patient     Code Status (Patient has no pulse and is not breathing):    CPR (Attempt to Resuscitate)     Medical Interventions (Patient has pulse or is breathing):    Full Support       Loyd Putnam MD  02/02/22

## 2022-02-02 NOTE — CONSULTS
"                  Clinical Nutrition     Reason for Visit: Identified at risk by screening criteria, MST score 2+    Patient Name: Hafsa Barron  YOB: 1970  MRN: [id number]  Date of Encounter: 02/02/22 11:15 EST  Admission date: 1/31/2022    Nutrition Assessment   Admission Problem List:    Pneumonia due to COVID-19 virus    Acute respiratory failure with hypoxia (HCC)    GIORGIO (obstructive sleep apnea)    Applicable PMH:    Fibromyalgia    Hypertension    Hyperlipidemia    Type 2 diabetes mellitus without complication, without long-term current use of insulin (HCC)    H/O: stroke    Myotonic dystrophy, type 2 (HCC)    Chronic pain    Anxiety     Asthma    GERD     UTI       Reported/Observed/Food/Nutrition Related History:     Patient reports decreased oral intake over the past week (Since dx of COVID). Reports she has been eating less than 50% of her meals.  Still with poor intake. Patient believes she lost about 10# in the process. Reports history of gastric sleeve in 2019; drank premier protein after surgery. No issues with food tolerance.  Can eat normal size meals now.  Still taking recommended vitamins and minerals.  Kitchen staff was able to get a hold of her and review menu options.     Anthropometrics   Height: Height: 167.6 cm (66\")  Weight: Weight: 97.1 kg (214 lb) (01/31/22 2041)  BMI: BMI (Calculated): 34.6  BMI classification: Obese Class I: 30-34.9kg/m2   IBW: 130#     UBW: 280# prior to gastric sleeve in 2019; she feels her weight plateau around 220#.  Additional 10# weight loss in the pat week.    Weight change: patient noted with 5# weight loss x 1 week per documented weight in EMR. (2.3%)     Labs reviewed   Results from last 7 days   Lab Units 02/02/22  0657   SODIUM mmol/L 140   POTASSIUM mmol/L 3.5   CHLORIDE mmol/L 102   CO2 mmol/L 27.0   BUN mg/dL 19   CREATININE mg/dL 0.46*   CALCIUM mg/dL 9.5   BILIRUBIN mg/dL 0.8   ALK PHOS U/L 86   ALT (SGPT) U/L 46*   AST (SGOT) U/L 76* "   GLUCOSE mg/dL 105*           Lab Results   Lab Value Date/Time    HGBA1C 6.20 (H) 01/31/2022 2051    HGBA1C 6.63 (H) 09/16/2021 1229       Medications reviewed   Pertinent:  Decadron, Protonix,     Intake/Ouptut 24 hrs (7:00AM - 6:59 AM)     Intake & Output (last day)       02/01 0701  02/02 0700 02/02 0701 02/03 0700    Urine (mL/kg/hr)  200 (0.5)    Total Output  200    Net  -200          Urine Unmeasured Occurrence 2 x           Nutrition Focused Physical Exam Findings     Unable to perform exam due to: COVID isolation     Current Nutrition Prescription     PO: Diet Regular; Consistent Carbohydrate  Oral Nutrition Supplement:     Evaluation of Received Nutrient/Fluid Intake:  Insufficient data patient reports poor intake      Nutrition Diagnosis   Date: 2/2 Updated:   Problem Malnutrition   Acute due to severe illness    Etiology COVID infection    Signs/Symptoms 2.3% weight loss with significant decreased oral intake    Status:     Date:  2/2 Updated:  Problem Predicted suboptimal energy intake   Etiology COVID infection    Signs/Symptoms Poor intake prior to admission    Status:  Nutrition Intervention   1.  Follow treatment progress, Care plan reviewed   2. Will send premier protein with lunch and PRN.  patient would like to tray first to see if she can tolerate.  Advice patient if she tolerates well to ask kitchen staff to add to meals when they call about menu options.        Goal:   General: Nutrition support treatment  PO: Establish PO, Tolerate PO    Additional goals:    Monitoring/Evaluation:   Per protocol, PO intake, Supplement intake, Pertinent labs, GI status    Will Continue to follow per protocol      Magaly Hidalgo RD  Time Spent: 30min

## 2022-02-03 LAB
ALBUMIN SERPL-MCNC: 3.3 G/DL (ref 3.5–5.2)
ALBUMIN/GLOB SERPL: 1 G/DL
ALP SERPL-CCNC: 110 U/L (ref 39–117)
ALT SERPL W P-5'-P-CCNC: 53 U/L (ref 1–33)
ANION GAP SERPL CALCULATED.3IONS-SCNC: 11 MMOL/L (ref 5–15)
AST SERPL-CCNC: 79 U/L (ref 1–32)
BASOPHILS # BLD AUTO: 0.01 10*3/MM3 (ref 0–0.2)
BASOPHILS NFR BLD AUTO: 0.2 % (ref 0–1.5)
BILIRUB SERPL-MCNC: 0.5 MG/DL (ref 0–1.2)
BUN SERPL-MCNC: 25 MG/DL (ref 6–20)
BUN/CREAT SERPL: 54.3 (ref 7–25)
CALCIUM SPEC-SCNC: 9.6 MG/DL (ref 8.6–10.5)
CHLORIDE SERPL-SCNC: 101 MMOL/L (ref 98–107)
CO2 SERPL-SCNC: 27 MMOL/L (ref 22–29)
CREAT SERPL-MCNC: 0.46 MG/DL (ref 0.57–1)
CRP SERPL-MCNC: 15.92 MG/DL (ref 0–0.5)
DEPRECATED RDW RBC AUTO: 42.7 FL (ref 37–54)
EOSINOPHIL # BLD AUTO: 0 10*3/MM3 (ref 0–0.4)
EOSINOPHIL NFR BLD AUTO: 0 % (ref 0.3–6.2)
ERYTHROCYTE [DISTWIDTH] IN BLOOD BY AUTOMATED COUNT: 13.1 % (ref 12.3–15.4)
FERRITIN SERPL-MCNC: 632.9 NG/ML (ref 13–150)
GFR SERPL CREATININE-BSD FRML MDRD: 143 ML/MIN/1.73
GLOBULIN UR ELPH-MCNC: 3.2 GM/DL
GLUCOSE SERPL-MCNC: 225 MG/DL (ref 65–99)
HCT VFR BLD AUTO: 33.6 % (ref 34–46.6)
HGB BLD-MCNC: 11.4 G/DL (ref 12–15.9)
IMM GRANULOCYTES # BLD AUTO: 0.05 10*3/MM3 (ref 0–0.05)
IMM GRANULOCYTES NFR BLD AUTO: 1.2 % (ref 0–0.5)
LYMPHOCYTES # BLD AUTO: 0.92 10*3/MM3 (ref 0.7–3.1)
LYMPHOCYTES NFR BLD AUTO: 21.4 % (ref 19.6–45.3)
MAGNESIUM SERPL-MCNC: 1.7 MG/DL (ref 1.6–2.6)
MCH RBC QN AUTO: 30.1 PG (ref 26.6–33)
MCHC RBC AUTO-ENTMCNC: 33.9 G/DL (ref 31.5–35.7)
MCV RBC AUTO: 88.7 FL (ref 79–97)
MONOCYTES # BLD AUTO: 0.43 10*3/MM3 (ref 0.1–0.9)
MONOCYTES NFR BLD AUTO: 10 % (ref 5–12)
NEUTROPHILS NFR BLD AUTO: 2.89 10*3/MM3 (ref 1.7–7)
NEUTROPHILS NFR BLD AUTO: 67.2 % (ref 42.7–76)
NRBC BLD AUTO-RTO: 0 /100 WBC (ref 0–0.2)
PLATELET # BLD AUTO: 306 10*3/MM3 (ref 140–450)
PMV BLD AUTO: 11.3 FL (ref 6–12)
POTASSIUM SERPL-SCNC: 3.6 MMOL/L (ref 3.5–5.2)
PROT SERPL-MCNC: 6.5 G/DL (ref 6–8.5)
RBC # BLD AUTO: 3.79 10*6/MM3 (ref 3.77–5.28)
SODIUM SERPL-SCNC: 139 MMOL/L (ref 136–145)
WBC NRBC COR # BLD: 4.3 10*3/MM3 (ref 3.4–10.8)

## 2022-02-03 PROCEDURE — 94660 CPAP INITIATION&MGMT: CPT

## 2022-02-03 PROCEDURE — 82728 ASSAY OF FERRITIN: CPT | Performed by: INTERNAL MEDICINE

## 2022-02-03 PROCEDURE — 25010000002 ENOXAPARIN PER 10 MG: Performed by: INTERNAL MEDICINE

## 2022-02-03 PROCEDURE — 25010000002 REMDESIVIR 100 MG/20ML SOLUTION 1 EACH VIAL: Performed by: INTERNAL MEDICINE

## 2022-02-03 PROCEDURE — 80053 COMPREHEN METABOLIC PANEL: CPT | Performed by: INTERNAL MEDICINE

## 2022-02-03 PROCEDURE — 63710000001 DEXAMETHASONE PER 0.25 MG: Performed by: INTERNAL MEDICINE

## 2022-02-03 PROCEDURE — 83735 ASSAY OF MAGNESIUM: CPT | Performed by: INTERNAL MEDICINE

## 2022-02-03 PROCEDURE — 99233 SBSQ HOSP IP/OBS HIGH 50: CPT | Performed by: INTERNAL MEDICINE

## 2022-02-03 PROCEDURE — 86140 C-REACTIVE PROTEIN: CPT | Performed by: INTERNAL MEDICINE

## 2022-02-03 PROCEDURE — 94799 UNLISTED PULMONARY SVC/PX: CPT

## 2022-02-03 PROCEDURE — 85025 COMPLETE CBC W/AUTO DIFF WBC: CPT | Performed by: INTERNAL MEDICINE

## 2022-02-03 RX ORDER — HYDROXYZINE HYDROCHLORIDE 10 MG/1
10 TABLET, FILM COATED ORAL 3 TIMES DAILY PRN
Status: DISCONTINUED | OUTPATIENT
Start: 2022-02-03 | End: 2022-02-05 | Stop reason: HOSPADM

## 2022-02-03 RX ADMIN — OXYBUTYNIN CHLORIDE 5 MG: 5 TABLET, EXTENDED RELEASE ORAL at 08:25

## 2022-02-03 RX ADMIN — HYDROCODONE BITARTRATE AND ACETAMINOPHEN 1 TABLET: 10; 325 TABLET ORAL at 06:20

## 2022-02-03 RX ADMIN — BUSPIRONE HYDROCHLORIDE 15 MG: 10 TABLET ORAL at 20:10

## 2022-02-03 RX ADMIN — SODIUM CHLORIDE, PRESERVATIVE FREE 10 ML: 5 INJECTION INTRAVENOUS at 20:11

## 2022-02-03 RX ADMIN — ENOXAPARIN SODIUM 40 MG: 40 INJECTION SUBCUTANEOUS at 08:24

## 2022-02-03 RX ADMIN — HYDROCODONE BITARTRATE AND ACETAMINOPHEN 1 TABLET: 10; 325 TABLET ORAL at 20:10

## 2022-02-03 RX ADMIN — BUSPIRONE HYDROCHLORIDE 15 MG: 10 TABLET ORAL at 16:39

## 2022-02-03 RX ADMIN — TIZANIDINE 4 MG: 4 TABLET ORAL at 20:10

## 2022-02-03 RX ADMIN — FLUTICASONE PROPIONATE 1 SPRAY: 50 SPRAY, METERED NASAL at 08:26

## 2022-02-03 RX ADMIN — REMDESIVIR 100 MG: 100 INJECTION, POWDER, LYOPHILIZED, FOR SOLUTION INTRAVENOUS at 08:25

## 2022-02-03 RX ADMIN — ATORVASTATIN CALCIUM 80 MG: 40 TABLET, FILM COATED ORAL at 20:10

## 2022-02-03 RX ADMIN — DEXAMETHASONE 6 MG: 2 TABLET ORAL at 08:25

## 2022-02-03 RX ADMIN — Medication 1 TABLET: at 08:25

## 2022-02-03 RX ADMIN — AMLODIPINE BESYLATE 2.5 MG: 5 TABLET ORAL at 08:25

## 2022-02-03 RX ADMIN — GUAIFENESIN AND DEXTROMETHORPHAN 5 ML: 100; 10 SYRUP ORAL at 04:33

## 2022-02-03 RX ADMIN — MONTELUKAST SODIUM 10 MG: 10 TABLET, COATED ORAL at 20:10

## 2022-02-03 RX ADMIN — CETIRIZINE HYDROCHLORIDE 10 MG: 10 TABLET, FILM COATED ORAL at 08:25

## 2022-02-03 RX ADMIN — HYDROXYZINE HYDROCHLORIDE 10 MG: 10 TABLET ORAL at 08:25

## 2022-02-03 RX ADMIN — SPIRONOLACTONE 50 MG: 50 TABLET ORAL at 08:25

## 2022-02-03 RX ADMIN — PANTOPRAZOLE SODIUM 40 MG: 40 TABLET, DELAYED RELEASE ORAL at 06:20

## 2022-02-03 RX ADMIN — GUAIFENESIN AND DEXTROMETHORPHAN 5 ML: 100; 10 SYRUP ORAL at 08:25

## 2022-02-03 RX ADMIN — HYDROCODONE BITARTRATE AND ACETAMINOPHEN 1 TABLET: 10; 325 TABLET ORAL at 00:26

## 2022-02-03 RX ADMIN — GUAIFENESIN AND DEXTROMETHORPHAN 5 ML: 100; 10 SYRUP ORAL at 20:14

## 2022-02-03 RX ADMIN — BUSPIRONE HYDROCHLORIDE 15 MG: 10 TABLET ORAL at 08:25

## 2022-02-03 RX ADMIN — GUAIFENESIN AND DEXTROMETHORPHAN 5 ML: 100; 10 SYRUP ORAL at 16:39

## 2022-02-03 RX ADMIN — CLOPIDOGREL BISULFATE 75 MG: 75 TABLET ORAL at 20:11

## 2022-02-03 RX ADMIN — POTASSIUM CHLORIDE 20 MEQ: 750 CAPSULE, EXTENDED RELEASE ORAL at 20:10

## 2022-02-03 RX ADMIN — ASPIRIN 81 MG: 81 TABLET, COATED ORAL at 08:25

## 2022-02-03 NOTE — CASE MANAGEMENT/SOCIAL WORK
Continued Stay Note  [facility]     Patient Name: Hafsa Barron  MRN: [id number]  Today's Date: 2/3/2022    Admit Date: 1/31/2022     Discharge Plan     Row Name 02/03/22 4157       Plan    Plan home    Patient/Family in Agreement with Plan yes    Plan Comments Kirany, on 3L/NC, down from 4L yesterday. Still with SOB, getting remdesivir and decadron and ID following. Plan is still home. Camryn @ [id number]               Discharge Codes    No documentation.               Expected Discharge Date and Time     Expected Discharge Date Expected Discharge Time    Feb 3, 2022             Susanna Mcdonald RN

## 2022-02-03 NOTE — PROGRESS NOTES
INFECTIOUS DISEASE f/u   Hafsa Barron  1970  [id number]    Date of Consult: 2/3/2022    Admission Date: 1/31/2022      Requesting Provider: No ref. provider found  Evaluating Physician: Corey Thomson MD    Reason for Consultation: COVID    History of present illness:    Patient is a 51 y.o. female who is unvaccinated for COVID, with GIORGIO, myotonic dystrophy, htn, hyperlipidemia, fibromyalgia, DM2, obesity, h/o stroke presents to [facility] with  Fevers, chills, malaise.  Received monoclonal ab at [facility] but because of worsening dyspnea has been admitted to hospital.  Currently on 4 L of oxygen.      2/2/22; no events overnigth; has fever, hemodynamically stable on bipap last night currently on 4 L o2 by nc    2/3/2021 feels marginally better afebrile has cough denies diarrhea wearing oxygen by nasal cannula  Past Medical History:   Diagnosis Date   • Allergic    • Allergy    • Anxiety    • Asthma Allergies induced   • Depression    • Fibromyalgia, primary    • GERD (gastroesophageal reflux disease)    • Headache    • History of blood clots    • History of UTI    • Hyperlipidemia    • Hypertension    • IBS (irritable bowel syndrome)    • Internal hemorrhoid    • Kidney stone    • Low back pain    • Muscular dystrophy (HCC)    • Muscular dystrophy (HCC)     II   • Myotonia    • Obesity    • Stroke ([facility]) 08/31/2013    resdual- left sided weakness.    • Type 2 diabetes mellitus without complication, without long-term current use of insulin (HCC)        Past Surgical History:   Procedure Laterality Date   • CHOLECYSTECTOMY  07/2004   • COLONOSCOPY  07/2017   • D & C WITH SUCTION     • LYMPH NODE BIOPSY     • SUBCLAVIAN ARTERY STENT  08/2018    x2   • WISDOM TOOTH EXTRACTION         Family History   Problem Relation Age of Onset   • Allergies Other    • ADD / ADHD Other    • Heart block Other    • Other Other         CEREBROVASCULAR ACCIDENT    • Hypertension Other    • Cancer Other         LUNG CANCER   •  Hyperlipidemia Other    • Alcohol abuse Mother    • Depression Mother    • Early death Mother          age 59   • Heart disease Mother         Mother  of Massive Heart attack   • Hyperlipidemia Mother    • Hypertension Mother    • Miscarriages / Stillbirths Mother         Miscarriage   • Heart attack Mother          of  massive heart attack   • Alcohol abuse Father    • Cancer Father         Had Lung Cancer  had 1 lung till death   • Diabetes Father         Lived on insuline shots   • Early death Father          age 53   • Breast cancer Maternal Aunt    • Learning disabilities Son         Had Adhd   • Breast cancer Cousin    • Ovarian cancer Neg Hx    • Endometrial cancer Neg Hx    • Uterine cancer Neg Hx    • Colon cancer Neg Hx        Social History     Socioeconomic History   • Marital status:    Tobacco Use   • Smoking status: Former Smoker     Packs/day: 1.50     Years: 15.00     Pack years: 22.50     Start date: 1983     Quit date: 2013     Years since quittin.4   • Smokeless tobacco: Never Used   • Tobacco comment: use Ecigg now NO NICOTENE   Vaping Use   • Vaping Use: Some days   • Substances: Flavoring   • Devices: Pre-filled pod   Substance and Sexual Activity   • Alcohol use: Yes   • Drug use: No   • Sexual activity: Yes     Partners: Male     Birth control/protection: Post-menopausal     Comment: Menapause no cycle since 2013       Allergies   Allergen Reactions   • Penicillins Anaphylaxis and Shortness Of Breath         Medication:    Current Facility-Administered Medications:   •  acetaminophen (TYLENOL) tablet 650 mg, 650 mg, Oral, Q4H PRN **OR** acetaminophen (TYLENOL) 160 MG/5ML solution 650 mg, 650 mg, Oral, Q4H PRN **OR** acetaminophen (TYLENOL) suppository 650 mg, 650 mg, Rectal, Q4H PRN, Gaetano, Chava, DO  •  albuterol sulfate HFA (PROVENTIL HFA;VENTOLIN HFA;PROAIR HFA) inhaler 2 puff, 2 puff, Inhalation, Q4H PRN, Gaetano, Chava, DO  •   amLODIPine (NORVASC) tablet 2.5 mg, 2.5 mg, Oral, Daily, Chava Salter, , 2.5 mg at 02/03/22 0825  •  artificial tears ophthalmic ointment, , Both Eyes, PRN, Chava Salter DO, Given at 02/02/22 0855  •  aspirin EC tablet 81 mg, 81 mg, Oral, Daily, Chava Salter, , 81 mg at 02/03/22 0825  •  atorvastatin (LIPITOR) tablet 80 mg, 80 mg, Oral, Nightly, Chava Salter, DO, 80 mg at 02/02/22 2109  •  busPIRone (BUSPAR) tablet 15 mg, 15 mg, Oral, TID, Chava Salter, , 15 mg at 02/03/22 0825  •  calcium carb-cholecalciferol 600-800 MG-UNIT tablet 1 tablet, 1 tablet, Oral, Daily, Chava Salter, , 1 tablet at 02/03/22 0825  •  cetirizine (zyrTEC) tablet 10 mg, 10 mg, Oral, Daily, Chava Salter, , 10 mg at 02/03/22 0825  •  clopidogrel (PLAVIX) tablet 75 mg, 75 mg, Oral, Nightly, Loyd Putnam MD, 75 mg at 02/02/22 2110  •  conjugated estrogens (PREMARIN) vaginal cream 1 g, 1 g, Vaginal, Once per day on Mon Wed Fri, Chava Salter,   •  cyclobenzaprine (FLEXERIL) tablet 10 mg, 10 mg, Oral, TID PRN, Chava Salter,   •  dexamethasone (DECADRON) injection 6 mg, 6 mg, Intravenous, Daily **OR** dexamethasone (DECADRON) tablet 6 mg, 6 mg, Oral, Daily, Loyd Putnam MD, 6 mg at 02/03/22 0825  •  enoxaparin (LOVENOX) syringe 40 mg, 40 mg, Subcutaneous, Q24H, Loyd Putnam MD, 40 mg at 02/03/22 0824  •  fluticasone (FLONASE) 50 MCG/ACT nasal spray 1 spray, 1 spray, Each Nare, BID, Chava Salter DO, 1 spray at 02/03/22 0826  •  gabapentin (NEURONTIN) capsule 600 mg, 600 mg, Oral, Q8H PRN, Chava Salter, , 600 mg at 02/01/22 0943  •  guaiFENesin-dextromethorphan (ROBITUSSIN DM) 100-10 MG/5ML syrup 5 mL, 5 mL, Oral, Q4H PRN, Loyd Putnam MD, 5 mL at 02/03/22 0825  •  HYDROcodone-acetaminophen (NORCO)  MG per tablet 1 tablet, 1 tablet, Oral, Q6H PRN, Chava Salter, , 1 tablet at 02/03/22 0620  •  hydrOXYzine (ATARAX) tablet 10 mg, 10 mg, Oral, TID PRN, Doe Long MD, 10  mg at 02/03/22 0825  •  magnesium sulfate 4 gram infusion - Mg less than or equal to 1mg/dL, 4 g, Intravenous, PRN **OR** magnesium sulfate 3 gram infusion (1gm x 3) - Mg 1.1 - 1.5 mg/dL, 1 g, Intravenous, PRN **OR** Magnesium Sulfate 2 gram infusion- Mg 1.6 - 1.9 mg/dL, 2 g, Intravenous, PRN, Loyd Putnam MD, Last Rate: 25 mL/hr at 02/02/22 1650, 2 g at 02/02/22 1650  •  modafinil (PROVIGIL) tablet 300 mg, 300 mg, Oral, Daily, Chava Salter, , 300 mg at 02/02/22 0853  •  montelukast (SINGULAIR) tablet 10 mg, 10 mg, Oral, Q PM, Chava Salter, DO, 10 mg at 02/02/22 2110  •  naloxone (NARCAN) injection 0.4 mg, 0.4 mg, Intravenous, PRN, Chava Salter, DO  •  ondansetron (ZOFRAN) injection 4 mg, 4 mg, Intravenous, Q6H PRN, Chava Salter, DO  •  oxybutynin XL (DITROPAN-XL) 24 hr tablet 5 mg, 5 mg, Oral, Daily, Chava Salter, DO, 5 mg at 02/03/22 0825  •  pantoprazole (PROTONIX) EC tablet 40 mg, 40 mg, Oral, Q AM, Chava Salter, , 40 mg at 02/03/22 0620  •  Pharmacy Consult - Remdesivir, , Does not apply, Continuous PRN, Chava Salter, DO  •  potassium chloride (MICRO-K) CR capsule 20 mEq, 20 mEq, Oral, Nightly, Loyd Putnam MD, 20 mEq at 02/02/22 2110  •  [COMPLETED] remdesivir 200 mg in sodium chloride 0.9 % 290 mL IVPB (powder vial), 200 mg, Intravenous, Q24H, Stopped at 02/01/22 0332 **FOLLOWED BY** remdesivir 100 mg in sodium chloride 0.9 % 250 mL IVPB (powder vial), 100 mg, Intravenous, Q24H, Chava Salter DO, 100 mg at 02/03/22 0825  •  sodium chloride 0.9 % flush 10 mL, 10 mL, Intravenous, PRN, Chava Salter, DO  •  sodium chloride 0.9 % flush 10 mL, 10 mL, Intravenous, Q12H, Chava Salter DO, 10 mL at 02/02/22 2110  •  sodium chloride 0.9 % flush 10 mL, 10 mL, Intravenous, PRN, Chava Salter,   •  sodium chloride nasal spray 2 spray, 2 spray, Each Nare, PRN, Chava Salter,   •  spironolactone (ALDACTONE) tablet 50 mg, 50 mg, Oral, Daily, Chava Salter DO, 50 mg at 02/03/22  "0825  •  tiZANidine (ZANAFLEX) tablet 4 mg, 4 mg, Oral, Nightly, Chava Salter DO, 4 mg at 22 2110    Antibiotics:  Anti-Infectives (From admission, onward)    Ordered     Dose/Rate Route Frequency Start Stop    22 0107  remdesivir 100 mg in sodium chloride 0.9 % 250 mL IVPB (powder vial)        Ordering Provider: Chava Salter DO   \"Followed by\" Linked Group Details    100 mg  over 60 Minutes Intravenous Every 24 Hours 22 0900 22 0859    22 0107  remdesivir 200 mg in sodium chloride 0.9 % 290 mL IVPB (powder vial)        Ordering Provider: Chava Salter DO   \"Followed by\" Linked Group Details    200 mg  over 60 Minutes Intravenous Every 24 Hours 22 0230 22 0332            Review of Systems:  See hpi    Physical Exam:   Vital Signs  Temp (24hrs), Av.5 °F (36.9 °C), Min:97.9 °F (36.6 °C), Max:99.2 °F (37.3 °C)    Temp  Min: 97.9 °F (36.6 °C)  Max: 99.2 °F (37.3 °C)  BP  Min: 115/65  Max: 148/51  Pulse  Min: 81  Max: 97  Resp  Min: 20  Max: 28  SpO2  Min: 89 %  Max: 95 %    GENERAL: Awake and alert, in no acute distress.   HEENT: Normocephalic, atraumatic.  PERRL. EOMI. No conjunctival injection. No icterus. wearking o2 by nasal cannula 3 L    HEART: RRR; No murmur  LUNGS: Clear to auscultation bilaterally   ABDOMEN: Soft, nontender  EXT:  No edema  :  Without Byrnes catheter.  MSK:  No joint deformity  SKIN: Warm and dry without cutaneous eruptions on Inspection/palpation.        Laboratory Data    Results from last 7 days   Lab Units 22  0439 22  0657 22  2051   WBC 10*3/mm3 4.30 4.51 5.45   HEMOGLOBIN g/dL 11.4* 11.9* 13.2   HEMATOCRIT % 33.6* 33.9* 38.0   PLATELETS 10*3/mm3 306 244 247     Results from last 7 days   Lab Units 22  0439   SODIUM mmol/L 139   POTASSIUM mmol/L 3.6   CHLORIDE mmol/L 101   CO2 mmol/L 27.0   BUN mg/dL 25*   CREATININE mg/dL 0.46*   GLUCOSE mg/dL 225*   CALCIUM mg/dL 9.6     Results from last 7 days   Lab Units " 02/03/22  0439 02/02/22  0657 02/02/22  0657   ALK PHOS U/L 110   < > 86   BILIRUBIN mg/dL 0.5   < > 0.8   BILIRUBIN DIRECT mg/dL  --   --  0.3   ALT (SGPT) U/L 53*   < > 46*   AST (SGOT) U/L 79*   < > 76*    < > = values in this interval not displayed.         Results from last 7 days   Lab Units 02/03/22  0439   CRP mg/dL 15.92*     Results from last 7 days   Lab Units 01/31/22  2051   LACTATE mmol/L 2.0             Estimated Creatinine Clearance: 168 mL/min (A) (by C-G formula based on SCr of 0.46 mg/dL (L)).      Microbiology:  No results found for: ACANTHNAEG, AFBCX, BPERTUSSISCX, BLOODCX  No results found for: BCIDPCR, CXREFLEX, CSFCX, CULTURETIS  No results found for: CULTURES, HSVCX, URCX  No results found for: EYECULTURE, GCCX, HSVCULTURE, LABHSV  No results found for: LEGIONELLA, MRSACX, MUMPSCX, MYCOPLASCX  No results found for: NOCARDIACX, STOOLCX  No results found for: THROATCX, UNSTIMCULT, URINECX, CULTURE, VZVCULTUR  No results found for: VIRALCULTU, WOUNDCX        Radiology:  Imaging Results (Last 72 Hours)     Procedure Component Value Units Date/Time    CT Chest Pulmonary Embolism [[id number]] Collected: 01/31/22 2358     Updated: 02/01/22 0008    Narrative:      EXAMINATION: CT ANGIOGRAM CHEST WITH IV CONTRAST       INDICATION: Chest pain.    COMPARISON: CT December 14, 2018    PROCEDURE: Multiple axial CT images were obtained of the chest after IV administration of contrast.  Coronal and sagittal reformations as well as MIP images were obtained. CT dose lowering techniques were used, to include: automated exposure control,   adjustment for patient size, and or use of iterative reconstruction.    FINDINGS:    Cardiovascular: No filling defects are seen in the pulmonary arteries. No focal aortic aneurysm or evidence of aortic dissection is seen. There appears to be a left proximal subclavian artery stent.    Mediastinum/Nasima: There are several enlarged mediastinal and hilar lymph nodes measuring up  to 12 mm in short axis diameter.    Lungs/Pleura :  There is extensive groundglass and patchy opacity throughout both lungs. No pneumothorax or pleural fluid collection.    Chest wall and Axilla: Unremarkable.    Bones:  No acute focal bony abnormality seen.    Upper abdomen: Cholecystectomy.       Impression:        No CTA evidence of pulmonary embolism or acute aortic pathology.    Extensive groundglass and patchy opacity throughout both lungs, most consistent with a widespread inflammatory or infectious process such as Covid.    Ultimately enlarged mediastinal and hilar lymph nodes may be reactive, though are nonspecific. Attention to these on follow-up chest CT in 2 months recommended.    RECOMMENDATION:    Follow-up chest CT in 2 months as above.    Electronically signed by:  Mk Da Silva M.D.    1/31/2022 10:07 PM Mountain Time            Impression:   COVID 19  Acute hypoxic resp failure  Myotonic dystrophy  DM II  fibromyalgia  crp 15.92 down from 17  PLAN/RECOMMENDATIONS:   Thank you for asking us to see Hafsa Barron, I recommend the following:     maintain euvolemia state    Cont dxms x 10 days    Complete remdesivir x 5 days    Patient has improving oxygenation and CRP is slightly downtrending  F/u crp    Prone/rotation    Monitor o2 requirements; currently 3 L        Corey Thomson MD  2/3/2022  14:48 EST

## 2022-02-03 NOTE — PROGRESS NOTES
"    [facility] Medicine Services  PROGRESS NOTE    Patient Name: Hafsa Barron  : 1970  MRN: [id number]    Date of Admission: 2022  Primary Care Physician: Jocy Snow MD    Subjective   Subjective     CC:  covid pneumonia, hypoxia    HPI:  Feels a little better. \"feel like I'm living again.\"  Still coughing.  If moves around too much causes coughing fit.     ROS:  Gen- No fevers, chills  CV- No chest pain, palpitations  Resp- +cough, SOA  GI- No N/V/D, abd pain        Objective   Objective     Vital Signs:   Temp:  [97.9 °F (36.6 °C)-99.2 °F (37.3 °C)] 98.8 °F (37.1 °C)  Heart Rate:  [] 81  Resp:  [18-28] 20  BP: (115-134)/(42-76) 132/72  Flow (L/min):  [3-4] 3     Physical Exam:  With patient's consent, physical exam was conducted via visual telemedicine encounter due to patient's current isolation requirements in the interest of PPE conservation.    Constitutional: No acute distress, awake, alert, nontoxic, obese  HENT: NCAT, MMM, no conjunctival injection  Respiratory: Good effort, nonlabored respirations   Cardiovascular:  tele with NSR  Musculoskeletal: No edema, normal muscle tone and mass for age  Psychiatric: Appropriate affect, good insight and judgement, cooperative  Neurologic: Oriented x 3, movements symmetric BUE and BLE, speech clear and fluent  Skin: No visible rashes, no jaundice seen on exposed skin through window            Results Reviewed:  LAB RESULTS:      Lab 22  0439 22  0657 22   WBC 4.30 4.51 5.45   HEMOGLOBIN 11.4* 11.9* 13.2   HEMATOCRIT 33.6* 33.9* 38.0   PLATELETS 306 244 247   NEUTROS ABS 2.89 3.05 3.86   IMMATURE GRANS (ABS) 0.05 0.03 0.03   LYMPHS ABS 0.92 1.12 1.19   MONOS ABS 0.43 0.28 0.36   EOS ABS 0.00 0.01 0.00   MCV 88.7 86.3 86.8   CRP 15.92* 17.81* 13.86*   PROCALCITONIN  --   --  0.20   LACTATE  --   --  2.0   LDH  --   --  512*   D DIMER QUANT  --  0.93*  --          Lab 22  4169 " 02/02/22  0657 02/01/22  0556 02/01/22 0258 01/31/22 2051   SODIUM 139 140  --   --  141   POTASSIUM 3.6 3.5  --   --  3.2*   CHLORIDE 101 102  --   --  103   CO2 27.0 27.0  --   --  24.0   ANION GAP 11.0 11.0  --   --  14.0   BUN 25* 19  --   --  26*   CREATININE 0.46* 0.46* 0.58 0.68 1.13*   GLUCOSE 225* 105*  --   --  110*   CALCIUM 9.6 9.5  --   --  9.1   MAGNESIUM 1.7 1.6  --   --   --    HEMOGLOBIN A1C  --   --   --   --  6.20*   TSH  --   --   --  0.961  --          Lab 02/03/22  0439 02/02/22 0657 02/01/22  0556 02/01/22 0258 01/31/22 2051   TOTAL PROTEIN 6.5 6.3 6.8 6.6 7.3   ALBUMIN 3.30* 3.30* 3.60 3.60 3.90   GLOBULIN 3.2  --   --   --  3.4   ALT (SGPT) 53* 46* 32 32 37*   AST (SGOT) 79* 76* 65* 59* 68*   BILIRUBIN 0.5 0.8 0.6 0.6 0.5   INDIRECT BILIRUBIN  --  0.5 0.4 0.4  --    BILIRUBIN DIRECT  --  0.3 0.2 0.2  --    ALK PHOS 110 86 83 76 87         Lab 01/31/22 2051   PROBNP 122.4   TROPONIN T <0.010             Lab 02/03/22 0439   FERRITIN 632.90*         Brief Urine Lab Results  (Last result in the past 365 days)      Color   Clarity   Blood   Leuk Est   Nitrite   Protein   CREAT   Urine HCG        01/31/22 2116               Negative             Microbiology Results Abnormal     None          Duplex Venous Lower Extremity - Bilateral CAR    Result Date: 2/2/2022  · Technically difficult study. · No evidence of DVT bilaterally        Results for orders placed during the hospital encounter of 05/11/20    Adult Transthoracic Echo Complete W/ Cont if Necessary Per Protocol    Interpretation Summary  · Estimated EF = 50%.  · Mild mitral valve regurgitation is present  · Left ventricular systolic function is mildly decreased.      I have reviewed the medications:  Scheduled Meds:amLODIPine, 2.5 mg, Oral, Daily  artificial tears, , Both Eyes, Q4H  aspirin, 81 mg, Oral, Daily  atorvastatin, 80 mg, Oral, Nightly  busPIRone, 15 mg, Oral, TID  calcium carb-cholecalciferol, 1 tablet, Oral,  Daily  cetirizine, 10 mg, Oral, Daily  clopidogrel, 75 mg, Oral, Nightly  conjugated estrogens, 1 g, Vaginal, Once per day on Mon Wed Fri  dexamethasone, 6 mg, Intravenous, Daily   Or  dexamethasone, 6 mg, Oral, Daily  enoxaparin, 40 mg, Subcutaneous, Q24H  fluticasone, 1 spray, Each Nare, BID  modafinil, 300 mg, Oral, Daily  montelukast, 10 mg, Oral, Q PM  oxybutynin XL, 5 mg, Oral, Daily  pantoprazole, 40 mg, Oral, Q AM  potassium chloride, 20 mEq, Oral, Nightly  remdesivir, 100 mg, Intravenous, Q24H  sodium chloride, 10 mL, Intravenous, Q12H  spironolactone, 50 mg, Oral, Daily  tiZANidine, 4 mg, Oral, Nightly      Continuous Infusions:Pharmacy Consult - Remdesivir,       PRN Meds:.•  acetaminophen **OR** acetaminophen **OR** acetaminophen  •  albuterol sulfate HFA  •  artificial tears  •  cyclobenzaprine  •  gabapentin  •  guaiFENesin-dextromethorphan  •  HYDROcodone-acetaminophen  •  hydrOXYzine  •  magnesium sulfate **OR** magnesium sulfate in D5W 1g/100mL (PREMIX) **OR** magnesium sulfate  •  naloxone  •  ondansetron  •  Pharmacy Consult - Remdesivir  •  sodium chloride  •  sodium chloride  •  sodium chloride    Assessment/Plan   Assessment & Plan     Active Hospital Problems    Diagnosis  POA   • Severe malnutrition (CMS/HCC) [E43]  Yes   • Pneumonia due to COVID-19 virus [U07.1, J12.82]  Yes   • Acute respiratory failure with hypoxia (HCC) [J96.01]  Yes   • GIORGIO (obstructive sleep apnea) [G47.33]  Yes   • Chronic pain [G89.29]  Yes   • Fibromyalgia [M79.7]  Yes   • Hypertension [I10]  Yes   • Hyperlipidemia [E78.5]  Yes   • Type 2 diabetes mellitus without complication, without long-term current use of insulin (HCC) [E11.9]  Yes   • Myotonic dystrophy, 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        Brief Hospital Course to date:  Hafsa Barron is a 51 y.o. female w/ hx obesity/giorgio, myotonic dystrophy, htn, dm2,  chronic pain who tested + for covid on 1/24/22 as outpatient. Developed myalgias, joint pains. Received monoclonal abx at [facility] 1/31/22, but continued to have worsening dyspnea w/ nonproductive cough. Was hypoxic 82% on room air in ED, ct angio chest was negative for pe but showed extensive bilateral ggo c/w covid pneumonia, wbc was normal, procal was negative, probnp normal, crp was 13.8. initiated on remdesivir & decadron. Hypoxia worsened prompting ID consultation.     *Acute hypoxic resp failure due to bilateral covid 19 pneumonia  -initial + test 1/24/22, isolation 20 days  d-dimer mildly elevated 2/2/22  -initiated originally on full dose lovenox (per new protocol for covid 19 prophylaxis), but on 2/2 backed off to traditional prophylactic dose lovenox as on DAPT  -venous duplex negative for dvt  -ct angio chest (on admission) negative for PE, did show b/l ggo  -day #3 remdesivir & decadron  -ID following: currently 3 Liters; if o2 requirements worsen to 5-6 liters consideration for IL6 inhibitor/micah inhibitor   -CRP mild improvement but ferritin increasing  -lasix 20mg iv x 1 on 2/2, monitor renal fxn    *DM2   -sliding scale humalog, titrate insulins prn  -A1c 6.20    *HTN  -continue home meds    *Hx cva  -on asa, plavix    *Obesity/jhonatan  -cpap    *Hx myotonic dystrophy  -continue home meds    *chronic pain  -chronic opioids/neurontin    Am labs: covid progression, every other day d-dimer (next 2/4)    DVT prophylaxis:  Medical and mechanical DVT prophylaxis orders are present.  changed to prophylactic dose lovenox (since on dapt)       Disposition: I expect the patient to be discharged TBD    CODE STATUS:   Code Status and Medical Interventions:   Ordered at: 02/01/22 0102     Level Of Support Discussed With:    Patient     Code Status (Patient has no pulse and is not breathing):    CPR (Attempt to Resuscitate)     Medical Interventions (Patient has pulse or is breathing):    Full Support       Doe CHAO  MD Mercedes  02/03/22

## 2022-02-03 NOTE — PLAN OF CARE
Goal Outcome Evaluation:           Progress: no change  Outcome Summary: VSS, complaints of pain alleviated with PRNs, AxO, 3LNC. Takes some time to recover with exertion. Continue to monitor.

## 2022-02-03 NOTE — PLAN OF CARE
Problem: Fatigue  Goal: Improved Activity Tolerance  Outcome: Ongoing, Progressing  Intervention: Promote Energy Conservation  Recent Flowsheet Documentation  Taken 2/3/2022 0554 by Shanta Paz RN  Activity Management: activity adjusted per tolerance  Fatigue Management: activity schedule adjusted  Sleep/Rest Enhancement: awakenings minimized  Taken 2/3/2022 0357 by Shanta Paz RN  Activity Management: activity adjusted per tolerance  Fatigue Management:   activity schedule adjusted   paced activity encouraged   frequent rest breaks encouraged  Sleep/Rest Enhancement: awakenings minimized  Taken 2/3/2022 0218 by Shanta Paz RN  Activity Management: activity adjusted per tolerance  Sleep/Rest Enhancement: awakenings minimized  Taken 2/3/2022 0000 by Shanta Paz RN  Activity Management: activity adjusted per tolerance  Fatigue Management:   paced activity encouraged   activity schedule adjusted  Sleep/Rest Enhancement: awakenings minimized  Taken 2/2/2022 2200 by Shanta Paz RN  Activity Management: activity adjusted per tolerance  Fatigue Management: activity schedule adjusted  Sleep/Rest Enhancement: awakenings minimized  Taken 2/2/2022 2100 by Shanta Paz RN  Fatigue Management:   activity schedule adjusted   frequent rest breaks encouraged   paced activity encouraged  Sleep/Rest Enhancement: awakenings minimized  Taken 2/2/2022 2000 by Shanta Paz RN  Activity Management: activity adjusted per tolerance  Fatigue Management:   activity schedule adjusted   paced activity encouraged   frequent rest breaks encouraged  Sleep/Rest Enhancement: awakenings minimized   Goal Outcome Evaluation:  Plan of Care Reviewed With: patient      Pt VSS. 3L NC, Pt up to the BSC.  Refused scuds. NSR on monitor.

## 2022-02-04 LAB
ALBUMIN SERPL-MCNC: 3 G/DL (ref 3.5–5.2)
ALBUMIN/GLOB SERPL: 1 G/DL
ALP SERPL-CCNC: 91 U/L (ref 39–117)
ALT SERPL W P-5'-P-CCNC: 52 U/L (ref 1–33)
ANION GAP SERPL CALCULATED.3IONS-SCNC: 11 MMOL/L (ref 5–15)
AST SERPL-CCNC: 67 U/L (ref 1–32)
BACTERIA UR QL AUTO: ABNORMAL /HPF
BASOPHILS # BLD MANUAL: 0 10*3/MM3 (ref 0–0.2)
BASOPHILS NFR BLD MANUAL: 0 % (ref 0–1.5)
BILIRUB SERPL-MCNC: 0.4 MG/DL (ref 0–1.2)
BILIRUB UR QL STRIP: NEGATIVE
BUN SERPL-MCNC: 27 MG/DL (ref 6–20)
BUN/CREAT SERPL: 71.1 (ref 7–25)
CALCIUM SPEC-SCNC: 9.1 MG/DL (ref 8.6–10.5)
CHLORIDE SERPL-SCNC: 105 MMOL/L (ref 98–107)
CLARITY UR: ABNORMAL
CO2 SERPL-SCNC: 26 MMOL/L (ref 22–29)
COLOR UR: YELLOW
CREAT SERPL-MCNC: 0.38 MG/DL (ref 0.57–1)
CRP SERPL-MCNC: 6.67 MG/DL (ref 0–0.5)
D DIMER PPP FEU-MCNC: 0.4 MCGFEU/ML (ref 0–0.56)
DEPRECATED RDW RBC AUTO: 41.9 FL (ref 37–54)
EOSINOPHIL # BLD MANUAL: 0 10*3/MM3 (ref 0–0.4)
EOSINOPHIL NFR BLD MANUAL: 0 % (ref 0.3–6.2)
ERYTHROCYTE [DISTWIDTH] IN BLOOD BY AUTOMATED COUNT: 13.2 % (ref 12.3–15.4)
FERRITIN SERPL-MCNC: 659.1 NG/ML (ref 13–150)
GFR SERPL CREATININE-BSD FRML MDRD: >150 ML/MIN/1.73
GLOBULIN UR ELPH-MCNC: 3.1 GM/DL
GLUCOSE SERPL-MCNC: 186 MG/DL (ref 65–99)
GLUCOSE UR STRIP-MCNC: ABNORMAL MG/DL
HCT VFR BLD AUTO: 32.8 % (ref 34–46.6)
HGB BLD-MCNC: 11.5 G/DL (ref 12–15.9)
HGB UR QL STRIP.AUTO: NEGATIVE
HYALINE CASTS UR QL AUTO: ABNORMAL /LPF
KETONES UR QL STRIP: NEGATIVE
LEUKOCYTE ESTERASE UR QL STRIP.AUTO: ABNORMAL
LYMPHOCYTES # BLD MANUAL: 0.86 10*3/MM3 (ref 0.7–3.1)
LYMPHOCYTES NFR BLD MANUAL: 12 % (ref 5–12)
MAGNESIUM SERPL-MCNC: 1.6 MG/DL (ref 1.6–2.6)
MCH RBC QN AUTO: 30.3 PG (ref 26.6–33)
MCHC RBC AUTO-ENTMCNC: 35.1 G/DL (ref 31.5–35.7)
MCV RBC AUTO: 86.5 FL (ref 79–97)
MONOCYTES # BLD: 0.54 10*3/MM3 (ref 0.1–0.9)
NEUTROPHILS # BLD AUTO: 3.11 10*3/MM3 (ref 1.7–7)
NEUTROPHILS NFR BLD MANUAL: 64 % (ref 42.7–76)
NEUTS BAND NFR BLD MANUAL: 5 % (ref 0–5)
NITRITE UR QL STRIP: NEGATIVE
PH UR STRIP.AUTO: 6.5 [PH] (ref 5–8)
PLAT MORPH BLD: NORMAL
PLATELET # BLD AUTO: 314 10*3/MM3 (ref 140–450)
PMV BLD AUTO: 11.2 FL (ref 6–12)
POTASSIUM SERPL-SCNC: 3.5 MMOL/L (ref 3.5–5.2)
PROT SERPL-MCNC: 6.1 G/DL (ref 6–8.5)
PROT UR QL STRIP: NEGATIVE
RBC # BLD AUTO: 3.79 10*6/MM3 (ref 3.77–5.28)
RBC # UR STRIP: ABNORMAL /HPF
RBC MORPH BLD: NORMAL
REF LAB TEST METHOD: ABNORMAL
SODIUM SERPL-SCNC: 142 MMOL/L (ref 136–145)
SP GR UR STRIP: 1.02 (ref 1–1.03)
SQUAMOUS #/AREA URNS HPF: ABNORMAL /HPF
UROBILINOGEN UR QL STRIP: ABNORMAL
VARIANT LYMPHS NFR BLD MANUAL: 17 % (ref 19.6–45.3)
VARIANT LYMPHS NFR BLD MANUAL: 2 % (ref 0–5)
WBC # UR STRIP: ABNORMAL /HPF
WBC MORPH BLD: NORMAL
WBC NRBC COR # BLD: 4.5 10*3/MM3 (ref 3.4–10.8)

## 2022-02-04 PROCEDURE — 87077 CULTURE AEROBIC IDENTIFY: CPT | Performed by: FAMILY MEDICINE

## 2022-02-04 PROCEDURE — 87186 SC STD MICRODIL/AGAR DIL: CPT | Performed by: FAMILY MEDICINE

## 2022-02-04 PROCEDURE — 85379 FIBRIN DEGRADATION QUANT: CPT | Performed by: INTERNAL MEDICINE

## 2022-02-04 PROCEDURE — 63710000001 DEXAMETHASONE PER 0.25 MG: Performed by: INTERNAL MEDICINE

## 2022-02-04 PROCEDURE — 99232 SBSQ HOSP IP/OBS MODERATE 35: CPT | Performed by: INTERNAL MEDICINE

## 2022-02-04 PROCEDURE — 85025 COMPLETE CBC W/AUTO DIFF WBC: CPT | Performed by: INTERNAL MEDICINE

## 2022-02-04 PROCEDURE — 87086 URINE CULTURE/COLONY COUNT: CPT | Performed by: FAMILY MEDICINE

## 2022-02-04 PROCEDURE — 82728 ASSAY OF FERRITIN: CPT | Performed by: INTERNAL MEDICINE

## 2022-02-04 PROCEDURE — 83735 ASSAY OF MAGNESIUM: CPT | Performed by: INTERNAL MEDICINE

## 2022-02-04 PROCEDURE — 25010000002 ENOXAPARIN PER 10 MG: Performed by: INTERNAL MEDICINE

## 2022-02-04 PROCEDURE — 25010000002 MAGNESIUM SULFATE 2 GM/50ML SOLUTION: Performed by: INTERNAL MEDICINE

## 2022-02-04 PROCEDURE — 86140 C-REACTIVE PROTEIN: CPT | Performed by: INTERNAL MEDICINE

## 2022-02-04 PROCEDURE — 85007 BL SMEAR W/DIFF WBC COUNT: CPT | Performed by: INTERNAL MEDICINE

## 2022-02-04 PROCEDURE — 81001 URINALYSIS AUTO W/SCOPE: CPT | Performed by: FAMILY MEDICINE

## 2022-02-04 PROCEDURE — 25010000002 REMDESIVIR 100 MG/20ML SOLUTION 1 EACH VIAL: Performed by: INTERNAL MEDICINE

## 2022-02-04 PROCEDURE — 80053 COMPREHEN METABOLIC PANEL: CPT | Performed by: INTERNAL MEDICINE

## 2022-02-04 RX ORDER — OXYCODONE HYDROCHLORIDE AND ACETAMINOPHEN 5; 325 MG/1; MG/1
1 TABLET ORAL EVERY 4 HOURS PRN
Status: DISCONTINUED | OUTPATIENT
Start: 2022-02-04 | End: 2022-02-05 | Stop reason: HOSPADM

## 2022-02-04 RX ADMIN — GUAIFENESIN AND DEXTROMETHORPHAN 5 ML: 100; 10 SYRUP ORAL at 00:12

## 2022-02-04 RX ADMIN — ASPIRIN 81 MG: 81 TABLET, COATED ORAL at 09:26

## 2022-02-04 RX ADMIN — POTASSIUM CHLORIDE 20 MEQ: 750 CAPSULE, EXTENDED RELEASE ORAL at 22:01

## 2022-02-04 RX ADMIN — REMDESIVIR 100 MG: 100 INJECTION, POWDER, LYOPHILIZED, FOR SOLUTION INTRAVENOUS at 09:27

## 2022-02-04 RX ADMIN — MONTELUKAST SODIUM 10 MG: 10 TABLET, COATED ORAL at 22:00

## 2022-02-04 RX ADMIN — HYDROXYZINE HYDROCHLORIDE 10 MG: 10 TABLET ORAL at 14:32

## 2022-02-04 RX ADMIN — PANTOPRAZOLE SODIUM 40 MG: 40 TABLET, DELAYED RELEASE ORAL at 04:18

## 2022-02-04 RX ADMIN — HYDROXYZINE HYDROCHLORIDE 10 MG: 10 TABLET ORAL at 04:21

## 2022-02-04 RX ADMIN — CLOPIDOGREL BISULFATE 75 MG: 75 TABLET ORAL at 22:01

## 2022-02-04 RX ADMIN — GUAIFENESIN AND DEXTROMETHORPHAN 5 ML: 100; 10 SYRUP ORAL at 09:40

## 2022-02-04 RX ADMIN — OXYBUTYNIN CHLORIDE 5 MG: 5 TABLET, EXTENDED RELEASE ORAL at 09:26

## 2022-02-04 RX ADMIN — HYDROCODONE BITARTRATE AND ACETAMINOPHEN 1 TABLET: 10; 325 TABLET ORAL at 03:35

## 2022-02-04 RX ADMIN — OXYCODONE HYDROCHLORIDE AND ACETAMINOPHEN 1 TABLET: 5; 325 TABLET ORAL at 16:25

## 2022-02-04 RX ADMIN — TIZANIDINE 4 MG: 4 TABLET ORAL at 22:00

## 2022-02-04 RX ADMIN — BUSPIRONE HYDROCHLORIDE 15 MG: 10 TABLET ORAL at 09:26

## 2022-02-04 RX ADMIN — GUAIFENESIN AND DEXTROMETHORPHAN 5 ML: 100; 10 SYRUP ORAL at 22:00

## 2022-02-04 RX ADMIN — BUSPIRONE HYDROCHLORIDE 15 MG: 10 TABLET ORAL at 16:25

## 2022-02-04 RX ADMIN — SPIRONOLACTONE 50 MG: 50 TABLET ORAL at 09:26

## 2022-02-04 RX ADMIN — BUSPIRONE HYDROCHLORIDE 15 MG: 10 TABLET ORAL at 22:01

## 2022-02-04 RX ADMIN — GUAIFENESIN AND DEXTROMETHORPHAN 5 ML: 100; 10 SYRUP ORAL at 04:18

## 2022-02-04 RX ADMIN — Medication 1 TABLET: at 09:26

## 2022-02-04 RX ADMIN — MODAFINIL 300 MG: 100 TABLET ORAL at 09:26

## 2022-02-04 RX ADMIN — SODIUM CHLORIDE, PRESERVATIVE FREE 10 ML: 5 INJECTION INTRAVENOUS at 09:27

## 2022-02-04 RX ADMIN — ENOXAPARIN SODIUM 40 MG: 40 INJECTION SUBCUTANEOUS at 10:45

## 2022-02-04 RX ADMIN — OXYCODONE HYDROCHLORIDE AND ACETAMINOPHEN 1 TABLET: 5; 325 TABLET ORAL at 22:00

## 2022-02-04 RX ADMIN — ATORVASTATIN CALCIUM 80 MG: 40 TABLET, FILM COATED ORAL at 22:00

## 2022-02-04 RX ADMIN — MAGNESIUM SULFATE HEPTAHYDRATE 2 G: 2 INJECTION, SOLUTION INTRAVENOUS at 12:16

## 2022-02-04 RX ADMIN — DEXAMETHASONE 6 MG: 2 TABLET ORAL at 09:27

## 2022-02-04 RX ADMIN — FLUTICASONE PROPIONATE 1 SPRAY: 50 SPRAY, METERED NASAL at 09:28

## 2022-02-04 RX ADMIN — CETIRIZINE HYDROCHLORIDE 10 MG: 10 TABLET, FILM COATED ORAL at 09:27

## 2022-02-04 NOTE — PROGRESS NOTES
INFECTIOUS DISEASE f/u   Hafsa Barron  1970  [id number]    Date of Consult: 2/4/2022    Admission Date: 1/31/2022      Requesting Provider: No ref. provider found  Evaluating Physician: Corey Thomson MD    Reason for Consultation: COVID    History of present illness:    Patient is a 51 y.o. female who is unvaccinated for COVID, with GIORGIO, myotonic dystrophy, htn, hyperlipidemia, fibromyalgia, DM2, obesity, h/o stroke presents to [facility] with  Fevers, chills, malaise.  Received monoclonal ab at [facility] but because of worsening dyspnea has been admitted to hospital.  Currently on 4 L of oxygen.      2/2/22; no events overnigth; has fever, hemodynamically stable on bipap last night currently on 4 L o2 by nc    2/3/2022 feels marginally better afebrile has cough denies diarrhea wearing oxygen by nasal cannula    2/4/22 patient feels better; no events overnight; no fever, rash, sore throat  Past Medical History:   Diagnosis Date   • Allergic    • Allergy    • Anxiety    • Asthma Allergies induced   • Depression    • Fibromyalgia, primary    • GERD (gastroesophageal reflux disease)    • Headache    • History of blood clots    • History of UTI    • Hyperlipidemia    • Hypertension    • IBS (irritable bowel syndrome)    • Internal hemorrhoid    • Kidney stone    • Low back pain    • Muscular dystrophy (HCC)    • Muscular dystrophy ([facility])     II   • Myotonia    • Obesity    • Stroke ([facility]) 08/31/2013    resdual- left sided weakness.    • Type 2 diabetes mellitus without complication, without long-term current use of insulin ([facility])        Past Surgical History:   Procedure Laterality Date   • CHOLECYSTECTOMY  07/2004   • COLONOSCOPY  07/2017   • D & C WITH SUCTION     • LYMPH NODE BIOPSY     • SUBCLAVIAN ARTERY STENT  08/2018    x2   • WISDOM TOOTH EXTRACTION         Family History   Problem Relation Age of Onset   • Allergies Other    • ADD / ADHD Other    • Heart block Other    • Other Other         CEREBROVASCULAR  ACCIDENT    • Hypertension Other    • Cancer Other         LUNG CANCER   • Hyperlipidemia Other    • Alcohol abuse Mother    • Depression Mother    • Early death Mother          age 59   • Heart disease Mother         Mother  of Massive Heart attack   • Hyperlipidemia Mother    • Hypertension Mother    • Miscarriages / Stillbirths Mother         Miscarriage   • Heart attack Mother          of  massive heart attack   • Alcohol abuse Father    • Cancer Father         Had Lung Cancer  had 1 lung till death   • Diabetes Father         Lived on insuline shots   • Early death Father          age 53   • Breast cancer Maternal Aunt    • Learning disabilities Son         Had Adhd   • Breast cancer Cousin    • Ovarian cancer Neg Hx    • Endometrial cancer Neg Hx    • Uterine cancer Neg Hx    • Colon cancer Neg Hx        Social History     Socioeconomic History   • Marital status:    Tobacco Use   • Smoking status: Former Smoker     Packs/day: 1.50     Years: 15.00     Pack years: 22.50     Start date: 1983     Quit date: 2013     Years since quittin.4   • Smokeless tobacco: Never Used   • Tobacco comment: use Ecigg now NO NICOTENE   Vaping Use   • Vaping Use: Some days   • Substances: Flavoring   • Devices: Pre-filled pod   Substance and Sexual Activity   • Alcohol use: Yes   • Drug use: No   • Sexual activity: Yes     Partners: Male     Birth control/protection: Post-menopausal     Comment: Menapause no cycle since 2013       Allergies   Allergen Reactions   • Fish-Derived Products Anaphylaxis   • Penicillins Anaphylaxis and Shortness Of Breath         Medication:    Current Facility-Administered Medications:   •  acetaminophen (TYLENOL) tablet 650 mg, 650 mg, Oral, Q4H PRN **OR** acetaminophen (TYLENOL) 160 MG/5ML solution 650 mg, 650 mg, Oral, Q4H PRN **OR** acetaminophen (TYLENOL) suppository 650 mg, 650 mg, Rectal, Q4H PRN, Chava Salter, DO  •  albuterol sulfate HFA  (PROVENTIL HFA;VENTOLIN HFA;PROAIR HFA) inhaler 2 puff, 2 puff, Inhalation, Q4H PRN, Chava Salter DO  •  amLODIPine (NORVASC) tablet 2.5 mg, 2.5 mg, Oral, Daily, Chava Salter, DO, 2.5 mg at 02/03/22 0825  •  artificial tears ophthalmic ointment, , Both Eyes, PRN, Chava Salter DO, Given at 02/02/22 0855  •  aspirin EC tablet 81 mg, 81 mg, Oral, Daily, Chava Salter, , 81 mg at 02/04/22 0926  •  atorvastatin (LIPITOR) tablet 80 mg, 80 mg, Oral, Nightly, Chava Salter DO, 80 mg at 02/03/22 2010  •  busPIRone (BUSPAR) tablet 15 mg, 15 mg, Oral, TID, Chava Salter, , 15 mg at 02/04/22 0926  •  calcium carb-cholecalciferol 600-800 MG-UNIT tablet 1 tablet, 1 tablet, Oral, Daily, Chava Salter DO, 1 tablet at 02/04/22 0926  •  cetirizine (zyrTEC) tablet 10 mg, 10 mg, Oral, Daily, Chava Salter DO, 10 mg at 02/04/22 0927  •  clopidogrel (PLAVIX) tablet 75 mg, 75 mg, Oral, Nightly, Loyd Putnam MD, 75 mg at 02/03/22 2011  •  conjugated estrogens (PREMARIN) vaginal cream 1 g, 1 g, Vaginal, Once per day on Mon Wed Fri, Chava Salter DO  •  cyclobenzaprine (FLEXERIL) tablet 10 mg, 10 mg, Oral, TID PRN, Chava Salter DO  •  dexamethasone (DECADRON) injection 6 mg, 6 mg, Intravenous, Daily **OR** dexamethasone (DECADRON) tablet 6 mg, 6 mg, Oral, Daily, Loyd Putnam MD, 6 mg at 02/04/22 0927  •  enoxaparin (LOVENOX) syringe 40 mg, 40 mg, Subcutaneous, Q24H, Loyd Putnam MD, 40 mg at 02/04/22 1045  •  fluticasone (FLONASE) 50 MCG/ACT nasal spray 1 spray, 1 spray, Each Nare, BID, Chava Salter DO, 1 spray at 02/04/22 0928  •  gabapentin (NEURONTIN) capsule 600 mg, 600 mg, Oral, Q8H PRN, Chava Salter DO, 600 mg at 02/01/22 0943  •  guaiFENesin-dextromethorphan (ROBITUSSIN DM) 100-10 MG/5ML syrup 5 mL, 5 mL, Oral, Q4H PRN, Loyd Putnam MD, 5 mL at 02/04/22 0940  •  HYDROcodone-acetaminophen (NORCO)  MG per tablet 1 tablet, 1 tablet, Oral, Q6H PRN, Chava Salter DO, 1  tablet at 02/04/22 0335  •  hydrOXYzine (ATARAX) tablet 10 mg, 10 mg, Oral, TID PRN, Doe Long MD, 10 mg at 02/04/22 0421  •  magnesium sulfate 4 gram infusion - Mg less than or equal to 1mg/dL, 4 g, Intravenous, PRN **OR** magnesium sulfate 3 gram infusion (1gm x 3) - Mg 1.1 - 1.5 mg/dL, 1 g, Intravenous, PRN **OR** Magnesium Sulfate 2 gram infusion- Mg 1.6 - 1.9 mg/dL, 2 g, Intravenous, PRN, Loyd Putnam MD, Last Rate: 25 mL/hr at 02/04/22 1216, 2 g at 02/04/22 1216  •  modafinil (PROVIGIL) tablet 300 mg, 300 mg, Oral, Daily, Chava Salter, DO, 300 mg at 02/04/22 0926  •  montelukast (SINGULAIR) tablet 10 mg, 10 mg, Oral, Q PM, Chava Salter, DO, 10 mg at 02/03/22 2010  •  naloxone (NARCAN) injection 0.4 mg, 0.4 mg, Intravenous, PRN, Chava Salter, DO  •  ondansetron (ZOFRAN) injection 4 mg, 4 mg, Intravenous, Q6H PRN, Chava Salter, DO  •  oxybutynin XL (DITROPAN-XL) 24 hr tablet 5 mg, 5 mg, Oral, Daily, Chava Salter, DO, 5 mg at 02/04/22 0926  •  oxyCODONE-acetaminophen (PERCOCET) 5-325 MG per tablet 1 tablet, 1 tablet, Oral, Q4H PRN, Doe Long MD  •  pantoprazole (PROTONIX) EC tablet 40 mg, 40 mg, Oral, Q AM, Chava Salter, DO, 40 mg at 02/04/22 0418  •  Pharmacy Consult - Remdesivir, , Does not apply, Continuous PRN, Chava Salter, DO  •  potassium chloride (MICRO-K) CR capsule 20 mEq, 20 mEq, Oral, Nightly, Loyd Putnam MD, 20 mEq at 02/03/22 2010  •  [COMPLETED] remdesivir 200 mg in sodium chloride 0.9 % 290 mL IVPB (powder vial), 200 mg, Intravenous, Q24H, Stopped at 02/01/22 0332 **FOLLOWED BY** remdesivir 100 mg in sodium chloride 0.9 % 250 mL IVPB (powder vial), 100 mg, Intravenous, Q24H, Chava Salter, , 100 mg at 02/04/22 0927  •  sodium chloride 0.9 % flush 10 mL, 10 mL, Intravenous, PRN, Chava Salter,   •  sodium chloride 0.9 % flush 10 mL, 10 mL, Intravenous, Q12H, Chava Salter DO, 10 mL at 02/04/22 0927  •  sodium chloride 0.9 % flush 10 mL, 10 mL,  "Intravenous, PRN, Chava Salter DO  •  sodium chloride nasal spray 2 spray, 2 spray, Each Nare, PRN, Chava Salter DO  •  spironolactone (ALDACTONE) tablet 50 mg, 50 mg, Oral, Daily, Chava Salter DO, 50 mg at 22 0926  •  tiZANidine (ZANAFLEX) tablet 4 mg, 4 mg, Oral, Nightly, Chava Salter DO, 4 mg at 22    Antibiotics:  Anti-Infectives (From admission, onward)    Ordered     Dose/Rate Route Frequency Start Stop    22 0107  remdesivir 100 mg in sodium chloride 0.9 % 250 mL IVPB (powder vial)        Ordering Provider: Chava Salter DO   \"Followed by\" Linked Group Details    100 mg  over 60 Minutes Intravenous Every 24 Hours 22 0900 22 0859    22 0107  remdesivir 200 mg in sodium chloride 0.9 % 290 mL IVPB (powder vial)        Ordering Provider: Chava Salter DO   \"Followed by\" Linked Group Details    200 mg  over 60 Minutes Intravenous Every 24 Hours 22 0230 22 0332            Review of Systems:  See hpi    Physical Exam:   Vital Signs  Temp (24hrs), Av.1 °F (36.7 °C), Min:97.2 °F (36.2 °C), Max:98.7 °F (37.1 °C)    Temp  Min: 97.2 °F (36.2 °C)  Max: 98.7 °F (37.1 °C)  BP  Min: 107/65  Max: 134/62  Pulse  Min: 49  Max: 94  Resp  Min: 15  Max: 20  SpO2  Min: 90 %  Max: 98 %    GENERAL: Awake and alert, in no acute distress.   HEENT: Normocephalic, atraumatic.  PERRL. EOMI. No conjunctival injection. No icterus. wearking o2 by nasal cannula 2-3 L    HEART: RRR; No murmur  LUNGS:symmetrical inspiration kinsey.  ABDOMEN: Soft, nontender  EXT:  No edema  :  Without Byrnes catheter.  MSK:  No joint deformity  SKIN: Warm and dry without cutaneous eruptions on Inspection/palpation.        Laboratory Data    Results from last 7 days   Lab Units 22  0327 22  0439 22  0657   WBC 10*3/mm3 4.50 4.30 4.51   HEMOGLOBIN g/dL 11.5* 11.4* 11.9*   HEMATOCRIT % 32.8* 33.6* 33.9*   PLATELETS 10*3/mm3 314 306 244     Results from last 7 days   Lab Units " 02/04/22  0327   SODIUM mmol/L 142   POTASSIUM mmol/L 3.5   CHLORIDE mmol/L 105   CO2 mmol/L 26.0   BUN mg/dL 27*   CREATININE mg/dL 0.38*   GLUCOSE mg/dL 186*   CALCIUM mg/dL 9.1     Results from last 7 days   Lab Units 02/04/22  0327 02/03/22  0439 02/02/22  0657   ALK PHOS U/L 91   < > 86   BILIRUBIN mg/dL 0.4   < > 0.8   BILIRUBIN DIRECT mg/dL  --   --  0.3   ALT (SGPT) U/L 52*   < > 46*   AST (SGOT) U/L 67*   < > 76*    < > = values in this interval not displayed.         Results from last 7 days   Lab Units 02/04/22  0327   CRP mg/dL 6.67*     Results from last 7 days   Lab Units 01/31/22  2051   LACTATE mmol/L 2.0             Estimated Creatinine Clearance: 203.4 mL/min (A) (by C-G formula based on SCr of 0.38 mg/dL (L)).      Microbiology:  No results found for: ACANTHNAEG, AFBCX, BPERTUSSISCX, BLOODCX  No results found for: BCIDPCR, CXREFLEX, CSFCX, CULTURETIS  No results found for: CULTURES, HSVCX, URCX  No results found for: EYECULTURE, GCCX, HSVCULTURE, LABHSV  No results found for: LEGIONELLA, MRSACX, MUMPSCX, MYCOPLASCX  No results found for: NOCARDIACX, STOOLCX  No results found for: THROATCX, UNSTIMCULT, URINECX, CULTURE, VZVCULTUR  No results found for: VIRALCULTU, WOUNDCX        Radiology:  Imaging Results (Last 72 Hours)     ** No results found for the last 72 hours. **            Impression:   COVID 19  Acute hypoxic resp failure  Myotonic dystrophy  DM II  fibromyalgia  crp 15.92 down from 17  PLAN/RECOMMENDATIONS:   Thank you for asking us to see Hafsa Barron, I recommend the following:     maintain euvolemia state    Cont dxms x 10 days    Complete remdesivir x 5 days    Quarantine is 20 days from positive test  Patient has improving oxygenation and CRP is slightly downtrending  F/u crp    Prone/rotation    Monitor o2 requirements; currently 3 L    Anticipate d/c home tomorrow on supplemental o2 after 5th dose of remdesivir        Corey Thomson MD  2/4/2022  14:24  EST

## 2022-02-04 NOTE — CONSULTS
Consult received for Diabetes education. Chart reviewed. Spoke to patient by phone due to covid isolation regarding her diabetes self-management. Her A1c is 6.2%. She is taking metformin at home. She has a One Touch and does not check on a regular basis. We discussed checking her blood sugar on a regular basis, once daily unless specified by PCP. Reviewed blood glucose goals per ADA. Patient denies hypoglycemia. She stated she had weight loss surgery and her blood sugars are better controlled. Discussed OP education when she recovers from Covid, she is interested. She will follow up with her PCP and take blood sugar records. She will discuss OP education with her PCP. If she has additional questions she will let her nurse know and diabetes education will follow up.

## 2022-02-04 NOTE — PROGRESS NOTES
"    [facility] Medicine Services  PROGRESS NOTE    Patient Name: Hafsa Barron  : 1970  MRN: [id number]    Date of Admission: 2022  Primary Care Physician: Jocy Snow MD    Subjective   Subjective     CC:  covid pneumonia, hypoxia    HPI:  States that \"I'm here.\"  Still doesn't feel good.  C/o myalgias says they're worse d/t myotonic dystrophy.  Asks about percocet    ROS:  Gen- No fevers, chills  CV- No chest pain, palpitations  Resp- +cough, SOA  GI- No N/V/D, abd pain        Objective   Objective     Vital Signs:   Temp:  [97.2 °F (36.2 °C)-98.7 °F (37.1 °C)] 98.7 °F (37.1 °C)  Heart Rate:  [49-94] 65  Resp:  [15-20] 17  BP: (107-148)/(47-66) 107/65  Flow (L/min):  [3-4] 4     Physical Exam:  With patient's consent, physical exam was conducted via visual telemedicine encounter due to patient's current isolation requirements in the interest of PPE conservation.    Constitutional: No acute distress, awake, alert, nontoxic, obese  HENT: NCAT, MMM, no conjunctival injection  Respiratory: Good effort, nonlabored respirations   Cardiovascular:  tele with NSR  Musculoskeletal: No edema, normal muscle tone and mass for age  Psychiatric: Appropriate affect, good insight and judgement, cooperative  Neurologic: Oriented x 3, movements symmetric BUE and BLE, speech clear and fluent  Skin: No visible rashes, no jaundice seen on exposed skin through window            Results Reviewed:  LAB RESULTS:      Lab 22  0327 22  0439 22  0657 22   WBC 4.50 4.30 4.51 5.45   HEMOGLOBIN 11.5* 11.4* 11.9* 13.2   HEMATOCRIT 32.8* 33.6* 33.9* 38.0   PLATELETS 314 306 244 247   NEUTROS ABS 3.11 2.89 3.05 3.86   IMMATURE GRANS (ABS)  --  0.05 0.03 0.03   LYMPHS ABS  --  0.92 1.12 1.19   MONOS ABS  --  0.43 0.28 0.36   EOS ABS 0.00 0.00 0.01 0.00   MCV 86.5 88.7 86.3 86.8   CRP 6.67* 15.92* 17.81* 13.86*   PROCALCITONIN  --   --   --  0.20   LACTATE  --   --   " --  2.0   LDH  --   --   --  512*   D DIMER QUANT 0.40  --  0.93*  --          Lab 02/04/22 0327 02/03/22 0439 02/02/22 0657 02/01/22 0556 02/01/22 0258 01/31/22 2051 01/31/22 2051   SODIUM 142 139 140  --   --   --  141   POTASSIUM 3.5 3.6 3.5  --   --   --  3.2*   CHLORIDE 105 101 102  --   --   --  103   CO2 26.0 27.0 27.0  --   --   --  24.0   ANION GAP 11.0 11.0 11.0  --   --   --  14.0   BUN 27* 25* 19  --   --   --  26*   CREATININE 0.38* 0.46* 0.46* 0.58 0.68   < > 1.13*   GLUCOSE 186* 225* 105*  --   --   --  110*   CALCIUM 9.1 9.6 9.5  --   --   --  9.1   MAGNESIUM 1.6 1.7 1.6  --   --   --   --    HEMOGLOBIN A1C  --   --   --   --   --   --  6.20*   TSH  --   --   --   --  0.961  --   --     < > = values in this interval not displayed.         Lab 02/04/22 0327 02/03/22 0439 02/02/22 0657 02/01/22  0556 02/01/22 0258 01/31/22 2051 01/31/22 2051   TOTAL PROTEIN 6.1 6.5 6.3 6.8 6.6   < > 7.3   ALBUMIN 3.00* 3.30* 3.30* 3.60 3.60   < > 3.90   GLOBULIN 3.1 3.2  --   --   --   --  3.4   ALT (SGPT) 52* 53* 46* 32 32   < > 37*   AST (SGOT) 67* 79* 76* 65* 59*   < > 68*   BILIRUBIN 0.4 0.5 0.8 0.6 0.6   < > 0.5   INDIRECT BILIRUBIN  --   --  0.5 0.4 0.4  --   --    BILIRUBIN DIRECT  --   --  0.3 0.2 0.2  --   --    ALK PHOS 91 110 86 83 76   < > 87    < > = values in this interval not displayed.         Lab 01/31/22 2051   PROBNP 122.4   TROPONIN T <0.010             Lab 02/04/22  0327   FERRITIN 659.10*         Brief Urine Lab Results  (Last result in the past 365 days)      Color   Clarity   Blood   Leuk Est   Nitrite   Protein   CREAT   Urine HCG        01/31/22 2116               Negative             Microbiology Results Abnormal     None          Duplex Venous Lower Extremity - Bilateral CAR    Result Date: 2/2/2022  · Technically difficult study. · No evidence of DVT bilaterally        Results for orders placed during the hospital encounter of 05/11/20    Adult Transthoracic Echo Complete W/  Cont if Necessary Per Protocol    Interpretation Summary  · Estimated EF = 50%.  · Mild mitral valve regurgitation is present  · Left ventricular systolic function is mildly decreased.      I have reviewed the medications:  Scheduled Meds:amLODIPine, 2.5 mg, Oral, Daily  aspirin, 81 mg, Oral, Daily  atorvastatin, 80 mg, Oral, Nightly  busPIRone, 15 mg, Oral, TID  calcium carb-cholecalciferol, 1 tablet, Oral, Daily  cetirizine, 10 mg, Oral, Daily  clopidogrel, 75 mg, Oral, Nightly  conjugated estrogens, 1 g, Vaginal, Once per day on Mon Wed Fri  dexamethasone, 6 mg, Intravenous, Daily   Or  dexamethasone, 6 mg, Oral, Daily  enoxaparin, 40 mg, Subcutaneous, Q24H  fluticasone, 1 spray, Each Nare, BID  modafinil, 300 mg, Oral, Daily  montelukast, 10 mg, Oral, Q PM  oxybutynin XL, 5 mg, Oral, Daily  pantoprazole, 40 mg, Oral, Q AM  potassium chloride, 20 mEq, Oral, Nightly  remdesivir, 100 mg, Intravenous, Q24H  sodium chloride, 10 mL, Intravenous, Q12H  spironolactone, 50 mg, Oral, Daily  tiZANidine, 4 mg, Oral, Nightly      Continuous Infusions:Pharmacy Consult - Remdesivir,       PRN Meds:.•  acetaminophen **OR** acetaminophen **OR** acetaminophen  •  albuterol sulfate HFA  •  artificial tears  •  cyclobenzaprine  •  gabapentin  •  guaiFENesin-dextromethorphan  •  HYDROcodone-acetaminophen  •  hydrOXYzine  •  magnesium sulfate **OR** magnesium sulfate in D5W 1g/100mL (PREMIX) **OR** magnesium sulfate  •  naloxone  •  ondansetron  •  Pharmacy Consult - Remdesivir  •  sodium chloride  •  sodium chloride  •  sodium chloride    Assessment/Plan   Assessment & Plan     Active Hospital Problems    Diagnosis  POA   • Severe malnutrition (CMS/HCC) [E43]  Yes   • Pneumonia due to COVID-19 virus [U07.1, J12.82]  Yes   • Acute respiratory failure with hypoxia (HCC) [J96.01]  Yes   • GIORGIO (obstructive sleep apnea) [G47.33]  Yes   • Chronic pain [G89.29]  Yes   • Fibromyalgia [M79.7]  Yes   • Hypertension [I10]  Yes   •  Hyperlipidemia [E78.5]  Yes   • Type 2 diabetes mellitus without complication, without long-term current use of insulin (HCC) [E11.9]  Yes   • Myotonic dystrophy, 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        Brief Hospital Course to date:  Hafsa Barron is a 51 y.o. female w/ hx obesity/jhonatan, myotonic dystrophy, htn, dm2, chronic pain who tested + for covid on 1/24/22 as outpatient. Developed myalgias, joint pains. Received monoclonal abx at [facility] 1/31/22, but continued to have worsening dyspnea w/ nonproductive cough. Was hypoxic 82% on room air in ED, ct angio chest was negative for pe but showed extensive bilateral ggo c/w covid pneumonia, wbc was normal, procal was negative, probnp normal, crp was 13.8. initiated on remdesivir & decadron. Hypoxia worsened prompting ID consultation.     *Acute hypoxic resp failure due to bilateral covid 19 pneumonia  -initial + test 1/24/22, isolation 20 days  d-dimer mildly elevated 2/2/22  -initiated originally on full dose lovenox (per new protocol for covid 19 prophylaxis), but on 2/2 backed off to traditional prophylactic dose lovenox as on DAPT  -venous duplex negative for dvt  -ct angio chest (on admission) negative for PE, did show b/l ggo  -day #4 remdesivir & decadron  -ID following: currently 3 Liters; if o2 requirements worsen to 5-6 liters consideration for IL6 inhibitor/micah inhibitor   -CRP mild improvement but ferritin increasing  -lasix 20mg iv x 1 on 2/2, monitor renal fxn  -oxygen requirement currently 2LNC.  Doing well.      *DM2   -sliding scale humalog, titrate insulins prn  -A1c 6.20    *HTN  -continue home meds    *Hx cva  -on asa, plavix    *Obesity/jhonatan  -cpap    *Hx myotonic dystrophy  -continue home meds    *chronic pain  -chronic opioids/neurontin  -add percocet prn for today with myalgias with covid as well.  Plan to continue her home lortab prn at  discharge.    Am labs: covid progression, every other day d-dimer (next 2/4)    DVT prophylaxis:  Medical and mechanical DVT prophylaxis orders are present.  changed to prophylactic dose lovenox (since on dapt)  AM-PAC 6 Clicks Score (PT): 20 (02/03/22 2010)    Disposition: I expect the patient to be discharged tomorrow after completes Remdesivir with home oxygen    CODE STATUS:   Code Status and Medical Interventions:   Ordered at: 02/01/22 0102     Level Of Support Discussed With:    Patient     Code Status (Patient has no pulse and is not breathing):    CPR (Attempt to Resuscitate)     Medical Interventions (Patient has pulse or is breathing):    Full Support       Doe Long MD  02/04/22

## 2022-02-04 NOTE — PLAN OF CARE
Goal Outcome Evaluation:  Plan of Care Reviewed With: patient            Pt resting much better tonight. Mild fatigue when moving from bed to BSC. 3/4 L NC. Urine smelled strong, called MD to get UA. Bipap at night. Still complains of itching meds given, see MAR. VSS, NRS-SB on monitor.

## 2022-02-04 NOTE — PLAN OF CARE
Goal Outcome Evaluation:               Patient alert and oriented. Complaints of generalized pain through the shift.PRN pain meds given. Magnesium replaced.

## 2022-02-04 NOTE — CASE MANAGEMENT/SOCIAL WORK
Continued Stay Note  [facility]     Patient Name: Hafsa Barron  MRN: [id number]  Today's Date: 2/4/2022    Admit Date: 1/31/2022     Discharge Plan     Row Name 02/04/22 1305       Plan    Plan Home with family transporting.    Plan Comments SW’er spoke with patient via phone due to COVID.  Per MD note expecting the patient to be discharged tomorrow after completes Remdesivir with home oxygen. Patient is on oxygen 2L/NC. SW’er spoke with Michelle Mcdonough [phone] who explained she will deliver oxygen to patients room. Plan is home with family transporting. LEONIE Banuelos [phone]               Discharge Codes    No documentation.               Expected Discharge Date and Time     Expected Discharge Date Expected Discharge Time    Feb 3, 2022             LEONIE Ibarra (Kay)

## 2022-02-05 ENCOUNTER — READMISSION MANAGEMENT (OUTPATIENT)
Dept: CALL CENTER | Facility: HOSPITAL | Age: 52
End: 2022-02-05

## 2022-02-05 VITALS
DIASTOLIC BLOOD PRESSURE: 56 MMHG | WEIGHT: 214 LBS | SYSTOLIC BLOOD PRESSURE: 130 MMHG | RESPIRATION RATE: 16 BRPM | HEIGHT: 66 IN | HEART RATE: 103 BPM | OXYGEN SATURATION: 92 % | TEMPERATURE: 98.6 F | BODY MASS INDEX: 34.39 KG/M2

## 2022-02-05 PROBLEM — D89.833 CYTOKINE RELEASE SYNDROME, GRADE 3: Status: ACTIVE | Noted: 2022-02-05

## 2022-02-05 LAB
ALBUMIN SERPL-MCNC: 3 G/DL (ref 3.5–5.2)
ALBUMIN/GLOB SERPL: 1.1 G/DL
ALP SERPL-CCNC: 89 U/L (ref 39–117)
ALT SERPL W P-5'-P-CCNC: 50 U/L (ref 1–33)
ANION GAP SERPL CALCULATED.3IONS-SCNC: 10 MMOL/L (ref 5–15)
AST SERPL-CCNC: 47 U/L (ref 1–32)
BILIRUB SERPL-MCNC: 0.3 MG/DL (ref 0–1.2)
BUN SERPL-MCNC: 23 MG/DL (ref 6–20)
BUN/CREAT SERPL: 57.5 (ref 7–25)
CALCIUM SPEC-SCNC: 8.8 MG/DL (ref 8.6–10.5)
CHLORIDE SERPL-SCNC: 105 MMOL/L (ref 98–107)
CO2 SERPL-SCNC: 27 MMOL/L (ref 22–29)
CREAT SERPL-MCNC: 0.4 MG/DL (ref 0.57–1)
CRP SERPL-MCNC: 2.85 MG/DL (ref 0–0.5)
FERRITIN SERPL-MCNC: 537.8 NG/ML (ref 13–150)
GFR SERPL CREATININE-BSD FRML MDRD: >150 ML/MIN/1.73
GLOBULIN UR ELPH-MCNC: 2.7 GM/DL
GLUCOSE SERPL-MCNC: 224 MG/DL (ref 65–99)
MAGNESIUM SERPL-MCNC: 1.7 MG/DL (ref 1.6–2.6)
POTASSIUM SERPL-SCNC: 3.6 MMOL/L (ref 3.5–5.2)
PROT SERPL-MCNC: 5.7 G/DL (ref 6–8.5)
SODIUM SERPL-SCNC: 142 MMOL/L (ref 136–145)

## 2022-02-05 PROCEDURE — 83735 ASSAY OF MAGNESIUM: CPT | Performed by: INTERNAL MEDICINE

## 2022-02-05 PROCEDURE — 97161 PT EVAL LOW COMPLEX 20 MIN: CPT

## 2022-02-05 PROCEDURE — 99239 HOSP IP/OBS DSCHRG MGMT >30: CPT | Performed by: INTERNAL MEDICINE

## 2022-02-05 PROCEDURE — 86140 C-REACTIVE PROTEIN: CPT | Performed by: INTERNAL MEDICINE

## 2022-02-05 PROCEDURE — 80053 COMPREHEN METABOLIC PANEL: CPT | Performed by: INTERNAL MEDICINE

## 2022-02-05 PROCEDURE — 25010000002 ENOXAPARIN PER 10 MG: Performed by: INTERNAL MEDICINE

## 2022-02-05 PROCEDURE — 63710000001 DEXAMETHASONE PER 0.25 MG: Performed by: INTERNAL MEDICINE

## 2022-02-05 PROCEDURE — 25010000002 REMDESIVIR 100 MG/20ML SOLUTION 1 EACH VIAL: Performed by: INTERNAL MEDICINE

## 2022-02-05 PROCEDURE — 82728 ASSAY OF FERRITIN: CPT | Performed by: INTERNAL MEDICINE

## 2022-02-05 PROCEDURE — 25010000002 MAGNESIUM SULFATE 2 GM/50ML SOLUTION: Performed by: INTERNAL MEDICINE

## 2022-02-05 PROCEDURE — 97530 THERAPEUTIC ACTIVITIES: CPT

## 2022-02-05 RX ORDER — DEXAMETHASONE 6 MG/1
6 TABLET ORAL DAILY
Qty: 5 TABLET | Refills: 0 | Status: SHIPPED | OUTPATIENT
Start: 2022-02-06 | End: 2022-02-11

## 2022-02-05 RX ORDER — GUAIFENESIN/DEXTROMETHORPHAN 100-10MG/5
5 SYRUP ORAL EVERY 4 HOURS PRN
Qty: 471 ML | Refills: 0 | Status: SHIPPED | OUTPATIENT
Start: 2022-02-05

## 2022-02-05 RX ADMIN — DEXAMETHASONE 6 MG: 2 TABLET ORAL at 08:20

## 2022-02-05 RX ADMIN — SODIUM CHLORIDE, PRESERVATIVE FREE 10 ML: 5 INJECTION INTRAVENOUS at 08:21

## 2022-02-05 RX ADMIN — BUSPIRONE HYDROCHLORIDE 15 MG: 10 TABLET ORAL at 08:19

## 2022-02-05 RX ADMIN — REMDESIVIR 100 MG: 100 INJECTION, POWDER, LYOPHILIZED, FOR SOLUTION INTRAVENOUS at 08:19

## 2022-02-05 RX ADMIN — SPIRONOLACTONE 50 MG: 50 TABLET ORAL at 08:20

## 2022-02-05 RX ADMIN — MAGNESIUM SULFATE HEPTAHYDRATE 2 G: 2 INJECTION, SOLUTION INTRAVENOUS at 08:20

## 2022-02-05 RX ADMIN — MODAFINIL 300 MG: 100 TABLET ORAL at 08:20

## 2022-02-05 RX ADMIN — ENOXAPARIN SODIUM 40 MG: 40 INJECTION SUBCUTANEOUS at 08:19

## 2022-02-05 RX ADMIN — Medication 1 TABLET: at 08:20

## 2022-02-05 RX ADMIN — ASPIRIN 81 MG: 81 TABLET, COATED ORAL at 08:20

## 2022-02-05 RX ADMIN — OXYCODONE HYDROCHLORIDE AND ACETAMINOPHEN 1 TABLET: 5; 325 TABLET ORAL at 08:20

## 2022-02-05 RX ADMIN — OXYBUTYNIN CHLORIDE 5 MG: 5 TABLET, EXTENDED RELEASE ORAL at 08:20

## 2022-02-05 RX ADMIN — CETIRIZINE HYDROCHLORIDE 10 MG: 10 TABLET, FILM COATED ORAL at 08:20

## 2022-02-05 RX ADMIN — PANTOPRAZOLE SODIUM 40 MG: 40 TABLET, DELAYED RELEASE ORAL at 05:47

## 2022-02-05 RX ADMIN — AMLODIPINE BESYLATE 2.5 MG: 5 TABLET ORAL at 08:20

## 2022-02-05 RX ADMIN — FLUTICASONE PROPIONATE 1 SPRAY: 50 SPRAY, METERED NASAL at 08:20

## 2022-02-05 NOTE — THERAPY EVALUATION
Patient Name: Hafsa Barron  : 1970    MRN: [id number]                              Today's Date: 2022       Admit Date: 2022    Visit Dx:     ICD-10-CM ICD-9-CM   1. Acute respiratory disease due to COVID-19 virus  U07.1 465.9    J06.9 079.89   2. Hypoxemia requiring supplemental oxygen  R09.02 799.02    Z99.81    3. Muscular dystrophy ([facility])  G71.00 359.1   4. Diabetes mellitus type 2 in obese ([facility])  E11.69 250.00    E66.9 278.00     Patient Active Problem List   Diagnosis   • Fibromyalgia   • Hypertension   • Sciatica   • Seasonal allergic rhinitis   • Hyperlipidemia   • Type 2 diabetes mellitus without complication, without long-term current use of insulin ([facility])   • H/O: stroke   • Myotonic dystrophy, type 2 ([facility])   • High risk medication use   • Left arm weakness   • Chronic pain   • Bruit of left carotid artery   • Anxiety and depression   • Thrombosis of Left subclavian artery (CMS/[facility]) - in Aug 2018 - Stent in place   • Recurrent UTI (urinary tract infection)   • Urinary urgency   • Pneumonia due to COVID-19 virus   • Acute respiratory failure with hypoxia ([facility])   • GIORGIO (obstructive sleep apnea)   • Severe malnutrition (CMS/[facility])     Past Medical History:   Diagnosis Date   • Allergic    • Allergy    • Anxiety    • Asthma Allergies induced   • Depression    • Fibromyalgia, primary    • GERD (gastroesophageal reflux disease)    • Headache    • History of blood clots    • History of UTI    • Hyperlipidemia    • Hypertension    • IBS (irritable bowel syndrome)    • Internal hemorrhoid    • Kidney stone    • Low back pain    • Muscular dystrophy ([facility])    • Muscular dystrophy ([facility])     II   • Myotonia    • Obesity    • Stroke ([facility]) 2013    resdual- left sided weakness.    • Type 2 diabetes mellitus without complication, without long-term current use of insulin ([facility])      Past Surgical History:   Procedure Laterality Date   • CHOLECYSTECTOMY  2004   • COLONOSCOPY  2017   • D & C WITH  SUCTION     • LYMPH NODE BIOPSY     • SUBCLAVIAN ARTERY STENT  08/2018    x2   • WISDOM TOOTH EXTRACTION        General Information     Row Name 02/05/22 1016          Physical Therapy Time and Intention    Document Type evaluation  -LO     Mode of Treatment individual therapy; physical therapy  -LO     Row Name 02/05/22 1016          General Information    Patient Profile Reviewed yes  -LO     Prior Level of Function min assist:; all household mobility; community mobility; gait  FWW and motorized scooter for mobility  -LO     Existing Precautions/Restrictions fall  hx CVA with residual LUE and LLE weakness, hx muscular dystrophy, contact/airborne room  -LO     Row Name 02/05/22 1016          Living Environment    Lives With spouse  -LO     Row Name 02/05/22 1016          Home Main Entrance    Number of Stairs, Main Entrance two  -LO     Stair Railings, Main Entrance none  -LO     Row Name 02/05/22 1016          Stairs Within Home, Primary    Number of Stairs, Within Home, Primary none  -LO     Row Name 02/05/22 1016          Cognition    Orientation Status (Cognition) oriented x 4  -LO     Row Name 02/05/22 1016          Safety Issues, Functional Mobility    Safety Issues Affecting Function (Mobility) other (see comments)  none noted  -LO     Impairments Affecting Function (Mobility) balance; endurance/activity tolerance; pain; strength  -LO     Comment, Safety Issues/Impairments (Mobility) very low endurance for activity  -LO           User Key  (r) = Recorded By, (t) = Taken By, (c) = Cosigned By    Initials Name Provider Type    Aracelis Tovar, PT Physical Therapist               Mobility     Row Name 02/05/22 1019          Bed Mobility    Bed Mobility supine-sit; sit-supine  -LO     Supine-Sit [place] (Bed Mobility) standby assist  -LO     Sit-Supine [place] (Bed Mobility) standby assist  -LO     Assistive Device (Bed Mobility) bed rails; head of bed elevated  -LO     Comment (Bed Mobility) SBA for  bed mobility, no physical assist required  -LO     Row Name 02/05/22 1019          Transfers    Comment (Transfers) EOB>BSC>EOB  -LO     [facility] Name 02/05/22 1019          Bed-Chair Transfer    Bed-Chair [place] (Transfers) contact guard  -LO     Assistive Device (Bed-Chair Transfers) other (see comments)  no AD  -LO     Row Name 02/05/22 1019          Sit-Stand Transfer    Sit-Stand [place] (Transfers) contact guard  -LO     Assistive Device (Sit-Stand Transfers) other (see comments)  no AD  -LO     Row Name 02/05/22 1019          Gait/Stairs (Locomotion)    [place] Level (Gait) not tested  -LO     [place] Level (Stairs) not tested  -LO     Comment (Gait/Stairs) declined gait due to fatigue  -[facility] Name 02/05/22 1019          Mobility    Extremity Weight-bearing Status --  no restrictions noted  -           User Key  (r) = Recorded By, (t) = Taken By, (c) = Cosigned By    Initials Name Provider Type    LO Aracelis Westbrook, PT Physical Therapist               Obj/Interventions     [facility] Name 02/05/22 1021          Range of Motion Comprehensive    General Range of Motion bilateral lower extremity ROM WNL  -LO     Row Name 02/05/22 1021          Strength Comprehensive (MMT)    General Manual Muscle Testing (MMT) Assessment lower extremity strength deficits identified  -     Comment, General Manual Muscle Testing (MMT) Assessment BLE grossly 3+/5  -LO     Row Name 02/05/22 1021          Motor Skills    Motor Skills functional endurance  -LO     Functional Endurance tolerates 1-2 min of activity before requires a rest break. SPO2 levels maintain > 90% on 3 liters of O2 throughout.  -[facility] Name 02/05/22 1021          Balance    Balance Assessment sitting static balance; sitting dynamic balance; standing static balance; standing dynamic balance  -LO     Static Sitting Balance WFL; unsupported; sitting, edge of bed  -LO     Dynamic Sitting Balance WFL; unsupported; sitting, edge of bed  -LO      Static Standing Balance mild impairment; unsupported; standing  -LO     Dynamic Standing Balance mild impairment; unsupported; standing  -LO     Comment, Balance Requires walker and SBA for safety in standing  -LO     Row Name 02/05/22 1021          Sensory Assessment (Somatosensory)    Sensory Assessment (Somatosensory) other (see comments)  no sensation deficits BLE currently, states will occassionally experience N/T in BLE  -LO           User Key  (r) = Recorded By, (t) = Taken By, (c) = Cosigned By    Initials Name Provider Type    Aracelis Tovar, PT Physical Therapist               Goals/Plan     Row Name 02/05/22 1027          Transfer Goal 1 (PT)    Activity/Assistive Device (Transfer Goal 1, PT) sit-to-stand/stand-to-sit; bed-to-chair/chair-to-bed; car transfer  -LO     [place] Level/Cues Needed (Transfer Goal 1, PT) modified independence  -LO     Time Frame (Transfer Goal 1, PT) long term goal (LTG); 10 days  -LO     Row Name 02/05/22 1027          Gait Training Goal 1 (PT)    Activity/Assistive Device (Gait Training Goal 1, PT) gait (walking locomotion); decrease fall risk; improve balance and speed; increase endurance/gait distance  -LO     [place] Level (Gait Training Goal 1, PT) modified independence  -LO     Distance (Gait Training Goal 1, PT) 100  -LO     Time Frame (Gait Training Goal 1, PT) long term goal (LTG); 10 days  -LO     Row Name 02/05/22 1027          Stairs Goal 1 (PT)    Activity/Assistive Device (Stairs Goal 1, PT) ascending stairs; descending stairs  -LO     [place] Level/Cues Needed (Stairs Goal 1, PT) modified independence  -LO     Number of Stairs (Stairs Goal 1, PT) 2  -LO     Time Frame (Stairs Goal 1, PT) long term goal (LTG); 10 days  -LO     Row Name 02/05/22 1027          Patient Education Goal (PT)    Activity (Patient Education Goal, PT) Patient will demonstrate safe and effective sequencing for bed mobility and transfers.  -LO     Time Frame (Patient  Education Goal, PT) long term goal (LTG); 10 days  -LO           User Key  (r) = Recorded By, (t) = Taken By, (c) = Cosigned By    Initials Name Provider Type    Aracelis Tovar, PT Physical Therapist               Clinical Impression     Row Name 02/05/22 1023          Pain    Additional Documentation Pain Scale: FACES Pre/Post-Treatment (Group)  -     Row Name 02/05/22 1023          Pain Scale: FACES Pre/Post-Treatment    Pain: FACES Scale, Pretreatment 2-->hurts little bit  -LO     Posttreatment Pain Rating 4-->hurts little more  -LO     Pain Location - Side Bilateral  -LO     Pain Location - Orientation generalized  -LO     Pre/Posttreatment Pain Comment generalized muscle pain/ache  -     Row Name 02/05/22 1023          Plan of Care Review    Plan of Care Reviewed With patient  -LO     Progress no change  -LO     Outcome Summary PT eval completed. Patient alert and oriented x4. Presents with deficits in BLE strength, standing balance, and functional mobility effecting functional mobility below baseline. Demonstrates SBA for bed mobility, CGA for transfers. Declines gait at this time due to level of fatigue and general muscle pain/ache. Patient will benefit from skilled IP PT services to address impairments in order to return to [place]. Recommend home with assist and HHPT at [place].  -     Row Name 02/05/22 1023          Therapy Assessment/Plan (PT)    Patient/Family Therapy Goals Statement (PT) return home  -LO     Rehab Potential (PT) good, to achieve stated therapy goals  -LO     Criteria for Skilled Interventions Met (PT) yes; skilled treatment is necessary  -     Row Name 02/05/22 1023          Vital Signs    Pre Systolic BP Rehab 130  -LO     Pre Treatment Diastolic BP 56  -LO     Post Systolic BP Rehab 125  -LO     Post Treatment Diastolic BP 60  -LO     Pretreatment Heart Rate (beats/min) 66  -LO     Posttreatment Heart Rate (beats/min) 89  -LO     Pre SpO2 (%) 97  -LO     O2 Delivery Pre Treatment  supplemental O2  -LO     Intra SpO2 (%) 91  -LO     O2 Delivery Intra Treatment supplemental O2  -LO     Post SpO2 (%) 94  -LO     O2 Delivery Post Treatment supplemental O2  -LO     Pre Patient Position Supine  -LO     Intra Patient Position Standing  -LO     Post Patient Position Supine  -LO     Rest Breaks  2  -LO     Row Name 02/05/22 1023          Positioning and Restraints    Pre-Treatment Position in bed  -LO     Post Treatment Position bed  -LO     In Bed notified nsg; encouraged to call for assist; supine; fowlers; call light within reach; side rails up x2  -LO           User Key  (r) = Recorded By, (t) = Taken By, (c) = Cosigned By    Initials Name Provider Type    Aracelis Tovar, PT Physical Therapist               Outcome Measures     Row Name 02/05/22 1028          How much help from another person do you currently need...    Turning from your back to your side while in flat bed without using bedrails? 4  -LO     Moving from lying on back to sitting on the side of a flat bed without bedrails? 3  -LO     Moving to and from a bed to a chair (including a wheelchair)? 3  -LO     Standing up from a chair using your arms (e.g., wheelchair, bedside chair)? 3  -LO     Climbing 3-5 steps with a railing? 3  -LO     To walk in hospital room? 3  -LO     AM-PAC 6 Clicks Score (PT) 19  -LO     Row Name 02/05/22 1028          Functional Assessment    Outcome Measure Options AM-PAC 6 Clicks Basic Mobility (PT)  -LO           User Key  (r) = Recorded By, (t) = Taken By, (c) = Cosigned By    Initials Name Provider Type    Aracelis Tovar, MADELYN Physical Therapist                             Physical Therapy Education                 Title: PT OT SLP Therapies (Done)     Topic: Physical Therapy (Done)     Point: Mobility training (Done)     Learning Progress Summary           Patient Acceptance, E, VU,NR by PRAVEEN at 2/5/2022 0854    Comment: Patient education regarding PT POC                   Point: Home exercise program  (Done)     Learning Progress Summary           Patient Acceptance, E, VU,NR by  at 2/5/2022 0845    Comment: Patient education regarding PT POC                   Point: Body mechanics (Done)     Learning Progress Summary           Patient Acceptance, E, VU,NR by  at 2/5/2022 0845    Comment: Patient education regarding PT POC                   Point: Precautions (Done)     Learning Progress Summary           Patient Acceptance, E, VU,NR by  at 2/5/2022 0845    Comment: Patient education regarding PT POC                               User Key     Initials Effective Dates Name Provider Type Discipline     06/16/21 -  Aracelis Westbrook, PT Physical Therapist PT              PT Recommendation and Plan  Planned Therapy Interventions (PT): balance training, gait training, home exercise program, patient/family education, neuromuscular re-education, stair training, strengthening, transfer training  Plan of Care Reviewed With: patient  Progress: no change  Outcome Summary: PT eval completed. Patient alert and oriented x4. Presents with deficits in BLE strength, standing balance, and functional mobility effecting functional mobility below baseline. Demonstrates SBA for bed mobility, CGA for transfers. Declines gait at this time due to level of fatigue and general muscle pain/ache. Patient will benefit from skilled IP PT services to address impairments in order to return to PLOF. Recommend home with assist and HHPT at [place].     Time Calculation:    PT Charges     Row Name 02/05/22 0845             Time Calculation    Start Time 0845  -LO      PT Received On 02/05/22  -LO      PT Goal Re-Cert Due Date 02/15/22  -LO              Timed Charges    04193 - PT Therapeutic Activity Minutes 16  -LO              Untimed Charges    PT Eval/Re-eval Minutes 38  -LO              Total Minutes    Timed Charges Total Minutes 16  -LO      Untimed Charges Total Minutes 38  -LO       Total Minutes 54  -LO            User Key  (r) = Recorded  By, (t) = Taken By, (c) = Cosigned By    Initials Name Provider Type    Aracelis Tovar, PT Physical Therapist              Therapy Charges for Today     Code Description Service Date Service Provider Modifiers Qty    44100171588 HC PT THERAPEUTIC ACT EA 15 MIN 2/5/2022 Aracelis Westbrook, PT GP 1    49262946298 HC PT EVAL LOW COMPLEXITY 3 2/5/2022 Aracelis Westbrook, PT GP 1          PT G-Codes  Outcome Measure Options: AM-PAC 6 Clicks Basic Mobility (PT)  AM-PAC 6 Clicks Score (PT): 19    Aracelis Westbrook, PT  2/5/2022

## 2022-02-05 NOTE — OUTREACH NOTE
Prep Survey      Responses   [facility] patient discharged from? [place]   Is LACE score < 7 ? No   Emergency Room discharge w/ pulse ox? No   Eligibility [facility]   Date of Admission 01/31/22   Date of Discharge 02/05/22   Discharge Disposition Home or Self Care   Discharge diagnosis Acute hypoxic resp failure due to bilateral covid 19 pneumonia   Does the patient have one of the following disease processes/diagnoses(primary or secondary)? COVID-19   Does the patient have Home health ordered? No   Is there a DME ordered? No   Prep survey completed? Yes          Vanda Cazares, RN

## 2022-02-05 NOTE — DISCHARGE SUMMARY
[facility] Medicine Services  DISCHARGE SUMMARY    Patient Name: Hafsa Barron  : 1970  MRN: [id number]    Date of Admission: 2022  8:43 PM  Date of Discharge:  2022  Primary Care Physician: Jocy Snow MD    Consults     Date and Time Order Name Status Description    2022 10:57 AM Inpatient Infectious Diseases Consult Completed           Hospital Course     Presenting Problem:   Acute respiratory disease due to COVID-19 virus [U07.1, J06.9]    Active Hospital Problems    Diagnosis  POA   • Cytokine release syndrome, grade 3 [D89.833]  No   • Severe malnutrition (CMS/HCC) [E43]  Yes   • Pneumonia due to COVID-19 virus [U07.1, J12.82]  Yes   • Acute respiratory failure with hypoxia (HCC) [J96.01]  Yes   • GIORGIO (obstructive sleep apnea) [G47.33]  Yes   • Chronic pain [G89.29]  Yes   • Fibromyalgia [M79.7]  Yes   • Hypertension [I10]  Yes   • Hyperlipidemia [E78.5]  Yes   • Type 2 diabetes mellitus without complication, without long-term current use of insulin (HCC) [E11.9]  Yes   • Myotonic dystrophy, 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          Hospital Course:  Hafsa Barron is a 51 y.o. female w/ hx obesity/giorgio, myotonic dystrophy, htn, dm2, chronic pain who tested + for covid on 22 as outpatient. Developed myalgias, joint pains. Received monoclonal abx at [facility] 22, but continued to have worsening dyspnea w/ nonproductive cough. Was hypoxic 82% on room air in ED, ct angio chest was negative for pe but showed extensive bilateral ggo c/w covid pneumonia, wbc was normal, procal was negative, probnp normal, crp was 13.8. initiated on remdesivir & decadron. Hypoxia worsened prompting ID consultation.      *Acute hypoxic resp failure due to bilateral covid 19 pneumonia  -initial + test 22, isolation 20 days-->2022  -d-dimer mildly  elevated  -initiated originally on full dose lovenox (per new protocol for covid 19 prophylaxis), but on 2/2 backed off to traditional prophylactic dose lovenox as on DAPT  -venous duplex negative for dvt  -ct angio chest (on admission) negative for PE, did show b/l ggo  -day #5 remdesivir & decadron day 5/10  -ID followed during hospitalizatoin   -CRP trending down  -lasix 20mg iv x 1 on 2/2,  -oxygenation during well.  Currently maintaining sats in the 90's on 2-3LNC     *DM2   -A1c 6.20     *HTN  -continue home meds     *Hx cva  -on asa, plavix     *Obesity/jhonatan  -cpap     *Hx myotonic dystrophy  -continue home meds     *chronic pain  -chronic opioids/neurontin      Discharge Follow Up Recommendations for outpatient labs/diagnostics:   f/u with PCP in 1 week via telemedicine  D/c home with Home O2    Day of Discharge     HPI:   Feeling better.  Ready to go home.     Review of Systems  Gen- No fevers, chills  CV- No chest pain, palpitations  Resp- +cough, dyspnea improved  GI- No N/V/D, abd pain        Vital Signs:   Temp:  [97.8 °F (36.6 °C)-98.6 °F (37 °C)] 98.6 °F (37 °C)  Heart Rate:  [58-68] 63  Resp:  [16-17] 16  BP: (105-146)/(50-89) 124/50  Flow (L/min):  [3] 3      Physical Exam:  With patient's consent, physical exam was conducted via visual telemedicine encounter due to patient's current isolation requirements in the interest of PPE conservation.    Constitutional: No acute distress, awake, alert, nontoxic, obese  HENT: NCAT, MMM, no conjunctival injection  Respiratory: Good effort, nonlabored respirations   Cardiovascular:  tele with NSR  Musculoskeletal: No edema, normal muscle tone and mass for age  Psychiatric: Appropriate affect, good insight and judgement, cooperative  Neurologic: Oriented x 3, movements symmetric BUE and BLE, speech clear and fluent  Skin: No visible rashes, no jaundice seen on exposed skin through window        Pertinent  and/or Most Recent Results     LAB RESULTS:      Lab  02/05/22 0454 02/04/22 0327 02/03/22  0439 02/02/22  0657 01/31/22 2051   WBC  --  4.50 4.30 4.51 5.45   HEMOGLOBIN  --  11.5* 11.4* 11.9* 13.2   HEMATOCRIT  --  32.8* 33.6* 33.9* 38.0   PLATELETS  --  314 306 244 247   NEUTROS ABS  --  3.11 2.89 3.05 3.86   IMMATURE GRANS (ABS)  --   --  0.05 0.03 0.03   LYMPHS ABS  --   --  0.92 1.12 1.19   MONOS ABS  --   --  0.43 0.28 0.36   EOS ABS  --  0.00 0.00 0.01 0.00   MCV  --  86.5 88.7 86.3 86.8   CRP 2.85* 6.67* 15.92* 17.81* 13.86*   PROCALCITONIN  --   --   --   --  0.20   LACTATE  --   --   --   --  2.0   LDH  --   --   --   --  512*   D DIMER QUANT  --  0.40  --  0.93*  --          Lab 02/05/22 0454 02/04/22 0327 02/03/22 0439 02/02/22  0657 02/01/22  0556 02/01/22  0258 02/01/22 0258 01/31/22 2051 01/31/22 2051   SODIUM 142 142 139 140  --   --   --   --  141   POTASSIUM 3.6 3.5 3.6 3.5  --   --   --   --  3.2*   CHLORIDE 105 105 101 102  --   --   --   --  103   CO2 27.0 26.0 27.0 27.0  --   --   --   --  24.0   ANION GAP 10.0 11.0 11.0 11.0  --   --   --   --  14.0   BUN 23* 27* 25* 19  --   --   --   --  26*   CREATININE 0.40* 0.38* 0.46* 0.46* 0.58   < > 0.68   < > 1.13*   GLUCOSE 224* 186* 225* 105*  --   --   --   --  110*   CALCIUM 8.8 9.1 9.6 9.5  --   --   --   --  9.1   MAGNESIUM 1.7 1.6 1.7 1.6  --   --   --   --   --    HEMOGLOBIN A1C  --   --   --   --   --   --   --   --  6.20*   TSH  --   --   --   --   --   --  0.961  --   --     < > = values in this interval not displayed.         Lab 02/05/22  0454 02/04/22  0327 02/03/22  0439 02/02/22  0657 02/01/22  0556 02/01/22  0258 02/01/22  0258 01/31/22 2051 01/31/22 2051   TOTAL PROTEIN 5.7* 6.1 6.5 6.3 6.8   < > 6.6   < > 7.3   ALBUMIN 3.00* 3.00* 3.30* 3.30* 3.60   < > 3.60   < > 3.90   GLOBULIN 2.7 3.1 3.2  --   --   --   --   --  3.4   ALT (SGPT) 50* 52* 53* 46* 32   < > 32   < > 37*   AST (SGOT) 47* 67* 79* 76* 65*   < > 59*   < > 68*   BILIRUBIN 0.3 0.4 0.5 0.8 0.6   < > 0.6   < > 0.5    INDIRECT BILIRUBIN  --   --   --  0.5 0.4  --  0.4  --   --    BILIRUBIN DIRECT  --   --   --  0.3 0.2  --  0.2  --   --    ALK PHOS 89 91 110 86 83   < > 76   < > 87    < > = values in this interval not displayed.         Lab 01/31/22  2051   PROBNP 122.4   TROPONIN T <0.010             Lab 02/05/22  0454   FERRITIN 537.80*         Brief Urine Lab Results  (Last result in the past 365 days)      Color   Clarity   Blood   Leuk Est   Nitrite   Protein   CREAT   Urine HCG        02/04/22 2205 Yellow   Cloudy   Negative   Trace   Negative   Negative               Microbiology Results (last 10 days)     ** No results found for the last 240 hours. **          CT Chest Pulmonary Embolism    Result Date: 2/1/2022  EXAMINATION: CT ANGIOGRAM CHEST WITH IV CONTRAST   INDICATION: Chest pain. COMPARISON: CT December 14, 2018 PROCEDURE: Multiple axial CT images were obtained of the chest after IV administration of contrast.  Coronal and sagittal reformations as well as MIP images were obtained. CT dose lowering techniques were used, to include: automated exposure control, adjustment for patient size, and or use of iterative reconstruction. FINDINGS: Cardiovascular: No filling defects are seen in the pulmonary arteries. No focal aortic aneurysm or evidence of aortic dissection is seen. There appears to be a left proximal subclavian artery stent. Mediastinum/Nasima: There are several enlarged mediastinal and hilar lymph nodes measuring up to 12 mm in short axis diameter. Lungs/Pleura :  There is extensive groundglass and patchy opacity throughout both lungs. No pneumothorax or pleural fluid collection. Chest wall and Axilla: Unremarkable. Bones:  No acute focal bony abnormality seen. Upper abdomen: Cholecystectomy.     No CTA evidence of pulmonary embolism or acute aortic pathology. Extensive groundglass and patchy opacity throughout both lungs, most consistent with a widespread inflammatory or infectious process such as Covid.  Ultimately enlarged mediastinal and hilar lymph nodes may be reactive, though are nonspecific. Attention to these on follow-up chest CT in 2 months recommended. RECOMMENDATION: Follow-up chest CT in 2 months as above. Electronically signed by:  Mk Da Silva M.D.  1/31/2022 10:07 PM Mountain Time    Duplex Venous Lower Extremity - Bilateral CAR    Result Date: 2/2/2022  · Technically difficult study. · No evidence of DVT bilaterally        Results for orders placed during the hospital encounter of 01/31/22    Duplex Venous Lower Extremity - Bilateral CAR    Interpretation Summary  · Technically difficult study.  · No evidence of DVT bilaterally      Results for orders placed during the hospital encounter of 01/31/22    Duplex Venous Lower Extremity - Bilateral CAR    Interpretation Summary  · Technically difficult study.  · No evidence of DVT bilaterally      Results for orders placed during the hospital encounter of 05/11/20    Adult Transthoracic Echo Complete W/ Cont if Necessary Per Protocol    Interpretation Summary  · Estimated EF = 50%.  · Mild mitral valve regurgitation is present  · Left ventricular systolic function is mildly decreased.      Plan for Follow-up of Pending Labs/Results:   Pending Labs     Order Current Status    Urine Culture - Urine, Urine, Random Void In process        Discharge Details        Discharge Medications      New Medications      Instructions Start Date   dexamethasone 6 MG tablet  Commonly known as: DECADRON   6 mg, Oral, Daily   Start Date: February 6, 2022     guaiFENesin-dextromethorphan 100-10 MG/5ML syrup  Commonly known as: ROBITUSSIN DM   5 mL, Oral, Every 4 Hours PRN         Continue These Medications      Instructions Start Date   albuterol sulfate  (90 Base) MCG/ACT inhaler  Commonly known as: ProAir HFA   1 puff, Inhalation, Every 6 Hours PRN      albuterol (2.5 MG/3ML) 0.083% nebulizer solution  Commonly known as: PROVENTIL   2.5 mg, Nebulization, Every 4  Hours PRN      amLODIPine 2.5 MG tablet  Commonly known as: NORVASC   TAKE ONE TABLET BY MOUTH DAILY      aspirin 81 MG EC tablet   81 mg, Oral, Daily      atorvastatin 80 MG tablet  Commonly known as: LIPITOR   TAKE ONE TABLET BY MOUTH ONCE NIGHTLY      baclofen 10 MG tablet  Commonly known as: LIORESAL   TAKE ONE TABLET BY MOUTH THREE TIMES A DAY      busPIRone 15 MG tablet  Commonly known as: BUSPAR   TAKE ONE TABLET BY MOUTH THREE TIMES A DAY      calcium carbonate-vitamin d 600-400 MG-UNIT per tablet  Commonly known as: Calcium 600+D High Potency   1 tablet, Oral, Daily      clopidogrel 75 MG tablet  Commonly known as: PLAVIX   TAKE ONE TABLET BY MOUTH DAILY      cyclobenzaprine 10 MG tablet  Commonly known as: FLEXERIL   10 mg, Oral, 3 Times Daily PRN      diclofenac 75 MG EC tablet  Commonly known as: VOLTAREN   75 mg, Oral, 2 Times Daily      fenofibrate 145 MG tablet  Commonly known as: TRICOR   TAKE ONE TABLET BY MOUTH DAILY      Flonase Allergy Relief 50 MCG/ACT nasal spray  Generic drug: fluticasone   Nasal      gabapentin 600 MG tablet  Commonly known as: NEURONTIN   TAKE ONE TABLET BY MOUTH THREE TIMES A DAY      HYDROcodone-acetaminophen  MG per tablet  Commonly known as: NORCO   1 tablet, Oral, Every 6 Hours PRN      loratadine 10 MG tablet  Commonly known as: CLARITIN   10 mg, Oral, Daily      metFORMIN 1000 MG tablet  Commonly known as: GLUCOPHAGE   TAKE ONE TABLET BY MOUTH TWICE A DAY WITH MEALS      modafinil 100 MG tablet  Commonly known as: PROVIGIL   100 mg, Oral, Daily      modafinil 200 MG tablet  Commonly known as: PROVIGIL   200 mg, Oral, Daily      montelukast 10 MG tablet  Commonly known as: SINGULAIR   10 mg, Oral, Every Evening      ONE TOUCH ULTRA 2 w/Device kit   No dose, route, or frequency recorded.      ONE TOUCH ULTRA TEST test strip  Generic drug: glucose blood   No dose, route, or frequency recorded.      oxybutynin XL 5 MG 24 hr tablet  Commonly known as: DITROPAN-XL   5  mg, Oral, Daily      pantoprazole 40 MG EC tablet  Commonly known as: PROTONIX   40 mg, Oral, Daily      potassium chloride ER 20 MEQ tablet controlled-release ER tablet  Commonly known as: K-TAB   TAKE 1 TABLET BY MOUTH EVERY DAY      Premarin 0.625 MG/GM vaginal cream  Generic drug: conjugated estrogens   Vaginal, Daily, Apply one finger tip strip of cream into vagina, three times weekly at night (M/W/F)      spironolactone 50 MG tablet  Commonly known as: ALDACTONE   50 mg, Oral, Daily      tiZANidine 4 MG tablet  Commonly known as: ZANAFLEX   TAKE ONE TABLET BY MOUTH ONCE NIGHTLY      venlafaxine XR 37.5 MG 24 hr capsule  Commonly known as: EFFEXOR-XR   37.5 mg, Oral, Daily         Stop These Medications    azithromycin 250 MG tablet  Commonly known as: Zithromax Z-Adilson     nitrofurantoin (macrocrystal-monohydrate) 100 MG capsule  Commonly known as: Macrobid     nystatin 973910 UNIT/GM powder  Commonly known as: MYCOSTATIN     ondansetron 8 MG tablet  Commonly known as: ZOFRAN     oxyCODONE-acetaminophen 5-325 MG per tablet  Commonly known as: PERCOCET     potassium chloride 20 MEQ CR tablet  Commonly known as: KLOR-CON     promethazine 12.5 MG tablet  Commonly known as: PHENERGAN     valACYclovir 1000 MG tablet  Commonly known as: Valtrex            Allergies   Allergen Reactions   • Fish-Derived Products Anaphylaxis   • Penicillins Anaphylaxis and Shortness Of Breath         Discharge Disposition:  Home or Self Care d/c home with home oxygen    Diet:  Hospital:  Diet Order   Procedures   • Diet Regular; Consistent Carbohydrate       Activity:      Restrictions or Other Recommendations:  Quarantine through 2/13/22       CODE STATUS:    Code Status and Medical Interventions:   Ordered at: 02/01/22 0102     Level Of Support Discussed With:    Patient     Code Status (Patient has no pulse and is not breathing):    CPR (Attempt to Resuscitate)     Medical Interventions (Patient has pulse or is breathing):    Full  Support       No future appointments.    Additional Instructions for the Follow-ups that You Need to Schedule     Discharge Follow-up with PCP   As directed       Currently Documented PCP:    Jocy Snow MD    PCP Phone Number:    [phone]     Follow Up Details: with PCP in 1 week via telemedicine                     Doe Long MD  02/05/22      Time Spent on Discharge:  I spent  42 minutes on this discharge activity which included: face-to-face encounter with the patient, reviewing the data in the system, coordination of the care with the nursing staff as well as consultants, documentation, and entering orders.

## 2022-02-05 NOTE — PLAN OF CARE
Goal Outcome Evaluation:  Plan of Care Reviewed With: patient        Progress: no change  Outcome Summary: PT eval completed. Patient alert and oriented x4. Presents with deficits in BLE strength, standing balance, and functional mobility effecting functional mobility below baseline. Demonstrates SBA for bed mobility, CGA for transfers. Declines gait at this time due to level of fatigue and general muscle pain/ache. Patient will benefit from skilled IP PT services to address impairments in order to return to PLOF. Recommend home with assist and HHPT at [place].

## 2022-02-05 NOTE — PLAN OF CARE
Goal Outcome Evaluation:  Plan of Care Reviewed With: patient           Outcome Summary: pt rested comfortably throughout shift, PRN pain and cough medications given, VSS

## 2022-02-05 NOTE — PLAN OF CARE
Problem: Fatigue  Goal: Improved Activity Tolerance  Outcome: Met   Goal Outcome Evaluation:

## 2022-02-06 ENCOUNTER — TRANSITIONAL CARE MANAGEMENT TELEPHONE ENCOUNTER (OUTPATIENT)
Dept: CALL CENTER | Facility: HOSPITAL | Age: 52
End: 2022-02-06

## 2022-02-06 NOTE — OUTREACH NOTE
Call Center TCM Note      Responses   [facility] patient discharged from? [place]   Does the patient have one of the following disease processes/diagnoses(primary or secondary)? COVID-19   COVID-19 underlying condition? None   TCM attempt successful? No   Discharge diagnosis Acute hypoxic resp failure due to bilateral covid 19 pneumonia          Gladis Longo RN    2/6/2022, 13:17 EST

## 2022-02-06 NOTE — OUTREACH NOTE
Call Center TCM Note      Responses   [facility] patient discharged from? [place]   Does the patient have one of the following disease processes/diagnoses(primary or secondary)? COVID-19   COVID-19 underlying condition? None   TCM attempt successful? No   Unsuccessful attempts Attempt 1   Discharge diagnosis Acute hypoxic resp failure due to bilateral covid 19 pneumonia          Gladis Longo RN    2/6/2022, 11:12 EST

## 2022-02-07 LAB — BACTERIA SPEC AEROBE CULT: ABNORMAL

## 2022-02-08 ENCOUNTER — TELEPHONE (OUTPATIENT)
Dept: INTERNAL MEDICINE | Facility: CLINIC | Age: 52
End: 2022-02-08

## 2022-02-08 NOTE — TELEPHONE ENCOUNTER
Patient sts she isn't having any symptoms and isn't sure the urine sample was still good. She says it sat in the room for 48 hours before it was dipped.

## 2022-02-08 NOTE — TELEPHONE ENCOUNTER
----- Message from Jocy Snow MD sent at 2/8/2022  7:30 AM EST -----  She had a UA come up positive from hospital. The hospitalist hasn't been able to get in touch with her. Wasn't going to treat unless she had sx. See if she does and if so I will send in antibiotic.

## 2022-02-14 RX ORDER — CEPHALEXIN 500 MG/1
500 CAPSULE ORAL 2 TIMES DAILY
Qty: 10 CAPSULE | Refills: 0 | Status: SHIPPED | OUTPATIENT
Start: 2022-02-14 | End: 2022-02-19

## 2022-02-17 DIAGNOSIS — G71.11 MYOTONIC DYSTROPHY, TYPE 2: ICD-10-CM

## 2022-02-18 RX ORDER — HYDROCODONE BITARTRATE AND ACETAMINOPHEN 10; 325 MG/1; MG/1
1 TABLET ORAL EVERY 6 HOURS PRN
Qty: 120 TABLET | Refills: 0 | Status: SHIPPED | OUTPATIENT
Start: 2022-02-18 | End: 2022-03-08 | Stop reason: SDUPTHER

## 2022-02-21 DIAGNOSIS — G71.11 MYOTONIC DYSTROPHY, TYPE 2: ICD-10-CM

## 2022-02-21 DIAGNOSIS — G47.19 DAYTIME HYPERSOMNOLENCE: ICD-10-CM

## 2022-02-22 DIAGNOSIS — G47.19 DAYTIME HYPERSOMNOLENCE: ICD-10-CM

## 2022-02-22 DIAGNOSIS — G71.11 MYOTONIC DYSTROPHY, TYPE 2: ICD-10-CM

## 2022-02-22 RX ORDER — GABAPENTIN 600 MG/1
TABLET ORAL
Qty: 270 TABLET | Refills: 2 | Status: SHIPPED | OUTPATIENT
Start: 2022-02-22 | End: 2022-03-08 | Stop reason: SDUPTHER

## 2022-02-22 RX ORDER — MODAFINIL 200 MG/1
TABLET ORAL
Qty: 30 TABLET | OUTPATIENT
Start: 2022-02-22

## 2022-02-23 RX ORDER — CIPROFLOXACIN 250 MG/1
250 TABLET, FILM COATED ORAL 2 TIMES DAILY
Qty: 10 TABLET | Refills: 0 | Status: SHIPPED | OUTPATIENT
Start: 2022-02-23 | End: 2022-02-28

## 2022-02-23 RX ORDER — MODAFINIL 200 MG/1
200 TABLET ORAL DAILY
Qty: 30 TABLET | Refills: 3 | OUTPATIENT
Start: 2022-02-23

## 2022-02-23 RX ORDER — MODAFINIL 100 MG/1
100 TABLET ORAL DAILY
Qty: 30 TABLET | Refills: 3 | OUTPATIENT
Start: 2022-02-23 | End: 2023-02-23

## 2022-02-23 RX ORDER — FLUCONAZOLE 150 MG/1
150 TABLET ORAL ONCE
Qty: 1 TABLET | Refills: 0 | Status: SHIPPED | OUTPATIENT
Start: 2022-02-23 | End: 2022-02-23

## 2022-02-25 ENCOUNTER — TRANSCRIBE ORDERS (OUTPATIENT)
Dept: ADMINISTRATIVE | Facility: HOSPITAL | Age: 52
End: 2022-02-25

## 2022-02-25 DIAGNOSIS — Z12.31 VISIT FOR SCREENING MAMMOGRAM: Primary | ICD-10-CM

## 2022-02-25 NOTE — TELEPHONE ENCOUNTER
Patient called to schedule follow up for first available which is in April. She is requesting the refills be sent in now if possible. Please advise.     Patient also would like visit to be a video visit.

## 2022-02-25 NOTE — TELEPHONE ENCOUNTER
10/12/21  1/18/21  4/12/22   Impression: Type 2 diabetes mellitus w/o complication: U17.4.  Plan: under care of dr Joel Martin

## 2022-02-28 ENCOUNTER — TELEPHONE (OUTPATIENT)
Dept: NEUROLOGY | Facility: CLINIC | Age: 52
End: 2022-02-28

## 2022-02-28 ENCOUNTER — TELEPHONE (OUTPATIENT)
Dept: INTERNAL MEDICINE | Facility: CLINIC | Age: 52
End: 2022-02-28

## 2022-02-28 DIAGNOSIS — G47.19 DAYTIME HYPERSOMNOLENCE: ICD-10-CM

## 2022-02-28 DIAGNOSIS — G71.11 MYOTONIC DYSTROPHY, TYPE 2: ICD-10-CM

## 2022-02-28 RX ORDER — MODAFINIL 200 MG/1
200 TABLET ORAL DAILY
Qty: 30 TABLET | Refills: 3 | Status: SHIPPED | OUTPATIENT
Start: 2022-02-28 | End: 2022-04-12

## 2022-02-28 RX ORDER — CLOPIDOGREL BISULFATE 75 MG/1
TABLET ORAL
Qty: 90 TABLET | Refills: 3 | Status: SHIPPED | OUTPATIENT
Start: 2022-02-28 | End: 2023-03-03

## 2022-02-28 RX ORDER — MODAFINIL 100 MG/1
100 TABLET ORAL DAILY
Qty: 30 TABLET | Refills: 3 | Status: SHIPPED | OUTPATIENT
Start: 2022-02-28 | End: 2022-04-12

## 2022-02-28 NOTE — TELEPHONE ENCOUNTER
yash from [facility] called in about pt is needing a pa for modafinil (PROVIGIL) 100 MG tablet  modafinil (PROVIGIL) 200 MG tablet      Pa is needing and they are needing to know what the dx is for to cover it.       Please advise     [phone] -[facility]     Case [id number]

## 2022-02-28 NOTE — TELEPHONE ENCOUNTER
Caller: SONAM    Best call back number: [phone]    Who are you requesting to speak with (clinical staff, provider,  specific staff member): GUANACO    What was the call regarding:   modafinil (PROVIGIL) 200 MG tablet    Do you require a callback:   PT CALLED BACK BECAUSE THE INSURANCE DIDN'T NOTICE THE PT HAS TWO SCRIPTS FOR THIS MED WITH 2 DIFFERENT DOSAGES.    EACH SCRIPT HAS TO BE DONE SEPARATELY. PT NEEDS PRE AUTH SENT FOR  MG AND THE PHARM IS [facility]. THEY NEEDS THE DX EVEN THOUGH IT IS THE SAME AS  MG.     MG HAS BEEN APPROVED.

## 2022-02-28 NOTE — TELEPHONE ENCOUNTER
PT INFORMED THAT THIS WAS COMPLETED AND THAT IT COULD BE 24-48 HOURS BEFORE THIS WOULD BE APPROVED. SHE STATED UNDERSTANDING, SHE WILL CALL PHARMACY.

## 2022-03-02 ENCOUNTER — PATIENT MESSAGE (OUTPATIENT)
Dept: INTERNAL MEDICINE | Facility: CLINIC | Age: 52
End: 2022-03-02

## 2022-03-02 NOTE — TELEPHONE ENCOUNTER
Tried submitting a PA for her. It says there is a pending pa this medication. Will send patient a [employer] message to follow up.

## 2022-03-07 ENCOUNTER — TELEMEDICINE (OUTPATIENT)
Dept: INTERNAL MEDICINE | Facility: CLINIC | Age: 52
End: 2022-03-07

## 2022-03-07 DIAGNOSIS — U07.1 PNEUMONIA DUE TO COVID-19 VIRUS: ICD-10-CM

## 2022-03-07 DIAGNOSIS — J96.00 ACUTE RESPIRATORY FAILURE DUE TO COVID-19: Primary | ICD-10-CM

## 2022-03-07 DIAGNOSIS — K21.9 GASTROESOPHAGEAL REFLUX DISEASE, UNSPECIFIED WHETHER ESOPHAGITIS PRESENT: ICD-10-CM

## 2022-03-07 DIAGNOSIS — R59.0 HILAR ADENOPATHY: ICD-10-CM

## 2022-03-07 DIAGNOSIS — J12.82 PNEUMONIA DUE TO COVID-19 VIRUS: ICD-10-CM

## 2022-03-07 DIAGNOSIS — U07.1 ACUTE RESPIRATORY FAILURE DUE TO COVID-19: Primary | ICD-10-CM

## 2022-03-07 PROCEDURE — 99214 OFFICE O/P EST MOD 30 MIN: CPT | Performed by: INTERNAL MEDICINE

## 2022-03-07 RX ORDER — FAMOTIDINE 20 MG/1
20 TABLET, FILM COATED ORAL DAILY
COMMUNITY
End: 2022-03-07 | Stop reason: SDUPTHER

## 2022-03-07 RX ORDER — ALBUTEROL SULFATE 90 UG/1
1 AEROSOL, METERED RESPIRATORY (INHALATION) EVERY 6 HOURS PRN
Qty: 18 G | Refills: 2 | Status: SHIPPED | OUTPATIENT
Start: 2022-03-07 | End: 2022-05-09

## 2022-03-07 RX ORDER — FAMOTIDINE 20 MG/1
20 TABLET, FILM COATED ORAL DAILY
Qty: 90 TABLET | Refills: 3 | Status: SHIPPED | OUTPATIENT
Start: 2022-03-07 | End: 2023-02-27

## 2022-03-07 NOTE — PROGRESS NOTES
Mode of Visit: Video  Location of patient: home  You have chosen to receive care through a telehealth visit.  The patient has signed the video visit consent form.  The visit included audio and video interaction. No technical issues occurred during this visit.     Juan Daniel Barron is a 51 y.o. female here for follow-up recent hospitalization for cytokine release syndrome and pneumonia due to COVID-19.  It has been about a month since she has been discharged.  She is still on 3 L O2 via NC.  She has tried to go without it but her O2 will go down to 82 to 85%.  With her oxygen, she does not feel too short of breath though she tires easily and does not have much energy.  She is eating and drinking okay.  She does not have any acute needs other than refill of her famotidine.    I have reviewed the following portions of the patient's history and confirmed they are accurate: current medications, past medical history, past social history, past surgical history and problem list     I have personally reviewed and performed the ROS. Jocy Snow MD     Review of Systems:  General: Fatigue  CV: negative  Respiratory: [place]  Neuro: negative  Psych: negative    Objective   LMP  (LMP Unknown)     Physical Exam  Vitals reviewed.   Constitutional:       Appearance: She is well-developed.   Pulmonary:      Effort: Pulmonary effort is normal.   Neurological:      Mental Status: She is alert and oriented to person, place, and time.   Psychiatric:         Behavior: Behavior normal.         Thought Content: Thought content normal.         Judgment: Judgment normal.         Assessment/Plan   Diagnoses and all orders for this visit:    1. Acute respiratory failure due to COVID-19 (HCC) (Primary)  -Still on 3 L O2.  Advised patient she can decrease slowly, such as 1/2 L at a time.  If she reaches 3 months and still is not off of oxygen, will refer to pulmonology.  For now, continue albuterol as needed    2. Pneumonia  due to COVID-19 virus  -     albuterol sulfate HFA (ProAir HFA) 108 (90 Base) MCG/ACT inhaler; Inhale 1 puff Every 6 (Six) Hours As Needed for Wheezing.  Dispense: 18 g; Refill: 2    3. Hilar adenopathy  -     CT Chest Without Contrast; Future    4. Gastroesophageal reflux disease, unspecified whether esophagitis present  -     famotidine (PEPCID) 20 MG tablet; Take 1 tablet by mouth Daily.  Dispense: 90 tablet; Refill: 3    Reviewed various hospital notes, labs, imaging         Jocy Snow MD

## 2022-03-08 DIAGNOSIS — G71.11 MYOTONIC DYSTROPHY, TYPE 2: ICD-10-CM

## 2022-03-08 RX ORDER — HYDROCODONE BITARTRATE AND ACETAMINOPHEN 10; 325 MG/1; MG/1
1 TABLET ORAL EVERY 6 HOURS PRN
Qty: 28 TABLET | Refills: 0 | Status: SHIPPED | OUTPATIENT
Start: 2022-03-08 | End: 2022-04-05 | Stop reason: SDUPTHER

## 2022-03-08 RX ORDER — GABAPENTIN 600 MG/1
600 TABLET ORAL 3 TIMES DAILY
Qty: 21 TABLET | Refills: 0 | Status: SHIPPED | OUTPATIENT
Start: 2022-03-08 | End: 2022-10-17 | Stop reason: SDUPTHER

## 2022-03-14 DIAGNOSIS — R59.0 HILAR ADENOPATHY: Primary | ICD-10-CM

## 2022-03-15 RX ORDER — BACLOFEN 10 MG/1
10 TABLET ORAL 3 TIMES DAILY
Qty: 270 TABLET | Refills: 1 | Status: SHIPPED | OUTPATIENT
Start: 2022-03-15 | End: 2022-03-15 | Stop reason: SDUPTHER

## 2022-03-15 RX ORDER — BACLOFEN 10 MG/1
10 TABLET ORAL 3 TIMES DAILY
Qty: 270 TABLET | Refills: 1 | Status: SHIPPED | OUTPATIENT
Start: 2022-03-15 | End: 2022-12-05 | Stop reason: SDUPTHER

## 2022-03-22 RX ORDER — BUSPIRONE HYDROCHLORIDE 15 MG/1
TABLET ORAL
Qty: 270 TABLET | Refills: 1 | Status: SHIPPED | OUTPATIENT
Start: 2022-03-22 | End: 2022-10-12

## 2022-03-24 RX ORDER — CYCLOBENZAPRINE HCL 10 MG
10 TABLET ORAL 3 TIMES DAILY PRN
Qty: 270 TABLET | Refills: 2 | Status: SHIPPED | OUTPATIENT
Start: 2022-03-24 | End: 2022-03-29 | Stop reason: SDUPTHER

## 2022-03-29 ENCOUNTER — TELEPHONE (OUTPATIENT)
Dept: INTERNAL MEDICINE | Facility: CLINIC | Age: 52
End: 2022-03-29

## 2022-03-29 RX ORDER — CYCLOBENZAPRINE HCL 10 MG
10 TABLET ORAL 3 TIMES DAILY PRN
Qty: 270 TABLET | Refills: 2 | Status: SHIPPED | OUTPATIENT
Start: 2022-03-29 | End: 2023-01-03 | Stop reason: SDUPTHER

## 2022-03-29 NOTE — TELEPHONE ENCOUNTER
Caller: Hafsa Barron    Relationship: Self    Best call back number: [phone]   What medications are you currently taking:   Current Outpatient Medications on File Prior to Visit   Medication Sig Dispense Refill   • albuterol (PROVENTIL) (2.5 MG/3ML) 0.083% nebulizer solution Take 2.5 mg by nebulization Every 4 (Four) Hours As Needed for Wheezing. 90 vial 1   • albuterol sulfate HFA (ProAir HFA) 108 (90 Base) MCG/ACT inhaler Inhale 1 puff Every 6 (Six) Hours As Needed for Wheezing. 18 g 2   • amLODIPine (NORVASC) 2.5 MG tablet TAKE ONE TABLET BY MOUTH DAILY 30 tablet 11   • aspirin 81 MG EC tablet Take 81 mg by mouth Daily.     • atorvastatin (LIPITOR) 80 MG tablet TAKE ONE TABLET BY MOUTH ONCE NIGHTLY 30 tablet 11   • baclofen (LIORESAL) 10 MG tablet Take 1 tablet by mouth 3 (Three) Times a Day. 270 tablet 1   • Blood Glucose Monitoring Suppl (ONE TOUCH ULTRA 2) w/Device kit      • busPIRone (BUSPAR) 15 MG tablet TAKE ONE TABLET BY MOUTH THREE TIMES A  tablet 1   • calcium carbonate-vitamin d (CALCIUM 600+D HIGH POTENCY) 600-400 MG-UNIT per tablet Take 1 tablet by mouth Daily. 90 tablet 2   • clopidogrel (PLAVIX) 75 MG tablet TAKE ONE TABLET BY MOUTH DAILY 90 tablet 3   • conjugated estrogens (Premarin) 0.625 MG/GM vaginal cream Insert  into the vagina Daily. Apply one finger tip strip of cream into vagina, three times weekly at night (M/W/F) 30 g 5   • cyclobenzaprine (FLEXERIL) 10 MG tablet Take 1 tablet by mouth 3 (Three) Times a Day As Needed for Muscle Spasms. 270 tablet 2   • diclofenac (VOLTAREN) 75 MG EC tablet Take 1 tablet by mouth 2 (Two) Times a Day. 180 tablet 3   • famotidine (PEPCID) 20 MG tablet Take 1 tablet by mouth Daily. 90 tablet 3   • fenofibrate (TRICOR) 145 MG tablet TAKE ONE TABLET BY MOUTH DAILY 90 tablet 1   • fluticasone (FLONASE ALLERGY RELIEF) 50 MCG/ACT nasal spray into each nostril.     • gabapentin (NEURONTIN) 600 MG tablet Take 1 tablet by mouth 3 (Three) Times a  Day. 21 tablet 0   • guaiFENesin-dextromethorphan (ROBITUSSIN DM) 100-10 MG/5ML syrup Take 5 mL by mouth Every 4 (Four) Hours As Needed for Cough. 471 mL 0   • HYDROcodone-acetaminophen (NORCO)  MG per tablet Take 1 tablet by mouth Every 6 (Six) Hours As Needed for Moderate Pain . 28 tablet 0   • loratadine (CLARITIN) 10 MG tablet Take 1 tablet by mouth Daily. 30 tablet 11   • metFORMIN (GLUCOPHAGE) 1000 MG tablet TAKE ONE TABLET BY MOUTH TWICE A DAY WITH MEALS 60 tablet 5   • modafinil (PROVIGIL) 100 MG tablet Take 1 tablet by mouth Daily. 30 tablet 3   • modafinil (PROVIGIL) 200 MG tablet Take 1 tablet by mouth Daily. 30 tablet 3   • montelukast (SINGULAIR) 10 MG tablet Take 1 tablet by mouth Every Evening. 90 tablet 3   • ONE TOUCH ULTRA TEST test strip      • oxybutynin XL (DITROPAN-XL) 5 MG 24 hr tablet Take 1 tablet by mouth Daily. 90 tablet 3   • pantoprazole (PROTONIX) 40 MG EC tablet Take 1 tablet by mouth Daily. 90 tablet 1   • potassium chloride ER (K-TAB) 20 MEQ tablet controlled-release ER tablet TAKE 1 TABLET BY MOUTH EVERY DAY 90 tablet 2   • spironolactone (ALDACTONE) 50 MG tablet Take 1 tablet by mouth Daily. 90 tablet 3   • tiZANidine (ZANAFLEX) 4 MG tablet TAKE ONE TABLET BY MOUTH ONCE NIGHTLY 90 tablet 1   • venlafaxine XR (EFFEXOR-XR) 37.5 MG 24 hr capsule Take 1 capsule by mouth Daily. 90 capsule 3     No current facility-administered medications on file prior to visit.          When did you start taking these medications: YEARS AGO    Which medication are you concerned about: tiZANidine (ZANAFLEX) 4 MG tablet AND cyclobenzaprine (FLEXERIL) 10 MG tablet    Who prescribed you this medication:DR CHAPIN    What are your concerns: THE PATIENT REPORTS THAT SHE ENHANCED HER INSURANCE MEDICARE COVERAGE AND NOW THE [facility] IN [facility] (PHONE  [phone]) STATES THAT CLARIFICATION IS REQUIRED AS TO WHY THE PATIENT IS TAKING THESE TWO MEDICATIONS TOGETHER.     How long have you had  these concerns: THE PATIENT STATES THE [facility] AT [facility] CANNOT APPROVE THE REFILL REQUEST ON THE CYCLOBENZAPRINE UNTIL THEY HEAR BACK FROM THE DOCTOR .  PLEASE CALL [facility] IN [facility] AND [facility] AT PHONE  [phone].     PLEASE CALL THE PATIENT IF ANY QUESTIONS -[phone].

## 2022-03-29 NOTE — TELEPHONE ENCOUNTER
Patient cannot take flexeril and zanaflex. Looks like she had a script for zanflex and flexeril called in by dr. Salter in Feb but Dr. Marcus filled flexeril recently. Please contact patient and clarify what she is taking and who is prescribing.

## 2022-03-29 NOTE — TELEPHONE ENCOUNTER
.  Caller: IRENE [address] - [address] [facility] - [phone]  - [phone] FX    Relationship: Pharmacy    Best call back number: [phone]    What was the call regarding: PHARMACY STATES THAT THE PATIENT CAN NOT TAKE BOTH OF THESE MEDICATIONS tiZANidine (ZANAFLEX) 4 MG tablet AND cyclobenzaprine (FLEXERIL) 10 MG tablet    Do you require a callback: YES

## 2022-03-30 ENCOUNTER — TELEPHONE (OUTPATIENT)
Dept: INTERNAL MEDICINE | Facility: CLINIC | Age: 52
End: 2022-03-30

## 2022-03-30 NOTE — TELEPHONE ENCOUNTER
Caller: Hafsa Barron    Relationship to patient: Self    Best call back number:  [phone]    Patient is needing: PATIENT STATED THAT SHE WANTED TO ADD TO MESSAGE THAT [facility] HAS ALREADY SENT MEDICATIONS ON 03-29-22 BUT PHARMACY WILL NOT FILL UNTIL PROVIDER CALLS AND CONFIRMS THAT PATIENT IS SUPPOSE TO BE ON BOTH tiZANidine (ZANAFLEX) 4 MG tablet AND cyclobenzaprine (FLEXERIL) 10 MG tablet     PLEASE [facility]

## 2022-03-30 NOTE — TELEPHONE ENCOUNTER
Yes please have someone send dr. vaca a note to follow up on it.  i'm not comfortable filling both due to not knowing her history.

## 2022-04-04 ENCOUNTER — PATIENT MESSAGE (OUTPATIENT)
Dept: INTERNAL MEDICINE | Facility: CLINIC | Age: 52
End: 2022-04-04

## 2022-04-05 DIAGNOSIS — G71.11 MYOTONIC DYSTROPHY, TYPE 2: ICD-10-CM

## 2022-04-05 RX ORDER — HYDROCODONE BITARTRATE AND ACETAMINOPHEN 10; 325 MG/1; MG/1
1 TABLET ORAL EVERY 6 HOURS PRN
Qty: 28 TABLET | Refills: 0 | Status: SHIPPED | OUTPATIENT
Start: 2022-04-05 | End: 2022-05-02 | Stop reason: SDUPTHER

## 2022-04-12 ENCOUNTER — TELEMEDICINE (OUTPATIENT)
Dept: NEUROLOGY | Facility: CLINIC | Age: 52
End: 2022-04-12

## 2022-04-12 DIAGNOSIS — G71.11 MYOTONIC DYSTROPHY, TYPE 2: ICD-10-CM

## 2022-04-12 DIAGNOSIS — G47.19 DAYTIME HYPERSOMNOLENCE: Primary | ICD-10-CM

## 2022-04-12 PROCEDURE — 99213 OFFICE O/P EST LOW 20 MIN: CPT | Performed by: PSYCHIATRY & NEUROLOGY

## 2022-04-12 RX ORDER — METHYLPHENIDATE HYDROCHLORIDE 10 MG/1
10 TABLET ORAL DAILY
Qty: 30 TABLET | Refills: 0 | Status: SHIPPED | OUTPATIENT
Start: 2022-04-12 | End: 2022-05-20 | Stop reason: SDUPTHER

## 2022-04-12 NOTE — PROGRESS NOTES
Subjective:    CC: Hafsa Barron is followed for myotonic dystrophy.    HPI:  Initial visit: 2/18/2020: Patient is a 49-year-old female with known history of type II myotonic dystrophy referred to clinic to establish care.  She reports that she started having symptoms back in 2003 soon after delivery.  At that time she noted some leg weakness which progressively became worse and in 2009, she was seen by  neuromuscular department and underwent detailed neurological examination, which was suggestive of proximal muscle weakness and possibility of myopathy.  Following this, EMG was performed which showed diffuse myotonic discharges and eventually genetic testing was done which confirmed the diagnosis of type II myotonic dystrophy.  She has followed with  neuroscience since year 2009 and was followed initially by Dr. Marina and then Dr. Lynn.  Since her diagnosis in 2009, she reports that there is been slight worsening in bilateral proximal muscle weakness in foot and lower extremities.  In year 2013, she reports that she had a stroke which affected her left upper and lower extremity strength.  Currently she was uses cane for shorter distances and electrical scooter for longer distances.  She is established with ophthalmology and has regular eye examination as well as is established with cardiology to monitor her heart health.  She does have history of obstructive sleep apnea and uses CPAP machine regularly.  Initially, she reports that it had helped her significantly but now she reports that even after using it regularly, she wakes up tired and reports extreme daytime somnolence, tiredness and fatigue.  She was prescribed Nuvigil by her sleep physician which initially helped and then it stopped working.  She has never tried Provigil for daytime somnolence.  She denies any problems with vision.  Currently she takes baclofen 10 mg 3 times a day, tizanidine and cyclobenzaprine only at night.  She also takes  gabapentin for paresthesias and muscle spasms.  She does report poor sleep quality.  She is on BuSpar 15 mg 3 times a day and venlafaxine 37.5 mg daily for mood    7/15/2020: This is a follow-up done through video.  Since her last visit, she reports that bilateral proximal muscle weakness remains stable without any worsening.  She has been taking Nuvigil 100 mg daily now for last 2 months and she reports that it has helped her tremendously in keeping daytime hypersomnolence, fatigue and tiredness under good control.  She is physically more active now.    1/18/2021: This is a follow-up done through video.  Since her last visit in July, she reports that after increasing the dose of Nuvigil to 200 mg daily, she did really well until about Christmas week following which she reports that she has started feeling daytime somnolence, tiredness and fatigue again.  She continues to take 200 mg dose but reports that it has not been as effective as it used to be.  As far as myotonic dystrophy is concerned, it remains stable without any worsening in muscle strength.    4/12/2022: This is a follow-up done through video.  Since her last visit in January 2021, she reports that she has been taking Provigil 300 mg daily but reports that it has not been helping the way it did when she started taking it initially.  She is interested in trying Ritalin and see if it helps better.  Ubrelvy physical she is reporting bilateral knee pain radiating down to both her legs for quite some time now.  She has seen orthopedic surgery and has had cortisone shots in her knee which helps temporarily.  She is interested in getting a second opinion for knee replacement surgery and she will be scheduling it soon.  She had COVID back in February 2022 and since then, she feels that overall she is feeling weaker in both her upper and lower extremities.    The following portions of the patient's history were reviewed and updated as of 04/12/2022: allergies,  social history and problem list.       Current Outpatient Medications:   •  albuterol (PROVENTIL) (2.5 MG/3ML) 0.083% nebulizer solution, Take 2.5 mg by nebulization Every 4 (Four) Hours As Needed for Wheezing., Disp: 90 vial, Rfl: 1  •  albuterol sulfate HFA (ProAir HFA) 108 (90 Base) MCG/ACT inhaler, Inhale 1 puff Every 6 (Six) Hours As Needed for Wheezing., Disp: 18 g, Rfl: 2  •  amLODIPine (NORVASC) 2.5 MG tablet, TAKE ONE TABLET BY MOUTH DAILY, Disp: 30 tablet, Rfl: 11  •  aspirin 81 MG EC tablet, Take 81 mg by mouth Daily., Disp: , Rfl:   •  atorvastatin (LIPITOR) 80 MG tablet, TAKE ONE TABLET BY MOUTH ONCE NIGHTLY, Disp: 30 tablet, Rfl: 11  •  baclofen (LIORESAL) 10 MG tablet, Take 1 tablet by mouth 3 (Three) Times a Day., Disp: 270 tablet, Rfl: 1  •  Blood Glucose Monitoring Suppl (ONE TOUCH ULTRA 2) w/Device kit, , Disp: , Rfl:   •  busPIRone (BUSPAR) 15 MG tablet, TAKE ONE TABLET BY MOUTH THREE TIMES A DAY, Disp: 270 tablet, Rfl: 1  •  calcium carbonate-vitamin d (CALCIUM 600+D HIGH POTENCY) 600-400 MG-UNIT per tablet, Take 1 tablet by mouth Daily., Disp: 90 tablet, Rfl: 2  •  clopidogrel (PLAVIX) 75 MG tablet, TAKE ONE TABLET BY MOUTH DAILY, Disp: 90 tablet, Rfl: 3  •  conjugated estrogens (Premarin) 0.625 MG/GM vaginal cream, Insert  into the vagina Daily. Apply one finger tip strip of cream into vagina, three times weekly at night (M/W/F), Disp: 30 g, Rfl: 5  •  cyclobenzaprine (FLEXERIL) 10 MG tablet, Take 1 tablet by mouth 3 (Three) Times a Day As Needed for Muscle Spasms., Disp: 270 tablet, Rfl: 2  •  diclofenac (VOLTAREN) 75 MG EC tablet, Take 1 tablet by mouth 2 (Two) Times a Day., Disp: 180 tablet, Rfl: 3  •  famotidine (PEPCID) 20 MG tablet, Take 1 tablet by mouth Daily., Disp: 90 tablet, Rfl: 3  •  fenofibrate (TRICOR) 145 MG tablet, TAKE ONE TABLET BY MOUTH DAILY, Disp: 90 tablet, Rfl: 1  •  fluticasone (FLONASE ALLERGY RELIEF) 50 MCG/ACT nasal spray, into each nostril., Disp: , Rfl:   •   gabapentin (NEURONTIN) 600 MG tablet, Take 1 tablet by mouth 3 (Three) Times a Day., Disp: 21 tablet, Rfl: 0  •  guaiFENesin-dextromethorphan (ROBITUSSIN DM) 100-10 MG/5ML syrup, Take 5 mL by mouth Every 4 (Four) Hours As Needed for Cough., Disp: 471 mL, Rfl: 0  •  HYDROcodone-acetaminophen (NORCO)  MG per tablet, Take 1 tablet by mouth Every 6 (Six) Hours As Needed for Moderate Pain ., Disp: 28 tablet, Rfl: 0  •  loratadine (CLARITIN) 10 MG tablet, Take 1 tablet by mouth Daily., Disp: 30 tablet, Rfl: 11  •  metFORMIN (GLUCOPHAGE) 1000 MG tablet, TAKE ONE TABLET BY MOUTH TWICE A DAY WITH MEALS, Disp: 60 tablet, Rfl: 5  •  methylphenidate (RITALIN) 10 MG tablet, Take 1 tablet by mouth Daily for 30 days., Disp: 30 tablet, Rfl: 0  •  montelukast (SINGULAIR) 10 MG tablet, Take 1 tablet by mouth Every Evening., Disp: 90 tablet, Rfl: 3  •  ONE TOUCH ULTRA TEST test strip, , Disp: , Rfl:   •  oxybutynin XL (DITROPAN-XL) 5 MG 24 hr tablet, Take 1 tablet by mouth Daily., Disp: 90 tablet, Rfl: 3  •  pantoprazole (PROTONIX) 40 MG EC tablet, Take 1 tablet by mouth Daily., Disp: 90 tablet, Rfl: 1  •  potassium chloride ER (K-TAB) 20 MEQ tablet controlled-release ER tablet, TAKE 1 TABLET BY MOUTH EVERY DAY, Disp: 90 tablet, Rfl: 2  •  spironolactone (ALDACTONE) 50 MG tablet, Take 1 tablet by mouth Daily., Disp: 90 tablet, Rfl: 3  •  tiZANidine (ZANAFLEX) 4 MG tablet, TAKE ONE TABLET BY MOUTH ONCE NIGHTLY, Disp: 90 tablet, Rfl: 1  •  venlafaxine XR (EFFEXOR-XR) 37.5 MG 24 hr capsule, Take 1 capsule by mouth Daily., Disp: 90 capsule, Rfl: 3   Past Medical History:   Diagnosis Date   • Allergic    • Allergy    • Anxiety    • Asthma Allergies induced   • Depression    • Fibromyalgia, primary    • GERD (gastroesophageal reflux disease)    • Headache    • History of blood clots    • History of UTI    • Hyperlipidemia    • Hypertension    • IBS (irritable bowel syndrome)    • Internal hemorrhoid    • Kidney stone    • Low back  pain    • Muscular dystrophy ([facility])    • Muscular dystrophy ([facility])     II   • Myotonia    • Obesity    • Stroke ([facility]) 2013    resdual- left sided weakness.    • Type 2 diabetes mellitus without complication, without long-term current use of insulin ([facility])       Past Surgical History:   Procedure Laterality Date   • CHOLECYSTECTOMY  2004   • COLONOSCOPY  2017   • D & C WITH SUCTION     • LYMPH NODE BIOPSY     • SUBCLAVIAN ARTERY STENT  08/2018    x2   • WISDOM TOOTH EXTRACTION        Family History   Problem Relation Age of Onset   • Allergies Other    • ADD / ADHD Other    • Heart block Other    • Other Other         CEREBROVASCULAR ACCIDENT    • Hypertension Other    • Cancer Other         LUNG CANCER   • Hyperlipidemia Other    • Alcohol abuse Mother    • Depression Mother    • Early death Mother          age 59   • Heart disease Mother         Mother  of Massive Heart attack   • Hyperlipidemia Mother    • Hypertension Mother    • Miscarriages / Stillbirths Mother         Miscarriage   • Heart attack Mother          of  massive heart attack   • Alcohol abuse Father    • Cancer Father         Had Lung Cancer - had 1 lung till death   • Diabetes Father         Lived on insuline shots   • Early death Father          age 53   • Breast cancer Maternal Aunt    • Learning disabilities Son         Had Adhd   • Breast cancer Cousin    • Ovarian cancer Neg Hx    • Endometrial cancer Neg Hx    • Uterine cancer Neg Hx    • Colon cancer Neg Hx         Review of Systems  Objective:    LMP  (LMP Unknown)     Neurology Exam:  General apperance: NAD.     Mental status: Alert, awake and oriented to time place and person.    Recent and Remote memory: Can recall 3/3 objects at 5 minutes. Can recall historical events.     Attention span and Concentration: Serial 7s: Normal.     Fund of knowledge:  Normal.     Language and Speech: No aphasia or dysarthria.    Naming , Repitition and Comprehension:  Can  name objects, repeat a sentence and follow commands. Speech is clear and fluent with good repetition, comprehension, and naming.    CN II to XII: Intact.    Opthalmoscopic Exam: No papilledema.    Motor:  Right UE muscle strength 5/5. Normal tone.     Left UE muscle strength 5/5. Normal tone.      Right LE muscle strength5/5. Normal tone.     Left LE muscle strength 5/5. Normal tone.      Sensory: Normal light touch, vibration and pinprick sensation bilaterally.    DTRs: 2+ bilaterally.    Babinski: Negative bilaterally.    Co-ordination: Normal finger-to-nose, heel to shin B/L.    Rhomberg: Negative.    Gait: Normal.    Cardiovascular: Regular rate and rhythm without murmur, gallop or rub.    Assessment and Plan:  1. Daytime hypersomnolence  2. Myotonic dystrophy, type 2 (HCC)  -Since Provigil is not working well, it will be stopped and instead will start her on Ritalin 10 mg to be taken once a day and see how she does.  Based on the response, further dose adjustment will be made.  I have advised her to call office with response in 2 to 3 weeks.  She is generally feeling weak since having Covid back in February.  I have assured her that with time, this will resolve- a lot of patients are having lingering symptoms after Covid.  Continue gabapentin as it is and I will plan to see her back in clinic in 6 months for follow-up.     This is a follow-up done through video and total time spent was 15 minutes.    No follow-ups on file.

## 2022-04-18 ENCOUNTER — APPOINTMENT (OUTPATIENT)
Dept: CT IMAGING | Facility: HOSPITAL | Age: 52
End: 2022-04-18

## 2022-04-27 ENCOUNTER — APPOINTMENT (OUTPATIENT)
Dept: MAMMOGRAPHY | Facility: HOSPITAL | Age: 52
End: 2022-04-27

## 2022-04-27 ENCOUNTER — HOSPITAL ENCOUNTER (OUTPATIENT)
Dept: MAMMOGRAPHY | Facility: HOSPITAL | Age: 52
Discharge: HOME | End: 2022-04-27
Admitting: NURSE PRACTITIONER

## 2022-04-27 DIAGNOSIS — Z12.31 VISIT FOR SCREENING MAMMOGRAM: ICD-10-CM

## 2022-04-27 PROCEDURE — 77067 SCR MAMMO BI INCL CAD: CPT

## 2022-04-27 PROCEDURE — 77063 BREAST TOMOSYNTHESIS BI: CPT | Performed by: RADIOLOGY

## 2022-04-27 PROCEDURE — 77063 BREAST TOMOSYNTHESIS BI: CPT

## 2022-04-27 PROCEDURE — 77067 SCR MAMMO BI INCL CAD: CPT | Performed by: RADIOLOGY

## 2022-05-02 DIAGNOSIS — G71.11 MYOTONIC DYSTROPHY, TYPE 2: ICD-10-CM

## 2022-05-02 RX ORDER — HYDROCODONE BITARTRATE AND ACETAMINOPHEN 10; 325 MG/1; MG/1
1 TABLET ORAL EVERY 6 HOURS PRN
Qty: 28 TABLET | Refills: 0 | Status: SHIPPED | OUTPATIENT
Start: 2022-05-02 | End: 2022-07-25 | Stop reason: SDUPTHER

## 2022-05-04 DIAGNOSIS — G71.11 MYOTONIC DYSTROPHY, TYPE 2: ICD-10-CM

## 2022-05-04 RX ORDER — DICLOFENAC SODIUM 75 MG/1
75 TABLET, DELAYED RELEASE ORAL 2 TIMES DAILY
Qty: 180 TABLET | Refills: 3 | Status: SHIPPED | OUTPATIENT
Start: 2022-05-04 | End: 2022-12-05 | Stop reason: SDUPTHER

## 2022-05-05 ENCOUNTER — HOSPITAL ENCOUNTER (OUTPATIENT)
Dept: CT IMAGING | Facility: HOSPITAL | Age: 52
Discharge: HOME | End: 2022-05-05
Admitting: INTERNAL MEDICINE

## 2022-05-05 DIAGNOSIS — R59.0 HILAR ADENOPATHY: ICD-10-CM

## 2022-05-05 LAB — CREAT BLDA-MCNC: 0.6 MG/DL (ref 0.6–1.3)

## 2022-05-05 PROCEDURE — 82565 ASSAY OF CREATININE: CPT

## 2022-05-05 PROCEDURE — 25010000002 IOPAMIDOL 61 % SOLUTION: Performed by: INTERNAL MEDICINE

## 2022-05-05 PROCEDURE — 71270 CT THORAX DX C-/C+: CPT

## 2022-05-05 RX ADMIN — IOPAMIDOL 100 ML: 612 INJECTION, SOLUTION INTRAVENOUS at 15:42

## 2022-05-06 RX ORDER — MONTELUKAST SODIUM 10 MG/1
TABLET ORAL
Qty: 90 TABLET | Refills: 3 | Status: SHIPPED | OUTPATIENT
Start: 2022-05-06

## 2022-05-09 ENCOUNTER — LAB (OUTPATIENT)
Dept: LAB | Facility: HOSPITAL | Age: 52
End: 2022-05-09

## 2022-05-09 ENCOUNTER — OFFICE VISIT (OUTPATIENT)
Dept: INTERNAL MEDICINE | Facility: CLINIC | Age: 52
End: 2022-05-09

## 2022-05-09 VITALS
HEART RATE: 84 BPM | TEMPERATURE: 97.8 F | DIASTOLIC BLOOD PRESSURE: 74 MMHG | SYSTOLIC BLOOD PRESSURE: 118 MMHG | HEIGHT: 66 IN | OXYGEN SATURATION: 98 % | WEIGHT: 227 LBS | BODY MASS INDEX: 36.48 KG/M2

## 2022-05-09 DIAGNOSIS — E11.9 TYPE 2 DIABETES MELLITUS WITHOUT COMPLICATION, WITHOUT LONG-TERM CURRENT USE OF INSULIN: ICD-10-CM

## 2022-05-09 DIAGNOSIS — M25.562 CHRONIC PAIN OF LEFT KNEE: ICD-10-CM

## 2022-05-09 DIAGNOSIS — G89.29 CHRONIC PAIN OF LEFT KNEE: ICD-10-CM

## 2022-05-09 DIAGNOSIS — J96.01 ACUTE RESPIRATORY FAILURE WITH HYPOXIA: Primary | ICD-10-CM

## 2022-05-09 DIAGNOSIS — Z01.84 IMMUNITY STATUS TESTING: ICD-10-CM

## 2022-05-09 LAB
ALBUMIN SERPL-MCNC: 4.5 G/DL (ref 3.5–5.2)
ALBUMIN/GLOB SERPL: 2.1 G/DL
ALP SERPL-CCNC: 68 U/L (ref 39–117)
ALT SERPL W P-5'-P-CCNC: 30 U/L (ref 1–33)
ANION GAP SERPL CALCULATED.3IONS-SCNC: 12 MMOL/L (ref 5–15)
AST SERPL-CCNC: 29 U/L (ref 1–32)
BILIRUB SERPL-MCNC: 0.3 MG/DL (ref 0–1.2)
BUN SERPL-MCNC: 23 MG/DL (ref 6–20)
BUN/CREAT SERPL: 48.9 (ref 7–25)
CALCIUM SPEC-SCNC: 10.2 MG/DL (ref 8.6–10.5)
CHLORIDE SERPL-SCNC: 105 MMOL/L (ref 98–107)
CHOLEST SERPL-MCNC: 145 MG/DL (ref 0–200)
CO2 SERPL-SCNC: 27 MMOL/L (ref 22–29)
CREAT SERPL-MCNC: 0.47 MG/DL (ref 0.57–1)
EGFRCR SERPLBLD CKD-EPI 2021: 114.7 ML/MIN/1.73
GLOBULIN UR ELPH-MCNC: 2.1 GM/DL
GLUCOSE SERPL-MCNC: 67 MG/DL (ref 65–99)
HBA1C MFR BLD: 6.1 % (ref 4.8–5.6)
HDLC SERPL-MCNC: 63 MG/DL (ref 40–60)
LDLC SERPL CALC-MCNC: 67 MG/DL (ref 0–100)
LDLC/HDLC SERPL: 1.06 {RATIO}
POTASSIUM SERPL-SCNC: 4.2 MMOL/L (ref 3.5–5.2)
PROT SERPL-MCNC: 6.6 G/DL (ref 6–8.5)
SODIUM SERPL-SCNC: 144 MMOL/L (ref 136–145)
TRIGL SERPL-MCNC: 75 MG/DL (ref 0–150)
VLDLC SERPL-MCNC: 15 MG/DL (ref 5–40)

## 2022-05-09 PROCEDURE — 82570 ASSAY OF URINE CREATININE: CPT | Performed by: INTERNAL MEDICINE

## 2022-05-09 PROCEDURE — 99214 OFFICE O/P EST MOD 30 MIN: CPT | Performed by: INTERNAL MEDICINE

## 2022-05-09 PROCEDURE — 86765 RUBEOLA ANTIBODY: CPT | Performed by: INTERNAL MEDICINE

## 2022-05-09 PROCEDURE — 86735 MUMPS ANTIBODY: CPT | Performed by: INTERNAL MEDICINE

## 2022-05-09 PROCEDURE — 80061 LIPID PANEL: CPT | Performed by: INTERNAL MEDICINE

## 2022-05-09 PROCEDURE — 80053 COMPREHEN METABOLIC PANEL: CPT | Performed by: INTERNAL MEDICINE

## 2022-05-09 PROCEDURE — 82043 UR ALBUMIN QUANTITATIVE: CPT | Performed by: INTERNAL MEDICINE

## 2022-05-09 PROCEDURE — 86762 RUBELLA ANTIBODY: CPT | Performed by: INTERNAL MEDICINE

## 2022-05-09 PROCEDURE — 83036 HEMOGLOBIN GLYCOSYLATED A1C: CPT | Performed by: INTERNAL MEDICINE

## 2022-05-09 RX ORDER — LEVALBUTEROL TARTRATE 45 UG/1
1-2 AEROSOL, METERED ORAL EVERY 4 HOURS PRN
Qty: 15 G | Refills: 11 | Status: SHIPPED | OUTPATIENT
Start: 2022-05-09

## 2022-05-10 LAB
ALBUMIN UR-MCNC: <1.2 MG/DL
CREAT UR-MCNC: 103.5 MG/DL
MICROALBUMIN/CREAT UR: NORMAL MG/G{CREAT}

## 2022-05-10 NOTE — PROGRESS NOTES
"Subjective   Hafsa Barron is a 51 y.o. female here for follow-up acute respiratory failure secondary to COVID, DMII, worsening left knee pain.  Patient is still on oxygen, between 2 and 3 L.  She says if she goes off of her oxygen for about an hour her oxygen can fall into the upper 80s.  She has no prior history of any lung disease.  She desires to taper off of her oxygen.  She feels great.  She had a CT scan recently which showed resolution of prior pneumonia, no scarring.  Needs some blood work for diabetes.  Left knee has been really bothering her.  She is interested in seeing orthopedics.  She is on Norco which does not really help this pain.  She has been going to an arthritis center in town and getting the \"cock's comb\" injections but they are expensive.    I have reviewed the following portions of the patient's history and confirmed they are accurate: current medications, past medical history, past social history and problem list     I have personally reviewed and performed the ROS. Jocy Snow MD     Review of Systems:  General: negative  CV: negative  Respiratory: negative  MSK: Knee pain  Endo: Negative    Objective   /74   Pulse 84   Temp 97.8 °F (36.6 °C) (Temporal)   Ht 167.6 cm (66\")   Wt 103 kg (227 lb)   LMP  (LMP Unknown)   SpO2 98%   BMI 36.64 kg/m²     Physical Exam  Vitals reviewed.   Constitutional:       Appearance: She is well-developed.   Cardiovascular:      Rate and Rhythm: Normal rate and regular rhythm.      Heart sounds: Normal heart sounds.   Pulmonary:      Effort: Pulmonary effort is normal.      Breath sounds: Normal breath sounds. No wheezing or rales.   Skin:     General: Skin is warm and dry.   Neurological:      Mental Status: She is alert and oriented to person, place, and time.   Psychiatric:         Behavior: Behavior normal.         Thought Content: Thought content normal.         A&P  Diagnoses and all orders for this visit:    1. Acute " respiratory failure with hypoxia (HCC) (Primary)  -can safely wean O2 at this point. Advised her on how to do that. Advised her to keep nighttime O2 for last to d/c  -     levalbuterol (XOPENEX HFA) 45 MCG/ACT inhaler; Inhale 1-2 puffs Every 4 (Four) Hours As Needed for Wheezing or Shortness of Air.  Dispense: 15 g; Refill: 11    2. Type 2 diabetes mellitus without complication, without long-term current use of insulin (HCC)  -     Lipid Panel  -     Hemoglobin A1c  -     Comprehensive Metabolic Panel  -     Microalbumin / Creatinine Urine Ratio - Urine, Clean Catch    3. Chronic pain of left knee  -     Ambulatory Referral to Orthopedic Surgery    4. Immunity status testing  -     Measles / Mumps / Rubella Immunity                 Jocy Snow MD  Answers for HPI/ROS submitted by the patient on 5/5/2022  Please describe your symptoms.: Follow up & to go over a CT scan results and discuss weaning off oxygen  Have you had these symptoms before?: No  How long have you been having these symptoms?: Greater than 2 weeks  What is the primary reason for your visit?: Other

## 2022-05-11 LAB
MEV IGG SER IA-ACNC: 222 AU/ML
MUV IGG SER IA-ACNC: <9 AU/ML
RUBV IGG SERPL IA-ACNC: 18.3 INDEX

## 2022-05-17 ENCOUNTER — TELEMEDICINE (OUTPATIENT)
Dept: SLEEP MEDICINE | Facility: HOSPITAL | Age: 52
End: 2022-05-17

## 2022-05-17 DIAGNOSIS — G47.10 HYPERSOMNIA WITH SLEEP APNEA: ICD-10-CM

## 2022-05-17 DIAGNOSIS — E66.09 CLASS 2 OBESITY DUE TO EXCESS CALORIES WITHOUT SERIOUS COMORBIDITY WITH BODY MASS INDEX (BMI) OF 36.0 TO 36.9 IN ADULT: ICD-10-CM

## 2022-05-17 DIAGNOSIS — G47.30 HYPERSOMNIA WITH SLEEP APNEA: ICD-10-CM

## 2022-05-17 DIAGNOSIS — G47.33 OBSTRUCTIVE SLEEP APNEA, ADULT: ICD-10-CM

## 2022-05-17 DIAGNOSIS — R06.83 SNORING: Primary | ICD-10-CM

## 2022-05-17 PROCEDURE — 99203 OFFICE O/P NEW LOW 30 MIN: CPT | Performed by: INTERNAL MEDICINE

## 2022-05-20 DIAGNOSIS — G47.19 DAYTIME HYPERSOMNOLENCE: ICD-10-CM

## 2022-05-20 RX ORDER — METHYLPHENIDATE HYDROCHLORIDE 10 MG/1
10 TABLET ORAL DAILY
Qty: 30 TABLET | Refills: 0 | Status: SHIPPED | OUTPATIENT
Start: 2022-05-20 | End: 2022-06-20 | Stop reason: SDUPTHER

## 2022-05-20 RX ORDER — SPIRONOLACTONE 50 MG/1
TABLET, FILM COATED ORAL
Qty: 90 TABLET | Refills: 3 | Status: SHIPPED | OUTPATIENT
Start: 2022-05-20

## 2022-05-20 RX ORDER — VENLAFAXINE HYDROCHLORIDE 37.5 MG/1
CAPSULE, EXTENDED RELEASE ORAL
Qty: 90 CAPSULE | Refills: 3 | Status: SHIPPED | OUTPATIENT
Start: 2022-05-20 | End: 2022-08-19

## 2022-05-20 NOTE — TELEPHONE ENCOUNTER
Rx Refill Note  Requested Prescriptions     Pending Prescriptions Disp Refills   • methylphenidate (RITALIN) 10 MG tablet 30 tablet 0     Sig: Take 1 tablet by mouth Daily for 30 days.      Last office visit with prescribing clinician: Visit date not found      Next office visit with prescribing clinician: 10/17/2022            Fei Hinojosa MA  05/20/22, 12:04 EDT

## 2022-05-20 NOTE — TELEPHONE ENCOUNTER
Caller:  IRVINMOIZ ARENASO    Relationship: SELF    Best call back number: [phone]    Requested Prescriptions: methylphenidate (RITALIN) 10 MG tablet   Requested Prescriptions      No prescriptions requested or ordered in this encounter        Pharmacy where request should be sent:  [facility]    Additional details provided by patient: PT WILL BE OUT OF MEIDCATION methylphenidate (RITALIN) 10 MG tablet  ON Saturday    Does the patient have less than a 3 day supply:  [x] Yes  [] No    Bud Zepeda Rep   05/20/22 10:55 EDT

## 2022-05-25 RX ORDER — TIZANIDINE 4 MG/1
TABLET ORAL
Qty: 90 TABLET | Refills: 1 | Status: SHIPPED | OUTPATIENT
Start: 2022-05-25 | End: 2022-11-22

## 2022-06-06 ENCOUNTER — TELEPHONE (OUTPATIENT)
Dept: SLEEP MEDICINE | Facility: HOSPITAL | Age: 52
End: 2022-06-06

## 2022-06-06 NOTE — TELEPHONE ENCOUNTER
DELETE AFTER REVIEWING: Telephone encounter to be sent to the clinical pool     Caller: Hafsa Barron    Relationship: Self      Date of last appointment: 05/17/22    Issues/Supplies requested: CPAP          Ordering physician's name: DR ECHEVARRIA    Patient contact information: [phone]    PT STATED THAT SHE CONTATED [facility] REGARDING ORDER AND THEY STATED THAT THEY HAVE NOT RECEIVED ANYTHING ON THEIR END.  PT REQUEST THAT [facility] CALL HER WHEN THE ORDER IS RECEIVED.

## 2022-06-06 NOTE — TELEPHONE ENCOUNTER
AS OF TODAY. OV NOTED NEEDED HAS NOT BEEN COMPLETED. PENDING COMPLETED OV NOTES BEFORE ORDER CAN BE FAXED TO DME FOR A NEW MACHINE 06/06/22TR

## 2022-06-06 NOTE — PROGRESS NOTES
Chief Complaint  Snoring and obstructive sleep apnea.    Juan Daniel Barron presents to [facility]EP [facility] for the evaluation of snoring and obstructive sleep apnea.  Her primary care physician is Dr. Danielle.  You have chosen to receive care through a telehealth visit.  Do you consent to use a video/audio connection for your medical care today? Yes  History of Present Illness  Patient says has had snoring for at least 15 years.  She has a history of myotonic dystrophy.  She had a study change of the spine 2019 and has been on CPAP since then.  She says she feels much better when she uses her machine.  She sometimes is tired related to her myotonic dystrophy.    With her mask she generally feels rested and does not have snoring.  If she does not have her mask she does not fall asleep.  She is still sleepy during the day.  She has been prescribed Ritalin to help her stay more alert.    She occasionally has a dry mouth.  She denies ever breaking her nose.  She has occasional kicking of her legs at night.  She had previous bariatric surgery.    She has been between 2 and 4 AM.  She will fall asleep quickly.  She awakens 0-1 time per night.  She can she gets 8 to 10 hours of sleep and says she is rested with her machine.  She had hypertension known for 8 years.  She had diabetes known for 5 years.  She denies any history of heart disease.  She had COVID in February.    Past medical history:    Allergies: She is allergic to fish products and penicillin    Habits: Smoking: She smoked a pack and half per day for 15 years and quit in 2013    Alcohol: She has 1 drink per month    Caffeine: She has 1 cup of coffee per day    Medical illnesses: She has allergic rhinitis, hypertension, hyperlipidemia, diabetes, anxiety pression, fibromyalgia, myotonic dystrophy, obstructive sleep apnea.    Medications:albuterol (PROVENTIL) (2.5 MG/3ML) 0.083% nebulizer solution   amLODIPine (NORVASC)  2.5 MG tablet   aspirin 81 MG EC tablet   atorvastatin (LIPITOR) 80 MG tablet   baclofen (LIORESAL) 10 MG tablet   Blood Glucose Monitoring Suppl (ONE TOUCH ULTRA 2) w/Device kit   busPIRone (BUSPAR) 15 MG tablet   calcium carbonate-vitamin d (CALCIUM 600+D HIGH POTENCY) 600-400 MG-UNIT per tablet   clopidogrel (PLAVIX) 75 MG tablet   conjugated estrogens (Premarin) 0.625 MG/GM vaginal cream   cyclobenzaprine (FLEXERIL) 10 MG tablet   diclofenac (VOLTAREN) 75 MG EC tablet   famotidine (PEPCID) 20 MG tablet   fenofibrate (TRICOR) 145 MG tablet   fluticasone (FLONASE ALLERGY RELIEF) 50 MCG/ACT nasal spray   gabapentin (NEURONTIN) 600 MG tablet   guaiFENesin-dextromethorphan (ROBITUSSIN DM) 100-10 MG/5ML syrup   HYDROcodone-acetaminophen (NORCO)  MG per tablet   levalbuterol (XOPENEX HFA) 45 MCG/ACT inhaler   loratadine (CLARITIN) 10 MG tablet   metFORMIN (GLUCOPHAGE) 1000 MG tablet   methylphenidate (RITALIN) 10 MG tablet   montelukast (SINGULAIR) 10 MG tablet   ONE TOUCH ULTRA TEST test strip   oxybutynin XL (DITROPAN-XL) 5 MG 24 hr tablet   pantoprazole (PROTONIX) 40 MG EC tablet   potassium chloride ER (K-TAB) 20 MEQ tablet controlled-release ER tablet   spironolactone (ALDACTONE) 50 MG tablet   tiZANidine (ZANAFLEX) 4 MG tablet   venlafaxine XR (EFFEXOR-XR) 37.5 MG 24 hr capsule      Surgeries: She had subclavian artery stent, cholecystectomy, D&C, lymph node biopsy, wisdom teeth extraction    Family history: Positives include ADD, heart block, hypertension, cancer, alcohol abuse, depression, hyperlipidemia, cancer.    Review of systems: Positives include fatigue, arthritis, myalgias, allergies, depression and anxiety.  Other systems reviewed and reported as negative.    [place] score 0/24  Objective   Vital Signs:  There were no vitals taken for this visit.      Physical Exam patient appears to be awake and alert.  She does not appear to be in acute respiratory distress.  Her stated weight is 225 pounds.   Her height 66 inches.  Body mass index 36.  Result Review :    Patient had sleep study January 13, 2019.  She had an AHI of 17.6.  She had a titration study in August.  Of that year.    Download for the past 30 days shows uses her machine every night.  She is averaging 8 hours 35 minutes per night.  She is on AutoSet of 9-14.  Her AHI is normal at 0.4     Assessment and Plan   Diagnoses and all orders for this visit:    1. Snoring (Primary)  -     Detailed AutoPAP Order    2. Obstructive sleep apnea, adult  -     Detailed AutoPAP Order    3. Hypersomnia with sleep apnea    4. Class 2 obesity due to excess calories without serious comorbidity with body mass index (BMI) of 36.0 to 36.9 in adult    Patient has a history of moderate obstructive sleep apnea but has excellent control of her respiratory vents on her current pressure settings.  We will continue on these pressures.  We will renew her supplies.  We have discussed other potential therapies including weight control, oral appliance, and surgery.  We have discussed the consequences of long-term untreated obstructive sleep apnea which includes hypertension, diabetes, heart disease, stroke, and dementia.  She is encouraged to lose weight.  She is encouraged avoid alcohol and sedatives close to bedtime.  She is encouraged to practice lateral position sleep.    She is currently on Ritalin for daytime sleepiness.  She is prescribed this by neurology.       I spent 35 minutes caring for Hafsa on this date of service. This time includes time spent by me in the following activities:reviewing tests, obtaining and/or reviewing a separately obtained history, performing a medically appropriate examination and/or evaluation , counseling and educating the patient/family/caregiver, ordering medications, tests, or procedures and documenting information in the medical record  Follow Up   Return in about 1 year (around 5/17/2023) for Annual visit.  Patient was given  instructions and counseling regarding her condition or for health maintenance advice. Please see specific information pulled into the AVS if appropriate.   Mike Patterson MD [facility]  Sleep Medicine  Pulmonary and Critical Care Medicine

## 2022-06-09 ENCOUNTER — OFFICE VISIT (OUTPATIENT)
Dept: ORTHOPEDIC SURGERY | Facility: CLINIC | Age: 52
End: 2022-06-09

## 2022-06-09 VITALS
HEIGHT: 66 IN | SYSTOLIC BLOOD PRESSURE: 142 MMHG | WEIGHT: 226 LBS | DIASTOLIC BLOOD PRESSURE: 81 MMHG | BODY MASS INDEX: 36.32 KG/M2

## 2022-06-09 DIAGNOSIS — M25.562 LEFT KNEE PAIN, UNSPECIFIED CHRONICITY: Primary | ICD-10-CM

## 2022-06-09 DIAGNOSIS — E66.01 CLASS 2 SEVERE OBESITY WITH SERIOUS COMORBIDITY AND BODY MASS INDEX (BMI) OF 36.0 TO 36.9 IN ADULT, UNSPECIFIED OBESITY TYPE: ICD-10-CM

## 2022-06-09 DIAGNOSIS — E11.9 TYPE 2 DIABETES MELLITUS WITHOUT COMPLICATION, WITHOUT LONG-TERM CURRENT USE OF INSULIN: ICD-10-CM

## 2022-06-09 DIAGNOSIS — M17.12 PRIMARY OSTEOARTHRITIS OF LEFT KNEE: ICD-10-CM

## 2022-06-09 PROCEDURE — 20610 DRAIN/INJ JOINT/BURSA W/O US: CPT | Performed by: PHYSICIAN ASSISTANT

## 2022-06-09 PROCEDURE — 99214 OFFICE O/P EST MOD 30 MIN: CPT | Performed by: PHYSICIAN ASSISTANT

## 2022-06-09 RX ADMIN — TRIAMCINOLONE ACETONIDE 40 MG: 40 INJECTION, SUSPENSION INTRA-ARTICULAR; INTRAMUSCULAR at 12:21

## 2022-06-09 RX ADMIN — LIDOCAINE HYDROCHLORIDE 4 ML: 10 INJECTION, SOLUTION EPIDURAL; INFILTRATION; INTRACAUDAL; PERINEURAL at 12:21

## 2022-06-09 NOTE — PROGRESS NOTES
[facility] [place] [facility] Note    Subjective     Chief Complaint   Patient presents with   • Left Knee - Pain        HPI  Hafsa Barron is a 51 y.o. female.  New patient presents for evaluation of left knee pain.  Symptoms/pain have been ongoing since 2016, history of left knee arthroscopy (debridement) in 2016 by Dr. Bennett.    Patient has a history of COVID that resulted in acute respiratory failure and needing to be on supplemental oxygen.  Recently she saw her PCP and she has been removed from the oxygen is doing much better.  She also had a history of a stroke in 2013 that did affect her left side causing it to be weaker.  She is on Plavix chronically.  Patient also has a history of diabetes with current A1c of 6.1.  Patient also has a history of muscular dystrophy.  She is here today to discuss her worsening left knee pain.  She is received both viscosupplementation as well as corticosteroid injections to the left knee (performed at [facility]) last corticosteroid injection was February 2022 which worked well for approximately 2 months.  Viscosupplementation series given in January 2022, no help.    Pain scale: 9/10.  Severity of the pain moderate to severe.  Quality of the pain aching, throbbing, stabbing.  Associated symptoms swelling, popping, grinding, stiffness, giving away/buckling.  Activity related to pain walking, standing, sitting, stair climbing, sleeping, leisure, movement of joint.  Pain eased by resting, ice, medication, cane/walker.  No reported numbness or tingling into the extremity.  Prior treatments corticosteroid injections, viscosupplementation, bracing, anti-inflammatories, physical therapy, working on weight loss.    No reported mechanical symptoms, such as locking or catching.    Denies fever, chills, night sweats or other constitutional symptoms.  Currently using a cane to assist with ambulation.      Past Medical History:   Diagnosis Date   • Allergic    • Allergy    • Anxiety     • Asthma Allergies induced   • Depression    • Fibromyalgia, primary    • GERD (gastroesophageal reflux disease)    • Headache    • History of blood clots    • History of UTI    • Hyperlipidemia    • Hypertension    • IBS (irritable bowel syndrome)    • Internal hemorrhoid    • Kidney stone    • Low back pain    • Muscular dystrophy ([facility])    • Muscular dystrophy ([facility])     II   • Myotonia    • Obesity    • Stroke ([facility]) 2013    resdual- left sided weakness.    • Type 2 diabetes mellitus without complication, without long-term current use of insulin ([facility])       Past Surgical History:   Procedure Laterality Date   • CHOLECYSTECTOMY  2004   • COLONOSCOPY  2017   • D & C WITH SUCTION     • LYMPH NODE BIOPSY     • SUBCLAVIAN ARTERY STENT  08/2018    x2   • WISDOM TOOTH EXTRACTION        Family History   Problem Relation Age of Onset   • Allergies Other    • ADD / ADHD Other    • Heart block Other    • Other Other         CEREBROVASCULAR ACCIDENT    • Hypertension Other    • Cancer Other         LUNG CANCER   • Hyperlipidemia Other    • Alcohol abuse Mother    • Depression Mother    • Early death Mother          age 59   • Heart disease Mother         Mother  of Massive Heart attack   • Hyperlipidemia Mother    • Hypertension Mother    • Miscarriages / Stillbirths Mother         Miscarriage   • Heart attack Mother          of  massive heart attack   • Alcohol abuse Father    • Cancer Father         Had Lung Cancer  had 1 lung till death   • Diabetes Father         Lived on insuline shots   • Early death Father          age 53   • Breast cancer Maternal Aunt    • Learning disabilities Son         Had Adhd   • Breast cancer Cousin    • Ovarian cancer Neg Hx    • Endometrial cancer Neg Hx    • Uterine cancer Neg Hx    • Colon cancer Neg Hx      Social History     Socioeconomic History   • Marital status:    Tobacco Use   • Smoking status: Former Smoker     Packs/day: 1.50     Years:  15.00     Pack years: 22.50     Start date: 1983     Quit date: 2013     Years since quittin.7   • Smokeless tobacco: Never Used   • Tobacco comment: use Ecigg now NO NICOTENE   Vaping Use   • Vaping Use: Some days   • Substances: Flavoring   • Devices: Pre-filled pod   Substance and Sexual Activity   • Alcohol use: Yes   • Drug use: No   • Sexual activity: Yes     Partners: Male     Birth control/protection: Post-menopausal     Comment: Menapause no cycle since 2013      Current Outpatient Medications on File Prior to Visit   Medication Sig Dispense Refill   • albuterol (PROVENTIL) (2.5 MG/3ML) 0.083% nebulizer solution Take 2.5 mg by nebulization Every 4 (Four) Hours As Needed for Wheezing. 90 vial 1   • amLODIPine (NORVASC) 2.5 MG tablet TAKE ONE TABLET BY MOUTH DAILY 30 tablet 11   • aspirin 81 MG EC tablet Take 81 mg by mouth Daily.     • atorvastatin (LIPITOR) 80 MG tablet TAKE ONE TABLET BY MOUTH ONCE NIGHTLY 30 tablet 11   • baclofen (LIORESAL) 10 MG tablet Take 1 tablet by mouth 3 (Three) Times a Day. 270 tablet 1   • Blood Glucose Monitoring Suppl (ONE TOUCH ULTRA 2) w/Device kit      • busPIRone (BUSPAR) 15 MG tablet TAKE ONE TABLET BY MOUTH THREE TIMES A  tablet 1   • calcium carbonate-vitamin d (CALCIUM 600+D HIGH POTENCY) 600-400 MG-UNIT per tablet Take 1 tablet by mouth Daily. 90 tablet 2   • clopidogrel (PLAVIX) 75 MG tablet TAKE ONE TABLET BY MOUTH DAILY 90 tablet 3   • conjugated estrogens (Premarin) 0.625 MG/GM vaginal cream Insert  into the vagina Daily. Apply one finger tip strip of cream into vagina, three times weekly at night (M/W/F) 30 g 5   • cyclobenzaprine (FLEXERIL) 10 MG tablet Take 1 tablet by mouth 3 (Three) Times a Day As Needed for Muscle Spasms. 270 tablet 2   • diclofenac (VOLTAREN) 75 MG EC tablet Take 1 tablet by mouth 2 (Two) Times a Day. 180 tablet 3   • famotidine (PEPCID) 20 MG tablet Take 1 tablet by mouth Daily. 90 tablet 3   • fenofibrate  (TRICOR) 145 MG tablet TAKE ONE TABLET BY MOUTH DAILY 90 tablet 1   • fluticasone (FLONASE) 50 MCG/ACT nasal spray into each nostril.     • gabapentin (NEURONTIN) 600 MG tablet Take 1 tablet by mouth 3 (Three) Times a Day. 21 tablet 0   • guaiFENesin-dextromethorphan (ROBITUSSIN DM) 100-10 MG/5ML syrup Take 5 mL by mouth Every 4 (Four) Hours As Needed for Cough. 471 mL 0   • HYDROcodone-acetaminophen (NORCO)  MG per tablet Take 1 tablet by mouth Every 6 (Six) Hours As Needed for Moderate Pain . 28 tablet 0   • levalbuterol (XOPENEX HFA) 45 MCG/ACT inhaler Inhale 1-2 puffs Every 4 (Four) Hours As Needed for Wheezing or Shortness of Air. 15 g 11   • loratadine (CLARITIN) 10 MG tablet Take 1 tablet by mouth Daily. 30 tablet 11   • metFORMIN (GLUCOPHAGE) 1000 MG tablet TAKE ONE TABLET BY MOUTH TWICE A DAY WITH MEALS 60 tablet 5   • methylphenidate (RITALIN) 10 MG tablet Take 1 tablet by mouth Daily for 30 days. 30 tablet 0   • montelukast (SINGULAIR) 10 MG tablet TAKE ONE TABLET BY MOUTH EVERY EVENING 90 tablet 3   • ONE TOUCH ULTRA TEST test strip      • oxybutynin XL (DITROPAN-XL) 5 MG 24 hr tablet Take 1 tablet by mouth Daily. 90 tablet 3   • pantoprazole (PROTONIX) 40 MG EC tablet Take 1 tablet by mouth Daily. 90 tablet 1   • potassium chloride ER (K-TAB) 20 MEQ tablet controlled-release ER tablet TAKE 1 TABLET BY MOUTH EVERY DAY 90 tablet 2   • spironolactone (ALDACTONE) 50 MG tablet TAKE ONE TABLET BY MOUTH DAILY 90 tablet 3   • tiZANidine (ZANAFLEX) 4 MG tablet TAKE ONE TABLET BY MOUTH ONCE NIGHTLY 90 tablet 1   • venlafaxine XR (EFFEXOR-XR) 37.5 MG 24 hr capsule TAKE ONE CAPSULE BY MOUTH DAILY 90 capsule 3   • [DISCONTINUED] modafinil (PROVIGIL) 200 MG tablet Take 1 tablet by mouth Daily. 30 tablet 3     No current facility-administered medications on file prior to visit.      Allergies   Allergen Reactions   • Fish-Derived Products Anaphylaxis   • Penicillins Anaphylaxis and Shortness Of Breath        The  "following portions of the patient's history were reviewed and updated as appropriate: allergies, current medications, past family history, past medical history, past social history, past surgical history and problem list.    Review of Systems   Constitutional: Negative.    HENT: Negative.    Eyes: Negative.    Respiratory: Negative.    Cardiovascular: Negative.    Gastrointestinal: Negative.    Endocrine: Negative.    Genitourinary: Negative.    Musculoskeletal: Positive for arthralgias.   Skin: Negative.    Allergic/Immunologic: Negative.    Neurological: Negative.    Hematological: Negative.    Psychiatric/Behavioral: Negative.         Objective      Physical Exam  /81   Ht 167.6 cm (65.98\")   Wt 103 kg (226 lb)   LMP  (LMP Unknown)   BMI 36.50 kg/m²     Body mass index is 36.5 kg/m².    GENERAL APPEARANCE: awake, alert & oriented x 3, in no acute distress and well developed, well nourished  PSYCH: normal mood and affect  LUNGS:  breathing nonlabored, no wheezing  EYES: sclera anicteric, pupils equal  CARDIOVASCULAR: palpable pulses. Capillary refill less than 2 seconds  INTEGUMENTARY: skin intact, no clubbing, cyanosis  NEUROLOGIC:  Normal Sensation         Ortho Exam  Left knee  Alignment: Genu varum  Skin: Intact without any erythema, warmth.  Trace effusion  Motion: 0-120° with crepitus and pain on motion.  Tenderness: Medial joint line as well as janneth-/retropatellar.  Instability: Anterior drawer negative.  Lachman negative.  Varus and valgus stress negative  Motor: Grossly intact Q/HS/TA/GS/EHL/P  Sensory: Grossly intact DP/SP/S/S/T nerve distributions.   Vascular: 2+ DP/PT  pulses with brisk capillary refill into each digit.      Imaging/Studies  Ordered left knee plain films.  Imaging read/interpreted by Dr. Sim.    Imaging Results (Last 7 Days)     Procedure Component Value Units Date/Time    XR Knee 4+ View Left [[id number]] Resulted: 06/09/22 1212     Updated: 06/09/22 1215    Narrative:   "    Indication: Left knee pain    Comparison: Todays xrays were compared to previous xrays from 7/26/2017      Impression:           Left Knee: moderate to severe tricompartmental arthritis with genu varum   alignment with bone-on-bone articulation medial compartment, periarticular   osteophytes visualized in all compartments and Radiographs demonstrate   worsening deformity with advancing arthritic changes and wear compared to   prior radiographs.  No acute bony injury or fracture.        Laboratory data  A1c as of 5/9/2022--6.1  Assessment/Plan        ICD-10-CM ICD-9-CM   1. Left knee pain, unspecified chronicity  M25.562 719.46   2. Primary osteoarthritis of left knee  M17.12 715.16   3. Type 2 diabetes mellitus without complication, without long-term current use of insulin ([facility])  E11.9 250.00   4. Class 2 severe obesity with serious comorbidity and body mass index (BMI) of 36.0 to 36.9 in adult, unspecified obesity type ([facility])  E66.01 278.01    Z68.36 V85.36       Orders Placed This Encounter   Procedures   • Large Joint Arthrocentesis: L knee   • XR Knee 4+ View Left        -Left knee pain due to osteoarthritis.  -Patient is a candidate for TKA however because of her various medical comorbidities to include getting over acute respiratory failure secondary to COVID she is not a candidate at this time.    -Offered and accepted a corticosteroid injection to her left knee.  Injection was given today.    -Diabetes--patient needs to continue working with PCP to maintain tight glucose/blood sugar control.  Because she is considering possible TKA this level needs to stay below 7.5 (currently 6.1).  -Obesity--patient has a BMI of 36.5.  The patient has been instructed on various weight loss avenues including diet, portion control, calorie restriction, low/no impact exercise.  It was explained that weight loss can improve joint pain alone by decreasing the joint reaction forces.  For every pound of weight change, the knee  and hip joints see a 4 to 5 fold change in pressure.  Given these options, the patient will proceed with diet and low impact exercise.  Patient understands that she needs to keep her BMI under 40 in order to be considered for TKA.  -Patient has diclofenac prescribed by her PCP.  Recommend she continue taking this as directed.  If she requires stronger medication she will have to go through her PCP.  -Patient to follow-up with Dr. Sim in approximately 4 months for repeat evaluation and to see if she is an appropriate candidate to proceed on with TKA.  -Questions and concerns answered.    After discussing the risks, benefits, indications of injection, the patient gave consent to proceed.  Her left knee was confirmed as the correct joint to be injected with a timeout.  It was then prepped using Hibiclens and injected with a mixture of 4 cc of 1% plain lidocaine and 1 cc of Kenalog (40 mg per mL), without any resistance through the anterior left approach, patient in seated position.  Area was cleaned, hemostasis was achieved and a Band-Aid was applied over the injection site.  The patient tolerated procedure well.  I instructed the patient on signs and symptoms of infection.  They should report to the emergency department or return to clinic if any of these develop, for further evaluation and treatment.  Recommended modifying activity for the next 48 hours to include rest, ice, elevation and oral pain medication as needed.  Discussed postinjection pain management as well as blood sugar monitoring.      Medical Decision Making  Management Options : prescription/IM medicine  Data/Risk: lab tests and radiology tests       Marifer Markham PA-C  06/09/22  12:36 EDT               EMR Dragon/Transcription disclaimer:  Much of this encounter note is an electronic transcription of spoken language to printed text. Electronic transcription of spoken language may permit erroneous, or at times, nonsensical words or  phrases to be inadvertently transcribed. Although I have reviewed the note for such errors, some may still exist.

## 2022-06-09 NOTE — PROGRESS NOTES
Procedure   Large Joint Arthrocentesis: L knee  Date/Time: 6/9/2022 12:21 PM  Consent given by: patient  Site marked: site marked  Timeout: Immediately prior to procedure a time out was called to verify the correct patient, procedure, equipment, support staff and site/side marked as required   Supporting Documentation  Indications: pain   Procedure Details  Location: knee - L knee  Preparation: Patient was prepped and draped in the usual sterile fashion  Needle size: 22 G  Approach: anterolateral  Medications administered: 4 mL lidocaine PF 1% 1 %; 40 mg triamcinolone acetonide 40 MG/ML  Patient tolerance: patient tolerated the procedure well with no immediate complications

## 2022-06-15 RX ORDER — LIDOCAINE HYDROCHLORIDE 10 MG/ML
4 INJECTION, SOLUTION EPIDURAL; INFILTRATION; INTRACAUDAL; PERINEURAL
Status: COMPLETED | OUTPATIENT
Start: 2022-06-09 | End: 2022-06-09

## 2022-06-15 RX ORDER — TRIAMCINOLONE ACETONIDE 40 MG/ML
40 INJECTION, SUSPENSION INTRA-ARTICULAR; INTRAMUSCULAR
Status: COMPLETED | OUTPATIENT
Start: 2022-06-09 | End: 2022-06-09

## 2022-06-16 DIAGNOSIS — E11.9 TYPE 2 DIABETES MELLITUS WITHOUT COMPLICATION, WITHOUT LONG-TERM CURRENT USE OF INSULIN: ICD-10-CM

## 2022-06-20 DIAGNOSIS — G47.19 DAYTIME HYPERSOMNOLENCE: ICD-10-CM

## 2022-06-20 RX ORDER — METHYLPHENIDATE HYDROCHLORIDE 10 MG/1
10 TABLET ORAL DAILY
Qty: 30 TABLET | Refills: 0 | Status: SHIPPED | OUTPATIENT
Start: 2022-06-20 | End: 2022-07-18 | Stop reason: SDUPTHER

## 2022-06-20 RX ORDER — FENOFIBRATE 145 MG/1
TABLET, COATED ORAL
Qty: 90 TABLET | Refills: 1 | Status: SHIPPED | OUTPATIENT
Start: 2022-06-20 | End: 2022-11-22

## 2022-06-20 NOTE — TELEPHONE ENCOUNTER
----- Message from Hafsa Barron sent at 6/20/2022 12:04 PM EDT -----  Regarding: Refill needed  Need refill of methylphenidate 10 MG tablet called into [facility] [place].

## 2022-06-20 NOTE — TELEPHONE ENCOUNTER
Rx Refill Note  Requested Prescriptions     Pending Prescriptions Disp Refills   • methylphenidate (RITALIN) 10 MG tablet 30 tablet 0     Sig: Take 1 tablet by mouth Daily for 30 days.      Last filled: 05/20/2022 30 with 0 refills.  Last office visit with prescribing clinician: Visit date not found      Next office visit with prescribing clinician: 10/17/2022         Lorri Powers MA  06/20/22, 14:20 EDT

## 2022-06-21 NOTE — TELEPHONE ENCOUNTER
Caller: Hafsa Barron    Relationship: Self    Best call back number: [phone]    Who are you requesting to speak with (clinical staff, provider,  specific staff member):   GUANACO    What was the call regarding:   methylphenidate (RITALIN) 10 MG  PT IS ASKING IF THIS CAN BE DONE AS A 90 DAY SUPPLY IN ORDER TO COST COST AND USE [facility]. IF THIS CAN BE DONE, THE PT IS WANTING TO HAVE THE IRENE SCRIPT CANCELLED AND THE SCRIPT BE SENT TO [facility]  [address] IN [place] PHONE [phone].    Do you require a callback: YES, PLEASE LET THE PT KNOW WHAT CAN BE DONE.

## 2022-07-18 DIAGNOSIS — G47.19 DAYTIME HYPERSOMNOLENCE: ICD-10-CM

## 2022-07-18 RX ORDER — METHYLPHENIDATE HYDROCHLORIDE 10 MG/1
10 TABLET ORAL DAILY
Qty: 30 TABLET | Refills: 0 | Status: SHIPPED | OUTPATIENT
Start: 2022-07-18 | End: 2022-08-08

## 2022-07-19 ENCOUNTER — TELEPHONE (OUTPATIENT)
Dept: INTERNAL MEDICINE | Facility: CLINIC | Age: 52
End: 2022-07-19

## 2022-07-19 DIAGNOSIS — G71.11 MYOTONIC DYSTROPHY, TYPE 2: ICD-10-CM

## 2022-07-19 DIAGNOSIS — L60.0 INGROWN TOENAIL: Primary | ICD-10-CM

## 2022-07-19 NOTE — TELEPHONE ENCOUNTER
Caller: Hafsa Barron    Relationship: Self    Best call back number: [phone]    What is the medical concern/diagnosis: INGROWN TOENAILS- THINKS THEY ARE INFECTED    What specialty or service is being requested: PODIATRY IN OR NEAR [place] IN THE [facility]    Any additional details: PLEASE REFER TO A PODIATRIST AS SOON AS POSSIBLE DUE TO AN INGROWN TOENAIL THAT IS INFECTED-SYMPTOMS INCLUDE: VERY PAINFUL, RED, SWOLLEN, DRAINING, PEROXIDE IS NOT HELPING.    PLEASE CALL WITH REFERRAL DETAILS AND IF ABLE TO PROVIDE ANY ADVICE FOR THE PAIN AND INFECTION.

## 2022-07-19 NOTE — TELEPHONE ENCOUNTER
Referred.  She will probably have better luck calling herself to get in ASAP rather than waiting on this referral.

## 2022-07-20 RX ORDER — FLUCONAZOLE 150 MG/1
TABLET ORAL
Qty: 2 TABLET | Refills: 0 | Status: SHIPPED | OUTPATIENT
Start: 2022-07-20 | End: 2022-11-22

## 2022-07-20 RX ORDER — HYDROCODONE BITARTRATE AND ACETAMINOPHEN 10; 325 MG/1; MG/1
1 TABLET ORAL EVERY 6 HOURS PRN
Qty: 28 TABLET | Refills: 0 | OUTPATIENT
Start: 2022-07-20

## 2022-07-25 RX ORDER — HYDROCODONE BITARTRATE AND ACETAMINOPHEN 10; 325 MG/1; MG/1
1 TABLET ORAL EVERY 6 HOURS PRN
Qty: 120 TABLET | Refills: 0 | Status: SHIPPED | OUTPATIENT
Start: 2022-07-25 | End: 2022-09-06 | Stop reason: SDUPTHER

## 2022-07-25 NOTE — TELEPHONE ENCOUNTER
Ok. Does she usually get 90 or 120? This one was actually refused as the # was 28 not that I wouldn't fill it

## 2022-08-08 RX ORDER — METHYLPHENIDATE HYDROCHLORIDE 20 MG/1
20 TABLET ORAL DAILY
Qty: 30 TABLET | Refills: 0 | Status: SHIPPED | OUTPATIENT
Start: 2022-08-08 | End: 2022-08-10

## 2022-08-10 ENCOUNTER — TELEPHONE (OUTPATIENT)
Dept: NEUROLOGY | Facility: CLINIC | Age: 52
End: 2022-08-10

## 2022-08-10 RX ORDER — METHYLPHENIDATE HYDROCHLORIDE 20 MG/1
20 TABLET ORAL DAILY
Qty: 30 TABLET | Refills: 0 | Status: SHIPPED | OUTPATIENT
Start: 2022-08-10 | End: 2022-10-06 | Stop reason: SDUPTHER

## 2022-08-19 ENCOUNTER — TELEMEDICINE (OUTPATIENT)
Dept: INTERNAL MEDICINE | Facility: CLINIC | Age: 52
End: 2022-08-19

## 2022-08-19 DIAGNOSIS — F41.9 ANXIETY AND DEPRESSION: Primary | ICD-10-CM

## 2022-08-19 DIAGNOSIS — F32.A ANXIETY AND DEPRESSION: Primary | ICD-10-CM

## 2022-08-19 DIAGNOSIS — F41.0 PANIC ATTACK: ICD-10-CM

## 2022-08-19 PROCEDURE — 99214 OFFICE O/P EST MOD 30 MIN: CPT | Performed by: INTERNAL MEDICINE

## 2022-08-19 RX ORDER — HYDROXYZINE HYDROCHLORIDE 25 MG/1
25 TABLET, FILM COATED ORAL 3 TIMES DAILY PRN
Qty: 10 TABLET | Refills: 0 | Status: SHIPPED | OUTPATIENT
Start: 2022-08-19 | End: 2022-09-29 | Stop reason: SDUPTHER

## 2022-08-19 RX ORDER — VENLAFAXINE HYDROCHLORIDE 75 MG/1
75 CAPSULE, EXTENDED RELEASE ORAL DAILY
Qty: 30 CAPSULE | Refills: 5 | Status: SHIPPED | OUTPATIENT
Start: 2022-08-19 | End: 2023-02-22

## 2022-08-19 NOTE — PROGRESS NOTES
Subjective   Hafsa Barron is a 51 y.o. female here for follow-up anxiety and depression.  Patient son has just gone to college and it is affecting patient for more than she thought it would.  She is having high anxiety and borderline panic.  She had been having some issues with her marriage so that is also playing into it.  She is very close with her son so she is having a hard time with this.  She is interested in changing her medications, currently on Effexor 37.5 mg.  Sleeping okay.    I have reviewed the following portions of the patient's history and confirmed they are accurate: current medications, past medical history, past social history and problem list     I have personally reviewed and performed the ROS. Jocy Snow MD     Review of Systems:  General: negative  Psych: Anxiety and depression    Objective   LMP  (LMP Unknown)     Physical Exam    Assessment & Plan   Diagnoses and all orders for this visit:    1. Anxiety and depression (Primary)  -     venlafaxine XR (Effexor XR) 75 MG 24 hr capsule; Take 1 capsule by mouth Daily.  Dispense: 30 capsule; Refill: 5  -Increase Effexor due to increased anxiety and depression    2. Panic attack  -     hydrOXYzine (ATARAX) 25 MG tablet; Take 1 tablet by mouth 3 (Three) Times a Day As Needed for Anxiety.  Dispense: 10 tablet; Refill: 0  -Discussed how to use this medication, may cause drowsiness             Jocy Snow MD

## 2022-09-06 DIAGNOSIS — G71.11 MYOTONIC DYSTROPHY, TYPE 2: ICD-10-CM

## 2022-09-06 RX ORDER — HYDROCODONE BITARTRATE AND ACETAMINOPHEN 10; 325 MG/1; MG/1
1 TABLET ORAL EVERY 6 HOURS PRN
Qty: 120 TABLET | Refills: 0 | Status: SHIPPED | OUTPATIENT
Start: 2022-09-06 | End: 2022-10-06 | Stop reason: SDUPTHER

## 2022-09-07 ENCOUNTER — TELEPHONE (OUTPATIENT)
Dept: UROLOGY | Facility: CLINIC | Age: 52
End: 2022-09-07

## 2022-09-08 RX ORDER — POTASSIUM CHLORIDE 1500 MG/1
20 TABLET, FILM COATED, EXTENDED RELEASE ORAL DAILY
Qty: 90 TABLET | Refills: 2 | Status: SHIPPED | OUTPATIENT
Start: 2022-09-08

## 2022-09-21 RX ORDER — AMLODIPINE BESYLATE 2.5 MG/1
TABLET ORAL
Qty: 90 TABLET | Refills: 0 | Status: SHIPPED | OUTPATIENT
Start: 2022-09-21 | End: 2022-09-29 | Stop reason: SDUPTHER

## 2022-09-21 NOTE — TELEPHONE ENCOUNTER
Rx Refill Note  Requested Prescriptions     Pending Prescriptions Disp Refills   • amLODIPine (NORVASC) 2.5 MG tablet [Pharmacy Med Name: amLODIPine BESYLATE 2.5 MG TAB] 90 tablet      Sig: TAKE ONE TABLET BY MOUTH DAILY      Last office visit with prescribing clinician: 5/9/2022      Next office visit with prescribing clinician: Visit date not found            Fei Hinojosa MA  09/21/22, 08:46 EDT

## 2022-09-27 ENCOUNTER — OFFICE VISIT (OUTPATIENT)
Dept: UROLOGY | Facility: CLINIC | Age: 52
End: 2022-09-27

## 2022-09-27 VITALS — WEIGHT: 226 LBS | BODY MASS INDEX: 36.32 KG/M2 | HEIGHT: 66 IN

## 2022-09-27 DIAGNOSIS — R39.15 URINARY URGENCY: ICD-10-CM

## 2022-09-27 DIAGNOSIS — N39.0 RECURRENT UTI: Primary | ICD-10-CM

## 2022-09-27 LAB
BILIRUB BLD-MCNC: ABNORMAL MG/DL
CLARITY, POC: CLEAR
COLOR UR: YELLOW
EXPIRATION DATE: ABNORMAL
GLUCOSE UR STRIP-MCNC: NEGATIVE MG/DL
KETONES UR QL: NEGATIVE
LEUKOCYTE EST, POC: NEGATIVE
Lab: ABNORMAL
NITRITE UR-MCNC: NEGATIVE MG/ML
PH UR: 6 [PH] (ref 5–8)
PROT UR STRIP-MCNC: ABNORMAL MG/DL
RBC # UR STRIP: NEGATIVE /UL
SP GR UR: 1.03 (ref 1–1.03)
UROBILINOGEN UR QL: ABNORMAL

## 2022-09-27 PROCEDURE — 99213 OFFICE O/P EST LOW 20 MIN: CPT | Performed by: STUDENT IN AN ORGANIZED HEALTH CARE EDUCATION/TRAINING PROGRAM

## 2022-09-27 PROCEDURE — 81003 URINALYSIS AUTO W/O SCOPE: CPT | Performed by: STUDENT IN AN ORGANIZED HEALTH CARE EDUCATION/TRAINING PROGRAM

## 2022-09-27 RX ORDER — OXYBUTYNIN CHLORIDE 5 MG/1
5 TABLET, EXTENDED RELEASE ORAL DAILY
Qty: 90 TABLET | Refills: 3 | Status: SHIPPED | OUTPATIENT
Start: 2022-09-27 | End: 2022-09-29 | Stop reason: SDUPTHER

## 2022-09-27 NOTE — PROGRESS NOTES
Follow Up Office Visit      Patient Name: Hafsa Barron  : 1970   MRN: [id number]     Chief Complaint:    Chief Complaint   Patient presents with   • History of UTI       Referring Provider: No ref. provider found    History of Present Illness: Hafsa Barron is a 51 y.o. female who presents today for follow up of overactive bladder and urgency related incontinence.  Patient was originally seen as new patient 2021.  Her past medical history includes fibromyalgia GERD, morbid obesity status post gastric sleeve procedure, muscular dystrophy, prior stroke , type 2 diabetes on Metformin, irritable bowel syndrome, hypertension, hyperlipidemia, previous nephrolithiasis.    At last visit, patient states endorsing daytime urgency, frequency, sensation of incomplete bladder emptying, nocturnal enuresis.  She was started on 5 mg oxybutynin extended release tablets.  Her symptoms have largely resolved since starting this medication.  She was also prescribed vaginal estrogen cream but cannot afford this and has not been using this.  The patient has a previous right-sided CVA  on aspirin and Plavix.  She continues to drink Coca-Cola but had previously quit but is back on this.  She drinks water throughout the day as well.  Overall she is very pleased with her symptom control.    She is a type II diabetic and her last hemoglobin A1c in May was 6.3 under fairly good control.  Her urinalysis today is largely negative except for trace protein.  She has no urinary tract infections since I have seen her a year ago.  A standing urine culture was ordered but she has never used this.    For her history of nephrolithiasis she completed CT scan 2021 demonstrating tiny 1 to 2 mm stone in the right kidney which we are observing.  Denies flank pain, gross hematuria or dysuria.    She denies side effects associated with oxybutynin.  Denies dry, dry mouth, constipation.    Subjective      Review of  System: Review of Systems   Genitourinary: Positive for urgency.      I have reviewed the ROS documented by my clinical staff, I have updated appropriately and I agree. Ge Ace MD    I have reviewed and the following portions of the patient's history were updated as appropriate: past family history, past medical history, past social history, past surgical history and problem list.    Medications:     Current Outpatient Medications:   •  albuterol (PROVENTIL) (2.5 MG/3ML) 0.083% nebulizer solution, Take 2.5 mg by nebulization Every 4 (Four) Hours As Needed for Wheezing., Disp: 90 vial, Rfl: 1  •  amLODIPine (NORVASC) 2.5 MG tablet, TAKE ONE TABLET BY MOUTH DAILY, Disp: 90 tablet, Rfl: 0  •  aspirin 81 MG EC tablet, Take 81 mg by mouth Daily., Disp: , Rfl:   •  atorvastatin (LIPITOR) 80 MG tablet, TAKE ONE TABLET BY MOUTH ONCE NIGHTLY, Disp: 30 tablet, Rfl: 11  •  baclofen (LIORESAL) 10 MG tablet, Take 1 tablet by mouth 3 (Three) Times a Day., Disp: 270 tablet, Rfl: 1  •  Blood Glucose Monitoring Suppl (ONE TOUCH ULTRA 2) w/Device kit, , Disp: , Rfl:   •  busPIRone (BUSPAR) 15 MG tablet, TAKE ONE TABLET BY MOUTH THREE TIMES A DAY, Disp: 270 tablet, Rfl: 1  •  calcium carbonate-vitamin d (CALCIUM 600+D HIGH POTENCY) 600-400 MG-UNIT per tablet, Take 1 tablet by mouth Daily., Disp: 90 tablet, Rfl: 2  •  clopidogrel (PLAVIX) 75 MG tablet, TAKE ONE TABLET BY MOUTH DAILY, Disp: 90 tablet, Rfl: 3  •  conjugated estrogens (Premarin) 0.625 MG/GM vaginal cream, Insert  into the vagina Daily. Apply one finger tip strip of cream into vagina, three times weekly at night (M/W/F), Disp: 30 g, Rfl: 5  •  cyclobenzaprine (FLEXERIL) 10 MG tablet, Take 1 tablet by mouth 3 (Three) Times a Day As Needed for Muscle Spasms., Disp: 270 tablet, Rfl: 2  •  diclofenac (VOLTAREN) 75 MG EC tablet, Take 1 tablet by mouth 2 (Two) Times a Day., Disp: 180 tablet, Rfl: 3  •  famotidine (PEPCID) 20 MG tablet, Take 1 tablet by mouth Daily.,  Disp: 90 tablet, Rfl: 3  •  fenofibrate (TRICOR) 145 MG tablet, TAKE ONE TABLET BY MOUTH DAILY, Disp: 90 tablet, Rfl: 1  •  fluconazole (Diflucan) 150 MG tablet, Take one tablet today. May repeat in 72 hours if symptoms hasn't resolved, Disp: 2 tablet, Rfl: 0  •  fluticasone (FLONASE) 50 MCG/ACT nasal spray, into each nostril., Disp: , Rfl:   •  gabapentin (NEURONTIN) 600 MG tablet, Take 1 tablet by mouth 3 (Three) Times a Day., Disp: 21 tablet, Rfl: 0  •  guaiFENesin-dextromethorphan (ROBITUSSIN DM) 100-10 MG/5ML syrup, Take 5 mL by mouth Every 4 (Four) Hours As Needed for Cough., Disp: 471 mL, Rfl: 0  •  HYDROcodone-acetaminophen (NORCO)  MG per tablet, Take 1 tablet by mouth Every 6 (Six) Hours As Needed for Moderate Pain., Disp: 120 tablet, Rfl: 0  •  hydrOXYzine (ATARAX) 25 MG tablet, Take 1 tablet by mouth 3 (Three) Times a Day As Needed for Anxiety., Disp: 10 tablet, Rfl: 0  •  levalbuterol (XOPENEX HFA) 45 MCG/ACT inhaler, Inhale 1-2 puffs Every 4 (Four) Hours As Needed for Wheezing or Shortness of Air., Disp: 15 g, Rfl: 11  •  loratadine (CLARITIN) 10 MG tablet, Take 1 tablet by mouth Daily., Disp: 30 tablet, Rfl: 11  •  metFORMIN (GLUCOPHAGE) 1000 MG tablet, TAKE ONE TABLET BY MOUTH TWICE A DAY WITH MEALS, Disp: 180 tablet, Rfl: 2  •  methylphenidate (Ritalin) 20 MG tablet, Take 1 tablet by mouth Daily., Disp: 30 tablet, Rfl: 0  •  montelukast (SINGULAIR) 10 MG tablet, TAKE ONE TABLET BY MOUTH EVERY EVENING, Disp: 90 tablet, Rfl: 3  •  ONE TOUCH ULTRA TEST test strip, , Disp: , Rfl:   •  oxybutynin XL (DITROPAN-XL) 5 MG 24 hr tablet, Take 1 tablet by mouth Daily., Disp: 90 tablet, Rfl: 3  •  pantoprazole (PROTONIX) 40 MG EC tablet, Take 1 tablet by mouth Daily., Disp: 90 tablet, Rfl: 1  •  potassium chloride ER (K-TAB) 20 MEQ tablet controlled-release ER tablet, Take 1 tablet by mouth Daily., Disp: 90 tablet, Rfl: 2  •  spironolactone (ALDACTONE) 50 MG tablet, TAKE ONE TABLET BY MOUTH DAILY, Disp: 90  "tablet, Rfl: 3  •  tiZANidine (ZANAFLEX) 4 MG tablet, TAKE ONE TABLET BY MOUTH ONCE NIGHTLY, Disp: 90 tablet, Rfl: 1  •  venlafaxine XR (Effexor XR) 75 MG 24 hr capsule, Take 1 capsule by mouth Daily., Disp: 30 capsule, Rfl: 5    Allergies:   Allergies   Allergen Reactions   • Fish-Derived Products Anaphylaxis   • Penicillins Anaphylaxis and Shortness Of Breath       Post void residual bladder scan:   4mL    Objective     Physical Exam:   Vital Signs:   Vitals:    09/27/22 1130   Weight: 103 kg (226 lb)   Height: 167.6 cm (65.98\")   PainSc: 0-No pain     Body mass index is 36.5 kg/m².     Physical Exam  Constitutional:       Appearance: Normal appearance. She is obese.   HENT:      Head: Normocephalic and atraumatic.      Nose: Nose normal.      Mouth/Throat:      Mouth: Mucous membranes are moist.      Pharynx: Oropharynx is clear.   Eyes:      Extraocular Movements: Extraocular movements intact.      Pupils: Pupils are equal, round, and reactive to light.   Pulmonary:      Effort: Pulmonary effort is normal. No respiratory distress.   Musculoskeletal:         General: No swelling or deformity. Normal range of motion.      Cervical back: Normal range of motion and neck supple.   Skin:     General: Skin is warm and dry.   Neurological:      General: No focal deficit present.      Mental Status: She is alert and oriented to person, place, and time. Mental status is at baseline.   Psychiatric:         Mood and Affect: Mood normal.         Behavior: Behavior normal.         Labs:   Brief Urine Lab Results  (Last result in the past 365 days)      Color   Clarity   Blood   Leuk Est   Nitrite   Protein   CREAT   Urine HCG        09/27/22 1141 Yellow   Clear   Negative   Negative   Negative   Trace                 Urine Culture    Urine Culture 2/4/22   Urine Culture >100,000 CFU/mL Klebsiella pneumoniae ssp pneumoniae (A)   (A) Abnormal value               Lab Results   Component Value Date    GLUCOSE 67 05/09/2022    " CALCIUM 10.2 05/09/2022     05/09/2022    K 4.2 05/09/2022    CO2 27.0 05/09/2022     05/09/2022    BUN 23 (H) 05/09/2022    CREATININE 0.47 (L) 05/09/2022    EGFRIFNONA >150 02/05/2022    BCR 48.9 (H) 05/09/2022    ANIONGAP 12.0 05/09/2022       Lab Results   Component Value Date    WBC 4.50 02/04/2022    HGB 11.5 (L) 02/04/2022    HCT 32.8 (L) 02/04/2022    MCV 86.5 02/04/2022     02/04/2022       Images:   XR Knee 4+ View Left    Result Date: 6/9/2022   Left Knee: moderate to severe tricompartmental arthritis with genu varum alignment with bone-on-bone articulation medial compartment, periarticular osteophytes visualized in all compartments and Radiographs demonstrate worsening deformity with advancing arthritic changes and wear compared to prior radiographs.  No acute bony injury or fracture.      Measures:   Tobacco:   Hafsa Barron  reports that she quit smoking about 9 years ago. She started smoking about 38 years ago. She has a 22.50 pack-year smoking history. She has never used smokeless tobacco.         Urine Incontinence: ( NOUI)  Patient reports that she is not currently experiencing any symptoms of urinary incontinence.      Assessment / Plan      Assessment/Plan:   51 y.o. female who presented today for follow up of overactive bladder.  She saw me a year ago.  She was having nocturnal enuresis, urgency and frequency.  She was supposed to see me in December but canceled her appointment.  She presents today and her symptoms have largely resolved.  She denies any significant urgency or frequency, she wears a pad for security but rarely ever has urgency related incontinence anymore.  Discussed she should discontinue drinking Coca-Cola's which she continues to do despite having previously quit.  She states this is challenging for her.  She will continue fluid intake, timed and double voiding, increase water intake greater than 2 L to prevent UTI.  Sending urine culture order still  present.  I refilled her medications a.  She can see me back in 1 year.  Discussed the importance of weight loss and general health as well.    Diagnoses and all orders for this visit:    1. Recurrent UTI (Primary)  -     POC Urinalysis Dipstick, Automated    2. Urinary urgency  -     POC Urinalysis Dipstick, Automated  -     oxybutynin XL (DITROPAN-XL) 5 MG 24 hr tablet; Take 1 tablet by mouth Daily.  Dispense: 90 tablet; Refill: 3           Follow Up:   Return in about 1 year (around 9/27/2023).    I spent approximately 20 minutes providing clinical care for this patient; including review of patient's chart and provider documentation, face to face time spent with patient in examination room (obtaining history, performing physical exam, discussing diagnosis and management options), placing orders, and completing patient documentation.     Ge Ace MD  [facility]

## 2022-09-29 DIAGNOSIS — F41.0 PANIC ATTACK: ICD-10-CM

## 2022-09-29 RX ORDER — HYDROXYZINE HYDROCHLORIDE 25 MG/1
25 TABLET, FILM COATED ORAL 3 TIMES DAILY PRN
Qty: 30 TABLET | Refills: 0 | Status: SHIPPED | OUTPATIENT
Start: 2022-09-29 | End: 2022-11-22

## 2022-09-29 RX ORDER — AMLODIPINE BESYLATE 2.5 MG/1
2.5 TABLET ORAL DAILY
Qty: 90 TABLET | Refills: 0 | Status: SHIPPED | OUTPATIENT
Start: 2022-09-29 | End: 2022-12-05 | Stop reason: SDUPTHER

## 2022-10-06 DIAGNOSIS — G71.11 MYOTONIC DYSTROPHY, TYPE 2: ICD-10-CM

## 2022-10-06 DIAGNOSIS — G47.19 DAYTIME HYPERSOMNOLENCE: Primary | ICD-10-CM

## 2022-10-06 RX ORDER — METHYLPHENIDATE HYDROCHLORIDE 20 MG/1
20 TABLET ORAL DAILY
Qty: 30 TABLET | Refills: 0 | Status: SHIPPED | OUTPATIENT
Start: 2022-10-06 | End: 2022-11-01 | Stop reason: SDUPTHER

## 2022-10-06 NOTE — TELEPHONE ENCOUNTER
Rx Refill Note  Requested Prescriptions     Pending Prescriptions Disp Refills   • methylphenidate (Ritalin) 20 MG tablet 30 tablet 0     Sig: Take 1 tablet by mouth Daily.      Last filled:08/10/2022  Last office visit with prescribing clinician: 04/12/2022  Next office visit with prescribing clinician: 10/17/2022     Rhiannon Ching MA  10/06/22, 08:03 EDT

## 2022-10-09 RX ORDER — HYDROCODONE BITARTRATE AND ACETAMINOPHEN 10; 325 MG/1; MG/1
1 TABLET ORAL EVERY 6 HOURS PRN
Qty: 120 TABLET | Refills: 0 | Status: SHIPPED | OUTPATIENT
Start: 2022-10-09 | End: 2022-11-01 | Stop reason: SDUPTHER

## 2022-10-11 ENCOUNTER — OFFICE VISIT (OUTPATIENT)
Dept: ORTHOPEDIC SURGERY | Facility: CLINIC | Age: 52
End: 2022-10-11

## 2022-10-11 VITALS
DIASTOLIC BLOOD PRESSURE: 98 MMHG | WEIGHT: 226 LBS | SYSTOLIC BLOOD PRESSURE: 150 MMHG | HEIGHT: 66 IN | BODY MASS INDEX: 36.32 KG/M2

## 2022-10-11 DIAGNOSIS — M17.12 PRIMARY OSTEOARTHRITIS OF LEFT KNEE: Primary | ICD-10-CM

## 2022-10-11 PROCEDURE — 20610 DRAIN/INJ JOINT/BURSA W/O US: CPT | Performed by: ORTHOPAEDIC SURGERY

## 2022-10-11 PROCEDURE — 99214 OFFICE O/P EST MOD 30 MIN: CPT | Performed by: ORTHOPAEDIC SURGERY

## 2022-10-11 RX ORDER — LIDOCAINE HYDROCHLORIDE 10 MG/ML
3 INJECTION, SOLUTION EPIDURAL; INFILTRATION; INTRACAUDAL; PERINEURAL
Status: COMPLETED | OUTPATIENT
Start: 2022-10-11 | End: 2022-10-11

## 2022-10-11 RX ORDER — TRIAMCINOLONE ACETONIDE 40 MG/ML
80 INJECTION, SUSPENSION INTRA-ARTICULAR; INTRAMUSCULAR
Status: COMPLETED | OUTPATIENT
Start: 2022-10-11 | End: 2022-10-11

## 2022-10-11 RX ORDER — BUPIVACAINE HYDROCHLORIDE 2.5 MG/ML
3 INJECTION, SOLUTION EPIDURAL; INFILTRATION; INTRACAUDAL
Status: COMPLETED | OUTPATIENT
Start: 2022-10-11 | End: 2022-10-11

## 2022-10-11 RX ADMIN — TRIAMCINOLONE ACETONIDE 80 MG: 40 INJECTION, SUSPENSION INTRA-ARTICULAR; INTRAMUSCULAR at 14:57

## 2022-10-11 RX ADMIN — LIDOCAINE HYDROCHLORIDE 3 ML: 10 INJECTION, SOLUTION EPIDURAL; INFILTRATION; INTRACAUDAL; PERINEURAL at 14:57

## 2022-10-11 RX ADMIN — BUPIVACAINE HYDROCHLORIDE 3 ML: 2.5 INJECTION, SOLUTION EPIDURAL; INFILTRATION; INTRACAUDAL at 14:57

## 2022-10-11 NOTE — PROGRESS NOTES
Procedure   Large Joint Arthrocentesis: L knee  Date/Time: 10/11/2022 2:57 PM  Consent given by: patient  Site marked: site marked  Timeout: Immediately prior to procedure a time out was called to verify the correct patient, procedure, equipment, support staff and site/side marked as required   Supporting Documentation  Indications: pain   Procedure Details  Location: knee - L knee  Preparation: Patient was prepped and draped in the usual sterile fashion  Needle size: 22 G  Approach: anterolateral  Medications administered: 80 mg triamcinolone acetonide 40 MG/ML; 3 mL bupivacaine (PF) 0.25 %; 3 mL lidocaine PF 1% 1 %  Patient tolerance: patient tolerated the procedure well with no immediate complications

## 2022-10-11 NOTE — PROGRESS NOTES
Orthopaedic Clinic Note: Knee New Patient    Chief Complaint   Patient presents with   • Left Knee - Pain     Primary osteoarthritis of left knee         HPI    Hafsa Barron is a 51 y.o. female who presents with left knee pain for 3 year(s). Onset atraumatic and gradual in nature. Pain is localized to the entire knee (globally) and is a 9/10 on the pain scale. Pain is described as throbbing. Associated symptoms include pain, popping, grinding, stiffness and giving way/buckling. The pain is worse with walking, standing, sitting, climbing stairs, sleeping and leisure; resting, ice, heat, pain medication and/or NSAID, lying down and elevating the extremity make it better. Previous treatments have included: bracing, cane/walker, NSAIDS, physical therapy and oral steroids since symptom onset. Although some transient relief was reported with these interventions, these conservative measures have failed and symptoms have persisted. The patient is limited in daily activities and has had a significant decrease in quality of life as a result. She has had a blood clots but denies fevers, chills, or constitutional symptoms.    I have reviewed the following portions of the patient's history:History of Present Illness    Past Medical History:   Diagnosis Date   • Allergic    • Allergy    • Anxiety    • Asthma Allergies induced   • Depression    • Fibromyalgia, primary    • GERD (gastroesophageal reflux disease)    • Headache    • History of blood clots    • History of UTI    • Hyperlipidemia    • Hypertension    • IBS (irritable bowel syndrome)    • Internal hemorrhoid    • Kidney stone    • Low back pain    • Muscular dystrophy (HCC)    • Muscular dystrophy (HCC)     II   • Myotonia    • Obesity    • Stroke ([facility]) 08/31/2013    resdual- left sided weakness.    • Type 2 diabetes mellitus without complication, without long-term current use of insulin ([facility])       Past Surgical History:   Procedure Laterality Date   •  CHOLECYSTECTOMY  2004   • COLONOSCOPY  2017   • D & C WITH SUCTION     • LYMPH NODE BIOPSY     • SUBCLAVIAN ARTERY STENT  08/2018    x2   • WISDOM TOOTH EXTRACTION        Family History   Problem Relation Age of Onset   • Allergies Other    • ADD / ADHD Other    • Heart block Other    • Other Other         CEREBROVASCULAR ACCIDENT    • Hypertension Other    • Cancer Other         LUNG CANCER   • Hyperlipidemia Other    • Alcohol abuse Mother    • Depression Mother    • Early death Mother          age 59   • Heart disease Mother         Mother  of Massive Heart attack   • Hyperlipidemia Mother    • Hypertension Mother    • Miscarriages / Stillbirths Mother         Miscarriage   • Heart attack Mother          of  massive heart attack   • Alcohol abuse Father    • Cancer Father         Had Lung Cancer  had 1 lung till death   • Diabetes Father         Lived on insuline shots   • Early death Father          age 53   • Breast cancer Maternal Aunt    • Learning disabilities Son         Had Adhd   • Breast cancer Cousin    • Ovarian cancer Neg Hx    • Endometrial cancer Neg Hx    • Uterine cancer Neg Hx    • Colon cancer Neg Hx      Social History     Socioeconomic History   • Marital status:    Tobacco Use   • Smoking status: Former     Packs/day: 1.50     Years: 15.00     Pack years: 22.50     Types: Cigarettes     Start date: 1983     Quit date: 2013     Years since quittin.1   • Smokeless tobacco: Never   • Tobacco comments:     use Ecigg now NO NICOTENE   Vaping Use   • Vaping Use: Some days   • Substances: Flavoring   • Devices: Pre-filled pod   Substance and Sexual Activity   • Alcohol use: Yes   • Drug use: No   • Sexual activity: Yes     Partners: Male     Birth control/protection: Post-menopausal     Comment: Menapause no cycle since 2013      Current Outpatient Medications on File Prior to Visit   Medication Sig Dispense Refill   • albuterol (PROVENTIL)  (2.5 MG/3ML) 0.083% nebulizer solution Take 2.5 mg by nebulization Every 4 (Four) Hours As Needed for Wheezing. 90 vial 1   • amLODIPine (NORVASC) 2.5 MG tablet Take 1 tablet by mouth Daily. 90 tablet 0   • aspirin 81 MG EC tablet Take 81 mg by mouth Daily.     • atorvastatin (LIPITOR) 80 MG tablet TAKE ONE TABLET BY MOUTH ONCE NIGHTLY 30 tablet 11   • baclofen (LIORESAL) 10 MG tablet Take 1 tablet by mouth 3 (Three) Times a Day. 270 tablet 1   • Blood Glucose Monitoring Suppl (ONE TOUCH ULTRA 2) w/Device kit      • busPIRone (BUSPAR) 15 MG tablet TAKE ONE TABLET BY MOUTH THREE TIMES A  tablet 1   • calcium carbonate-vitamin d (CALCIUM 600+D HIGH POTENCY) 600-400 MG-UNIT per tablet Take 1 tablet by mouth Daily. 90 tablet 2   • clopidogrel (PLAVIX) 75 MG tablet TAKE ONE TABLET BY MOUTH DAILY 90 tablet 3   • conjugated estrogens (Premarin) 0.625 MG/GM vaginal cream Insert  into the vagina Daily. Apply one finger tip strip of cream into vagina, three times weekly at night (M/W/F) 30 g 5   • cyclobenzaprine (FLEXERIL) 10 MG tablet Take 1 tablet by mouth 3 (Three) Times a Day As Needed for Muscle Spasms. 270 tablet 2   • diclofenac (VOLTAREN) 75 MG EC tablet Take 1 tablet by mouth 2 (Two) Times a Day. 180 tablet 3   • famotidine (PEPCID) 20 MG tablet Take 1 tablet by mouth Daily. 90 tablet 3   • fenofibrate (TRICOR) 145 MG tablet TAKE ONE TABLET BY MOUTH DAILY 90 tablet 1   • fluconazole (Diflucan) 150 MG tablet Take one tablet today. May repeat in 72 hours if symptoms hasn't resolved 2 tablet 0   • fluticasone (FLONASE) 50 MCG/ACT nasal spray into each nostril.     • gabapentin (NEURONTIN) 600 MG tablet Take 1 tablet by mouth 3 (Three) Times a Day. 21 tablet 0   • guaiFENesin-dextromethorphan (ROBITUSSIN DM) 100-10 MG/5ML syrup Take 5 mL by mouth Every 4 (Four) Hours As Needed for Cough. 471 mL 0   • HYDROcodone-acetaminophen (NORCO)  MG per tablet Take 1 tablet by mouth Every 6 (Six) Hours As Needed for  Moderate Pain. 120 tablet 0   • hydrOXYzine (ATARAX) 25 MG tablet Take 1 tablet by mouth 3 (Three) Times a Day As Needed for Anxiety. 30 tablet 0   • levalbuterol (XOPENEX HFA) 45 MCG/ACT inhaler Inhale 1-2 puffs Every 4 (Four) Hours As Needed for Wheezing or Shortness of Air. 15 g 11   • loratadine (CLARITIN) 10 MG tablet Take 1 tablet by mouth Daily. 30 tablet 11   • metFORMIN (GLUCOPHAGE) 1000 MG tablet TAKE ONE TABLET BY MOUTH TWICE A DAY WITH MEALS 180 tablet 2   • methylphenidate (Ritalin) 20 MG tablet Take 1 tablet by mouth Daily. 30 tablet 0   • montelukast (SINGULAIR) 10 MG tablet TAKE ONE TABLET BY MOUTH EVERY EVENING 90 tablet 3   • ONE TOUCH ULTRA TEST test strip      • oxybutynin XL (DITROPAN-XL) 5 MG 24 hr tablet Take 1 tablet by mouth Daily. 90 tablet 3   • pantoprazole (PROTONIX) 40 MG EC tablet Take 1 tablet by mouth Daily. 90 tablet 1   • potassium chloride ER (K-TAB) 20 MEQ tablet controlled-release ER tablet Take 1 tablet by mouth Daily. 90 tablet 2   • spironolactone (ALDACTONE) 50 MG tablet TAKE ONE TABLET BY MOUTH DAILY 90 tablet 3   • tiZANidine (ZANAFLEX) 4 MG tablet TAKE ONE TABLET BY MOUTH ONCE NIGHTLY 90 tablet 1   • venlafaxine XR (Effexor XR) 75 MG 24 hr capsule Take 1 capsule by mouth Daily. 30 capsule 5   • [DISCONTINUED] modafinil (PROVIGIL) 200 MG tablet Take 1 tablet by mouth Daily. 30 tablet 3     No current facility-administered medications on file prior to visit.      Allergies   Allergen Reactions   • Fish-Derived Products Anaphylaxis   • Penicillins Anaphylaxis and Shortness Of Breath        Review of Systems   HENT: Positive for congestion, nosebleeds and sinus pressure.    Eyes: Negative.    Respiratory: Positive for apnea.    Cardiovascular: Negative.    Gastrointestinal: Negative.    Endocrine: Negative.    Genitourinary: Positive for frequency and urgency.   Musculoskeletal: Positive for arthralgias.   Skin: Negative.    Allergic/Immunologic: Positive for environmental  "allergies.   Neurological: Positive for speech difficulty, weakness and numbness.   Hematological: Negative.    Psychiatric/Behavioral: Negative.         The patient's Review of Systems was personally reviewed and confirmed as accurate.    The following portions of the patient's history were reviewed and updated as appropriate: allergies, current medications, past family history, past medical history, past social history, past surgical history and problem list.    Physical Exam  Blood pressure 150/98, height 167.6 cm (65.98\"), weight 103 kg (226 lb), not currently breastfeeding.    Body mass index is 36.5 kg/m².    GENERAL APPEARANCE: awake, alert & oriented x 3, in no acute distress and well developed, well nourished  PSYCH: normal affect  LUNGS:  breathing nonlabored  EYES: sclera anicteric  CARDIOVASCULAR: palpable dorsalis pedis, palpable posterior tibial bilaterally. Capillary refill less than 2 seconds  EXTREMITIES: no clubbing, cyanosis  GAIT:  Antalgic            Right Lower Extremity Exam:   ----------  Hip Exam  ----------  FLEXION CONTRACTURE: None  FLEXION: 110 degrees  INTERNAL ROTATION: 20 degrees at 90 degrees of flexion   EXTERNAL ROTATION: 40 degrees at 90 degrees of flexion    PAIN WITH HIP MOTION: no  ----------  Knee Exam  ----------  ALIGNMENT: neutral, no varus or valgus deformity     RANGE OF MOTION:  Normal (0-120 degrees) with no extensor lag or flexion contracture  LIGAMENTOUS STABILITY:   stable to varus and valgus stress at terminal extension and 30 degrees without any evidence of laxity     STRENGTH:  5/5 knee flexion, extension. 5/5 ankle dorsiflexion and plantarflexion.     PAIN WITH PALPATION: denies tenderness to palpation about the knee, denies medial or lateral joint line pain  KNEE EFFUSION: no  PAIN WITH KNEE ROM: no  PATELLAR CREPITUS: yes, asymptomatic  SPECIAL EXAM FINDINGS:  Negative patellar compression    REFLEXES:  PATELLAR 2+/4  ACHILLES 2+/4    CLONUS: negative  STRAIGHT " LEG TEST:   negative    SENSATION TO LIGHT TOUCH:  DEEP PERONEAL/SUPERFICIAL PERONEAL/SURAL/SAPHENOUS/TIBIAL:   intact    EDEMA:  no  ERYTHEMA:  no  WOUNDS/INCISIONS: no overlying skin problems.      Left Lower Extremity Exam:   ----------  Hip Exam  ----------  FLEXION CONTRACTURE: None  FLEXION: 110 degrees  INTERNAL ROTATION: 20 degrees at 90 degrees of flexion   EXTERNAL ROTATION: 40 degrees at 90 degrees of flexion    PAIN WITH HIP MOTION: no  ----------  Knee Exam  ----------  ALIGNMENT: severe varus, correctable to neutral    RANGE OF MOTION:  Decreased (5 - 115 degrees) with no extensor lag  LIGAMENTOUS STABILITY:   stable to varus and valgus stress at terminal extension and 30 degrees; retensioning of the MCL is appreciated with valgus stress at 30 degrees consistent with medial compartment degeneration     STRENGTH:  4/5 knee flexion, extension. 5/5 ankle dorsiflexion and plantarflexion.     PAIN WITH PALPATION: medial joint line and global  KNEE EFFUSION: yes, mild effusion  PAIN WITH KNEE ROM: yes, medially and anteriorly  PATELLAR CREPITUS: yes, painful and symptomatic  SPECIAL EXAM FINDINGS:  none    REFLEXES:  PATELLAR 2+/4  ACHILLES 2+/4    CLONUS: no  STRAIGHT LEG TEST:   negative    SENSATION TO LIGHT TOUCH:  DEEP PERONEAL/SUPERFICIAL PERONEAL/SURAL/SAPHENOUS/TIBIAL:   intact    EDEMA:  no  ERYTHEMA:  no  WOUNDS/INCISIONS: Yes, well-healed arthroscopic portals      ______________________________________________________________________  ______________________________________________________________________    RADIOGRAPHIC FINDINGS:   No new imaging.  Prior imaging of the left knee from 6/9/2022 reveals moderate to severe tricompartmental arthritis with genu varum alignment and bone-on-bone articulation medial compartment    Assessment/Plan:   Diagnosis Plan   1. Primary osteoarthritis of left knee          Patient has end-stage osteoarthritis of the left knee.  I discussed surgical nonsurgical  treatment options with the patient.  She has failed prior conservative interventions including anti-inflammatories, viscosupplementation injections, and steroid injection.  I discussed surgical intervention with her including perioperative process, recovery period, and postoperative therapy requirements.  Ultimately the patient wishes to avoid surgery at this time and plan on proceeding with surgery at the beginning of the new year.  In the interval we will continue with conservative treatment.  We will proceed with cortisone injection left knee today.  She will follow-up in 2 months for repeat assessment with x-ray 4 views left knee and scheduling left knee replacement.     Procedure Note:  I discussed with the patient the potential benefits of performing a therapeutic injection of the left knee as well as potential risks including but not limited to infection, swelling, pain, bleeding, bruising, nerve/vessel damage, skin color changes, transient elevation in blood glucose levels, and fat atrophy. After informed consent and verifying correct patient, procedure site, and type of procedure, the area was prepped with alcohol, ethyl chloride was used to numb the skin. Via the superolateral approach, 3cc of 1% lidocaine, 3 cc of 0.25% Marcaine and 2 cc of 40mg/ml of Kenalog were injected into the left knee. The patient tolerated the procedure well. There were no complications. A sterile dressing was placed over the injection site.      Mk Sim MD  10/11/22  15:04 EDT

## 2022-10-12 DIAGNOSIS — G71.11 MYOTONIC DYSTROPHY, TYPE 2: ICD-10-CM

## 2022-10-12 RX ORDER — BUSPIRONE HYDROCHLORIDE 15 MG/1
TABLET ORAL
Qty: 270 TABLET | Refills: 1 | Status: SHIPPED | OUTPATIENT
Start: 2022-10-12

## 2022-10-14 RX ORDER — GABAPENTIN 600 MG/1
600 TABLET ORAL 3 TIMES DAILY
Qty: 21 TABLET | Refills: 0 | OUTPATIENT
Start: 2022-10-14

## 2022-10-17 DIAGNOSIS — G71.11 MYOTONIC DYSTROPHY, TYPE 2: ICD-10-CM

## 2022-10-17 RX ORDER — GABAPENTIN 600 MG/1
600 TABLET ORAL 3 TIMES DAILY
Qty: 270 TABLET | Refills: 0 | Status: SHIPPED | OUTPATIENT
Start: 2022-10-17 | End: 2023-01-09

## 2022-10-17 NOTE — TELEPHONE ENCOUNTER
----- Message from Jocy Snow MD sent at 10/17/2022  7:43 AM EDT -----  Regarding: FW: Refill Gabapentin  Contact: [phone]  Can you pend this to me?  She usually gets 600 mg 3 times daily, #270  ----- Message -----  From: Yohana Rogel MA  Sent: 10/17/2022   7:09 AM EDT  To: Jocy Snow MD  Subject: FW: Refill Gabapentin                              ----- Message -----  From: Hafsa Barron  Sent: 10/15/2022  10:08 AM EDT  To: [facility]  Subject: Refill Gabapentin                                Can you please call in Gabapentin 600 mg  90 days prescription in to [facility]? They are cheaper to fill than [facility].     Thank you,   Hafsa

## 2022-10-17 NOTE — TELEPHONE ENCOUNTER
----- Message from Jocy Snow MD sent at 10/17/2022  7:43 AM EDT -----  Regarding: FW: Refill Gabapentin  Contact: [phone]  Can you pend this to me?  She usually gets 600 mg 3 times daily, #270  ----- Message -----  From: Yohana Rogel MA  Sent: 10/17/2022   7:09 AM EDT  To: Jocy Snow MD  Subject: FW: Refill Gabapentin                              ----- Message -----  From: Hafsa Barron  Sent: 10/15/2022  10:08 AM EDT  To: [facility]  Subject: Refill Gabapentin                                Can you please call in Gabapentin 600 mg  90 days prescription in to [facility]? They are cheaper to fill than [facility].     Thank you,   Hafsa

## 2022-10-31 DIAGNOSIS — G71.11 MYOTONIC DYSTROPHY, TYPE 2: Primary | ICD-10-CM

## 2022-10-31 DIAGNOSIS — G89.4 CHRONIC PAIN SYNDROME: ICD-10-CM

## 2022-11-01 DIAGNOSIS — G47.19 DAYTIME HYPERSOMNOLENCE: ICD-10-CM

## 2022-11-01 DIAGNOSIS — G71.11 MYOTONIC DYSTROPHY, TYPE 2: ICD-10-CM

## 2022-11-01 RX ORDER — HYDROCODONE BITARTRATE AND ACETAMINOPHEN 10; 325 MG/1; MG/1
1 TABLET ORAL EVERY 6 HOURS PRN
Qty: 120 TABLET | Refills: 0 | Status: SHIPPED | OUTPATIENT
Start: 2022-11-01

## 2022-11-01 RX ORDER — METHYLPHENIDATE HYDROCHLORIDE 20 MG/1
20 TABLET ORAL DAILY
Qty: 30 TABLET | Refills: 0 | Status: SHIPPED | OUTPATIENT
Start: 2022-11-01 | End: 2022-12-12 | Stop reason: SDUPTHER

## 2022-11-01 RX ORDER — METHYLPHENIDATE HYDROCHLORIDE 20 MG/1
20 TABLET ORAL DAILY
Qty: 30 TABLET | Refills: 0 | OUTPATIENT
Start: 2022-11-01 | End: 2023-11-01

## 2022-11-01 NOTE — TELEPHONE ENCOUNTER
Rx Refill Note  Requested Prescriptions     Pending Prescriptions Disp Refills   • methylphenidate (Ritalin) 20 MG tablet 30 tablet 0     Sig: Take 1 tablet by mouth Daily.      Last filled: 10/6/2022 with 0 refills pending to   Last office visit with prescribing clinician: 4/12/22  Next office visit with prescribing clinician: Visit date not found     Joanna Pratt MA  11/01/22, 11:55 EDT

## 2022-11-22 DIAGNOSIS — F41.0 PANIC ATTACK: ICD-10-CM

## 2022-11-22 RX ORDER — FENOFIBRATE 145 MG/1
TABLET, COATED ORAL
Qty: 90 TABLET | Refills: 1 | Status: SHIPPED | OUTPATIENT
Start: 2022-11-22

## 2022-11-22 RX ORDER — TIZANIDINE 4 MG/1
TABLET ORAL
Qty: 90 TABLET | Refills: 1 | Status: SHIPPED | OUTPATIENT
Start: 2022-11-22 | End: 2023-01-03 | Stop reason: SDUPTHER

## 2022-11-22 RX ORDER — FLUCONAZOLE 150 MG/1
TABLET ORAL
Qty: 2 TABLET | Refills: 0 | Status: SHIPPED | OUTPATIENT
Start: 2022-11-22

## 2022-11-22 RX ORDER — HYDROXYZINE HYDROCHLORIDE 25 MG/1
TABLET, FILM COATED ORAL
Qty: 10 TABLET | Refills: 3 | Status: SHIPPED | OUTPATIENT
Start: 2022-11-22

## 2022-11-23 NOTE — TELEPHONE ENCOUNTER
Called patient back about carotid duplex and overnight ox and left vm to call back at their convenience.    Received 7 days of nystatin suspension    Monitor

## 2022-12-05 DIAGNOSIS — G71.11 MYOTONIC DYSTROPHY, TYPE 2: ICD-10-CM

## 2022-12-06 RX ORDER — BACLOFEN 10 MG/1
10 TABLET ORAL 3 TIMES DAILY
Qty: 270 TABLET | Refills: 1 | Status: SHIPPED | OUTPATIENT
Start: 2022-12-06

## 2022-12-06 RX ORDER — DICLOFENAC SODIUM 75 MG/1
75 TABLET, DELAYED RELEASE ORAL 2 TIMES DAILY
Qty: 180 TABLET | Refills: 3 | Status: SHIPPED | OUTPATIENT
Start: 2022-12-06

## 2022-12-06 RX ORDER — PROMETHAZINE HYDROCHLORIDE 12.5 MG/1
12.5 TABLET ORAL EVERY 8 HOURS PRN
Qty: 30 TABLET | Refills: 1 | Status: SHIPPED | OUTPATIENT
Start: 2022-12-06

## 2022-12-06 RX ORDER — AMLODIPINE BESYLATE 2.5 MG/1
2.5 TABLET ORAL DAILY
Qty: 90 TABLET | Refills: 0 | Status: SHIPPED | OUTPATIENT
Start: 2022-12-06

## 2022-12-11 ENCOUNTER — PATIENT MESSAGE (OUTPATIENT)
Dept: NEUROLOGY | Facility: CLINIC | Age: 52
End: 2022-12-11

## 2022-12-11 DIAGNOSIS — G47.19 DAYTIME HYPERSOMNOLENCE: ICD-10-CM

## 2022-12-12 RX ORDER — METHYLPHENIDATE HYDROCHLORIDE 20 MG/1
20 TABLET ORAL DAILY
Qty: 90 TABLET | Refills: 0 | Status: SHIPPED | OUTPATIENT
Start: 2022-12-12 | End: 2023-03-14 | Stop reason: SDUPTHER

## 2022-12-12 NOTE — TELEPHONE ENCOUNTER
From: Hafsa Barron  To: Fausto Edwards MD  Sent: 12/11/2022 12:19 PM EST  Subject: Need Refil Methylpenidate 20mg    I am in need of a refill for my Mrthylphenidate 20mg. I will run out this Monday night 12/12. If you could please call prescription into [facility].    Also, is there anyway to get more in the refill per month instead of having to do it every month? I ask only because looking at the single care site where I get lower prices it shows that you can get a 60 day or 90 day supply and it’s a lot cheaper that way. I am on a lot of different medication so I am asking just to see if there’s a way to lower my out-of-pocket each month.     Thank you so much in advance,    Hafsa Barron

## 2022-12-13 ENCOUNTER — OFFICE VISIT (OUTPATIENT)
Dept: ORTHOPEDIC SURGERY | Facility: CLINIC | Age: 52
End: 2022-12-13

## 2022-12-13 ENCOUNTER — APPOINTMENT (OUTPATIENT)
Dept: GENERAL RADIOLOGY | Facility: HOSPITAL | Age: 52
End: 2022-12-13

## 2022-12-13 VITALS
HEIGHT: 66 IN | WEIGHT: 234 LBS | SYSTOLIC BLOOD PRESSURE: 146 MMHG | BODY MASS INDEX: 37.61 KG/M2 | DIASTOLIC BLOOD PRESSURE: 48 MMHG

## 2022-12-13 DIAGNOSIS — M67.972 ACHILLES TENDON DISORDER, LEFT: ICD-10-CM

## 2022-12-13 DIAGNOSIS — M17.12 PRIMARY OSTEOARTHRITIS OF LEFT KNEE: Primary | ICD-10-CM

## 2022-12-13 PROCEDURE — 99213 OFFICE O/P EST LOW 20 MIN: CPT | Performed by: ORTHOPAEDIC SURGERY

## 2022-12-13 NOTE — PROGRESS NOTES
"Orthopaedic Clinic Note: Knee Established Patient    Chief Complaint   Patient presents with   • Follow-up     2 month follow up -Primary osteoarthritis of left knee        HPI    It has been 2  month(s) since Ms. Barron's last visit. She returns to clinic today for follow-up left knee osteoarthritis.  She underwent cortisone injection left knee 2 months ago.  She returns to clinic today complaining of increasing left knee pain that she rates a 9/10 on the pain scale.  She is ambulating with the assistance of a cane.  In addition to this she is also having increased pain in her left Achilles.  She had a prior surgery done by Dr. Brock Salinas that has resulted in \"poor healing\".  According to her, she needs another surgery on the Achilles in order to fix the problem but is looking for an alternative surgeon.  She states that this is significantly affecting her ability to walk and she thinks may be contributing to her ongoing knee pain.  She is requesting intervention for her ongoing knee as well as ankle pain.    Past Medical History:   Diagnosis Date   • Allergic    • Allergy    • Anxiety    • Asthma Allergies induced   • Depression    • Fibromyalgia, primary    • GERD (gastroesophageal reflux disease)    • Headache    • History of blood clots    • History of UTI    • Hyperlipidemia    • Hypertension    • IBS (irritable bowel syndrome)    • Internal hemorrhoid    • Kidney stone    • Low back pain    • Muscular dystrophy (HCC)    • Muscular dystrophy (HCC)     II   • Myotonia    • Obesity    • Stroke ([facility]) 08/31/2013    resdual- left sided weakness.    • Type 2 diabetes mellitus without complication, without long-term current use of insulin (HCC)       Past Surgical History:   Procedure Laterality Date   • CHOLECYSTECTOMY  07/2004   • COLONOSCOPY  07/2017   • D & C WITH SUCTION     • LYMPH NODE BIOPSY     • SUBCLAVIAN ARTERY STENT  08/2018    x2   • WISDOM TOOTH EXTRACTION        Family History   Problem Relation " Age of Onset   • Allergies Other    • ADD / ADHD Other    • Heart block Other    • Other Other         CEREBROVASCULAR ACCIDENT    • Hypertension Other    • Cancer Other         LUNG CANCER   • Hyperlipidemia Other    • Alcohol abuse Mother    • Depression Mother    • Early death Mother          age 59   • Heart disease Mother         Mother  of Massive Heart attack   • Hyperlipidemia Mother    • Hypertension Mother    • Miscarriages / Stillbirths Mother         Miscarriage   • Heart attack Mother          of  massive heart attack   • Alcohol abuse Father    • Cancer Father         Had Lung Cancer - had 1 lung till death   • Diabetes Father         Lived on insuline shots   • Early death Father          age 53   • Breast cancer Maternal Aunt    • Learning disabilities Son         Had Adhd   • Breast cancer Cousin    • Ovarian cancer Neg Hx    • Endometrial cancer Neg Hx    • Uterine cancer Neg Hx    • Colon cancer Neg Hx      Social History     Socioeconomic History   • Marital status:    Tobacco Use   • Smoking status: Former     Packs/day: 1.50     Years: 15.00     Pack years: 22.50     Types: Cigarettes     Start date: 1983     Quit date: 2013     Years since quittin.2   • Smokeless tobacco: Never   • Tobacco comments:     use Ecigg now NO NICOTENE   Vaping Use   • Vaping Use: Some days   • Substances: Flavoring   • Devices: Pre-filled pod   Substance and Sexual Activity   • Alcohol use: Yes   • Drug use: No   • Sexual activity: Yes     Partners: Male     Birth control/protection: Post-menopausal     Comment: Menapause no cycle since 2013      Current Outpatient Medications on File Prior to Visit   Medication Sig Dispense Refill   • methylphenidate (Ritalin) 20 MG tablet Take 1 tablet by mouth Daily. 90 tablet 0   • albuterol (PROVENTIL) (2.5 MG/3ML) 0.083% nebulizer solution Take 2.5 mg by nebulization Every 4 (Four) Hours As Needed for Wheezing. 90 vial 1   •  amLODIPine (NORVASC) 2.5 MG tablet Take 1 tablet by mouth Daily. 90 tablet 0   • aspirin 81 MG EC tablet Take 81 mg by mouth Daily.     • atorvastatin (LIPITOR) 80 MG tablet TAKE ONE TABLET BY MOUTH ONCE NIGHTLY 30 tablet 11   • baclofen (LIORESAL) 10 MG tablet Take 1 tablet by mouth 3 (Three) Times a Day. 270 tablet 1   • Blood Glucose Monitoring Suppl (ONE TOUCH ULTRA 2) w/Device kit      • busPIRone (BUSPAR) 15 MG tablet TAKE ONE TABLET BY MOUTH THREE TIMES A  tablet 1   • calcium carbonate-vitamin d (CALCIUM 600+D HIGH POTENCY) 600-400 MG-UNIT per tablet Take 1 tablet by mouth Daily. 90 tablet 2   • clopidogrel (PLAVIX) 75 MG tablet TAKE ONE TABLET BY MOUTH DAILY 90 tablet 3   • conjugated estrogens (Premarin) 0.625 MG/GM vaginal cream Insert  into the vagina Daily. Apply one finger tip strip of cream into vagina, three times weekly at night (M/W/F) 30 g 5   • cyclobenzaprine (FLEXERIL) 10 MG tablet Take 1 tablet by mouth 3 (Three) Times a Day As Needed for Muscle Spasms. 270 tablet 2   • diclofenac (VOLTAREN) 75 MG EC tablet Take 1 tablet by mouth 2 (Two) Times a Day. 180 tablet 3   • famotidine (PEPCID) 20 MG tablet Take 1 tablet by mouth Daily. 90 tablet 3   • fenofibrate (TRICOR) 145 MG tablet TAKE ONE TABLET BY MOUTH DAILY 90 tablet 1   • fluconazole (DIFLUCAN) 150 MG tablet TAKE ONE TABLET TODAY. MAY REPEAT IN 72 HOURS IF SYMPTOMS NOT RESOLVED 2 tablet 0   • fluticasone (FLONASE) 50 MCG/ACT nasal spray into each nostril.     • gabapentin (NEURONTIN) 600 MG tablet Take 1 tablet by mouth 3 (Three) Times a Day. 270 tablet 0   • guaiFENesin-dextromethorphan (ROBITUSSIN DM) 100-10 MG/5ML syrup Take 5 mL by mouth Every 4 (Four) Hours As Needed for Cough. 471 mL 0   • HYDROcodone-acetaminophen (NORCO)  MG per tablet Take 1 tablet by mouth Every 6 (Six) Hours As Needed for Moderate Pain. 120 tablet 0   • hydrOXYzine (ATARAX) 25 MG tablet TAKE ONE TABLET BY MOUTH THREE TIMES A DAY AS NEEDED FOR ANXIETY  10 tablet 3   • levalbuterol (XOPENEX HFA) 45 MCG/ACT inhaler Inhale 1-2 puffs Every 4 (Four) Hours As Needed for Wheezing or Shortness of Air. 15 g 11   • loratadine (CLARITIN) 10 MG tablet Take 1 tablet by mouth Daily. 30 tablet 11   • metFORMIN (GLUCOPHAGE) 1000 MG tablet TAKE ONE TABLET BY MOUTH TWICE A DAY WITH MEALS 180 tablet 2   • montelukast (SINGULAIR) 10 MG tablet TAKE ONE TABLET BY MOUTH EVERY EVENING 90 tablet 3   • ONE TOUCH ULTRA TEST test strip      • oxybutynin XL (DITROPAN-XL) 5 MG 24 hr tablet Take 1 tablet by mouth Daily. 90 tablet 3   • pantoprazole (PROTONIX) 40 MG EC tablet Take 1 tablet by mouth Daily. 90 tablet 1   • potassium chloride ER (K-TAB) 20 MEQ tablet controlled-release ER tablet Take 1 tablet by mouth Daily. 90 tablet 2   • promethazine (PHENERGAN) 12.5 MG tablet Take 1 tablet by mouth Every 8 (Eight) Hours As Needed for Nausea or Vomiting. 30 tablet 1   • spironolactone (ALDACTONE) 50 MG tablet TAKE ONE TABLET BY MOUTH DAILY 90 tablet 3   • tiZANidine (ZANAFLEX) 4 MG tablet TAKE ONE TABLET BY MOUTH ONCE NIGHTLY 90 tablet 1   • venlafaxine XR (Effexor XR) 75 MG 24 hr capsule Take 1 capsule by mouth Daily. 30 capsule 5   • [DISCONTINUED] modafinil (PROVIGIL) 200 MG tablet Take 1 tablet by mouth Daily. 30 tablet 3     No current facility-administered medications on file prior to visit.      Allergies   Allergen Reactions   • Fish-Derived Products Anaphylaxis   • Penicillins Anaphylaxis and Shortness Of Breath        Review of Systems   Constitutional: Negative.    HENT: Negative.    Eyes: Negative.    Respiratory: Negative.    Cardiovascular: Negative.    Gastrointestinal: Negative.    Endocrine: Negative.    Genitourinary: Negative.    Musculoskeletal: Positive for arthralgias.   Skin: Negative.    Allergic/Immunologic: Negative.    Neurological: Negative.    Hematological: Negative.    Psychiatric/Behavioral: Negative.         The patient's Review of Systems was personally reviewed  "and confirmed as accurate.    Physical Exam  Blood pressure 146/48, height 167.6 cm (65.98\"), weight 106 kg (234 lb), not currently breastfeeding.    Body mass index is 37.79 kg/m².    GENERAL APPEARANCE: awake, alert, oriented, in no acute distress, well developed, well nourished and obese  LUNGS:  breathing nonlabored  EXTREMITIES: no clubbing, cyanosis  PERIPHERAL PULSES: palpable dorsalis pedis and posterior tibial pulses bilaterally.    GAIT:  Antalgic        ----------  Left Knee Exam:  ----------  ALIGNMENT: severe varus, correctable to neutral  ----------  RANGE OF MOTION:  Decreased (5 - 115 degrees) with no extensor lag  LIGAMENTOUS STABILITY:   stable to varus and valgus stress at terminal extension and 30 degrees; retensioning of the MCL is appreciated with valgus stress at 30 degrees consistent with medial compartment degeneration  ----------  STRENGTH:  KNEE FLEXION 4/5  KNEE EXTENSION  4/5  ANKLE DORSIFLEXION  5/5  ANKLE PLANTARFLEXION  3/5  ----------  PAIN WITH PALPATION:global  KNEE EFFUSION: yes, trace effusion  PAIN WITH KNEE ROM: yes  PATELLAR CREPITUS:  yes, painful and symptomatic  ----------  SENSATION TO LIGHT TOUCH:  DEEP PERONEAL/SUPERFICIAL PERONEAL/SURAL/SAPHENOUS/TIBIAL:    intact  ----------  EDEMA:  no  ERYTHEMA:    no  WOUNDS/INCISIONS:   no  _____________________________________________________________________  _____________________________________________________________________    RADIOGRAPHIC FINDINGS:   Indication: Left knee pain    Comparison: Todays xrays were compared to previous xrays from 6/9/2022    Knee films: moderate to severe tricompartmental arthritis with genu varum alignment, periarticular osteophytes visualized in all compartments and No significant changes compared to prior radiographs.    Assessment/Plan:   Diagnosis Plan   1. Primary osteoarthritis of left knee  XR Knee 4+ View Left      2. Achilles tendon disorder, left          Had an extensive discussion with " the patient regarding her ongoing left knee pain.  While she does need a total knee arthroplasty, I am concerned about her current gait status giving her Achilles tendon issue.  Patient states that she would like to get her Achilles fixed before proceeding to surgery as this has been an ongoing issue that is affecting her gait pattern.  I agree as her current gait problems will affect her ability to rehab the knee.  I discussed with Dr. Casas the possibility of assuming her care for her Achilles tendon disorder.  He currently does not feel he has enough experience to take on this complicated issue.  Therefore we will attempt to have outside records obtained and reviewed by Dr. Casiano to determine if she can help this patient with Achilles tendon reconstruction before proceeding to total knee arthroplasty.  I directed the patient to obtain outside records and send them to our office for Dr. Casiano's review.  I will see her back once her Achilles tendon issues have been resolved to discuss proceeding to total knee arthroplasty on the left.      Mk Sim MD  12/13/22  16:01 EST

## 2022-12-19 RX ORDER — ATORVASTATIN CALCIUM 80 MG/1
TABLET, FILM COATED ORAL
Qty: 90 TABLET | Refills: 1 | Status: SHIPPED | OUTPATIENT
Start: 2022-12-19

## 2022-12-22 RX ORDER — METHYLPHENIDATE HYDROCHLORIDE 20 MG/1
20 TABLET ORAL DAILY
Qty: 30 TABLET | Refills: 0 | Status: CANCELLED | OUTPATIENT
Start: 2022-12-22 | End: 2023-12-22

## 2023-01-03 RX ORDER — TIZANIDINE 4 MG/1
4 TABLET ORAL NIGHTLY
Qty: 90 TABLET | Refills: 1 | Status: SHIPPED | OUTPATIENT
Start: 2023-01-03

## 2023-01-03 RX ORDER — CYCLOBENZAPRINE HCL 10 MG
10 TABLET ORAL 3 TIMES DAILY PRN
Qty: 270 TABLET | Refills: 2 | Status: SHIPPED | OUTPATIENT
Start: 2023-01-03

## 2023-01-08 DIAGNOSIS — G71.11 MYOTONIC DYSTROPHY, TYPE 2: ICD-10-CM

## 2023-01-09 ENCOUNTER — OFFICE VISIT (OUTPATIENT)
Dept: ORTHOPEDIC SURGERY | Facility: CLINIC | Age: 53
End: 2023-01-09
Payer: MEDICARE

## 2023-01-09 VITALS
DIASTOLIC BLOOD PRESSURE: 94 MMHG | SYSTOLIC BLOOD PRESSURE: 172 MMHG | BODY MASS INDEX: 37.56 KG/M2 | HEIGHT: 66 IN | WEIGHT: 233.69 LBS

## 2023-01-09 DIAGNOSIS — M25.572 LEFT ANKLE PAIN, UNSPECIFIED CHRONICITY: Primary | ICD-10-CM

## 2023-01-09 DIAGNOSIS — E11.9 TYPE 2 DIABETES MELLITUS WITHOUT COMPLICATION, WITHOUT LONG-TERM CURRENT USE OF INSULIN: ICD-10-CM

## 2023-01-09 DIAGNOSIS — S86.012S RUPTURE OF LEFT ACHILLES TENDON, SEQUELA: ICD-10-CM

## 2023-01-09 DIAGNOSIS — G71.11 MYOTONIC DYSTROPHY, TYPE 2: ICD-10-CM

## 2023-01-09 PROCEDURE — 99214 OFFICE O/P EST MOD 30 MIN: CPT | Performed by: ORTHOPAEDIC SURGERY

## 2023-01-09 RX ORDER — GABAPENTIN 600 MG/1
TABLET ORAL
Qty: 270 TABLET | Refills: 1 | Status: SHIPPED | OUTPATIENT
Start: 2023-01-09

## 2023-01-09 NOTE — PROGRESS NOTES
NEW PATIENT    Patient: Hafsa Barron  : 1970    Primary Care Provider: Jocy Snow MD    Requesting Provider: As above    Pain of the Left Ankle (History of achilles tendon repair Dr. Salinas 2021)      History    Chief Complaint: Left Achilles problem    History of Present Illness: This is a very pleasant 52-year-old woman seen in the request of Dr. Sim.  She is here for second opinion regarding a left Achilles problem.  Her underlying history is complex.  She has myotonic dystrophy type II.  She uses a rolling walker for stability.  In  she had a stroke that left her with left-sided weakness.  In 2021 she had a left Achilles rupture.  She was seen by Dr. Salinas.  I have a copy of the preoperative MRI report dated 9/3/2021, done at [facility], \"there is nearly complete rupture of the Achilles tendon with inflammatory signal changes in the gastrocnemius.  The tear extends proximally beyond the axial field-of-view.\"  She had surgery at the [facility] on [address] 2021.  The operative note indicates that they encountered the bit of scar tissue.  The tendon was repaired primarily.  She had what sounds like a fairly standard postop course.  She had difficulty getting to formal physical therapy because of cost.  She last saw Dr. Salinas in .  She indicates that at that time he discussed some type of revision surgery with tendon transfer.  I do not see that in the outside office notes, and she is not certain exactly what he meant.  She is here for another opinion.  She notes continued weakness in the leg.  She does note it was weak before surgery secondary to the stroke.  She has significant pain in the left knee and has discussed knee replacement with Dr. Sim.  She also has diabetes, her most recent hemoglobin A1c was 6.1 on 2022      The patient does have a personal or family history of DVT, PE, hypercoagulable state or  risk factors for clotting.  She has had DVTs      Current Outpatient Medications on File Prior to Visit   Medication Sig Dispense Refill   • albuterol (PROVENTIL) (2.5 MG/3ML) 0.083% nebulizer solution Take 2.5 mg by nebulization Every 4 (Four) Hours As Needed for Wheezing. 90 vial 1   • amLODIPine (NORVASC) 2.5 MG tablet Take 1 tablet by mouth Daily. 90 tablet 0   • aspirin 81 MG EC tablet Take 81 mg by mouth Daily.     • atorvastatin (LIPITOR) 80 MG tablet TAKE ONE TABLET BY MOUTH ONCE NIGHTLY 90 tablet 1   • baclofen (LIORESAL) 10 MG tablet Take 1 tablet by mouth 3 (Three) Times a Day. 270 tablet 1   • Blood Glucose Monitoring Suppl (ONE TOUCH ULTRA 2) w/Device kit      • busPIRone (BUSPAR) 15 MG tablet TAKE ONE TABLET BY MOUTH THREE TIMES A  tablet 1   • calcium carbonate-vitamin d (CALCIUM 600+D HIGH POTENCY) 600-400 MG-UNIT per tablet Take 1 tablet by mouth Daily. 90 tablet 2   • clopidogrel (PLAVIX) 75 MG tablet TAKE ONE TABLET BY MOUTH DAILY 90 tablet 3   • conjugated estrogens (Premarin) 0.625 MG/GM vaginal cream Insert  into the vagina Daily. Apply one finger tip strip of cream into vagina, three times weekly at night (M/W/F) 30 g 5   • cyclobenzaprine (FLEXERIL) 10 MG tablet Take 1 tablet by mouth 3 (Three) Times a Day As Needed for Muscle Spasms. 270 tablet 2   • diclofenac (VOLTAREN) 75 MG EC tablet Take 1 tablet by mouth 2 (Two) Times a Day. 180 tablet 3   • famotidine (PEPCID) 20 MG tablet Take 1 tablet by mouth Daily. 90 tablet 3   • fenofibrate (TRICOR) 145 MG tablet TAKE ONE TABLET BY MOUTH DAILY 90 tablet 1   • fluconazole (DIFLUCAN) 150 MG tablet TAKE ONE TABLET TODAY. MAY REPEAT IN 72 HOURS IF SYMPTOMS NOT RESOLVED 2 tablet 0   • fluticasone (FLONASE) 50 MCG/ACT nasal spray into each nostril.     • gabapentin (NEURONTIN) 600 MG tablet TAKE 1 TABLET BY MOUTH THREE TIMES A  tablet 1   • guaiFENesin-dextromethorphan (ROBITUSSIN DM) 100-10 MG/5ML syrup Take 5 mL by mouth Every 4 (Four)  Hours As Needed for Cough. 471 mL 0   • HYDROcodone-acetaminophen (NORCO)  MG per tablet Take 1 tablet by mouth Every 6 (Six) Hours As Needed for Moderate Pain. 120 tablet 0   • hydrOXYzine (ATARAX) 25 MG tablet TAKE ONE TABLET BY MOUTH THREE TIMES A DAY AS NEEDED FOR ANXIETY 10 tablet 3   • levalbuterol (XOPENEX HFA) 45 MCG/ACT inhaler Inhale 1-2 puffs Every 4 (Four) Hours As Needed for Wheezing or Shortness of Air. 15 g 11   • loratadine (CLARITIN) 10 MG tablet Take 1 tablet by mouth Daily. 30 tablet 11   • metFORMIN (GLUCOPHAGE) 1000 MG tablet TAKE ONE TABLET BY MOUTH TWICE A DAY WITH MEALS 180 tablet 2   • methylphenidate (Ritalin) 20 MG tablet Take 1 tablet by mouth Daily. 90 tablet 0   • montelukast (SINGULAIR) 10 MG tablet TAKE ONE TABLET BY MOUTH EVERY EVENING 90 tablet 3   • ONE TOUCH ULTRA TEST test strip      • oxybutynin XL (DITROPAN-XL) 5 MG 24 hr tablet Take 1 tablet by mouth Daily. 90 tablet 3   • pantoprazole (PROTONIX) 40 MG EC tablet Take 1 tablet by mouth Daily. 90 tablet 1   • potassium chloride ER (K-TAB) 20 MEQ tablet controlled-release ER tablet Take 1 tablet by mouth Daily. 90 tablet 2   • promethazine (PHENERGAN) 12.5 MG tablet Take 1 tablet by mouth Every 8 (Eight) Hours As Needed for Nausea or Vomiting. 30 tablet 1   • spironolactone (ALDACTONE) 50 MG tablet TAKE ONE TABLET BY MOUTH DAILY 90 tablet 3   • tiZANidine (ZANAFLEX) 4 MG tablet Take 1 tablet by mouth Every Night. 90 tablet 1   • venlafaxine XR (Effexor XR) 75 MG 24 hr capsule Take 1 capsule by mouth Daily. 30 capsule 5   • [DISCONTINUED] gabapentin (NEURONTIN) 600 MG tablet Take 1 tablet by mouth 3 (Three) Times a Day. 270 tablet 0   • [DISCONTINUED] modafinil (PROVIGIL) 200 MG tablet Take 1 tablet by mouth Daily. 30 tablet 3     No current facility-administered medications on file prior to visit.      Allergies   Allergen Reactions   • Fish-Derived Products Anaphylaxis   • Penicillins Anaphylaxis and Shortness Of Breath       Past Medical History:   Diagnosis Date   • Achilles tendon rupture 2021    left   • Allergic    • Allergy    • Anxiety    • Asthma Allergies induced   • Depression    • Fibromyalgia, primary    • GERD (gastroesophageal reflux disease)    • Headache    • History of blood clots    • History of UTI    • Hyperlipidemia    • Hypertension    • IBS (irritable bowel syndrome)    • Internal hemorrhoid    • Kidney stone    • Low back pain    • Muscular dystrophy ([facility])    • Muscular dystrophy ([facility])     II   • Myotonia    • Obesity    • Stroke ([facility]) 2013    resdual- left sided weakness.    • Type 2 diabetes mellitus without complication, without long-term current use of insulin ([facility])      Past Surgical History:   Procedure Laterality Date   • ACHILLES TENDON SURGERY Left 2021    Dr. Salinas achilles rupture repair   • CHOLECYSTECTOMY  2004   • COLONOSCOPY  2017   • D & C WITH SUCTION     • LYMPH NODE BIOPSY     • SUBCLAVIAN ARTERY STENT  08/2018    x2   • WISDOM TOOTH EXTRACTION       Family History   Problem Relation Age of Onset   • Allergies Other    • ADD / ADHD Other    • Heart block Other    • Other Other         CEREBROVASCULAR ACCIDENT    • Hypertension Other    • Cancer Other         LUNG CANCER   • Hyperlipidemia Other    • Alcohol abuse Mother    • Depression Mother    • Early death Mother          age 59   • Heart disease Mother         Mother  of Massive Heart attack   • Hyperlipidemia Mother    • Hypertension Mother    • Miscarriages / Stillbirths Mother         Miscarriage   • Heart attack Mother          of  massive heart attack   • Alcohol abuse Father    • Cancer Father         Had Lung Cancer - had 1 lung till death   • Diabetes Father         Lived on insuline shots   • Early death Father          age 53   • Breast cancer Maternal Aunt    • Learning disabilities Son         Had Adhd   • Breast cancer Cousin    • Ovarian cancer Neg Hx    • Endometrial cancer Neg Hx     • Uterine cancer Neg Hx    • Colon cancer Neg Hx       Social History     Socioeconomic History   • Marital status:    Tobacco Use   • Smoking status: Former     Packs/day: 1.50     Years: 15.00     Pack years: 22.50     Types: Cigarettes     Start date: 1983     Quit date: 2013     Years since quittin.3   • Smokeless tobacco: Never   • Tobacco comments:     use Ecigg now NO NICOTENE   Vaping Use   • Vaping Use: Some days   • Substances: Flavoring   • Devices: Pre-filled pod   Substance and Sexual Activity   • Alcohol use: Yes   • Drug use: No   • Sexual activity: Yes     Partners: Male     Birth control/protection: Post-menopausal     Comment: Menapause no cycle since 2013        Review of Systems   Constitutional: Negative.    HENT: Negative.    Eyes: Negative.    Respiratory: Negative.    Cardiovascular: Negative.    Gastrointestinal: Negative.    Endocrine: Negative.    Genitourinary: Negative.    Musculoskeletal: Positive for arthralgias.   Skin: Negative.    Allergic/Immunologic: Negative.    Neurological: Negative.    Hematological: Negative.    Psychiatric/Behavioral: Negative.        The following portions of the patient's history were reviewed and updated as appropriate: allergies, current medications, past family history, past medical history, past social history, past surgical history and problem list.    Physical Exam:   /94   Ht 167.6 cm (65.98\")   Wt 106 kg (233 lb 11 oz)   LMP  (LMP Unknown)   BMI 37.74 kg/m²   GENERAL: Body habitus: morbidly obese    Lower extremity edema: Right: trace; Left: trace    Varicose veins:  Right: none; Left: none    Gait: using walker and Gait without the walker is slow, careful, slightly unsteady and somewhat shuffling, short stride      Mental Status:  awake and alert; oriented to person, place, and time    Voice:  clear  SKIN:  Lower extremity: warm and dry    Hair Growth(lower extremity):  Right:normal; Left:   normal  NAILS: Toenails: thick  HEENT: Head: Normocephalic, atraumatic,  without obvious abnormality.  eye: normal external eye, no icterus  ears:normal external ears  PULM:  Repiratory effort normal  CV:  Dorsalis Pedis:  Right: 2+; Left:2+    Posterior Tibial: Right:2+; Left:2+    Capillary Refill:  Brisk  MSK:  Hand:Left upper extremity is weak, she uses it in a very limited fashion      Tibia:  Right:  non tender; Left:  non tender      Ankle:  Right: non tender; Left:  No tenderness in the left ankle, in the prone position the left foot sits in neutral dorsiflexion compared to plantarflexion on the right.  She has a healed midline posterior incision over the Achilles.  Giraldo test does not produce any response in the foot, it is normal on the right.  She is able to plantarflex against gravity and to gross manual testing the Achilles tendon appears to be in continuity, there is definitely calf atrophy compared with the right.      Foot:  Right:  non tender; Left:  non tender      NEURO: Heel Walking:  Right:  She does not have the balance to heel walk but is able to dorsiflex the foot off the floor holding onto the bed for balance; Left:  She does not have the balance to heel walk but is able to dorsiflex the foot off the floor holding onto the bed for balance    Toe Walking:  Right:  Unable to toe walk due to balance issues, but she is able to do a double leg toe raise and on the right side she is able to do a single-leg toe raise; Left:  Holding onto the bed for balance she can do a double leg toe raise, she is unable to do a single-leg toe raise on the left     [place] 5.07 monofilament test: normal    Lower extremity sensation: intact        Calf Atrophy:Left calf atrophy    Motor Function: Motor function is 5 out of 5 on the right, on the left I felt that her quads were 4+ out of 5, soleus and all the plantar flexes tested 4+ out of 5, FHL FDL 5 out of 5, dorsiflexors 5 out of 5         Medical  Decision Making    Data Review:   ordered and reviewed x-rays today, reviewed prior lab results, reviewed radiology results and reviewed outside records    Assessment and Plan/ Diagnosis/Treatment options:   1. Rupture of left Achilles tendon, sequela  Very complicated problem.  I explained to the patient that I think with Dr. Salinas was probably talking about is an F HL transfer.  I have certainly done quite a few FHL transfers.  However the FHL transfer is useful only for bridging the gap in the tendon.  It really does not add any significant strength.  Her primary concern is weakness and feeling as if the muscle will give way.  Unless there is a specific gap in her Achilles, which we could bridge with the FHL to restore continuity and thus allow the gastroc muscle to function as a plantar flexor, the tendon transfer would not do anything.  I explained that cyst simply transferring the FHL to an intact Achilles with a weak gastrocsoleus would not help much at all.  If the primary problem is simply weakness of the gastrocsoleus, there is nothing that we have to improve that.  She has quite a few risk factors for this including the previous stroke, the myotonic dystrophy type II, and the fact that it is now been well over a year since the original injury.  We will do an MRI to look at the continuity of the tendon.  I will see her back following the MRI.  If surgery is not indicated I will recommend a permanent brace.  - MRI Ankle Left Without Contrast; Future    2. Type 2 diabetes mellitus without complication, without long-term current use of insulin (HCC)  She has good glucose control, no obvious neuropathy    3. Myotonic dystrophy, type 2 (HCC)  She appears to have more proximal than distal motor weakness, but this can affect all motors.  She definitely has left-sided weakness as a result of the previous stroke.  As above I am not certain that an FHL transfer will be of any benefit for her.      - XR Ankle 2  View Left            Part of this encounter note is an electronic transcription/translation of spoken language to printed text. The electronic translation of spoken language may permit erroneous, or at times, nonsensical words or phrases to be inadvertently transcribed; Although I have reviewed the note for such errors, some may still exist.          Cris Casiano MD

## 2023-01-12 ENCOUNTER — HOSPITAL ENCOUNTER (OUTPATIENT)
Dept: MRI IMAGING | Facility: HOSPITAL | Age: 53
Discharge: HOME | End: 2023-01-12
Admitting: ORTHOPAEDIC SURGERY
Payer: MEDICARE

## 2023-01-12 DIAGNOSIS — S86.012S RUPTURE OF LEFT ACHILLES TENDON, SEQUELA: ICD-10-CM

## 2023-01-12 PROCEDURE — 73721 MRI JNT OF LWR EXTRE W/O DYE: CPT

## 2023-01-18 ENCOUNTER — OFFICE VISIT (OUTPATIENT)
Dept: ORTHOPEDIC SURGERY | Facility: CLINIC | Age: 53
End: 2023-01-18
Payer: MEDICARE

## 2023-01-18 VITALS
SYSTOLIC BLOOD PRESSURE: 121 MMHG | BODY MASS INDEX: 37.56 KG/M2 | WEIGHT: 233.69 LBS | DIASTOLIC BLOOD PRESSURE: 74 MMHG | HEIGHT: 66 IN

## 2023-01-18 DIAGNOSIS — S86.012S RUPTURE OF LEFT ACHILLES TENDON, SEQUELA: Primary | ICD-10-CM

## 2023-01-18 PROCEDURE — 99213 OFFICE O/P EST LOW 20 MIN: CPT | Performed by: ORTHOPAEDIC SURGERY

## 2023-01-18 NOTE — PROGRESS NOTES
ESTABLISHED PATIENT    Patient: Hafsa Barron  : 1970    Primary Care Provider: Jocy Snow MD    Requesting Provider: As above    Follow-up (Rupture of Left Achilles Tendon, after MRI Left Ankle 23 )      History    Chief Complaint: Left gastrocsoleus weakness    History of Present Illness: She returns for follow-up of the MRI of her left ankle done on 2023.  I reviewed the films and the report.  My interpretation is the Achilles is intact, and has the expected postop changes.  I explained this to her.  I explained that I do not think an FHL transfer would be of any benefit at all.  I explained that we use the FHL to bridge the gap, it really does not provide any strength.  I think her weakness is a combination of multiple things including the previous stroke, her myotonic dystrophy, the fact that she had an Achilles rupture, and perhaps the lack of rehab.  I do think rehab might be helpful to improve the strength somewhat.    Current Outpatient Medications on File Prior to Visit   Medication Sig Dispense Refill   • albuterol (PROVENTIL) (2.5 MG/3ML) 0.083% nebulizer solution Take 2.5 mg by nebulization Every 4 (Four) Hours As Needed for Wheezing. 90 vial 1   • amLODIPine (NORVASC) 2.5 MG tablet Take 1 tablet by mouth Daily. 90 tablet 0   • aspirin 81 MG EC tablet Take 81 mg by mouth Daily.     • atorvastatin (LIPITOR) 80 MG tablet TAKE ONE TABLET BY MOUTH ONCE NIGHTLY 90 tablet 1   • baclofen (LIORESAL) 10 MG tablet Take 1 tablet by mouth 3 (Three) Times a Day. 270 tablet 1   • Blood Glucose Monitoring Suppl (ONE TOUCH ULTRA 2) w/Device kit      • busPIRone (BUSPAR) 15 MG tablet TAKE ONE TABLET BY MOUTH THREE TIMES A  tablet 1   • calcium carbonate-vitamin d (CALCIUM 600+D HIGH POTENCY) 600-400 MG-UNIT per tablet Take 1 tablet by mouth Daily. 90 tablet 2   • clopidogrel (PLAVIX) 75 MG tablet TAKE ONE TABLET BY MOUTH DAILY 90 tablet 3   • conjugated estrogens (Premarin)  0.625 MG/GM vaginal cream Insert  into the vagina Daily. Apply one finger tip strip of cream into vagina, three times weekly at night (M/W/F) 30 g 5   • cyclobenzaprine (FLEXERIL) 10 MG tablet Take 1 tablet by mouth 3 (Three) Times a Day As Needed for Muscle Spasms. 270 tablet 2   • diclofenac (VOLTAREN) 75 MG EC tablet Take 1 tablet by mouth 2 (Two) Times a Day. 180 tablet 3   • famotidine (PEPCID) 20 MG tablet Take 1 tablet by mouth Daily. 90 tablet 3   • fenofibrate (TRICOR) 145 MG tablet TAKE ONE TABLET BY MOUTH DAILY 90 tablet 1   • fluconazole (DIFLUCAN) 150 MG tablet TAKE ONE TABLET TODAY. MAY REPEAT IN 72 HOURS IF SYMPTOMS NOT RESOLVED 2 tablet 0   • fluticasone (FLONASE) 50 MCG/ACT nasal spray into each nostril.     • gabapentin (NEURONTIN) 600 MG tablet TAKE 1 TABLET BY MOUTH THREE TIMES A  tablet 1   • guaiFENesin-dextromethorphan (ROBITUSSIN DM) 100-10 MG/5ML syrup Take 5 mL by mouth Every 4 (Four) Hours As Needed for Cough. 471 mL 0   • HYDROcodone-acetaminophen (NORCO)  MG per tablet Take 1 tablet by mouth Every 6 (Six) Hours As Needed for Moderate Pain. 120 tablet 0   • hydrOXYzine (ATARAX) 25 MG tablet TAKE ONE TABLET BY MOUTH THREE TIMES A DAY AS NEEDED FOR ANXIETY 10 tablet 3   • levalbuterol (XOPENEX HFA) 45 MCG/ACT inhaler Inhale 1-2 puffs Every 4 (Four) Hours As Needed for Wheezing or Shortness of Air. 15 g 11   • loratadine (CLARITIN) 10 MG tablet Take 1 tablet by mouth Daily. 30 tablet 11   • metFORMIN (GLUCOPHAGE) 1000 MG tablet TAKE ONE TABLET BY MOUTH TWICE A DAY WITH MEALS 180 tablet 2   • methylphenidate (Ritalin) 20 MG tablet Take 1 tablet by mouth Daily. 90 tablet 0   • montelukast (SINGULAIR) 10 MG tablet TAKE ONE TABLET BY MOUTH EVERY EVENING 90 tablet 3   • ONE TOUCH ULTRA TEST test strip      • oxybutynin XL (DITROPAN-XL) 5 MG 24 hr tablet Take 1 tablet by mouth Daily. 90 tablet 3   • pantoprazole (PROTONIX) 40 MG EC tablet Take 1 tablet by mouth Daily. 90 tablet 1   •  potassium chloride ER (K-TAB) 20 MEQ tablet controlled-release ER tablet Take 1 tablet by mouth Daily. 90 tablet 2   • promethazine (PHENERGAN) 12.5 MG tablet Take 1 tablet by mouth Every 8 (Eight) Hours As Needed for Nausea or Vomiting. 30 tablet 1   • spironolactone (ALDACTONE) 50 MG tablet TAKE ONE TABLET BY MOUTH DAILY 90 tablet 3   • tiZANidine (ZANAFLEX) 4 MG tablet Take 1 tablet by mouth Every Night. 90 tablet 1   • venlafaxine XR (Effexor XR) 75 MG 24 hr capsule Take 1 capsule by mouth Daily. 30 capsule 5   • [DISCONTINUED] modafinil (PROVIGIL) 200 MG tablet Take 1 tablet by mouth Daily. 30 tablet 3     No current facility-administered medications on file prior to visit.      Allergies   Allergen Reactions   • Fish-Derived Products Anaphylaxis   • Penicillins Anaphylaxis and Shortness Of Breath      Past Medical History:   Diagnosis Date   • Achilles tendon rupture 08/2021    left   • Allergic    • Allergy    • Anxiety    • Asthma Allergies induced   • Depression    • Fibromyalgia, primary    • GERD (gastroesophageal reflux disease)    • Headache    • History of blood clots    • History of UTI    • Hyperlipidemia    • Hypertension    • IBS (irritable bowel syndrome)    • Internal hemorrhoid    • Kidney stone    • Low back pain    • Muscular dystrophy (HCC)    • Muscular dystrophy ([facility])     II   • Myotonia    • Obesity    • Stroke ([facility]) 08/31/2013    resdual- left sided weakness.    • Type 2 diabetes mellitus without complication, without long-term current use of insulin ([facility])      Past Surgical History:   Procedure Laterality Date   • ACHILLES TENDON SURGERY Left 09/2021    Dr. Salinas achilles rupture repair   • CHOLECYSTECTOMY  07/2004   • COLONOSCOPY  07/2017   • D & C WITH SUCTION     • LYMPH NODE BIOPSY     • SUBCLAVIAN ARTERY STENT  08/2018    x2   • WISDOM TOOTH EXTRACTION       Family History   Problem Relation Age of Onset   • Allergies Other    • ADD / ADHD Other    • Heart block Other    • Other  Other         CEREBROVASCULAR ACCIDENT    • Hypertension Other    • Cancer Other         LUNG CANCER   • Hyperlipidemia Other    • Alcohol abuse Mother    • Depression Mother    • Early death Mother          age 59   • Heart disease Mother         Mother  of Massive Heart attack   • Hyperlipidemia Mother    • Hypertension Mother    • Miscarriages / Stillbirths Mother         Miscarriage   • Heart attack Mother          of  massive heart attack   • Alcohol abuse Father    • Cancer Father         Had Lung Cancer  had 1 lung till death   • Diabetes Father         Lived on insuline shots   • Early death Father          age 53   • Breast cancer Maternal Aunt    • Learning disabilities Son         Had Adhd   • Breast cancer Cousin    • Ovarian cancer Neg Hx    • Endometrial cancer Neg Hx    • Uterine cancer Neg Hx    • Colon cancer Neg Hx       Social History     Socioeconomic History   • Marital status:    Tobacco Use   • Smoking status: Former     Packs/day: 1.50     Years: 15.00     Pack years: 22.50     Types: Cigarettes     Start date: 1983     Quit date: 2013     Years since quittin.3   • Smokeless tobacco: Never   • Tobacco comments:     use Ecigg now NO NICOTENE   Vaping Use   • Vaping Use: Some days   • Substances: Flavoring   • Devices: Pre-filled pod   Substance and Sexual Activity   • Alcohol use: Yes   • Drug use: No   • Sexual activity: Yes     Partners: Male     Birth control/protection: Post-menopausal     Comment: Menapause no cycle since 2013        Review of Systems   Constitutional: Negative.    HENT: Negative.    Eyes: Negative.    Respiratory: Negative.    Cardiovascular: Negative.    Gastrointestinal: Negative.    Endocrine: Negative.    Genitourinary: Negative.    Musculoskeletal: Positive for arthralgias.   Skin: Negative.    Allergic/Immunologic: Negative.    Neurological: Negative.    Hematological: Negative.    Psychiatric/Behavioral: Negative.   "      The following portions of the patient's history were reviewed and updated as appropriate: allergies, current medications, past family history, past medical history, past social history, past surgical history and problem list.    Physical Exam:   /74   Ht 167.6 cm (65.98\")   Wt 106 kg (233 lb 11 oz)   LMP  (LMP Unknown)   BMI 37.74 kg/m²   No change in exam, walking with a cane        Medical Decision Making    Data Review:   reviewed radiology images and reviewed radiology results    Assessment/Plan/Diagnosis/Treatment Options:   1. Rupture of left Achilles tendon, sequela  As above I reviewed the MRI and discussed it with her in detail.  I do not think that surgery would be of benefit.  I think there is some room for improvement with physical therapy.  She really was not able to go to formal physical therapy previously.  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  But again I think she can improve somewhat with PT.  I explained that weakness after Achilles repair is a very common complaint.  Achilles rupture is the leading cause for early prison for pro athletes.  If she does not improve enough with PT then I would recommend an AFO brace.  I would not recommend a brace first off because actually that can contribute to weakness.  I will be happy to see her anytime  - Ambulatory Referral to Physical Therapy Evaluate and treat, Ortho        Cris Casiano MD                      "

## 2023-02-22 DIAGNOSIS — F41.9 ANXIETY AND DEPRESSION: ICD-10-CM

## 2023-02-22 DIAGNOSIS — F32.A ANXIETY AND DEPRESSION: ICD-10-CM

## 2023-02-22 RX ORDER — VENLAFAXINE HYDROCHLORIDE 75 MG/1
CAPSULE, EXTENDED RELEASE ORAL
Qty: 30 CAPSULE | Refills: 5 | Status: SHIPPED | OUTPATIENT
Start: 2023-02-22

## 2023-02-24 DIAGNOSIS — B37.9 YEAST INFECTION: ICD-10-CM

## 2023-02-24 DIAGNOSIS — B37.31 YEAST VAGINITIS: Primary | ICD-10-CM

## 2023-02-24 DIAGNOSIS — B37.9 YEAST INFECTION: Primary | ICD-10-CM

## 2023-02-24 RX ORDER — NYSTATIN 100000 U/G
1 OINTMENT TOPICAL 2 TIMES DAILY
Qty: 30 G | Refills: 1 | Status: SHIPPED | OUTPATIENT
Start: 2023-02-24

## 2023-02-24 RX ORDER — NYSTATIN 100000 [USP'U]/G
POWDER TOPICAL 3 TIMES DAILY
Qty: 30 G | Refills: 2 | Status: SHIPPED | OUTPATIENT
Start: 2023-02-24

## 2023-02-27 DIAGNOSIS — K21.9 GASTROESOPHAGEAL REFLUX DISEASE, UNSPECIFIED WHETHER ESOPHAGITIS PRESENT: ICD-10-CM

## 2023-02-27 RX ORDER — FAMOTIDINE 20 MG/1
TABLET, FILM COATED ORAL
Qty: 90 TABLET | Refills: 3 | Status: SHIPPED | OUTPATIENT
Start: 2023-02-27

## 2023-03-03 RX ORDER — CLOPIDOGREL BISULFATE 75 MG/1
TABLET ORAL
Qty: 90 TABLET | Refills: 3 | Status: SHIPPED | OUTPATIENT
Start: 2023-03-03

## 2023-03-14 DIAGNOSIS — G47.19 DAYTIME HYPERSOMNOLENCE: ICD-10-CM

## 2023-03-15 NOTE — TELEPHONE ENCOUNTER
Rx Refill Note  Requested Prescriptions     Pending Prescriptions Disp Refills   • methylphenidate (Ritalin) 20 MG tablet 90 tablet 0     Sig: Take 1 tablet by mouth Daily.      Last filled:12/12/23 with 0 refills. Pending to provider  Last office visit with prescribing clinician: Visit date not found      Next office visit with prescribing clinician: Visit date not found     Tavo Armstrong MA  03/15/23, 08:07 EDT

## 2023-03-16 ENCOUNTER — TELEPHONE (OUTPATIENT)
Dept: ORTHOPEDIC SURGERY | Facility: CLINIC | Age: 53
End: 2023-03-16

## 2023-03-16 NOTE — TELEPHONE ENCOUNTER
Attempted to call patient but phone went straight to voicemail and the mailbox was full. Inform the patient that a physical therapy order was successfully faxed to [facility] at [phone].

## 2023-03-16 NOTE — TELEPHONE ENCOUNTER
Caller: SONAM BEARD    Relationship: SELF    What form or medical record are you requesting: PHYSICAL THERAPY ORDERS    Who is requesting this form or medical record from you: [facility]    How would you like to receive the form or medical records (pick-up, mail, fax): FAX  If fax, what is the fax number:   [phone]    Timeframe paperwork needed: [facility] HAS APPOINTMENT TODAY AT 2    ADDITIONAL DETAILS WE FAXED IT TO [facility] [[email or web address]] AND HER INSURANCE WOULD NOT PAY AND SHE HAS AN APPOINTMENT WITH [facility] TODAY.

## 2023-03-17 ENCOUNTER — DOCUMENTATION (OUTPATIENT)
Dept: NEUROLOGY | Facility: CLINIC | Age: 53
End: 2023-03-17
Payer: MEDICARE

## 2023-03-17 RX ORDER — METHYLPHENIDATE HYDROCHLORIDE 20 MG/1
20 TABLET ORAL DAILY
Qty: 30 TABLET | Refills: 0 | Status: SHIPPED | OUTPATIENT
Start: 2023-03-17 | End: 2023-04-16

## 2023-04-05 DIAGNOSIS — E11.9 TYPE 2 DIABETES MELLITUS WITHOUT COMPLICATION, WITHOUT LONG-TERM CURRENT USE OF INSULIN: ICD-10-CM

## 2023-04-06 ENCOUNTER — TELEPHONE (OUTPATIENT)
Dept: ORTHOPEDIC SURGERY | Facility: CLINIC | Age: 53
End: 2023-04-06

## 2023-04-06 NOTE — TELEPHONE ENCOUNTER
Provider:   Caller: SONAM BEARD  Relationship to Patient: SELF  Phone Number: [phone]  Reason for Call: PATIENT CALLED STATING SHE IS IN PAIN. WANTING TO SCHEDULE LEFT KNEE CORTISONE INJECTIONS. PLEASE ADVISE.

## 2023-04-07 ENCOUNTER — OFFICE VISIT (OUTPATIENT)
Dept: ORTHOPEDIC SURGERY | Facility: CLINIC | Age: 53
End: 2023-04-07
Payer: MEDICARE

## 2023-04-07 VITALS
HEIGHT: 66 IN | WEIGHT: 244 LBS | BODY MASS INDEX: 39.21 KG/M2 | SYSTOLIC BLOOD PRESSURE: 140 MMHG | DIASTOLIC BLOOD PRESSURE: 80 MMHG

## 2023-04-07 DIAGNOSIS — M17.12 PRIMARY OSTEOARTHRITIS OF LEFT KNEE: Primary | ICD-10-CM

## 2023-04-07 DIAGNOSIS — E66.09 CLASS 2 OBESITY DUE TO EXCESS CALORIES WITHOUT SERIOUS COMORBIDITY WITH BODY MASS INDEX (BMI) OF 39.0 TO 39.9 IN ADULT: ICD-10-CM

## 2023-04-07 PROCEDURE — 3077F SYST BP >= 140 MM HG: CPT | Performed by: ORTHOPAEDIC SURGERY

## 2023-04-07 PROCEDURE — 3079F DIAST BP 80-89 MM HG: CPT | Performed by: ORTHOPAEDIC SURGERY

## 2023-04-07 PROCEDURE — 20610 DRAIN/INJ JOINT/BURSA W/O US: CPT | Performed by: ORTHOPAEDIC SURGERY

## 2023-04-07 PROCEDURE — 99214 OFFICE O/P EST MOD 30 MIN: CPT | Performed by: ORTHOPAEDIC SURGERY

## 2023-04-07 RX ORDER — TRIAMCINOLONE ACETONIDE 40 MG/ML
80 INJECTION, SUSPENSION INTRA-ARTICULAR; INTRAMUSCULAR
Status: COMPLETED | OUTPATIENT
Start: 2023-04-07 | End: 2023-04-07

## 2023-04-07 RX ORDER — BUSPIRONE HYDROCHLORIDE 15 MG/1
15 TABLET ORAL 3 TIMES DAILY
Qty: 270 TABLET | Refills: 1 | Status: SHIPPED | OUTPATIENT
Start: 2023-04-07

## 2023-04-07 RX ORDER — LIDOCAINE HYDROCHLORIDE 10 MG/ML
3 INJECTION, SOLUTION EPIDURAL; INFILTRATION; INTRACAUDAL; PERINEURAL
Status: COMPLETED | OUTPATIENT
Start: 2023-04-07 | End: 2023-04-07

## 2023-04-07 RX ORDER — BUPIVACAINE HYDROCHLORIDE 2.5 MG/ML
3 INJECTION, SOLUTION EPIDURAL; INFILTRATION; INTRACAUDAL
Status: COMPLETED | OUTPATIENT
Start: 2023-04-07 | End: 2023-04-07

## 2023-04-07 RX ADMIN — LIDOCAINE HYDROCHLORIDE 3 ML: 10 INJECTION, SOLUTION EPIDURAL; INFILTRATION; INTRACAUDAL; PERINEURAL at 08:52

## 2023-04-07 RX ADMIN — TRIAMCINOLONE ACETONIDE 80 MG: 40 INJECTION, SUSPENSION INTRA-ARTICULAR; INTRAMUSCULAR at 08:52

## 2023-04-07 RX ADMIN — BUPIVACAINE HYDROCHLORIDE 3 ML: 2.5 INJECTION, SOLUTION EPIDURAL; INFILTRATION; INTRACAUDAL at 08:52

## 2023-04-07 NOTE — PROGRESS NOTES
Orthopaedic Clinic Note: Knee Established Patient    Chief Complaint   Patient presents with   • Follow-up     3.5 month f/u-- Primary osteoarthritis of left knee        HPI    It has been 3  month(s) since Ms. Barron's last visit. She returns to clinic today for follow-up left knee osteoarthritis.  She was last seen 3 months ago.  She has been doing physical therapy for her ankle and Achilles which is improving.  She is here today due to worsening left knee pain that is limiting daily activities.  Rates her pain a 10/10 on the pain scale.  She is ambulating with assistance of a cane.  She is here today to discuss treatment for her ongoing worsening knee pain.  She is overweight with a BMI 39.4.    Past Medical History:   Diagnosis Date   • Achilles tendon rupture 08/2021    left   • Allergic    • Allergy    • Ankle sprain    • Anxiety    • Asthma Allergies induced   • CTS (carpal tunnel syndrome)    • Depression    • Fibromyalgia, primary    • Fracture, finger    • GERD (gastroesophageal reflux disease)    • Headache    • History of blood clots    • History of UTI    • Hyperlipidemia    • Hypertension    • IBS (irritable bowel syndrome)    • Internal hemorrhoid    • Kidney stone    • Knee sprain    • Knee swelling    • Low back pain    • Low back strain    • Muscular dystrophy    • Muscular dystrophy     II   • Myotonia    • Obesity    • Rotator cuff syndrome    • Stroke 08/31/2013    resdual- left sided weakness.    • Tear of meniscus of knee    • Type 2 diabetes mellitus without complication, without long-term current use of insulin       Past Surgical History:   Procedure Laterality Date   • ACHILLES TENDON SURGERY Left 09/2021    Dr. Salinas achilles rupture repair   • ANKLE OPEN REDUCTION INTERNAL FIXATION  Oct 2021    Achilles tendon ruptured had repair surgery   • CHOLECYSTECTOMY  07/2004   • COLONOSCOPY  07/2017   • D & C WITH SUCTION     • KNEE SURGERY     • LYMPH NODE BIOPSY     • SUBCLAVIAN ARTERY STENT   08/2018    x2   • WISDOM TOOTH EXTRACTION        Family History   Problem Relation Age of Onset   • Allergies Other    • ADD / ADHD Other    • Heart block Other    • Other Other         CEREBROVASCULAR ACCIDENT    • Hypertension Other    • Cancer Other         LUNG CANCER   • Hyperlipidemia Other    • Alcohol abuse Mother    • Depression Mother    • Early death Mother          age 59   • Heart disease Mother         Mother  of Massive Heart attack   • Hyperlipidemia Mother    • Hypertension Mother    • Miscarriages / Stillbirths Mother         Miscarriage   • Heart attack Mother          of  massive heart attack   • Alcohol abuse Father    • Cancer Father         Had Lung Cancer  had 1 lung till death   • Diabetes Father         Lived on insuline shots   • Early death Father          age 53   • Breast cancer Maternal Aunt    • Learning disabilities Son         Had Adhd   • Breast cancer Cousin    • Ovarian cancer Neg Hx    • Endometrial cancer Neg Hx    • Uterine cancer Neg Hx    • Colon cancer Neg Hx      Social History     Socioeconomic History   • Marital status:    Tobacco Use   • Smoking status: Former     Types: Electronic Cigarette   • Smokeless tobacco: Never   • Tobacco comments:     use Ecigg now NO NICOTENE   Vaping Use   • Vaping Use: Some days   • Substances: Flavoring   • Devices: Pre-filled pod   Substance and Sexual Activity   • Alcohol use: Yes   • Drug use: No   • Sexual activity: Yes     Partners: Male     Birth control/protection: Post-menopausal, None     Comment: Menapause no cycle since 2013      Current Outpatient Medications on File Prior to Visit   Medication Sig Dispense Refill   • albuterol (PROVENTIL) (2.5 MG/3ML) 0.083% nebulizer solution Take 2.5 mg by nebulization Every 4 (Four) Hours As Needed for Wheezing. 90 vial 1   • amLODIPine (NORVASC) 2.5 MG tablet Take 1 tablet by mouth Daily. 90 tablet 0   • aspirin 81 MG EC tablet Take 81 mg by mouth  Daily.     • atorvastatin (LIPITOR) 80 MG tablet TAKE ONE TABLET BY MOUTH ONCE NIGHTLY 90 tablet 1   • baclofen (LIORESAL) 10 MG tablet Take 1 tablet by mouth 3 (Three) Times a Day. 270 tablet 1   • Blood Glucose Monitoring Suppl (ONE TOUCH ULTRA 2) w/Device kit      • busPIRone (BUSPAR) 15 MG tablet TAKE ONE TABLET BY MOUTH THREE TIMES A  tablet 1   • calcium carbonate-vitamin d (CALCIUM 600+D HIGH POTENCY) 600-400 MG-UNIT per tablet Take 1 tablet by mouth Daily. 90 tablet 2   • clopidogrel (PLAVIX) 75 MG tablet TAKE ONE TABLET BY MOUTH DAILY 90 tablet 3   • conjugated estrogens (Premarin) 0.625 MG/GM vaginal cream Insert  into the vagina Daily. Apply one finger tip strip of cream into vagina, three times weekly at night (M/W/F) 30 g 5   • cyclobenzaprine (FLEXERIL) 10 MG tablet Take 1 tablet by mouth 3 (Three) Times a Day As Needed for Muscle Spasms. 270 tablet 2   • diclofenac (VOLTAREN) 75 MG EC tablet Take 1 tablet by mouth 2 (Two) Times a Day. 180 tablet 3   • famotidine (PEPCID) 20 MG tablet TAKE ONE TABLET BY MOUTH DAILY 90 tablet 3   • fenofibrate (TRICOR) 145 MG tablet TAKE ONE TABLET BY MOUTH DAILY 90 tablet 1   • fluconazole (DIFLUCAN) 150 MG tablet TAKE ONE TABLET TODAY. MAY REPEAT IN 72 HOURS IF SYMPTOMS NOT RESOLVED 2 tablet 0   • fluticasone (FLONASE) 50 MCG/ACT nasal spray into each nostril.     • gabapentin (NEURONTIN) 600 MG tablet TAKE 1 TABLET BY MOUTH THREE TIMES A  tablet 1   • guaiFENesin-dextromethorphan (ROBITUSSIN DM) 100-10 MG/5ML syrup Take 5 mL by mouth Every 4 (Four) Hours As Needed for Cough. 471 mL 0   • HYDROcodone-acetaminophen (NORCO)  MG per tablet Take 1 tablet by mouth Every 6 (Six) Hours As Needed for Moderate Pain. 120 tablet 0   • hydrOXYzine (ATARAX) 25 MG tablet TAKE ONE TABLET BY MOUTH THREE TIMES A DAY AS NEEDED FOR ANXIETY 10 tablet 3   • levalbuterol (XOPENEX HFA) 45 MCG/ACT inhaler Inhale 1-2 puffs Every 4 (Four) Hours As Needed for Wheezing or  Shortness of Air. 15 g 11   • loratadine (CLARITIN) 10 MG tablet Take 1 tablet by mouth Daily. 30 tablet 11   • metFORMIN (GLUCOPHAGE) 1000 MG tablet TAKE ONE TABLET BY MOUTH TWICE A DAY WITH MEALS 180 tablet 2   • methylphenidate (Ritalin) 20 MG tablet Take 1 tablet by mouth Daily for 30 days. 30 tablet 0   • montelukast (SINGULAIR) 10 MG tablet TAKE ONE TABLET BY MOUTH EVERY EVENING 90 tablet 3   • nystatin (MYCOSTATIN) 087143 UNIT/GM ointment Apply 1 application topically to the appropriate area as directed 2 (Two) Times a Day. 30 g 1   • nystatin (MYCOSTATIN) 353207 UNIT/GM powder Apply  topically to the appropriate area as directed 3 (Three) Times a Day. 30 g 2   • ONE TOUCH ULTRA TEST test strip      • oxybutynin XL (DITROPAN-XL) 5 MG 24 hr tablet Take 1 tablet by mouth Daily. 90 tablet 3   • pantoprazole (PROTONIX) 40 MG EC tablet Take 1 tablet by mouth Daily. 90 tablet 1   • potassium chloride ER (K-TAB) 20 MEQ tablet controlled-release ER tablet Take 1 tablet by mouth Daily. 90 tablet 2   • promethazine (PHENERGAN) 12.5 MG tablet Take 1 tablet by mouth Every 8 (Eight) Hours As Needed for Nausea or Vomiting. 30 tablet 1   • spironolactone (ALDACTONE) 50 MG tablet TAKE ONE TABLET BY MOUTH DAILY 90 tablet 3   • tiZANidine (ZANAFLEX) 4 MG tablet Take 1 tablet by mouth Every Night. 90 tablet 1   • venlafaxine XR (EFFEXOR-XR) 75 MG 24 hr capsule TAKE ONE CAPSULE BY MOUTH DAILY 30 capsule 5   • [DISCONTINUED] modafinil (PROVIGIL) 200 MG tablet Take 1 tablet by mouth Daily. 30 tablet 3     No current facility-administered medications on file prior to visit.      Allergies   Allergen Reactions   • Fish-Derived Products Anaphylaxis   • Penicillins Anaphylaxis and Shortness Of Breath        Review of Systems   Constitutional: Negative.    HENT: Negative.    Eyes: Negative.    Respiratory: Negative.    Cardiovascular: Negative.    Gastrointestinal: Negative.    Endocrine: Negative.    Genitourinary: Negative.   "  Musculoskeletal: Positive for arthralgias.   Skin: Negative.    Allergic/Immunologic: Negative.    Neurological: Negative.    Hematological: Negative.    Psychiatric/Behavioral: Negative.         The patient's Review of Systems was personally reviewed and confirmed as accurate.    Physical Exam  Blood pressure 140/80, height 167.6 cm (65.98\"), weight 111 kg (244 lb), not currently breastfeeding.    Body mass index is 39.4 kg/m².    GENERAL APPEARANCE: awake, alert, oriented, in no acute distress, well developed, well nourished and obese  LUNGS:  breathing nonlabored  EXTREMITIES: no clubbing, cyanosis  PERIPHERAL PULSES: palpable dorsalis pedis and posterior tibial pulses bilaterally.    GAIT:  Antalgic        ----------  Left Knee Exam:  ----------  ALIGNMENT: severe varus, correctable to neutral  ----------  RANGE OF MOTION:  Decreased (5 - 115 degrees) with no extensor lag  LIGAMENTOUS STABILITY:   stable to varus and valgus stress at terminal extension and 30 degrees; retensioning of the MCL is appreciated with valgus stress at 30 degrees consistent with medial compartment degeneration  ----------  STRENGTH:  KNEE FLEXION 4/5  KNEE EXTENSION  4/5  ANKLE DORSIFLEXION  5/5  ANKLE PLANTARFLEXION  5/5  ----------  PAIN WITH PALPATION:global  KNEE EFFUSION: yes, mild effusion  PAIN WITH KNEE ROM: yes  PATELLAR CREPITUS:  yes, painful and symptomatic  ----------  SENSATION TO LIGHT TOUCH:  DEEP PERONEAL/SUPERFICIAL PERONEAL/SURAL/SAPHENOUS/TIBIAL:    intact  ----------  EDEMA:  no  ERYTHEMA:    no  WOUNDS/INCISIONS:   no  _____________________________________________________________________  _____________________________________________________________________    RADIOGRAPHIC FINDINGS:   No new imaging today    Assessment/Plan:   Diagnosis Plan   1. Primary osteoarthritis of left knee        2. Class 2 obesity due to excess calories without serious comorbidity with body mass index (BMI) of 39.0 to 39.9 in adult      "     The patient has failed conservative treatment measures and is a candidate for joint arthroplasty.  I discussed the joint arthroplasty surgical process as well as the recovery and rehabilitation time frame.  The patient asked several questions regarding the joint arthroplasty surgery, which were answered accordingly.  Ultimately, the patient declines surgical intervention at this time and wishes to continue with conservative treatment measures.  Alternative conservative treatment measures were discussed including bracing, therapy, topical/oral anti-inflammatories, activity modification, and weight loss.  The patient considered these treatment options and wishes to proceed with corticosteroid injection(s) today.  Therefore we will proceed with corticosteroid injection(s) today.  Follow-up in 3 months for repeat evaluation with x-ray 4 views left knee on return.    Patient has an elevated BMI. The patient has been instructed on various weight loss avenues including diet, portion control, calorie restriction, low/no impact exercise, referral to weight loss management and/or bariatric surgery.  It was explained that weight loss can improve joint pain alone by decreasing the joint reaction forces.  For every pound of weight change, the knee and hip joints see a 4 to 5 fold change in pressure.  Given these options, the patient will proceed with low calorie diet and low impact exercise.    Procedure Note:  I discussed with the patient the potential benefits of performing a therapeutic injection of the left knee as well as potential risks including but not limited to infection, swelling, pain, bleeding, bruising, nerve/vessel damage, skin color changes, transient elevation in blood glucose levels, and fat atrophy. After informed consent and verifying correct patient, procedure site, and type of procedure, the area was prepped with alcohol, ethyl chloride was used to numb the skin. Via the superolateral approach, 3 cc of  1% lidocaine, 3 cc of 0.25% Marcaine and 2 cc of 40mg/ml of Kenalog were injected into the left knee. The patient tolerated the procedure well. There were no complications. A sterile dressing was placed over the injection site.            Mk Sim MD  04/07/23  08:54 EDT

## 2023-04-07 NOTE — PROGRESS NOTES
Procedure   Large Joint Arthrocentesis: L knee  Date/Time: 4/7/2023 8:52 AM  Consent given by: patient  Site marked: site marked  Timeout: Immediately prior to procedure a time out was called to verify the correct patient, procedure, equipment, support staff and site/side marked as required   Supporting Documentation  Indications: pain   Procedure Details  Location: knee - L knee  Preparation: Patient was prepped and draped in the usual sterile fashion  Needle size: 22 G  Approach: anterolateral  Medications administered: 80 mg triamcinolone acetonide 40 MG/ML; 3 mL bupivacaine (PF) 0.25 %; 3 mL lidocaine PF 1% 1 %  Patient tolerance: patient tolerated the procedure well with no immediate complications

## 2023-04-18 ENCOUNTER — PATIENT MESSAGE (OUTPATIENT)
Dept: NEUROLOGY | Facility: CLINIC | Age: 53
End: 2023-04-18
Payer: MEDICARE

## 2023-04-18 DIAGNOSIS — G47.19 DAYTIME HYPERSOMNOLENCE: ICD-10-CM

## 2023-04-19 RX ORDER — METHYLPHENIDATE HYDROCHLORIDE 20 MG/1
20 TABLET ORAL DAILY
Qty: 90 TABLET | Refills: 0 | Status: SHIPPED | OUTPATIENT
Start: 2023-04-19 | End: 2023-07-18

## 2023-04-19 NOTE — TELEPHONE ENCOUNTER
From: Hafsa Barron  To: Fausto Edwards  Sent: 4/18/2023 11:24 PM EDT  Subject: Need 90 day refill of methylphenidate 20mg    I need a 90 refill of methylphenidate 20MG called into [facility]. A few months ago the doctor agreed that he could do a 90 day supply instead of every 30 days because my insurance would be cheaper To fill it that way. Last month when he called it in I guess he accidentally called it in only for a 30 day supply. If you could please call in a 90 day supply before the weekend as I will be running out by Sunday. Thank you in advance.

## 2023-04-26 ENCOUNTER — OFFICE VISIT (OUTPATIENT)
Dept: INTERNAL MEDICINE | Facility: CLINIC | Age: 53
End: 2023-04-26
Payer: MEDICARE

## 2023-04-26 ENCOUNTER — PRIOR AUTHORIZATION (OUTPATIENT)
Dept: INTERNAL MEDICINE | Facility: CLINIC | Age: 53
End: 2023-04-26

## 2023-04-26 ENCOUNTER — LAB (OUTPATIENT)
Dept: LAB | Facility: HOSPITAL | Age: 53
End: 2023-04-26
Payer: MEDICARE

## 2023-04-26 VITALS
SYSTOLIC BLOOD PRESSURE: 132 MMHG | WEIGHT: 238 LBS | DIASTOLIC BLOOD PRESSURE: 84 MMHG | HEART RATE: 91 BPM | OXYGEN SATURATION: 97 % | HEIGHT: 66 IN | BODY MASS INDEX: 38.25 KG/M2

## 2023-04-26 DIAGNOSIS — E78.5 HYPERLIPIDEMIA, UNSPECIFIED HYPERLIPIDEMIA TYPE: ICD-10-CM

## 2023-04-26 DIAGNOSIS — E11.9 TYPE 2 DIABETES MELLITUS WITHOUT COMPLICATION, WITHOUT LONG-TERM CURRENT USE OF INSULIN: Primary | ICD-10-CM

## 2023-04-26 LAB
ALBUMIN SERPL-MCNC: 4.5 G/DL (ref 3.5–5.2)
ALBUMIN/GLOB SERPL: 2 G/DL
ALP SERPL-CCNC: 68 U/L (ref 39–117)
ALT SERPL W P-5'-P-CCNC: 34 U/L (ref 1–33)
ANION GAP SERPL CALCULATED.3IONS-SCNC: 11 MMOL/L (ref 5–15)
AST SERPL-CCNC: 27 U/L (ref 1–32)
BILIRUB SERPL-MCNC: 0.3 MG/DL (ref 0–1.2)
BUN SERPL-MCNC: 19 MG/DL (ref 6–20)
BUN/CREAT SERPL: 28.8 (ref 7–25)
CALCIUM SPEC-SCNC: 9.9 MG/DL (ref 8.6–10.5)
CHLORIDE SERPL-SCNC: 109 MMOL/L (ref 98–107)
CHOLEST SERPL-MCNC: 149 MG/DL (ref 0–200)
CO2 SERPL-SCNC: 26 MMOL/L (ref 22–29)
CREAT SERPL-MCNC: 0.66 MG/DL (ref 0.57–1)
DEPRECATED RDW RBC AUTO: 41.5 FL (ref 37–54)
EGFRCR SERPLBLD CKD-EPI 2021: 105 ML/MIN/1.73
ERYTHROCYTE [DISTWIDTH] IN BLOOD BY AUTOMATED COUNT: 12.8 % (ref 12.3–15.4)
EXPIRATION DATE: NORMAL
GLOBULIN UR ELPH-MCNC: 2.2 GM/DL
GLUCOSE SERPL-MCNC: 159 MG/DL (ref 65–99)
HBA1C MFR BLD: 6.7 %
HCT VFR BLD AUTO: 42.9 % (ref 34–46.6)
HDLC SERPL-MCNC: 62 MG/DL (ref 40–60)
HGB BLD-MCNC: 14.4 G/DL (ref 12–15.9)
LDLC SERPL CALC-MCNC: 69 MG/DL (ref 0–100)
LDLC/HDLC SERPL: 1.08 {RATIO}
Lab: NORMAL
MCH RBC QN AUTO: 30.1 PG (ref 26.6–33)
MCHC RBC AUTO-ENTMCNC: 33.6 G/DL (ref 31.5–35.7)
MCV RBC AUTO: 89.6 FL (ref 79–97)
PLATELET # BLD AUTO: 249 10*3/MM3 (ref 140–450)
PMV BLD AUTO: 11.7 FL (ref 6–12)
POTASSIUM SERPL-SCNC: 4.3 MMOL/L (ref 3.5–5.2)
PROT SERPL-MCNC: 6.7 G/DL (ref 6–8.5)
RBC # BLD AUTO: 4.79 10*6/MM3 (ref 3.77–5.28)
SODIUM SERPL-SCNC: 146 MMOL/L (ref 136–145)
TRIGL SERPL-MCNC: 99 MG/DL (ref 0–150)
VLDLC SERPL-MCNC: 18 MG/DL (ref 5–40)
WBC NRBC COR # BLD: 6.04 10*3/MM3 (ref 3.4–10.8)

## 2023-04-26 PROCEDURE — 80061 LIPID PANEL: CPT | Performed by: INTERNAL MEDICINE

## 2023-04-26 PROCEDURE — 80053 COMPREHEN METABOLIC PANEL: CPT | Performed by: INTERNAL MEDICINE

## 2023-04-26 PROCEDURE — 85027 COMPLETE CBC AUTOMATED: CPT | Performed by: INTERNAL MEDICINE

## 2023-04-26 NOTE — PROGRESS NOTES
"Subjective   Hafsa Barron is a 52 y.o. female here for follow-up DMII and HLD. Her last A1c was 6.1%. Needs to be below 7% for her to get knee replacement. She also needs to get BMI below 35, currently at 38.41. She had VSG and did well after that. She has picked up some bad eating habits, including drinking regular soda. She plans on a dietary change to help lose weight. She is interested in ozempic or the like to help with glc and weight. No fam hx thyroid ca or pancreatitis.    I have reviewed the patient's relevant medical history and confirmed it is accurate.    I have personally reviewed and performed the ROS. Jocy Snow MD     Objective   /84 (BP Location: Left arm)   Pulse 91   Ht 167.6 cm (66\")   Wt 108 kg (238 lb)   LMP  (LMP Unknown)   SpO2 97%   BMI 38.41 kg/m²     Physical Exam  Vitals reviewed.   Constitutional:       Appearance: She is well-developed.   Pulmonary:      Effort: Pulmonary effort is normal.   Neurological:      General: No focal deficit present.      Mental Status: She is alert.   Psychiatric:         Behavior: Behavior normal.         Thought Content: Thought content normal.         Judgment: Judgment normal.           Current Outpatient Medications:   •  albuterol (PROVENTIL) (2.5 MG/3ML) 0.083% nebulizer solution, Take 2.5 mg by nebulization Every 4 (Four) Hours As Needed for Wheezing., Disp: 90 vial, Rfl: 1  •  amLODIPine (NORVASC) 2.5 MG tablet, Take 1 tablet by mouth Daily., Disp: 90 tablet, Rfl: 0  •  aspirin 81 MG EC tablet, Take 1 tablet by mouth Daily., Disp: , Rfl:   •  atorvastatin (LIPITOR) 80 MG tablet, TAKE ONE TABLET BY MOUTH ONCE NIGHTLY, Disp: 90 tablet, Rfl: 1  •  baclofen (LIORESAL) 10 MG tablet, Take 1 tablet by mouth 3 (Three) Times a Day., Disp: 270 tablet, Rfl: 1  •  Blood Glucose Monitoring Suppl (ONE TOUCH ULTRA 2) w/Device kit, , Disp: , Rfl:   •  busPIRone (BUSPAR) 15 MG tablet, Take 1 tablet by mouth 3 (Three) Times a Day., " Disp: 270 tablet, Rfl: 1  •  calcium carbonate-vitamin d (CALCIUM 600+D HIGH POTENCY) 600-400 MG-UNIT per tablet, Take 1 tablet by mouth Daily., Disp: 90 tablet, Rfl: 2  •  clopidogrel (PLAVIX) 75 MG tablet, TAKE ONE TABLET BY MOUTH DAILY, Disp: 90 tablet, Rfl: 3  •  conjugated estrogens (Premarin) 0.625 MG/GM vaginal cream, Insert  into the vagina Daily. Apply one finger tip strip of cream into vagina, three times weekly at night (M/W/F), Disp: 30 g, Rfl: 5  •  cyclobenzaprine (FLEXERIL) 10 MG tablet, Take 1 tablet by mouth 3 (Three) Times a Day As Needed for Muscle Spasms., Disp: 270 tablet, Rfl: 2  •  diclofenac (VOLTAREN) 75 MG EC tablet, Take 1 tablet by mouth 2 (Two) Times a Day., Disp: 180 tablet, Rfl: 3  •  famotidine (PEPCID) 20 MG tablet, TAKE ONE TABLET BY MOUTH DAILY, Disp: 90 tablet, Rfl: 3  •  fenofibrate (TRICOR) 145 MG tablet, TAKE ONE TABLET BY MOUTH DAILY, Disp: 90 tablet, Rfl: 1  •  fluticasone (FLONASE) 50 MCG/ACT nasal spray, into each nostril., Disp: , Rfl:   •  gabapentin (NEURONTIN) 600 MG tablet, TAKE 1 TABLET BY MOUTH THREE TIMES A DAY, Disp: 270 tablet, Rfl: 1  •  HYDROcodone-acetaminophen (NORCO)  MG per tablet, Take 1 tablet by mouth Every 6 (Six) Hours As Needed for Moderate Pain., Disp: 120 tablet, Rfl: 0  •  hydrOXYzine (ATARAX) 25 MG tablet, TAKE ONE TABLET BY MOUTH THREE TIMES A DAY AS NEEDED FOR ANXIETY, Disp: 10 tablet, Rfl: 3  •  loratadine (CLARITIN) 10 MG tablet, Take 1 tablet by mouth Daily., Disp: 30 tablet, Rfl: 11  •  metFORMIN (GLUCOPHAGE) 1000 MG tablet, TAKE ONE TABLET BY MOUTH TWICE A DAY WITH MEALS, Disp: 180 tablet, Rfl: 2  •  methylphenidate (Ritalin) 20 MG tablet, Take 1 tablet by mouth Daily for 90 days., Disp: 90 tablet, Rfl: 0  •  montelukast (SINGULAIR) 10 MG tablet, TAKE ONE TABLET BY MOUTH EVERY EVENING, Disp: 90 tablet, Rfl: 3  •  nystatin (MYCOSTATIN) 105187 UNIT/GM ointment, Apply 1 application topically to the appropriate area as directed 2 (Two)  Times a Day., Disp: 30 g, Rfl: 1  •  nystatin (MYCOSTATIN) 065175 UNIT/GM powder, Apply  topically to the appropriate area as directed 3 (Three) Times a Day., Disp: 30 g, Rfl: 2  •  ONE TOUCH ULTRA TEST test strip, , Disp: , Rfl:   •  oxybutynin XL (DITROPAN-XL) 5 MG 24 hr tablet, Take 1 tablet by mouth Daily., Disp: 90 tablet, Rfl: 3  •  pantoprazole (PROTONIX) 40 MG EC tablet, Take 1 tablet by mouth Daily., Disp: 90 tablet, Rfl: 1  •  potassium chloride ER (K-TAB) 20 MEQ tablet controlled-release ER tablet, Take 1 tablet by mouth Daily., Disp: 90 tablet, Rfl: 2  •  promethazine (PHENERGAN) 12.5 MG tablet, Take 1 tablet by mouth Every 8 (Eight) Hours As Needed for Nausea or Vomiting., Disp: 30 tablet, Rfl: 1  •  spironolactone (ALDACTONE) 50 MG tablet, TAKE ONE TABLET BY MOUTH DAILY, Disp: 90 tablet, Rfl: 3  •  tiZANidine (ZANAFLEX) 4 MG tablet, Take 1 tablet by mouth Every Night., Disp: 90 tablet, Rfl: 1  •  venlafaxine XR (EFFEXOR-XR) 75 MG 24 hr capsule, TAKE ONE CAPSULE BY MOUTH DAILY, Disp: 30 capsule, Rfl: 5    Assessment & Plan   Diagnoses and all orders for this visit:    1. Type 2 diabetes mellitus without complication, without long-term current use of insulin (Primary)  -     POC Glycosylated Hemoglobin (Hb A1C): 6.7%, will add moudaniel for weight loss and further improvement of the A1c as pt desires to stop metformin  -     Tirzepatide 5 MG/0.5ML solution pen-injector; Inject 0.5 mL under the skin into the appropriate area as directed 1 (One) Time Per Week.  Dispense: 3 mL; Refill: 0  -     CBC (No Diff)  -     Comprehensive Metabolic Panel  -discussed common SE of mounjaro    2. Hyperlipidemia, unspecified hyperlipidemia type  -     Lipid Panel             Jocy Snow MD

## 2023-05-02 RX ORDER — MONTELUKAST SODIUM 10 MG/1
TABLET ORAL
Qty: 90 TABLET | Refills: 3 | Status: SHIPPED | OUTPATIENT
Start: 2023-05-02

## 2023-05-03 RX ORDER — BACLOFEN 10 MG/1
10 TABLET ORAL 3 TIMES DAILY
Qty: 270 TABLET | Refills: 1 | Status: SHIPPED | OUTPATIENT
Start: 2023-05-03

## 2023-05-14 DIAGNOSIS — B37.9 YEAST INFECTION: ICD-10-CM

## 2023-05-15 RX ORDER — SPIRONOLACTONE 50 MG/1
TABLET, FILM COATED ORAL
Qty: 90 TABLET | Refills: 3 | Status: SHIPPED | OUTPATIENT
Start: 2023-05-15

## 2023-05-15 RX ORDER — VENLAFAXINE HYDROCHLORIDE 37.5 MG/1
CAPSULE, EXTENDED RELEASE ORAL
Qty: 90 CAPSULE | Refills: 0 | Status: SHIPPED | OUTPATIENT
Start: 2023-05-15

## 2023-05-15 RX ORDER — NYSTATIN 100000 U/G
OINTMENT TOPICAL
Qty: 30 G | Refills: 1 | Status: SHIPPED | OUTPATIENT
Start: 2023-05-15

## 2023-05-17 RX ORDER — AZITHROMYCIN 250 MG/1
TABLET, FILM COATED ORAL
Qty: 6 TABLET | Refills: 0 | Status: SHIPPED | OUTPATIENT
Start: 2023-05-17

## 2023-05-23 ENCOUNTER — TRANSCRIBE ORDERS (OUTPATIENT)
Dept: INTERNAL MEDICINE | Facility: CLINIC | Age: 53
End: 2023-05-23
Payer: MEDICARE

## 2023-05-23 DIAGNOSIS — Z12.31 ENCOUNTER FOR SCREENING MAMMOGRAM FOR BREAST CANCER: Primary | ICD-10-CM

## 2023-05-24 DIAGNOSIS — I74.8: Primary | ICD-10-CM

## 2023-05-25 ENCOUNTER — TELEPHONE (OUTPATIENT)
Dept: INTERNAL MEDICINE | Facility: CLINIC | Age: 53
End: 2023-05-25
Payer: MEDICARE

## 2023-05-25 DIAGNOSIS — E11.9 TYPE 2 DIABETES MELLITUS WITHOUT COMPLICATION, WITHOUT LONG-TERM CURRENT USE OF INSULIN: ICD-10-CM

## 2023-05-25 NOTE — TELEPHONE ENCOUNTER
[id number] [place] - [address] - [phone]  - [phone] FX    Relationship: Pharmacy    Best call back number: [phone]    What medications are you currently taking:   Current Outpatient Medications on File Prior to Visit   Medication Sig Dispense Refill   • albuterol (PROVENTIL) (2.5 MG/3ML) 0.083% nebulizer solution Take 2.5 mg by nebulization Every 4 (Four) Hours As Needed for Wheezing. 90 vial 1   • amLODIPine (NORVASC) 2.5 MG tablet Take 1 tablet by mouth Daily. 90 tablet 0   • aspirin 81 MG EC tablet Take 1 tablet by mouth Daily.     • atorvastatin (LIPITOR) 80 MG tablet TAKE ONE TABLET BY MOUTH ONCE NIGHTLY 90 tablet 1   • azithromycin (Zithromax Z-Adilson) 250 MG tablet Take 2 tablets the first day, then 1 tablet daily for 4 days. 6 tablet 0   • baclofen (LIORESAL) 10 MG tablet Take 1 tablet by mouth 3 (Three) Times a Day. 270 tablet 1   • Blood Glucose Monitoring Suppl (ONE TOUCH ULTRA 2) w/Device kit      • busPIRone (BUSPAR) 15 MG tablet Take 1 tablet by mouth 3 (Three) Times a Day. 270 tablet 1   • calcium carbonate-vitamin d (CALCIUM 600+D HIGH POTENCY) 600-400 MG-UNIT per tablet Take 1 tablet by mouth Daily. 90 tablet 2   • clopidogrel (PLAVIX) 75 MG tablet TAKE ONE TABLET BY MOUTH DAILY 90 tablet 3   • conjugated estrogens (Premarin) 0.625 MG/GM vaginal cream Insert  into the vagina Daily. Apply one finger tip strip of cream into vagina, three times weekly at night (M/W/F) 30 g 5   • cyclobenzaprine (FLEXERIL) 10 MG tablet Take 1 tablet by mouth 3 (Three) Times a Day As Needed for Muscle Spasms. 270 tablet 2   • diclofenac (VOLTAREN) 75 MG EC tablet Take 1 tablet by mouth 2 (Two) Times a Day. 180 tablet 3   • famotidine (PEPCID) 20 MG tablet TAKE ONE TABLET BY MOUTH DAILY 90 tablet 3   • fenofibrate (TRICOR) 145 MG tablet TAKE ONE TABLET BY MOUTH DAILY 90 tablet 1   • fluticasone (FLONASE) 50 MCG/ACT nasal spray into each nostril.     • gabapentin  (NEURONTIN) 600 MG tablet TAKE 1 TABLET BY MOUTH THREE TIMES A  tablet 1   • HYDROcodone-acetaminophen (NORCO)  MG per tablet Take 1 tablet by mouth Every 6 (Six) Hours As Needed for Moderate Pain. 120 tablet 0   • hydrOXYzine (ATARAX) 25 MG tablet TAKE ONE TABLET BY MOUTH THREE TIMES A DAY AS NEEDED FOR ANXIETY 10 tablet 3   • loratadine (CLARITIN) 10 MG tablet Take 1 tablet by mouth Daily. 30 tablet 11   • metFORMIN (GLUCOPHAGE) 1000 MG tablet TAKE ONE TABLET BY MOUTH TWICE A DAY WITH MEALS 180 tablet 2   • methylphenidate (Ritalin) 20 MG tablet Take 1 tablet by mouth Daily for 90 days. 90 tablet 0   • montelukast (SINGULAIR) 10 MG tablet TAKE ONE TABLET BY MOUTH EVERY EVENING 90 tablet 3   • nystatin (MYCOSTATIN) 058429 UNIT/GM ointment APPLY TO AFFECTED AREA(S) TWO TIMES A DAY 30 g 1   • nystatin (MYCOSTATIN) 492901 UNIT/GM powder Apply  topically to the appropriate area as directed 3 (Three) Times a Day. 30 g 2   • ONE TOUCH ULTRA TEST test strip      • oxybutynin XL (DITROPAN-XL) 5 MG 24 hr tablet Take 1 tablet by mouth Daily. 90 tablet 3   • pantoprazole (PROTONIX) 40 MG EC tablet Take 1 tablet by mouth Daily. 90 tablet 1   • potassium chloride ER (K-TAB) 20 MEQ tablet controlled-release ER tablet Take 1 tablet by mouth Daily. 90 tablet 2   • promethazine (PHENERGAN) 12.5 MG tablet Take 1 tablet by mouth Every 8 (Eight) Hours As Needed for Nausea or Vomiting. 30 tablet 1   • spironolactone (ALDACTONE) 50 MG tablet TAKE ONE TABLET BY MOUTH DAILY 90 tablet 3   • Tirzepatide 5 MG/0.5ML solution pen-injector Inject 0.5 mL under the skin into the appropriate area as directed 1 (One) Time Per Week. 3 mL 0   • tiZANidine (ZANAFLEX) 4 MG tablet Take 1 tablet by mouth Every Night. 90 tablet 1   • venlafaxine XR (EFFEXOR-XR) 37.5 MG 24 hr capsule TAKE ONE CAPSULE BY MOUTH DAILY 90 capsule 0   • venlafaxine XR (EFFEXOR-XR) 75 MG 24 hr capsule TAKE ONE CAPSULE BY MOUTH DAILY 30 capsule 5   • [DISCONTINUED]  modafinil (PROVIGIL) 200 MG tablet Take 1 tablet by mouth Daily. 30 tablet 3     No current facility-administered medications on file prior to visit.          Which medication are you concerned about:   • Tirzepatide 5 MG/0.5ML solution pen-injector         What are your concerns: PHARMACY NEEDS CLARIFICATION ON THE DOSE 5MG SENT USUALLY IT STARTS OUT WITH 2.5 MG  CALLBACK TO CLARIFY

## 2023-06-01 RX ORDER — FENOFIBRATE 145 MG/1
TABLET, COATED ORAL
Qty: 90 TABLET | Refills: 1 | Status: SHIPPED | OUTPATIENT
Start: 2023-06-01

## 2023-07-26 ENCOUNTER — OFFICE VISIT (OUTPATIENT)
Dept: INTERNAL MEDICINE | Facility: CLINIC | Age: 53
End: 2023-07-26
Payer: MEDICARE

## 2023-07-26 VITALS
OXYGEN SATURATION: 97 % | DIASTOLIC BLOOD PRESSURE: 80 MMHG | BODY MASS INDEX: 35.68 KG/M2 | HEIGHT: 66 IN | SYSTOLIC BLOOD PRESSURE: 138 MMHG | WEIGHT: 222 LBS | HEART RATE: 113 BPM

## 2023-07-26 DIAGNOSIS — F51.04 CHRONIC INSOMNIA: ICD-10-CM

## 2023-07-26 DIAGNOSIS — E66.01 CLASS 2 SEVERE OBESITY DUE TO EXCESS CALORIES WITH SERIOUS COMORBIDITY AND BODY MASS INDEX (BMI) OF 35.0 TO 35.9 IN ADULT: ICD-10-CM

## 2023-07-26 DIAGNOSIS — E11.9 TYPE 2 DIABETES MELLITUS WITHOUT COMPLICATION, WITHOUT LONG-TERM CURRENT USE OF INSULIN: Primary | ICD-10-CM

## 2023-07-26 PROBLEM — J96.01 ACUTE RESPIRATORY FAILURE WITH HYPOXIA: Status: RESOLVED | Noted: 2022-02-01 | Resolved: 2023-07-26

## 2023-07-26 PROBLEM — D89.833 CYTOKINE RELEASE SYNDROME, GRADE 3: Status: RESOLVED | Noted: 2022-02-05 | Resolved: 2023-07-26

## 2023-07-26 PROBLEM — U07.1 PNEUMONIA DUE TO COVID-19 VIRUS: Status: RESOLVED | Noted: 2022-02-01 | Resolved: 2023-07-26

## 2023-07-26 PROBLEM — J12.82 PNEUMONIA DUE TO COVID-19 VIRUS: Status: RESOLVED | Noted: 2022-02-01 | Resolved: 2023-07-26

## 2023-07-26 PROBLEM — E43 SEVERE MALNUTRITION: Status: RESOLVED | Noted: 2022-02-02 | Resolved: 2023-07-26

## 2023-07-26 PROBLEM — R39.15 URINARY URGENCY: Status: RESOLVED | Noted: 2021-09-13 | Resolved: 2023-07-26

## 2023-07-26 LAB
EXPIRATION DATE: NORMAL
HBA1C MFR BLD: 5.4 %
Lab: NORMAL

## 2023-07-26 RX ORDER — SUVOREXANT 5 MG/1
5 TABLET, FILM COATED ORAL NIGHTLY
Qty: 30 TABLET | Refills: 0 | Status: SHIPPED | OUTPATIENT
Start: 2023-07-26 | End: 2023-07-28

## 2023-07-26 NOTE — PROGRESS NOTES
"Subjective   Hafsa Barron is a 52 y.o. female here for follow-up DMII, insomnia, obesity.  Patient has lost about 15 pounds since last visit 3 months ago.  She says she is watching what she eats.  She likes the Mounjaro, has been on 5 mg for the last 3 months.  She says she had some upset stomach at first, but now it does not really bother her.  She would like to increase the dose if possible to see if she could lose more weight.  Diabetes has been well controlled long-term.  She does struggle with insomnia, RLS.  She is currently taking tizanidine, Flexeril, baclofen.  She is going to get custody of her 3 grandchildren (her sons children) and she wants to be able to sleep through the night but also be able to get up with them if needed.  They are 10, 11, 6.    I have reviewed the patient's relevant medical history and confirmed it is accurate.    I have personally reviewed and performed the ROS. Jocy Snow MD     Objective   /80 (BP Location: Left arm)   Pulse 113   Ht 167.6 cm (66\")   Wt 101 kg (222 lb)   LMP  (LMP Unknown)   SpO2 97%   BMI 35.83 kg/m²     Physical Exam  Vitals reviewed.   Constitutional:       Appearance: She is well-developed.   Pulmonary:      Effort: Pulmonary effort is normal.   Neurological:      General: No focal deficit present.      Mental Status: She is alert.   Psychiatric:         Behavior: Behavior normal.         Thought Content: Thought content normal.         Judgment: Judgment normal.         Current Outpatient Medications:     albuterol (PROVENTIL) (2.5 MG/3ML) 0.083% nebulizer solution, Take 2.5 mg by nebulization Every 4 (Four) Hours As Needed for Wheezing., Disp: 90 vial, Rfl: 1    amLODIPine (NORVASC) 2.5 MG tablet, TAKE ONE TABLET BY MOUTH DAILY, Disp: 90 tablet, Rfl: 0    aspirin 81 MG EC tablet, Take 1 tablet by mouth Daily., Disp: , Rfl:     atorvastatin (LIPITOR) 80 MG tablet, TAKE ONE TABLET BY MOUTH ONCE NIGHTLY, Disp: 90 tablet, Rfl: 1   "  baclofen (LIORESAL) 10 MG tablet, Take 1 tablet by mouth 3 (Three) Times a Day. (Patient taking differently: Take 1 tablet by mouth every night at bedtime.), Disp: 270 tablet, Rfl: 1    Blood Glucose Monitoring Suppl (ONE TOUCH ULTRA 2) w/Device kit, , Disp: , Rfl:     busPIRone (BUSPAR) 15 MG tablet, Take 1 tablet by mouth 3 (Three) Times a Day., Disp: 270 tablet, Rfl: 1    calcium carbonate-vitamin d (CALCIUM 600+D HIGH POTENCY) 600-400 MG-UNIT per tablet, Take 1 tablet by mouth Daily., Disp: 90 tablet, Rfl: 2    clopidogrel (PLAVIX) 75 MG tablet, TAKE ONE TABLET BY MOUTH DAILY, Disp: 90 tablet, Rfl: 3    conjugated estrogens (Premarin) 0.625 MG/GM vaginal cream, Insert  into the vagina Daily. Apply one finger tip strip of cream into vagina, three times weekly at night (M/W/F), Disp: 30 g, Rfl: 5    cyclobenzaprine (FLEXERIL) 10 MG tablet, Take 1 tablet by mouth 3 (Three) Times a Day As Needed for Muscle Spasms. (Patient taking differently: Take 1 tablet by mouth every night at bedtime.), Disp: 270 tablet, Rfl: 2    diclofenac (VOLTAREN) 75 MG EC tablet, Take 1 tablet by mouth 2 (Two) Times a Day., Disp: 180 tablet, Rfl: 3    fenofibrate (TRICOR) 145 MG tablet, TAKE ONE TABLET BY MOUTH DAILY, Disp: 90 tablet, Rfl: 1    fluticasone (FLONASE) 50 MCG/ACT nasal spray, into each nostril., Disp: , Rfl:     gabapentin (NEURONTIN) 600 MG tablet, TAKE 1 TABLET BY MOUTH THREE TIMES A DAY, Disp: 270 tablet, Rfl: 1    HYDROcodone-acetaminophen (NORCO)  MG per tablet, Take 1 tablet by mouth Every 6 (Six) Hours As Needed for Moderate Pain., Disp: 120 tablet, Rfl: 0    hydrOXYzine (ATARAX) 25 MG tablet, TAKE ONE TABLET BY MOUTH THREE TIMES A DAY AS NEEDED FOR ANXIETY, Disp: 10 tablet, Rfl: 3    loratadine (CLARITIN) 10 MG tablet, Take 1 tablet by mouth Daily., Disp: 30 tablet, Rfl: 11    metFORMIN (GLUCOPHAGE) 1000 MG tablet, TAKE ONE TABLET BY MOUTH TWICE A DAY WITH MEALS, Disp: 180 tablet, Rfl: 2    methylphenidate  (Ritalin) 20 MG tablet, Take 1 tablet by mouth Daily for 90 days., Disp: 90 tablet, Rfl: 0    montelukast (SINGULAIR) 10 MG tablet, TAKE ONE TABLET BY MOUTH EVERY EVENING, Disp: 90 tablet, Rfl: 3    nystatin (MYCOSTATIN) 530406 UNIT/GM ointment, APPLY TO AFFECTED AREA(S) TWO TIMES A DAY, Disp: 30 g, Rfl: 1    nystatin (MYCOSTATIN) 626581 UNIT/GM powder, Apply  topically to the appropriate area as directed 3 (Three) Times a Day., Disp: 30 g, Rfl: 2    ONE TOUCH ULTRA TEST test strip, , Disp: , Rfl:     oxybutynin XL (DITROPAN-XL) 5 MG 24 hr tablet, Take 1 tablet by mouth Daily., Disp: 90 tablet, Rfl: 3    pantoprazole (PROTONIX) 40 MG EC tablet, Take 1 tablet by mouth Daily., Disp: 90 tablet, Rfl: 1    potassium chloride ER (K-TAB) 20 MEQ tablet controlled-release ER tablet, Take 1 tablet by mouth Daily., Disp: 90 tablet, Rfl: 2    promethazine (PHENERGAN) 12.5 MG tablet, Take 1 tablet by mouth Every 8 (Eight) Hours As Needed for Nausea or Vomiting., Disp: 30 tablet, Rfl: 1    spironolactone (ALDACTONE) 50 MG tablet, TAKE ONE TABLET BY MOUTH DAILY, Disp: 90 tablet, Rfl: 3    Tirzepatide (Mounjaro) 5 MG/0.5ML solution pen-injector, INJECT 5 MG UNDER THE SKIN ONCE WEEKLY, Disp: 2 mL, Rfl: 0    tiZANidine (ZANAFLEX) 4 MG tablet, Take 1 tablet by mouth Every Night., Disp: 90 tablet, Rfl: 1    venlafaxine XR (EFFEXOR-XR) 75 MG 24 hr capsule, TAKE ONE CAPSULE BY MOUTH DAILY, Disp: 30 capsule, Rfl: 5    Assessment & Plan   Diagnoses and all orders for this visit:    1. Type 2 diabetes mellitus without complication, without long-term current use of insulin (Primary)  -     POC Glycosylated Hemoglobin (Hb A1C): 5.4%, excellent control  -     Tirzepatide 7.5 MG/0.5ML solution pen-injector; Inject 0.5 mL under the skin into the appropriate area as directed 1 (One) Time Per Week.  Dispense: 2 mL; Refill: 0  -Increase Mounjaro to 7.5 mg to help more with weight loss.    2. Chronic insomnia  -start belsomra--new med  -hold  flexeril and tizanidine    3. Class 2 severe obesity due to excess calories with serious comorbidity and body mass index (BMI) of 35.0 to 35.9 in adult  Class 2 Severe Obesity (BMI >=35 and <=39.9). Obesity-related health conditions include the following: diabetes mellitus. Obesity is improving with treatment. BMI is is above average; BMI management plan is completed. We discussed low calorie, low carb based diet program, portion control, and pharmacologic options including Mounjaro .  -Making good progress on weight loss, hopefully with the increase of Mounjaro she will lose more weight             Jocy Snow MD

## 2023-07-27 ENCOUNTER — TELEPHONE (OUTPATIENT)
Dept: NEUROLOGY | Facility: CLINIC | Age: 53
End: 2023-07-27
Payer: MEDICARE

## 2023-07-27 ENCOUNTER — TELEPHONE (OUTPATIENT)
Dept: NEUROLOGY | Facility: CLINIC | Age: 53
End: 2023-07-27

## 2023-07-27 DIAGNOSIS — G47.19 DAYTIME HYPERSOMNOLENCE: ICD-10-CM

## 2023-07-27 RX ORDER — METHYLPHENIDATE HYDROCHLORIDE 20 MG/1
20 TABLET ORAL DAILY
Qty: 90 TABLET | Refills: 0 | Status: SHIPPED | OUTPATIENT
Start: 2023-07-27 | End: 2023-10-25

## 2023-07-27 NOTE — TELEPHONE ENCOUNTER
URGENT    Medication requested (name and dose):      METHYLPHENIDATE 20 MG TABLET  TAKE 1 TABLET BY MOUTH DAILY      Pharmacy where request should be sent:      [facility] [id number] [place] - [facility] [address] - [phone]  - [phone] FX     Additional details provided by patient:  PATIENT IS SCHEDULED FOR APPT 11/27/23 AND IS ON WAIT LIST - REQUESTING REFILL UNTIL SHE CAN BE SEEN.  PATIENT WILL BE OUT OF MEDICATION THIS SUNDAY.    Best call back number:      Does the patient have less than a 3 day supply:  [x] Yes  [] No    Bud Victoria Rep  07/27/23, 11:49 }

## 2023-07-27 NOTE — TELEPHONE ENCOUNTER
Scheduled patient for office visit with Dr. Edwards and pended medication to provider for approval. Patient will need to be present for next scheduled office visit for further refills.

## 2023-07-27 NOTE — TELEPHONE ENCOUNTER
PATIENT REQUESTING NEXT APPT  11/27/23 CAN BE A TELE-A-HEALTH VISIT.    PLEASE ADVISE PATIENT VIA [employer]    THANK YOU

## 2023-07-27 NOTE — TELEPHONE ENCOUNTER
Rx Refill Note  Requested Prescriptions     Pending Prescriptions Disp Refills    methylphenidate (Ritalin) 20 MG tablet 90 tablet 0     Sig: Take 1 tablet by mouth Daily for 90 days.      Last filled:4/19/23 for 90 day supply.  Last office visit with prescribing clinician: 4/12/22      Next office visit with prescribing clinician: 10/25/23     Tavo Armstrong MA  07/27/23, 09:39 EDT

## 2023-07-27 NOTE — TELEPHONE ENCOUNTER
Hub to read:    Left voicemail for Hafsa that we are unable to fill her medication until she gets a follow up scheduled with our office. Informed patient that last office visit was in April of 2022 but we like to see patient at least once a year in order to continue filling medication. Informed her that if she gets an office visit scheduled we will be able to give her medication until that office visit but if she is not present for that office visit we will not be able to authorize any more refills.

## 2023-07-28 DIAGNOSIS — F51.04 CHRONIC INSOMNIA: Primary | ICD-10-CM

## 2023-07-28 RX ORDER — ESZOPICLONE 1 MG/1
1 TABLET, FILM COATED ORAL NIGHTLY
Qty: 30 TABLET | Refills: 0 | Status: SHIPPED | OUTPATIENT
Start: 2023-07-28

## 2023-07-29 DIAGNOSIS — G71.11 MYOTONIC DYSTROPHY, TYPE 2: ICD-10-CM

## 2023-07-31 DIAGNOSIS — G71.11 MYOTONIC DYSTROPHY, TYPE 2: ICD-10-CM

## 2023-07-31 RX ORDER — GABAPENTIN 600 MG/1
600 TABLET ORAL 3 TIMES DAILY
Qty: 270 TABLET | Refills: 1 | Status: SHIPPED | OUTPATIENT
Start: 2023-07-31

## 2023-07-31 RX ORDER — TIRZEPATIDE 5 MG/.5ML
INJECTION, SOLUTION SUBCUTANEOUS
Qty: 2 ML | OUTPATIENT
Start: 2023-07-31

## 2023-07-31 RX ORDER — GABAPENTIN 600 MG/1
600 TABLET ORAL 3 TIMES DAILY
Qty: 270 TABLET | Refills: 1 | Status: SHIPPED | OUTPATIENT
Start: 2023-07-31 | End: 2023-07-31 | Stop reason: SDUPTHER

## 2023-08-11 DIAGNOSIS — E11.9 TYPE 2 DIABETES MELLITUS WITHOUT COMPLICATION, WITHOUT LONG-TERM CURRENT USE OF INSULIN: ICD-10-CM

## 2023-08-11 RX ORDER — VENLAFAXINE HYDROCHLORIDE 37.5 MG/1
CAPSULE, EXTENDED RELEASE ORAL
Qty: 90 CAPSULE | OUTPATIENT
Start: 2023-08-11

## 2023-08-18 ENCOUNTER — HOSPITAL ENCOUNTER (OUTPATIENT)
Dept: CARDIOLOGY | Facility: HOSPITAL | Age: 53
Discharge: HOME | End: 2023-08-18
Payer: MEDICARE

## 2023-08-18 VITALS — HEIGHT: 66 IN | BODY MASS INDEX: 35.68 KG/M2 | WEIGHT: 222 LBS

## 2023-08-18 DIAGNOSIS — R09.89 BRUIT OF LEFT CAROTID ARTERY: ICD-10-CM

## 2023-08-18 LAB
BH CV XLRA MEAS LEFT DIST CCA EDV: 25.1 CM/SEC
BH CV XLRA MEAS LEFT DIST CCA PSV: 97 CM/SEC
BH CV XLRA MEAS LEFT DIST ICA EDV: 31.7 CM/SEC
BH CV XLRA MEAS LEFT DIST ICA PSV: 81.4 CM/SEC
BH CV XLRA MEAS LEFT ICA/CCA RATIO: 0.77
BH CV XLRA MEAS LEFT MID CCA EDV: 17.3 CM/SEC
BH CV XLRA MEAS LEFT MID CCA PSV: 94.4 CM/SEC
BH CV XLRA MEAS LEFT MID ICA EDV: 30.4 CM/SEC
BH CV XLRA MEAS LEFT MID ICA PSV: 75.2 CM/SEC
BH CV XLRA MEAS LEFT PROX CCA EDV: 26.9 CM/SEC
BH CV XLRA MEAS LEFT PROX CCA PSV: 104.8 CM/SEC
BH CV XLRA MEAS LEFT PROX ECA PSV: 85.1 CM/SEC
BH CV XLRA MEAS LEFT PROX ICA EDV: 19.3 CM/SEC
BH CV XLRA MEAS LEFT PROX ICA PSV: 52.2 CM/SEC
BH CV XLRA MEAS LEFT PROX SCLA PSV: 190.8 CM/SEC
BH CV XLRA MEAS LEFT VERTEBRAL A EDV: 24.8 CM/SEC
BH CV XLRA MEAS LEFT VERTEBRAL A PSV: 86.4 CM/SEC
BH CV XLRA MEAS RIGHT DIST CCA EDV: 25.9 CM/SEC
BH CV XLRA MEAS RIGHT DIST CCA PSV: 83.4 CM/SEC
BH CV XLRA MEAS RIGHT DIST ICA EDV: 42.7 CM/SEC
BH CV XLRA MEAS RIGHT DIST ICA PSV: 93.9 CM/SEC
BH CV XLRA MEAS RIGHT ICA/CCA RATIO: 1.1
BH CV XLRA MEAS RIGHT MID CCA EDV: 25.9 CM/SEC
BH CV XLRA MEAS RIGHT MID CCA PSV: 89 CM/SEC
BH CV XLRA MEAS RIGHT MID ICA EDV: 28.7 CM/SEC
BH CV XLRA MEAS RIGHT MID ICA PSV: 81.3 CM/SEC
BH CV XLRA MEAS RIGHT PROX CCA EDV: 23.2 CM/SEC
BH CV XLRA MEAS RIGHT PROX CCA PSV: 117.2 CM/SEC
BH CV XLRA MEAS RIGHT PROX ECA PSV: 149 CM/SEC
BH CV XLRA MEAS RIGHT PROX ICA EDV: 20.3 CM/SEC
BH CV XLRA MEAS RIGHT PROX ICA PSV: 92.5 CM/SEC
BH CV XLRA MEAS RIGHT PROX SCLA PSV: 232.1 CM/SEC
BH CV XLRA MEAS RIGHT VERTEBRAL A EDV: 14.3 CM/SEC
BH CV XLRA MEAS RIGHT VERTEBRAL A PSV: 55.9 CM/SEC

## 2023-08-18 PROCEDURE — 93880 EXTRACRANIAL BILAT STUDY: CPT

## 2023-08-22 DIAGNOSIS — E11.9 TYPE 2 DIABETES MELLITUS WITHOUT COMPLICATION, WITHOUT LONG-TERM CURRENT USE OF INSULIN: ICD-10-CM

## 2023-08-22 RX ORDER — TIRZEPATIDE 7.5 MG/.5ML
INJECTION, SOLUTION SUBCUTANEOUS
Qty: 2 ML | Refills: 0 | Status: SHIPPED | OUTPATIENT
Start: 2023-08-22

## 2023-08-31 ENCOUNTER — PATIENT MESSAGE (OUTPATIENT)
Dept: INTERNAL MEDICINE | Facility: CLINIC | Age: 53
End: 2023-08-31
Payer: MEDICARE

## 2023-09-01 ENCOUNTER — TELEPHONE (OUTPATIENT)
Dept: INTERNAL MEDICINE | Facility: CLINIC | Age: 53
End: 2023-09-01
Payer: MEDICARE

## 2023-09-01 DIAGNOSIS — E11.9 TYPE 2 DIABETES MELLITUS WITHOUT COMPLICATION, WITHOUT LONG-TERM CURRENT USE OF INSULIN: ICD-10-CM

## 2023-09-01 RX ORDER — TIRZEPATIDE 7.5 MG/.5ML
7.5 INJECTION, SOLUTION SUBCUTANEOUS WEEKLY
Qty: 2 ML | Refills: 0 | Status: SHIPPED | OUTPATIENT
Start: 2023-09-01

## 2023-09-01 NOTE — TELEPHONE ENCOUNTER
----- Message from Hafsa Barron sent at 8/31/2023 11:59 PM EDT -----  Regarding: Need refills   Contact: [phone]  Need refill of Monjuro 7.5 today please to [facility].    Also the new sleeping pill that you started me on has been working great but I'm noticing more and more each night. I'm not falling asleep as fast as I was so I think I need a stronger dosage. If you could add to it where I can choose what I have left and increase that you change it to. Greatly appreciate it as it was definitely helping a lot but I really just think it needs an increase.

## 2023-09-05 RX ORDER — ESZOPICLONE 2 MG/1
2 TABLET, FILM COATED ORAL NIGHTLY
Qty: 30 TABLET | Refills: 2 | Status: SHIPPED | OUTPATIENT
Start: 2023-09-05

## 2023-09-12 ENCOUNTER — OFFICE VISIT (OUTPATIENT)
Dept: UROLOGY | Facility: CLINIC | Age: 53
End: 2023-09-12
Payer: MEDICARE

## 2023-09-12 VITALS — HEIGHT: 66 IN | HEART RATE: 106 BPM | WEIGHT: 210.6 LBS | OXYGEN SATURATION: 97 % | BODY MASS INDEX: 33.85 KG/M2

## 2023-09-12 DIAGNOSIS — R39.15 URINARY URGENCY: ICD-10-CM

## 2023-09-12 DIAGNOSIS — N39.0 RECURRENT UTI: ICD-10-CM

## 2023-09-12 DIAGNOSIS — N30.00 ACUTE CYSTITIS WITHOUT HEMATURIA: Primary | ICD-10-CM

## 2023-09-12 LAB
BILIRUB BLD-MCNC: NEGATIVE MG/DL
CLARITY, POC: CLEAR
COLOR UR: ABNORMAL
EXPIRATION DATE: ABNORMAL
GLUCOSE UR STRIP-MCNC: NEGATIVE MG/DL
KETONES UR QL: NEGATIVE
LEUKOCYTE EST, POC: ABNORMAL
Lab: ABNORMAL
NITRITE UR-MCNC: POSITIVE MG/ML
PH UR: 7.5 [PH] (ref 5–8)
PROT UR STRIP-MCNC: NEGATIVE MG/DL
RBC # UR STRIP: NEGATIVE /UL
SP GR UR: 1.01 (ref 1–1.03)
UROBILINOGEN UR QL: NORMAL

## 2023-09-12 RX ORDER — SULFAMETHOXAZOLE AND TRIMETHOPRIM 800; 160 MG/1; MG/1
1 TABLET ORAL 2 TIMES DAILY
Qty: 14 TABLET | Refills: 0 | Status: SHIPPED | OUTPATIENT
Start: 2023-09-12

## 2023-09-12 RX ORDER — OXYBUTYNIN CHLORIDE 5 MG/1
5 TABLET, EXTENDED RELEASE ORAL DAILY
Qty: 90 TABLET | Refills: 3 | Status: SHIPPED | OUTPATIENT
Start: 2023-09-12

## 2023-09-12 RX ORDER — FAMOTIDINE 20 MG/1
TABLET, FILM COATED ORAL
COMMUNITY
Start: 2023-08-28

## 2023-09-12 NOTE — PROGRESS NOTES
[facility] Female Office Visit      Patient Name: Hafsa Barron  : 1970   MRN: [id number]     Chief Complaint:  Lower Urinary Tract Symptoms.   Chief Complaint   Patient presents with    History of UTI       Referring Provider: No ref. provider found    History of Present Illness: Mr. Barron is a 52 y.o. female with history of recurrent UTI and OAB returns for follow-up.  I saw her a year ago.  She has a known 1 to 2 mm stone in the right kidney.  She is managing OAB symptoms with oxybutynin 5 mg extended release tablets with great relief.  She denies urgency incontinence, frequency or significant urgency complaints, she does not wear pads.      She has a history of recurrent UTI, we previously prescribed Estrace but she was unable to afford this.  A standing urine culture has been ordered for the patient, this has not been utilized    However, the patient requested ASAP follow-up with me today because she thinks she has UTI.  Her urine is positive for leukocytes and nitrite.  She reports lower back pain and right-sided flank pain.  She reports a low-grade fever at home yesterday.  She feels otherwise well today.    I reviewed the results of the patient's CT scan from 2021 demonstrating a tiny stone in the right kidney.    CMP reviewed from May 2022, creatinine 0.47 with an A1c 5.2023.      Subjective      Review of System: Review of Systems   Genitourinary:  Positive for flank pain, frequency and urgency. Negative for decreased urine volume, difficulty urinating, dysuria, enuresis and hematuria.    I have reviewed the ROS documented by my clinical staff, I have updated appropriately and I agree. Ge Ace MD    Past Medical History:  Past Medical History:   Diagnosis Date    Achilles tendon rupture 2021    left    Allergic     Allergy     Ankle sprain     Anxiety     Arthritis     Asthma Allergies induced    Coronary artery disease     CTS (carpal tunnel syndrome)      Depression     Fibromyalgia, primary     Fracture, finger     GERD (gastroesophageal reflux disease)     Headache     History of blood clots     History of UTI     Hyperlipidemia     Hypertension     IBS (irritable bowel syndrome)     Internal hemorrhoid     Kidney stone     Knee sprain     Knee swelling     Low back pain     Low back strain     Muscular dystrophy     Muscular dystrophy     II    Myotonia     Obesity     Pulmonary embolism sub clavical artery clot stent put in Aug 2018    Rotator cuff syndrome     Stroke 08/31/2013    resdual- left sided weakness.     Tear of meniscus of knee     Type 2 diabetes mellitus without complication, without long-term current use of insulin        Past Surgical History:  Past Surgical History:   Procedure Laterality Date    ACHILLES TENDON SURGERY Left 09/2021    Dr. Salinas achilles rupture repair    ANKLE OPEN REDUCTION INTERNAL FIXATION  Oct 2021    Achilles tendon ruptured had repair surgery    BARIATRIC SURGERY  4/29/2019    CHOLECYSTECTOMY  07/2004    COLONOSCOPY  07/2017    D & C WITH SUCTION      KNEE SURGERY Left     LYMPH NODE BIOPSY      neck    SUBCLAVIAN ARTERY STENT Left 08/2018    x2    WISDOM TOOTH EXTRACTION         Medications:    Current Outpatient Medications:     famotidine (PEPCID) 20 MG tablet, , Disp: , Rfl:     oxybutynin XL (DITROPAN-XL) 5 MG 24 hr tablet, Take 1 tablet by mouth Daily., Disp: 90 tablet, Rfl: 3    albuterol (PROVENTIL) (2.5 MG/3ML) 0.083% nebulizer solution, Take 2.5 mg by nebulization Every 4 (Four) Hours As Needed for Wheezing., Disp: 90 vial, Rfl: 1    amLODIPine (NORVASC) 2.5 MG tablet, TAKE ONE TABLET BY MOUTH DAILY, Disp: 90 tablet, Rfl: 0    aspirin 81 MG EC tablet, Take 1 tablet by mouth Daily., Disp: , Rfl:     atorvastatin (LIPITOR) 80 MG tablet, TAKE ONE TABLET BY MOUTH ONCE NIGHTLY, Disp: 90 tablet, Rfl: 1    baclofen (LIORESAL) 10 MG tablet, Take 1 tablet by mouth 3 (Three) Times a Day. (Patient taking differently:  Take 1 tablet by mouth every night at bedtime.), Disp: 270 tablet, Rfl: 1    Blood Glucose Monitoring Suppl (ONE TOUCH ULTRA 2) w/Device kit, , Disp: , Rfl:     busPIRone (BUSPAR) 15 MG tablet, Take 1 tablet by mouth 3 (Three) Times a Day., Disp: 270 tablet, Rfl: 1    calcium carbonate-vitamin d (CALCIUM 600+D HIGH POTENCY) 600-400 MG-UNIT per tablet, Take 1 tablet by mouth Daily., Disp: 90 tablet, Rfl: 2    clopidogrel (PLAVIX) 75 MG tablet, TAKE ONE TABLET BY MOUTH DAILY, Disp: 90 tablet, Rfl: 3    conjugated estrogens (Premarin) 0.625 MG/GM vaginal cream, Insert  into the vagina Daily. Apply one finger tip strip of cream into vagina, three times weekly at night (M/W/F), Disp: 30 g, Rfl: 5    cyclobenzaprine (FLEXERIL) 10 MG tablet, Take 1 tablet by mouth 3 (Three) Times a Day As Needed for Muscle Spasms. (Patient taking differently: Take 1 tablet by mouth every night at bedtime.), Disp: 270 tablet, Rfl: 2    diclofenac (VOLTAREN) 75 MG EC tablet, Take 1 tablet by mouth 2 (Two) Times a Day., Disp: 180 tablet, Rfl: 3    eszopiclone (Lunesta) 2 MG tablet, Take 1 tablet by mouth Every Night. Take immediately before bedtime, Disp: 30 tablet, Rfl: 2    fenofibrate (TRICOR) 145 MG tablet, TAKE ONE TABLET BY MOUTH DAILY, Disp: 90 tablet, Rfl: 1    fluticasone (FLONASE) 50 MCG/ACT nasal spray, into each nostril., Disp: , Rfl:     gabapentin (NEURONTIN) 600 MG tablet, Take 1 tablet by mouth 3 (Three) Times a Day., Disp: 270 tablet, Rfl: 1    HYDROcodone-acetaminophen (NORCO)  MG per tablet, Take 1 tablet by mouth Every 6 (Six) Hours As Needed for Moderate Pain., Disp: 120 tablet, Rfl: 0    hydrOXYzine (ATARAX) 25 MG tablet, TAKE ONE TABLET BY MOUTH THREE TIMES A DAY AS NEEDED FOR ANXIETY, Disp: 10 tablet, Rfl: 3    loratadine (CLARITIN) 10 MG tablet, Take 1 tablet by mouth Daily., Disp: 30 tablet, Rfl: 11    metFORMIN (GLUCOPHAGE) 1000 MG tablet, TAKE ONE TABLET BY MOUTH TWICE A DAY WITH MEALS, Disp: 180 tablet, Rfl:  2    methylphenidate (Ritalin) 20 MG tablet, Take 1 tablet by mouth Daily for 90 days., Disp: 90 tablet, Rfl: 0    montelukast (SINGULAIR) 10 MG tablet, TAKE ONE TABLET BY MOUTH EVERY EVENING, Disp: 90 tablet, Rfl: 3    nystatin (MYCOSTATIN) 066438 UNIT/GM ointment, APPLY TO AFFECTED AREA(S) TWO TIMES A DAY, Disp: 30 g, Rfl: 1    nystatin (MYCOSTATIN) 582511 UNIT/GM powder, Apply  topically to the appropriate area as directed 3 (Three) Times a Day., Disp: 30 g, Rfl: 2    ONE TOUCH ULTRA TEST test strip, , Disp: , Rfl:     pantoprazole (PROTONIX) 40 MG EC tablet, Take 1 tablet by mouth Daily., Disp: 90 tablet, Rfl: 1    potassium chloride ER (K-TAB) 20 MEQ tablet controlled-release ER tablet, Take 1 tablet by mouth Daily., Disp: 90 tablet, Rfl: 2    promethazine (PHENERGAN) 12.5 MG tablet, Take 1 tablet by mouth Every 8 (Eight) Hours As Needed for Nausea or Vomiting., Disp: 30 tablet, Rfl: 1    spironolactone (ALDACTONE) 50 MG tablet, TAKE ONE TABLET BY MOUTH DAILY, Disp: 90 tablet, Rfl: 3    sulfamethoxazole-trimethoprim (Bactrim DS) 800-160 MG per tablet, Take 1 tablet by mouth 2 (Two) Times a Day., Disp: 14 tablet, Rfl: 0    Tirzepatide (Mounjaro) 7.5 MG/0.5ML solution pen-injector, Inject 0.5 mL under the skin into the appropriate area as directed 1 (One) Time Per Week., Disp: 2 mL, Rfl: 0    tiZANidine (ZANAFLEX) 4 MG tablet, Take 1 tablet by mouth Every Night., Disp: 90 tablet, Rfl: 1    venlafaxine XR (EFFEXOR-XR) 75 MG 24 hr capsule, TAKE ONE CAPSULE BY MOUTH DAILY, Disp: 30 capsule, Rfl: 5    Allergies:  Allergies   Allergen Reactions    Fish-Derived Products Anaphylaxis    Penicillins Anaphylaxis and Shortness Of Breath    Shellfish Allergy Anaphylaxis       Social History:  Social History     Socioeconomic History    Marital status:     Number of children: 2   Tobacco Use    Smoking status: Former     Packs/day: 2.00     Years: 27.00     Pack years: 54.00     Types: Electronic Cigarette, Cigarettes      Quit date:      Years since quitting: 10.7     Passive exposure: Past    Smokeless tobacco: Never    Tobacco comments:     use Ecigg now NO NICOTENE   Vaping Use    Vaping Use: Some days    Substances: Flavoring    Devices: Pre-filled pod   Substance and Sexual Activity    Alcohol use: Yes    Drug use: No    Sexual activity: Yes     Partners: Male     Birth control/protection: Post-menopausal, None     Comment: Menapause no cycle since 2013       Family History:  Family History   Problem Relation Age of Onset    Allergies Other     ADD / ADHD Other     Heart block Other     Other Other         CEREBROVASCULAR ACCIDENT     Hypertension Other     Cancer Other         LUNG CANCER    Hyperlipidemia Other     Alcohol abuse Mother     Depression Mother     Early death Mother          age 59 massive Heart Attack    Heart disease Mother         Mother  of Massive Heart attack    Hyperlipidemia Mother     Hypertension Mother     Miscarriages / Stillbirths Mother         1 I know of    Heart attack Mother          of  massive heart attack    Alcohol abuse Father     Cancer Father         Had Lung Cancer  had 1 lung till death    Diabetes Father         Lived on insuline shots    Early death Father          at age 53 due to Lung Cancer    Breast cancer Maternal Aunt     Learning disabilities Son         ADHD    Breast cancer Cousin     Ovarian cancer Neg Hx     Endometrial cancer Neg Hx     Uterine cancer Neg Hx     Colon cancer Neg Hx        Bladder & Bowel Symptom Questionnaire    How often do you usually urinate during the day ?   4 - At least once an hour    2.   How many timed do you urinate at night?   2 - About every 2-3 hours    3.   What is the reason that you usually urinate?   3 - About every 1-2 hours   4.   Once you get the urge to go, how long can you     comfortably delay?   3 - About every 1-2 hours   5.   How often do you get a sudden urge that makes you rush to the  "bathroom?   4 - At least once an hour    6.   How often does a sudden urge to urinate result in you leaking urine or wetting pads?   3 - About every 1-2 hours   7.  In your opinion, how good is your bladder control?   3 - About every 1-2 hours   8.  Do you have accidental bowel leakage?   no   9.  Do you have difficulty fully emptying your bladder?   yes   10.  Do you experience accidental leakage when performing some physical activity such as coughing, sneezing, laughing or exercise?   no   11. Have you tried medications to help improve your symptoms?   yes   12. Would you be interested in learning about a long-lasting option that may help you with your symptoms?   yes                                                                             Total Score   23     0-7 (Mild) 8-16 (Moderate) 17-28 (Severe)       Post void residual bladder scan:    033mL     Objective     Physical Exam:   Vital Signs:   Vitals:    09/12/23 1401   Pulse: 106   SpO2: 97%   Weight: 95.5 kg (210 lb 9.6 oz)   Height: 167.6 cm (65.98\")   PainSc: 0-No pain     Body mass index is 34.01 kg/m².     Physical Exam  Constitutional:       Appearance: Normal appearance.   HENT:      Head: Normocephalic and atraumatic.      Nose: Nose normal.      Mouth/Throat:      Mouth: Mucous membranes are moist.      Pharynx: Oropharynx is clear.   Eyes:      Extraocular Movements: Extraocular movements intact.      Pupils: Pupils are equal, round, and reactive to light.   Pulmonary:      Effort: Pulmonary effort is normal. No respiratory distress.   Musculoskeletal:         General: No swelling or deformity. Normal range of motion.      Cervical back: Normal range of motion and neck supple.   Skin:     General: Skin is warm and dry.   Neurological:      General: No focal deficit present.      Mental Status: She is alert and oriented to person, place, and time. Mental status is at baseline.   Psychiatric:         Mood and Affect: Mood normal.         Behavior: " Behavior normal.       Labs:   Brief Urine Lab Results  (Last result in the past 365 days)        Color   Clarity   Blood   Leuk Est   Nitrite   Protein   CREAT   Urine HCG        09/12/23 1409 Dark Yellow   Clear   Negative   Small (1+)   Positive   Negative                        Lab Results   Component Value Date    GLUCOSE 159 (H) 04/26/2023    CALCIUM 9.9 04/26/2023     (H) 04/26/2023    K 4.3 04/26/2023    CO2 26.0 04/26/2023     (H) 04/26/2023    BUN 19 04/26/2023    CREATININE 0.66 04/26/2023    EGFRIFNONA >150 02/05/2022    BCR 28.8 (H) 04/26/2023    ANIONGAP 11.0 04/26/2023       Lab Results   Component Value Date    WBC 6.04 04/26/2023    HGB 14.4 04/26/2023    HCT 42.9 04/26/2023    MCV 89.6 04/26/2023     04/26/2023       Images:   No Images in the past 120 days found..    Measures:   Tobacco:   Hafsa Barron  reports that she quit smoking about 10 years ago. Her smoking use included electronic cigarette and cigarettes. She has a 54.00 pack-year smoking history. She has been exposed to tobacco smoke. She has never used smokeless tobacco.    Assessment / Plan      Assessment:  Mrs. Barron is a 52 y.o. female who presented today with lower urinary tract symptoms. She has achieved good symptom relief with low-dose 5 mg oxybutynin extended release for OAB symptoms.  She has not had a urinary tract infection in more than a year for the patient but she does have 1 today.  We will treat her with 1 week of Bactrim given the possibility of right sided pyelonephritis given her vague flank pain.  If the flank pain does not resolve with antibiotics I recommend she proceed to the emergency room or give me a call and I can order an outpatient scan to make sure she is not passing a small stone.  Any fevers or chills would prompt her to go to the ER.  She reports understanding.  I will refill her oxybutynin which is affording her great relief from OAB symptoms without side effects.  She will see my  nurse practitioner back in 6 months for continued follow-up.    Diagnoses and all orders for this visit:    1. Acute cystitis without hematuria (Primary)  -     sulfamethoxazole-trimethoprim (Bactrim DS) 800-160 MG per tablet; Take 1 tablet by mouth 2 (Two) Times a Day.  Dispense: 14 tablet; Refill: 0  -     Urine Culture - Urine, Urine, Clean Catch    2. Recurrent UTI  -     POC Urinalysis Dipstick, Automated    3. Urinary urgency  -     oxybutynin XL (DITROPAN-XL) 5 MG 24 hr tablet; Take 1 tablet by mouth Daily.  Dispense: 90 tablet; Refill: 3           Follow Up:   Return in about 6 months (around 3/12/2024) for 6 months Nieves Nuno NP .    I spent approximately 30 minutes providing clinical care for this patient; including review of patient's chart and provider documentation, face to face time spent with patient in examination room (obtaining history, performing physical exam, discussing diagnosis and management options), placing orders, and completing patient documentation.     Ge Ace MD  [facility]

## 2023-09-22 ENCOUNTER — TELEPHONE (OUTPATIENT)
Dept: UROLOGY | Facility: CLINIC | Age: 53
End: 2023-09-22
Payer: MEDICARE

## 2023-09-22 DIAGNOSIS — B37.9 ANTIBIOTIC-INDUCED YEAST INFECTION: Primary | ICD-10-CM

## 2023-09-22 DIAGNOSIS — T36.95XA ANTIBIOTIC-INDUCED YEAST INFECTION: Primary | ICD-10-CM

## 2023-09-22 RX ORDER — FLUCONAZOLE 100 MG/1
100 TABLET ORAL DAILY
Qty: 3 TABLET | Refills: 0 | Status: SHIPPED | OUTPATIENT
Start: 2023-09-22 | End: 2023-09-25

## 2023-09-22 NOTE — TELEPHONE ENCOUNTER
Patient was treated by Dr. Ace in the last week or so and has finished her abx and now has a yeast infection.  Can you send her in a fluconazole?  [address]

## 2023-09-25 RX ORDER — AMLODIPINE BESYLATE 2.5 MG/1
TABLET ORAL
Qty: 90 TABLET | Refills: 1 | Status: SHIPPED | OUTPATIENT
Start: 2023-09-25

## 2023-10-03 RX ORDER — BUSPIRONE HYDROCHLORIDE 15 MG/1
TABLET ORAL
Qty: 270 TABLET | Refills: 1 | Status: SHIPPED | OUTPATIENT
Start: 2023-10-03

## 2023-10-09 DIAGNOSIS — E11.9 TYPE 2 DIABETES MELLITUS WITHOUT COMPLICATION, WITHOUT LONG-TERM CURRENT USE OF INSULIN: ICD-10-CM

## 2023-10-09 RX ORDER — PROMETHAZINE HYDROCHLORIDE 12.5 MG/1
12.5 TABLET ORAL EVERY 8 HOURS PRN
Qty: 30 TABLET | Refills: 1 | Status: SHIPPED | OUTPATIENT
Start: 2023-10-09

## 2023-10-13 ENCOUNTER — TELEPHONE (OUTPATIENT)
Dept: INTERNAL MEDICINE | Facility: CLINIC | Age: 53
End: 2023-10-13

## 2023-10-13 ENCOUNTER — TELEMEDICINE (OUTPATIENT)
Dept: INTERNAL MEDICINE | Facility: CLINIC | Age: 53
End: 2023-10-13
Payer: MEDICARE

## 2023-10-13 DIAGNOSIS — J06.9 UPPER RESPIRATORY INFECTION WITH COUGH AND CONGESTION: Primary | ICD-10-CM

## 2023-10-13 PROCEDURE — 1159F MED LIST DOCD IN RCRD: CPT | Performed by: NURSE PRACTITIONER

## 2023-10-13 PROCEDURE — 1160F RVW MEDS BY RX/DR IN RCRD: CPT | Performed by: NURSE PRACTITIONER

## 2023-10-13 PROCEDURE — 99213 OFFICE O/P EST LOW 20 MIN: CPT | Performed by: NURSE PRACTITIONER

## 2023-10-13 PROCEDURE — 3044F HG A1C LEVEL LT 7.0%: CPT | Performed by: NURSE PRACTITIONER

## 2023-10-13 RX ORDER — AZITHROMYCIN 250 MG/1
TABLET, FILM COATED ORAL
Qty: 6 TABLET | Refills: 0 | Status: SHIPPED | OUTPATIENT
Start: 2023-10-13

## 2023-10-13 RX ORDER — DEXTROMETHORPHAN HYDROBROMIDE AND PROMETHAZINE HYDROCHLORIDE 15; 6.25 MG/5ML; MG/5ML
5 SYRUP ORAL 4 TIMES DAILY PRN
Qty: 118 ML | Refills: 0 | Status: SHIPPED | OUTPATIENT
Start: 2023-10-13

## 2023-10-13 NOTE — PROGRESS NOTES
Chief Complaint  URI    Subjective      This provider is located at the [address] [place] [address] [place] (through [facility]), using a secure MyChart Video Visit through [employer]. Patient is being seen remotely via telehealth at their home address in [place], and stated they are in a secure environment for this session. The patient's condition being diagnosed/treated is appropriate for telemedicine. The provider identified herself as well as her credentials. The patient, and/or patients guardian, consent to be seen remotely, and when consent is given they understand that the consent allows for patient identifiable information to be sent to a third party as needed. They may refuse to be seen remotely at any time. The electronic data is encrypted and password protected, and the patient and/or guardian has been advised of the potential risks to privacy not withstanding such measures.    You have chosen to receive care through a telehealth visit. Do you consent to use a video/audio connection for your medical care today? Yes   Hafsa Barron presents to [facility]  URI   Associated symptoms include congestion (chest congestion), coughing, rhinorrhea and a sore throat. Pertinent negatives include no diarrhea, ear pain, headaches, nausea, sneezing, vomiting or wheezing.     The patient is a 52 year old female who presents with upper respiratory symptoms. Symptoms developed two weeks ago. She is taking Tussin OTC for cough. Cough is keeping her awake at night. Cough is productive with yellow sputum. The patient occasionally takes OTC antihistamine prn but has not taken antihistamine in the past week. Denies history of asthma or COPD. Former smoker, 15 plus years, 1 1/2 PPD smoker.     Review of Systems   Constitutional:  Negative for chills, diaphoresis, fatigue and fever.   HENT:  Positive for congestion (chest congestion), postnasal drip, rhinorrhea and sore throat. Negative for  "ear discharge, ear pain, sinus pressure and sneezing.    Eyes: Negative.    Respiratory:  Positive for cough. Negative for shortness of breath and wheezing.    Gastrointestinal:  Negative for diarrhea, nausea and vomiting.   Allergic/Immunologic: Positive for environmental allergies and food allergies (sea food).   Neurological:  Negative for dizziness, light-headedness and headaches.      Objective   Vital Signs:  There were no vitals taken for this visit.  Estimated body mass index is 34.01 kg/mý as calculated from the following:    Height as of 9/12/23: 167.6 cm (65.98\").    Weight as of 9/12/23: 95.5 kg (210 lb 9.6 oz).            Physical Exam   Result Review :                 Assessment and Plan   Diagnoses and all orders for this visit:    1. Upper respiratory infection with cough and congestion (Primary)  -     azithromycin (Zithromax Z-Adilson) 250 MG tablet; Take 2 tablets by mouth on day 1, then 1 tablet daily on days 2-5  Dispense: 6 tablet; Refill: 0  -     promethazine-dextromethorphan (PROMETHAZINE-DM) 6.25-15 MG/5ML syrup; Take 5 mL by mouth 4 (Four) Times a Day As Needed for Cough.  Dispense: 118 mL; Refill: 0      Discussed treatment options with patient. Z-pack and Promethazine DM prescribed, take as directed. Restart OTC antihistamine, take as directed. Follow up with PCP for persistent symptoms.        Follow Up   Return if symptoms worsen or fail to improve.  Patient was given instructions and counseling regarding her condition or for health maintenance advice. Please see specific information pulled into the AVS if appropriate.         "

## 2023-10-13 NOTE — TELEPHONE ENCOUNTER
Caller: Hafsa Barron    Relationship: Self    Best call back number: [phone]     What medication are you requesting: SOMETHING FOR COUGH    What are your current symptoms: PROLONGED COUGH    How long have you been experiencing symptoms: ABOUT 2 WEEKS    Have you had these symptoms before:    [] Yes  [x] No    Have you been treated for these symptoms before:   [] Yes  [x] No    If a prescription is needed, what is your preferred pharmacy and phone number: [facility] [id number] - [place], [place] - [address] - [phone]  - [phone] FX     Additional notes:  PATIENT WOULD LIKE TO REQUEST MEDICATION TO HELP WITH HER COUGH. PATIENT STATES THE [facility] DO NOT HELP AND WOULD LIKE SOMETHING ELSE CALLED IN .

## 2023-10-17 ENCOUNTER — HOSPITAL ENCOUNTER (OUTPATIENT)
Dept: MAMMOGRAPHY | Facility: HOSPITAL | Age: 53
Discharge: HOME | End: 2023-10-17
Payer: MEDICARE

## 2023-10-25 ENCOUNTER — TELEMEDICINE (OUTPATIENT)
Dept: NEUROLOGY | Facility: CLINIC | Age: 53
End: 2023-10-25
Payer: MEDICARE

## 2023-10-25 ENCOUNTER — TELEPHONE (OUTPATIENT)
Dept: NEUROLOGY | Facility: CLINIC | Age: 53
End: 2023-10-25

## 2023-10-25 DIAGNOSIS — G47.19 DAYTIME HYPERSOMNOLENCE: Primary | ICD-10-CM

## 2023-10-25 DIAGNOSIS — G71.11 MYOTONIC DYSTROPHY, TYPE 2: ICD-10-CM

## 2023-10-25 PROCEDURE — 1160F RVW MEDS BY RX/DR IN RCRD: CPT | Performed by: PSYCHIATRY & NEUROLOGY

## 2023-10-25 PROCEDURE — 99214 OFFICE O/P EST MOD 30 MIN: CPT | Performed by: PSYCHIATRY & NEUROLOGY

## 2023-10-25 PROCEDURE — 1159F MED LIST DOCD IN RCRD: CPT | Performed by: PSYCHIATRY & NEUROLOGY

## 2023-10-25 RX ORDER — METHYLPHENIDATE HYDROCHLORIDE 20 MG/1
20 TABLET ORAL DAILY
Qty: 90 TABLET | Refills: 0 | Status: SHIPPED | OUTPATIENT
Start: 2023-10-25 | End: 2024-01-23

## 2023-10-25 NOTE — TELEPHONE ENCOUNTER
LEFT A VOICE MAIL TO SCHEDULE PATIENT'S NEXT 6 MONTH FOLLOW UP WITH DR BLANC. WILL NEED TO BE IN CLINIC.

## 2023-10-25 NOTE — PROGRESS NOTES
Subjective:    CC: Hafsa Barron is followed for history of type II myotonic dystrophy and daytime hypersomnolence.    HPI:  Initial visit: 2/18/2020: Patient is a 49-year-old female with known history of type II myotonic dystrophy referred to clinic to establish care.  She reports that she started having symptoms back in 2003 soon after delivery.  At that time she noted some leg weakness which progressively became worse and in 2009, she was seen by [facility] neuromuscular department and underwent detailed neurological examination, which was suggestive of proximal muscle weakness and possibility of myopathy.  Following this, EMG was performed which showed diffuse myotonic discharges and eventually genetic testing was done which confirmed the diagnosis of type II myotonic dystrophy.  She has followed with  neuroscience since year 2009 and was followed initially by Dr. Marina and then Dr. Lynn.  Since her diagnosis in 2009, she reports that there is been slight worsening in bilateral proximal muscle weakness in foot and lower extremities.  In year 2013, she reports that she had a stroke which affected her left upper and lower extremity strength.  Currently she was uses cane for shorter distances and electrical scooter for longer distances.  She is established with ophthalmology and has regular eye examination as well as is established with cardiology to monitor her heart health.  She does have history of obstructive sleep apnea and uses CPAP machine regularly.  Initially, she reports that it had helped her significantly but now she reports that even after using it regularly, she wakes up tired and reports extreme daytime somnolence, tiredness and fatigue.  She was prescribed Nuvigil by her sleep physician which initially helped and then it stopped working.  She has never tried Provigil for daytime somnolence.  She denies any problems with vision.  Currently she takes baclofen 10 mg 3 times a day, tizanidine and  cyclobenzaprine only at night.  She also takes gabapentin for paresthesias and muscle spasms.  She does report poor sleep quality.  She is on BuSpar 15 mg 3 times a day and venlafaxine 37.5 mg daily for mood    7/15/2020: This is a follow-up done through video.  Since her last visit, she reports that bilateral proximal muscle weakness remains stable without any worsening.  She has been taking Nuvigil 100 mg daily now for last 2 months and she reports that it has helped her tremendously in keeping daytime hypersomnolence, fatigue and tiredness under good control.  She is physically more active now.    1/18/2021: This is a follow-up done through video.  Since her last visit in July, she reports that after increasing the dose of Nuvigil to 200 mg daily, she did really well until about Jose week following which she reports that she has started feeling daytime somnolence, tiredness and fatigue again.  She continues to take 200 mg dose but reports that it has not been as effective as it used to be.  As far as myotonic dystrophy is concerned, it remains stable without any worsening in muscle strength.    4/12/2022: This is a follow-up done through video.  Since her last visit in January 2021, she reports that she has been taking Provigil 300 mg daily but reports that it has not been helping the way it did when she started taking it initially.  She is interested in trying Ritalin and see if it helps better.  Ubrelvy physical she is reporting bilateral knee pain radiating down to both her legs for quite some time now.  She has seen orthopedic surgery and has had cortisone shots in her knee which helps temporarily.  She is interested in getting a second opinion for knee replacement surgery and she will be scheduling it soon.  She had COVID back in February 2022 and since then, she feels that overall she is feeling weaker in both her upper and lower extremities.    10/25/2023: This is a follow-up done through video.  She  was last seen in follow-up in April 2022.  Since her last visit, she reports that overall symptoms of myotonic dystrophy has remained stable but some days, she does feel more tired and fatigued than others.  She has also noticed some increase in low back pain shooting down to right posterior thigh up to the posterior aspect of the knee.  She reports that daytime hypersomnolence is much better with use of Ritalin 20 mg daily.  Is working better than Provigil.    The following portions of the patient's history were reviewed and updated as of 10/25/2023: allergies, social history, and problem list.       Current Outpatient Medications:     methylphenidate (Ritalin) 20 MG tablet, Take 1 tablet by mouth Daily for 90 days., Disp: 90 tablet, Rfl: 0    albuterol (PROVENTIL) (2.5 MG/3ML) 0.083% nebulizer solution, Take 2.5 mg by nebulization Every 4 (Four) Hours As Needed for Wheezing., Disp: 90 vial, Rfl: 1    amLODIPine (NORVASC) 2.5 MG tablet, TAKE 1 TABLET BY MOUTH DAILY, Disp: 90 tablet, Rfl: 1    aspirin 81 MG EC tablet, Take 1 tablet by mouth Daily., Disp: , Rfl:     atorvastatin (LIPITOR) 80 MG tablet, TAKE ONE TABLET BY MOUTH ONCE NIGHTLY, Disp: 90 tablet, Rfl: 1    azithromycin (Zithromax Z-Adilson) 250 MG tablet, Take 2 tablets by mouth on day 1, then 1 tablet daily on days 2-5, Disp: 6 tablet, Rfl: 0    baclofen (LIORESAL) 10 MG tablet, Take 1 tablet by mouth 3 (Three) Times a Day. (Patient taking differently: Take 1 tablet by mouth every night at bedtime.), Disp: 270 tablet, Rfl: 1    Blood Glucose Monitoring Suppl (ONE TOUCH ULTRA 2) w/Device kit, , Disp: , Rfl:     busPIRone (BUSPAR) 15 MG tablet, TAKE ONE TABLET BY MOUTH THREE TIMES A DAY, Disp: 270 tablet, Rfl: 1    calcium carbonate-vitamin d (CALCIUM 600+D HIGH POTENCY) 600-400 MG-UNIT per tablet, Take 1 tablet by mouth Daily., Disp: 90 tablet, Rfl: 2    clopidogrel (PLAVIX) 75 MG tablet, TAKE ONE TABLET BY MOUTH DAILY, Disp: 90 tablet, Rfl: 3    conjugated  estrogens (Premarin) 0.625 MG/GM vaginal cream, Insert  into the vagina Daily. Apply one finger tip strip of cream into vagina, three times weekly at night (M/W/F), Disp: 30 g, Rfl: 5    cyclobenzaprine (FLEXERIL) 10 MG tablet, Take 1 tablet by mouth 3 (Three) Times a Day As Needed for Muscle Spasms. (Patient taking differently: Take 1 tablet by mouth every night at bedtime.), Disp: 270 tablet, Rfl: 2    diclofenac (VOLTAREN) 75 MG EC tablet, Take 1 tablet by mouth 2 (Two) Times a Day., Disp: 180 tablet, Rfl: 3    eszopiclone (Lunesta) 2 MG tablet, Take 1 tablet by mouth Every Night. Take immediately before bedtime, Disp: 30 tablet, Rfl: 2    famotidine (PEPCID) 20 MG tablet, , Disp: , Rfl:     fenofibrate (TRICOR) 145 MG tablet, TAKE ONE TABLET BY MOUTH DAILY, Disp: 90 tablet, Rfl: 1    fluticasone (FLONASE) 50 MCG/ACT nasal spray, into each nostril., Disp: , Rfl:     gabapentin (NEURONTIN) 600 MG tablet, Take 1 tablet by mouth 3 (Three) Times a Day., Disp: 270 tablet, Rfl: 1    HYDROcodone-acetaminophen (NORCO)  MG per tablet, Take 1 tablet by mouth Every 6 (Six) Hours As Needed for Moderate Pain., Disp: 120 tablet, Rfl: 0    hydrOXYzine (ATARAX) 25 MG tablet, TAKE ONE TABLET BY MOUTH THREE TIMES A DAY AS NEEDED FOR ANXIETY, Disp: 10 tablet, Rfl: 3    loratadine (CLARITIN) 10 MG tablet, Take 1 tablet by mouth Daily., Disp: 30 tablet, Rfl: 11    metFORMIN (GLUCOPHAGE) 1000 MG tablet, TAKE ONE TABLET BY MOUTH TWICE A DAY WITH MEALS, Disp: 180 tablet, Rfl: 2    montelukast (SINGULAIR) 10 MG tablet, TAKE ONE TABLET BY MOUTH EVERY EVENING, Disp: 90 tablet, Rfl: 3    nystatin (MYCOSTATIN) 297875 UNIT/GM ointment, APPLY TO AFFECTED AREA(S) TWO TIMES A DAY, Disp: 30 g, Rfl: 1    nystatin (MYCOSTATIN) 234697 UNIT/GM powder, Apply  topically to the appropriate area as directed 3 (Three) Times a Day., Disp: 30 g, Rfl: 2    ONE TOUCH ULTRA TEST test strip, , Disp: , Rfl:     oxybutynin XL (DITROPAN-XL) 5 MG 24 hr  tablet, Take 1 tablet by mouth Daily., Disp: 90 tablet, Rfl: 3    pantoprazole (PROTONIX) 40 MG EC tablet, Take 1 tablet by mouth Daily., Disp: 90 tablet, Rfl: 1    potassium chloride ER (K-TAB) 20 MEQ tablet controlled-release ER tablet, Take 1 tablet by mouth Daily., Disp: 90 tablet, Rfl: 2    promethazine (PHENERGAN) 12.5 MG tablet, TAKE ONE TABLET BY MOUTH EVERY 8 HOURS AS NEEDED FOR NAUSEA OR VOMITING, Disp: 30 tablet, Rfl: 1    promethazine-dextromethorphan (PROMETHAZINE-DM) 6.25-15 MG/5ML syrup, Take 5 mL by mouth 4 (Four) Times a Day As Needed for Cough., Disp: 118 mL, Rfl: 0    spironolactone (ALDACTONE) 50 MG tablet, TAKE ONE TABLET BY MOUTH DAILY, Disp: 90 tablet, Rfl: 3    sulfamethoxazole-trimethoprim (Bactrim DS) 800-160 MG per tablet, Take 1 tablet by mouth 2 (Two) Times a Day., Disp: 14 tablet, Rfl: 0    Tirzepatide (Mounjaro) 7.5 MG/0.5ML solution pen-injector, Inject 0.5 mL under the skin into the appropriate area as directed 1 (One) Time Per Week., Disp: 2 mL, Rfl: 0    tiZANidine (ZANAFLEX) 4 MG tablet, Take 1 tablet by mouth Every Night., Disp: 90 tablet, Rfl: 1    venlafaxine XR (EFFEXOR-XR) 75 MG 24 hr capsule, TAKE ONE CAPSULE BY MOUTH DAILY, Disp: 30 capsule, Rfl: 5   Past Medical History:   Diagnosis Date    Achilles tendon rupture 08/2021    left    Allergic     Allergy     Ankle sprain     Anxiety     Arthritis     Asthma Allergies induced    Coronary artery disease     CTS (carpal tunnel syndrome)     Depression     Fibromyalgia, primary     Fracture, finger     GERD (gastroesophageal reflux disease)     Headache     History of blood clots     History of UTI     Hyperlipidemia     Hypertension     IBS (irritable bowel syndrome)     Internal hemorrhoid     Kidney stone     Knee sprain     Knee swelling     Low back pain     Low back strain     Muscular dystrophy     Muscular dystrophy     II    Myotonia     Obesity     Pulmonary embolism sub clavical artery clot stent put in Aug 2018     Rotator cuff syndrome     Stroke 2013    resdual- left sided weakness.     Tear of meniscus of knee     Type 2 diabetes mellitus without complication, without long-term current use of insulin       Past Surgical History:   Procedure Laterality Date    ACHILLES TENDON SURGERY Left 2021    Dr. Salinas achilles rupture repair    ANKLE OPEN REDUCTION INTERNAL FIXATION  Oct 2021    Achilles tendon ruptured had repair surgery    BARIATRIC SURGERY  2019    CHOLECYSTECTOMY  2004    COLONOSCOPY  2017    D & C WITH SUCTION      KNEE SURGERY Left     LYMPH NODE BIOPSY      neck    SUBCLAVIAN ARTERY STENT Left 08/2018    x2    WISDOM TOOTH EXTRACTION        Family History   Problem Relation Age of Onset    Allergies Other     ADD / ADHD Other     Heart block Other     Other Other         CEREBROVASCULAR ACCIDENT     Hypertension Other     Cancer Other         LUNG CANCER    Hyperlipidemia Other     Alcohol abuse Mother     Depression Mother     Early death Mother          age 59 massive Heart Attack    Heart disease Mother         Mother  of Massive Heart attack    Hyperlipidemia Mother     Hypertension Mother     Miscarriages / Stillbirths Mother         1 I know of    Heart attack Mother          of  massive heart attack    Alcohol abuse Father     Cancer Father         Had Lung Cancer - had 1 lung till death    Diabetes Father         Lived on insuline shots    Early death Father          at age 53 due to Lung Cancer    Breast cancer Maternal Aunt     Learning disabilities Son         ADHD    Breast cancer Cousin     Ovarian cancer Neg Hx     Endometrial cancer Neg Hx     Uterine cancer Neg Hx     Colon cancer Neg Hx         Review of Systems  Objective:    LMP  (LMP Unknown)     Neurology Exam:  General apperance: NAD.     Mental status: Alert, awake and oriented to time place and person.    Language and Speech: No aphasia or dysarthria.    CN II to XII: Intact.    Opthalmoscopic  Exam: No papilledema.    Motor:  Right UE muscle strength 5/5. Normal tone.     Left UE muscle strength 5/5. Normal tone.      Right LE muscle strength 5/5. Normal tone.     Left LE muscle strength 5/5. Normal tone.      Sensory: Normal light touch, vibration and pinprick sensation bilaterally.    DTRs: 2+ bilaterally.    Babinski: Negative bilaterally.    Co-ordination: Normal finger-to-nose, heel to shin B/L.    Rhomberg: Negative.    Gait: Normal.    Cardiovascular: Regular rate and rhythm without murmur, gallop or rub.    Assessment and Plan:  1. Daytime hypersomnolence  2. Myotonic dystrophy, type 2  Overall stable.  She still has good days and bad days.  She is also reporting some low back pain shooting down to the right posterior thigh suggestive of sciatica on the right.  I have advised her to call office in case if it gets worse and she may need MRI of lumbar spine for further evaluation.  Otherwise, continue with regular exercises, continue with Ritalin to help with daytime hypersomnolence and I will see her back in clinic in 6 months for follow-up.    - methylphenidate (Ritalin) 20 MG tablet; Take 1 tablet by mouth Daily for 90 days.  Dispense: 90 tablet; Refill: 0    I spent 30 minutes in patient care: Reviewing records prior to the visit, entering orders and documentation and spent more than prince 50% of this time in management, instructions and education regarding above mentioned diagnosis and also on counseling and discussing about taking medication regularly, possible side effects with medication use, importance of good sleep hygiene, good hydration and regular exercise.    No follow-ups on file.       Note to patient: The 21st Century Cures Act makes medical notes like these available to patients in the interest of transparency. However, be advised this is a medical document. It is intended as peer to peer communication. It is written in medical language and may contain abbreviations or verbiage that  are unfamiliar. It may appear blunt or direct. Medical documents are intended to carry relevant information, facts as evident, and the clinical opinion of the physician.

## 2023-10-25 NOTE — TELEPHONE ENCOUNTER
----- Message from Fausto Edwards MD sent at 10/25/2023 12:07 PM EDT -----  Please schedule a follow-up visit in 6 months.

## 2023-11-21 ENCOUNTER — OFFICE VISIT (OUTPATIENT)
Dept: ORTHOPEDIC SURGERY | Facility: CLINIC | Age: 53
End: 2023-11-21
Payer: MEDICARE

## 2023-11-21 VITALS
HEIGHT: 66 IN | SYSTOLIC BLOOD PRESSURE: 136 MMHG | DIASTOLIC BLOOD PRESSURE: 78 MMHG | WEIGHT: 186 LBS | BODY MASS INDEX: 29.89 KG/M2

## 2023-11-21 DIAGNOSIS — E66.09 CLASS 1 OBESITY DUE TO EXCESS CALORIES WITHOUT SERIOUS COMORBIDITY WITH BODY MASS INDEX (BMI) OF 30.0 TO 30.9 IN ADULT: ICD-10-CM

## 2023-11-21 DIAGNOSIS — M17.12 PRIMARY OSTEOARTHRITIS OF LEFT KNEE: Primary | ICD-10-CM

## 2023-11-21 RX ORDER — TRIAMCINOLONE ACETONIDE 40 MG/ML
80 INJECTION, SUSPENSION INTRA-ARTICULAR; INTRAMUSCULAR
Status: COMPLETED | OUTPATIENT
Start: 2023-11-21 | End: 2023-11-21

## 2023-11-21 RX ORDER — LIDOCAINE HYDROCHLORIDE 10 MG/ML
3 INJECTION, SOLUTION EPIDURAL; INFILTRATION; INTRACAUDAL; PERINEURAL
Status: COMPLETED | OUTPATIENT
Start: 2023-11-21 | End: 2023-11-21

## 2023-11-21 RX ORDER — BUPIVACAINE HYDROCHLORIDE 5 MG/ML
1 INJECTION, SOLUTION EPIDURAL; INTRACAUDAL
Status: COMPLETED | OUTPATIENT
Start: 2023-11-21 | End: 2023-11-21

## 2023-11-21 RX ADMIN — BUPIVACAINE HYDROCHLORIDE 1 ML: 5 INJECTION, SOLUTION EPIDURAL; INTRACAUDAL at 10:18

## 2023-11-21 RX ADMIN — TRIAMCINOLONE ACETONIDE 80 MG: 40 INJECTION, SUSPENSION INTRA-ARTICULAR; INTRAMUSCULAR at 10:18

## 2023-11-21 RX ADMIN — LIDOCAINE HYDROCHLORIDE 3 ML: 10 INJECTION, SOLUTION EPIDURAL; INFILTRATION; INTRACAUDAL; PERINEURAL at 10:18

## 2023-11-21 NOTE — PROGRESS NOTES
Orthopaedic Clinic Note: Knee Established Patient    Chief Complaint   Patient presents with    Follow-up     7 month follow up --Primary osteoarthritis of left knee        HPI    It has been 7  month(s) since Ms. Barron's last visit. She returns to clinic today for follow-up left knee osteoarthritis.  Patient was last seen in April and underwent intra-articular injection in the left knee.  The injection provided approximately 6 months of relief.  Over the course of the last month, her pain has returned.  Over the last 2 weeks it is gotten progressively more severe.  It is now a 10/10 on the pain scale.  She is having difficulty with any walking and weightbearing activities.  She is complaining of increased swelling and pain with any movement of the knee.  She denies fevers chills or constitutional symptoms.  Overall she is doing worse.  She is able leg with assistance of a cane.    Past Medical History:   Diagnosis Date    Achilles tendon rupture 08/2021    left    Allergic     Allergy     Ankle sprain     Anxiety     Arthritis     Asthma Allergies induced    Coronary artery disease     CTS (carpal tunnel syndrome)     Depression     Fibromyalgia, primary     Fracture, finger     GERD (gastroesophageal reflux disease)     Headache     High risk medication use 08/12/2016    History of blood clots     History of UTI     Hyperlipidemia     Hypertension     IBS (irritable bowel syndrome)     Internal hemorrhoid     Kidney stone     Knee sprain     Knee swelling     Low back pain     Low back strain     Muscular dystrophy     Muscular dystrophy     II    Myotonia     Obesity     Pulmonary embolism sub clavical artery clot stent put in Aug 2018    Rotator cuff syndrome     Stroke 08/31/2013    resdual- left sided weakness.     Tear of meniscus of knee     Type 2 diabetes mellitus without complication, without long-term current use of insulin       Past Surgical History:   Procedure Laterality Date    ACHILLES TENDON  SURGERY Left 2021    Dr. Salinas achilles rupture repair    ANKLE OPEN REDUCTION INTERNAL FIXATION  Oct 2021    Achilles tendon ruptured had repair surgery    BARIATRIC SURGERY  2019    CHOLECYSTECTOMY  2004    COLONOSCOPY  2017    D & C WITH SUCTION      KNEE SURGERY Left     LYMPH NODE BIOPSY      neck    SUBCLAVIAN ARTERY STENT Left 08/2018    x2    WISDOM TOOTH EXTRACTION        Family History   Problem Relation Age of Onset    Allergies Other     ADD / ADHD Other     Heart block Other     Other Other         CEREBROVASCULAR ACCIDENT     Hypertension Other     Cancer Other         LUNG CANCER    Hyperlipidemia Other     Alcohol abuse Mother     Depression Mother     Early death Mother          age 59 massive Heart Attack    Heart disease Mother         Mother  of Massive Heart attack    Hyperlipidemia Mother     Hypertension Mother     Miscarriages / Stillbirths Mother         1 I know of    Heart attack Mother          of  massive heart attack    Alcohol abuse Father     Cancer Father         Had Lung Cancer - had 1 lung till death    Diabetes Father         Lived on insuline shots    Early death Father          at age 53 due to Lung Cancer    Breast cancer Maternal Aunt     Learning disabilities Son         ADHD    Breast cancer Cousin     Ovarian cancer Neg Hx     Endometrial cancer Neg Hx     Uterine cancer Neg Hx     Colon cancer Neg Hx      Social History     Socioeconomic History    Marital status:     Number of children: 2   Tobacco Use    Smoking status: Former     Packs/day: 2.00     Years: 27.00     Additional pack years: 0.00     Total pack years: 54.00     Types: Electronic Cigarette, Cigarettes     Quit date:      Years since quitting: 10.8     Passive exposure: Past    Smokeless tobacco: Never    Tobacco comments:     use Ecigg now NO NICOTENE   Vaping Use    Vaping Use: Some days    Substances: Flavoring    Devices: Pre-filled pod   Substance and  Sexual Activity    Alcohol use: Yes    Drug use: No    Sexual activity: Yes     Partners: Male     Birth control/protection: Post-menopausal, None     Comment: Menapause no cycle since sept 2013      Current Outpatient Medications on File Prior to Visit   Medication Sig Dispense Refill    albuterol (PROVENTIL) (2.5 MG/3ML) 0.083% nebulizer solution Take 2.5 mg by nebulization Every 4 (Four) Hours As Needed for Wheezing. 90 vial 1    amLODIPine (NORVASC) 2.5 MG tablet TAKE 1 TABLET BY MOUTH DAILY 90 tablet 1    aspirin 81 MG EC tablet Take 1 tablet by mouth Daily.      atorvastatin (LIPITOR) 80 MG tablet TAKE ONE TABLET BY MOUTH ONCE NIGHTLY 90 tablet 1    azithromycin (Zithromax Z-Adilson) 250 MG tablet Take 2 tablets by mouth on day 1, then 1 tablet daily on days 2-5 6 tablet 0    baclofen (LIORESAL) 10 MG tablet Take 1 tablet by mouth 3 (Three) Times a Day. (Patient taking differently: Take 1 tablet by mouth every night at bedtime.) 270 tablet 1    Blood Glucose Monitoring Suppl (ONE TOUCH ULTRA 2) w/Device kit       busPIRone (BUSPAR) 15 MG tablet TAKE ONE TABLET BY MOUTH THREE TIMES A  tablet 1    calcium carbonate-vitamin d (CALCIUM 600+D HIGH POTENCY) 600-400 MG-UNIT per tablet Take 1 tablet by mouth Daily. 90 tablet 2    clopidogrel (PLAVIX) 75 MG tablet TAKE ONE TABLET BY MOUTH DAILY 90 tablet 3    conjugated estrogens (Premarin) 0.625 MG/GM vaginal cream Insert  into the vagina Daily. Apply one finger tip strip of cream into vagina, three times weekly at night (M/W/F) 30 g 5    cyclobenzaprine (FLEXERIL) 10 MG tablet Take 1 tablet by mouth 3 (Three) Times a Day As Needed for Muscle Spasms. (Patient taking differently: Take 1 tablet by mouth every night at bedtime.) 270 tablet 2    diclofenac (VOLTAREN) 75 MG EC tablet Take 1 tablet by mouth 2 (Two) Times a Day. 180 tablet 3    eszopiclone (Lunesta) 2 MG tablet Take 1 tablet by mouth Every Night. Take immediately before bedtime 30 tablet 2    famotidine  (PEPCID) 20 MG tablet       fenofibrate (TRICOR) 145 MG tablet TAKE ONE TABLET BY MOUTH DAILY 90 tablet 1    fluticasone (FLONASE) 50 MCG/ACT nasal spray into each nostril.      gabapentin (NEURONTIN) 600 MG tablet Take 1 tablet by mouth 3 (Three) Times a Day. 270 tablet 1    HYDROcodone-acetaminophen (NORCO)  MG per tablet Take 1 tablet by mouth Every 6 (Six) Hours As Needed for Moderate Pain. 120 tablet 0    hydrOXYzine (ATARAX) 25 MG tablet TAKE ONE TABLET BY MOUTH THREE TIMES A DAY AS NEEDED FOR ANXIETY 10 tablet 3    loratadine (CLARITIN) 10 MG tablet Take 1 tablet by mouth Daily. 30 tablet 11    metFORMIN (GLUCOPHAGE) 1000 MG tablet TAKE ONE TABLET BY MOUTH TWICE A DAY WITH MEALS 180 tablet 2    methylphenidate (Ritalin) 20 MG tablet Take 1 tablet by mouth Daily for 90 days. 90 tablet 0    montelukast (SINGULAIR) 10 MG tablet TAKE ONE TABLET BY MOUTH EVERY EVENING 90 tablet 3    nystatin (MYCOSTATIN) 678798 UNIT/GM ointment APPLY TO AFFECTED AREA(S) TWO TIMES A DAY 30 g 1    nystatin (MYCOSTATIN) 385118 UNIT/GM powder Apply  topically to the appropriate area as directed 3 (Three) Times a Day. 30 g 2    ONE TOUCH ULTRA TEST test strip       oxybutynin XL (DITROPAN-XL) 5 MG 24 hr tablet Take 1 tablet by mouth Daily. 90 tablet 3    pantoprazole (PROTONIX) 40 MG EC tablet Take 1 tablet by mouth Daily. 90 tablet 1    potassium chloride ER (K-TAB) 20 MEQ tablet controlled-release ER tablet Take 1 tablet by mouth Daily. 90 tablet 2    promethazine (PHENERGAN) 12.5 MG tablet TAKE ONE TABLET BY MOUTH EVERY 8 HOURS AS NEEDED FOR NAUSEA OR VOMITING 30 tablet 1    promethazine-dextromethorphan (PROMETHAZINE-DM) 6.25-15 MG/5ML syrup Take 5 mL by mouth 4 (Four) Times a Day As Needed for Cough. 118 mL 0    spironolactone (ALDACTONE) 50 MG tablet TAKE ONE TABLET BY MOUTH DAILY 90 tablet 3    sulfamethoxazole-trimethoprim (Bactrim DS) 800-160 MG per tablet Take 1 tablet by mouth 2 (Two) Times a Day. 14 tablet 0     "Tirzepatide (Mounjaro) 7.5 MG/0.5ML solution pen-injector Inject 0.5 mL under the skin into the appropriate area as directed 1 (One) Time Per Week. 2 mL 0    tiZANidine (ZANAFLEX) 4 MG tablet Take 1 tablet by mouth Every Night. 90 tablet 1    venlafaxine XR (EFFEXOR-XR) 75 MG 24 hr capsule TAKE ONE CAPSULE BY MOUTH DAILY 30 capsule 5    [DISCONTINUED] modafinil (PROVIGIL) 200 MG tablet Take 1 tablet by mouth Daily. 30 tablet 3     No current facility-administered medications on file prior to visit.      Allergies   Allergen Reactions    Fish-Derived Products Anaphylaxis    Penicillins Anaphylaxis and Shortness Of Breath    Shellfish Allergy Anaphylaxis        Review of Systems   Constitutional: Negative.    HENT: Negative.     Eyes: Negative.    Respiratory: Negative.     Cardiovascular: Negative.    Gastrointestinal: Negative.    Endocrine: Negative.    Genitourinary: Negative.    Musculoskeletal:  Positive for arthralgias.   Skin: Negative.    Allergic/Immunologic: Negative.    Neurological: Negative.    Hematological: Negative.    Psychiatric/Behavioral: Negative.          The patient's Review of Systems was personally reviewed and confirmed as accurate.    Physical Exam  Blood pressure 136/78, height 167.6 cm (66\"), weight 84.4 kg (186 lb), not currently breastfeeding.    Body mass index is 30.02 kg/m².    GENERAL APPEARANCE: awake, alert, oriented, in no acute distress and well developed, well nourished  LUNGS:  breathing nonlabored  EXTREMITIES: no clubbing, cyanosis  PERIPHERAL PULSES: palpable dorsalis pedis and posterior tibial pulses bilaterally.    GAIT:  Antalgic        ----------  ----------  Left Knee Exam:  ----------  ALIGNMENT: severe varus, correctable to neutral  ----------  RANGE OF MOTION:  Decreased (5 - 115 degrees) with no extensor lag  LIGAMENTOUS STABILITY:   stable to varus and valgus stress at terminal extension and 30 degrees; retensioning of the MCL is appreciated with valgus stress at " 30 degrees consistent with medial compartment degeneration  ----------  STRENGTH:  KNEE FLEXION 4/5  KNEE EXTENSION  4/5  ANKLE DORSIFLEXION  5/5  ANKLE PLANTARFLEXION  5/5  ----------  PAIN WITH PALPATION:global  KNEE EFFUSION: yes, mild effusion  PAIN WITH KNEE ROM: yes  PATELLAR CREPITUS:  yes, painful and symptomatic  ----------  SENSATION TO LIGHT TOUCH:  DEEP PERONEAL/SUPERFICIAL PERONEAL/SURAL/SAPHENOUS/TIBIAL:    intact  ----------  EDEMA:  no  ERYTHEMA:    no  WOUNDS/INCISIONS:   no  _____________________________________________________________________  _____________________________________________________________________    RADIOGRAPHIC FINDINGS:   Indication: Left knee pain    Comparison: Todays xrays were compared to previous xrays from 12/13/2022    Knee films: moderate to severe tricompartmental arthritis with genu varum alignment, periarticular osteophytes visualized in all compartments and No significant changes compared to prior radiographs.    Assessment/Plan:   Diagnosis Plan   1. Primary osteoarthritis of left knee  XR Knee 4+ View Left      2. Class 1 obesity due to excess calories without serious comorbidity with body mass index (BMI) of 30.0 to 30.9 in adult          The patient has failed conservative treatment measures and is a candidate for joint arthroplasty.  I discussed the joint arthroplasty surgical process as well as the recovery and rehabilitation time frame.  The patient asked several questions regarding the joint arthroplasty surgery, which were answered accordingly.  Ultimately, the patient declines surgical intervention at this time and wishes to continue with conservative treatment measures.  Alternative conservative treatment measures were discussed including bracing, therapy, topical/oral anti-inflammatories, activity modification, and weight loss.  The patient considered these treatment options and wishes to proceed with corticosteroid injection(s) today.  Therefore we will  proceed with corticosteroid injection(s) today.  Follow-up 3 months for repeat evaluation.    Patient has an elevated BMI. The patient has been instructed on various weight loss avenues including diet, portion control, calorie restriction, low/no impact exercise, referral to weight loss management and/or bariatric surgery.  It was explained that weight loss can improve joint pain alone by decreasing the joint reaction forces.  For every pound of weight change, the knee and hip joints see a 4 to 5 fold change in pressure.  Given these options, the patient will proceed with low calorie diet and low impact exercise.    Procedure Note:  I discussed with the patient the potential benefits of performing a therapeutic injection of the left knee as well as potential risks including but not limited to infection, swelling, pain, bleeding, bruising, nerve/vessel damage, skin color changes, transient elevation in blood glucose levels, and fat atrophy. After informed consent and verifying correct patient, procedure site, and type of procedure, the area was prepped with alcohol, ethyl chloride was used to numb the skin. Via the superolateral approach, 3 cc of 1% lidocaine, 1 cc of 0.5% bupivicaine, and 2 cc of 40mg/ml of Kenalog were injected into the left knee. The patient tolerated the procedure well. There were no complications. A sterile dressing was placed over the injection site.      Mk Sim MD  11/21/23  10:17 EST

## 2023-11-21 NOTE — PROGRESS NOTES
Procedure   - Large Joint Arthrocentesis: L knee on 11/21/2023 10:18 AM  Indications: pain  Details: 21 G needle, anterolateral approach  Medications: 3 mL lidocaine PF 1% 1 %; 80 mg triamcinolone acetonide 40 MG/ML; 1 mL bupivacaine (PF) 0.5 %  Outcome: tolerated well, no immediate complications  Procedure, treatment alternatives, risks and benefits explained, specific risks discussed. Consent was given by the patient. Immediately prior to procedure a time out was called to verify the correct patient, procedure, equipment, support staff and site/side marked as required. Patient was prepped and draped in the usual sterile fashion.

## 2023-12-04 RX ORDER — FENOFIBRATE 145 MG/1
145 TABLET, COATED ORAL DAILY
Qty: 90 TABLET | Refills: 1 | Status: SHIPPED | OUTPATIENT
Start: 2023-12-04

## 2023-12-06 ENCOUNTER — TELEPHONE (OUTPATIENT)
Dept: INTERNAL MEDICINE | Facility: CLINIC | Age: 53
End: 2023-12-06
Payer: MEDICARE

## 2023-12-06 DIAGNOSIS — Z65.9 SOCIAL PROBLEM: Primary | ICD-10-CM

## 2023-12-06 NOTE — TELEPHONE ENCOUNTER
I spoke with patient and she stated that it was for her to be able to go and talk to the  due to her 3 grand children living with her now and the 3 children already see him. She said he is a . I looked him up and it says behavioral health at [facility].

## 2023-12-06 NOTE — TELEPHONE ENCOUNTER
Can I get a little more info? I have to type in what concerns I have/what the pt needs help with. Also where is that person [place] located?

## 2023-12-06 NOTE — TELEPHONE ENCOUNTER
Would you make a TCM call? Patient discharged 12/3 from [facility]  Put in with Dr Snow or Dr Salter

## 2023-12-07 DIAGNOSIS — Z65.9 SOCIAL PROBLEM: Primary | ICD-10-CM

## 2023-12-08 RX ORDER — FLUCONAZOLE 150 MG/1
150 TABLET ORAL DAILY
Qty: 2 TABLET | Refills: 1 | Status: SHIPPED | OUTPATIENT
Start: 2023-12-08

## 2023-12-08 NOTE — TELEPHONE ENCOUNTER
As discussed during your visit, please:    Increase Lasix to 40 mg once per day for the next 5 days and weigh yourself daily. Try to keep your salt intake low. Elevate your legs when sitting or sleeping at night. Wear compression stockings throughout the day. Please return in 5 weeks for follow-up on your blood pressure. Please call the clinic if you have any questions or concerns, [phone]. She had infusion today and oxygen stayed in 80-81..  They got her up to 90 and was able to do infusion.  She stopped and got pulse ox and its running 82-89%  She is not wanting to go to ER she is just wanting us to send in order for oxygen

## 2023-12-11 RX ORDER — ESZOPICLONE 2 MG/1
TABLET, FILM COATED ORAL
Qty: 30 TABLET | Refills: 2 | Status: SHIPPED | OUTPATIENT
Start: 2023-12-11

## 2023-12-21 RX ORDER — ATORVASTATIN CALCIUM 80 MG/1
TABLET, FILM COATED ORAL
Qty: 90 TABLET | Refills: 1 | Status: SHIPPED | OUTPATIENT
Start: 2023-12-21

## 2024-01-04 ENCOUNTER — OFFICE VISIT (OUTPATIENT)
Dept: INTERNAL MEDICINE | Facility: CLINIC | Age: 54
End: 2024-01-04
Payer: MEDICARE

## 2024-01-04 VITALS
OXYGEN SATURATION: 97 % | SYSTOLIC BLOOD PRESSURE: 138 MMHG | WEIGHT: 179 LBS | DIASTOLIC BLOOD PRESSURE: 84 MMHG | HEART RATE: 102 BPM | HEIGHT: 66 IN | BODY MASS INDEX: 28.77 KG/M2

## 2024-01-04 DIAGNOSIS — F51.04 CHRONIC INSOMNIA: ICD-10-CM

## 2024-01-04 DIAGNOSIS — E11.9 TYPE 2 DIABETES MELLITUS WITHOUT COMPLICATION, WITHOUT LONG-TERM CURRENT USE OF INSULIN: Primary | ICD-10-CM

## 2024-01-04 DIAGNOSIS — J06.9 ACUTE URI: ICD-10-CM

## 2024-01-04 DIAGNOSIS — R05.1 ACUTE COUGH: ICD-10-CM

## 2024-01-04 LAB
EXPIRATION DATE: NORMAL
HBA1C MFR BLD: 5 % (ref 4.5–5.7)
Lab: NORMAL

## 2024-01-04 RX ORDER — DOXYCYCLINE HYCLATE 100 MG/1
100 CAPSULE ORAL 2 TIMES DAILY
Qty: 14 CAPSULE | Refills: 0 | Status: SHIPPED | OUTPATIENT
Start: 2024-01-04 | End: 2024-01-11

## 2024-01-04 RX ORDER — ESZOPICLONE 3 MG/1
3 TABLET, FILM COATED ORAL NIGHTLY
Qty: 30 TABLET | Refills: 2 | Status: SHIPPED | OUTPATIENT
Start: 2024-01-04 | End: 2024-01-05 | Stop reason: SDUPTHER

## 2024-01-04 NOTE — TELEPHONE ENCOUNTER
Pt needs us to send her script to the [facility] on [address] on [place], b/c the pharmacy we sent her meds to [facility] b/c they were out of stock. Pt didn't want to call the pharmacy herself because she was driving to the [facility] pharmacy on [address], please advise

## 2024-01-04 NOTE — PROGRESS NOTES
"Subjective   Hafsa Barron is a 53 y.o. female here for cough for 1.5 months, insomnia, DMII. Cough has been wet sounding but overall unproductive.  No shortness of breath or wheezing.  Her  has a similar scenario.  She has history of pneumonia.  She also has been taking Lunesta 2 mg which had worked  well recently but now she think she needs a higher dose.  Lastly, has been doing remarkably well on the Mounjaro and has lost an additional 40 pounds since I have last seen her.  No hypoglycemia.    I have reviewed the patient's relevant medical history and confirmed it is accurate.    I have personally reviewed and performed the ROS. Jocy Snow MD     Objective   /84 (BP Location: Left arm)   Pulse 102   Ht 167.6 cm (66\")   Wt 81.2 kg (179 lb)   LMP  (LMP Unknown)   SpO2 97%   BMI 28.89 kg/m²     Physical Exam  Vitals reviewed.   Constitutional:       Appearance: She is well-developed.   Cardiovascular:      Rate and Rhythm: Normal rate and regular rhythm.      Heart sounds: Normal heart sounds.   Pulmonary:      Effort: Pulmonary effort is normal.      Breath sounds: Rhonchi present. No wheezing or rales.   Skin:     General: Skin is warm and dry.   Neurological:      Mental Status: She is alert and oriented to person, place, and time.   Psychiatric:         Behavior: Behavior normal.         Thought Content: Thought content normal.           Current Outpatient Medications:     albuterol (PROVENTIL) (2.5 MG/3ML) 0.083% nebulizer solution, Take 2.5 mg by nebulization Every 4 (Four) Hours As Needed for Wheezing., Disp: 90 vial, Rfl: 1    amLODIPine (NORVASC) 2.5 MG tablet, TAKE 1 TABLET BY MOUTH DAILY, Disp: 90 tablet, Rfl: 1    aspirin 81 MG EC tablet, Take 1 tablet by mouth Daily., Disp: , Rfl:     atorvastatin (LIPITOR) 80 MG tablet, TAKE ONE TABLET BY MOUTH ONCE NIGHTLY, Disp: 90 tablet, Rfl: 1    baclofen (LIORESAL) 10 MG tablet, Take 1 tablet by mouth 3 (Three) Times a Day. " (Patient taking differently: Take 1 tablet by mouth every night at bedtime.), Disp: 270 tablet, Rfl: 1    Blood Glucose Monitoring Suppl (ONE TOUCH ULTRA 2) w/Device kit, , Disp: , Rfl:     busPIRone (BUSPAR) 15 MG tablet, TAKE ONE TABLET BY MOUTH THREE TIMES A DAY, Disp: 270 tablet, Rfl: 1    calcium carbonate-vitamin d (CALCIUM 600+D HIGH POTENCY) 600-400 MG-UNIT per tablet, Take 1 tablet by mouth Daily., Disp: 90 tablet, Rfl: 2    clopidogrel (PLAVIX) 75 MG tablet, TAKE ONE TABLET BY MOUTH DAILY, Disp: 90 tablet, Rfl: 3    diclofenac (VOLTAREN) 75 MG EC tablet, Take 1 tablet by mouth 2 (Two) Times a Day., Disp: 180 tablet, Rfl: 3    eszopiclone (LUNESTA) 2 MG tablet, TAKE 1 TABLET BY MOUTH DAILY AT BEDTIME IMMEDIATELY BEFORE BEDTIME, Disp: 30 tablet, Rfl: 2    famotidine (PEPCID) 20 MG tablet, , Disp: , Rfl:     fenofibrate (TRICOR) 145 MG tablet, TAKE 1 TABLET BY MOUTH DAILY, Disp: 90 tablet, Rfl: 1    gabapentin (NEURONTIN) 600 MG tablet, Take 1 tablet by mouth 3 (Three) Times a Day., Disp: 270 tablet, Rfl: 1    HYDROcodone-acetaminophen (NORCO)  MG per tablet, Take 1 tablet by mouth Every 6 (Six) Hours As Needed for Moderate Pain., Disp: 120 tablet, Rfl: 0    hydrOXYzine (ATARAX) 25 MG tablet, TAKE ONE TABLET BY MOUTH THREE TIMES A DAY AS NEEDED FOR ANXIETY, Disp: 10 tablet, Rfl: 3    loratadine (CLARITIN) 10 MG tablet, Take 1 tablet by mouth Daily., Disp: 30 tablet, Rfl: 11    metFORMIN (GLUCOPHAGE) 1000 MG tablet, TAKE ONE TABLET BY MOUTH TWICE A DAY WITH MEALS, Disp: 180 tablet, Rfl: 2    methylphenidate (Ritalin) 20 MG tablet, Take 1 tablet by mouth Daily for 90 days., Disp: 90 tablet, Rfl: 0    montelukast (SINGULAIR) 10 MG tablet, TAKE ONE TABLET BY MOUTH EVERY EVENING, Disp: 90 tablet, Rfl: 3    nystatin (MYCOSTATIN) 847405 UNIT/GM ointment, APPLY TO AFFECTED AREA(S) TWO TIMES A DAY, Disp: 30 g, Rfl: 1    nystatin (MYCOSTATIN) 069023 UNIT/GM powder, Apply  topically to the appropriate area as  directed 3 (Three) Times a Day., Disp: 30 g, Rfl: 2    ONE TOUCH ULTRA TEST test strip, , Disp: , Rfl:     oxybutynin XL (DITROPAN-XL) 5 MG 24 hr tablet, Take 1 tablet by mouth Daily., Disp: 90 tablet, Rfl: 3    pantoprazole (PROTONIX) 40 MG EC tablet, Take 1 tablet by mouth Daily., Disp: 90 tablet, Rfl: 1    potassium chloride ER (K-TAB) 20 MEQ tablet controlled-release ER tablet, Take 1 tablet by mouth Daily., Disp: 90 tablet, Rfl: 2    promethazine (PHENERGAN) 12.5 MG tablet, TAKE ONE TABLET BY MOUTH EVERY 8 HOURS AS NEEDED FOR NAUSEA OR VOMITING, Disp: 30 tablet, Rfl: 1    spironolactone (ALDACTONE) 50 MG tablet, TAKE ONE TABLET BY MOUTH DAILY, Disp: 90 tablet, Rfl: 3    Tirzepatide (Mounjaro) 7.5 MG/0.5ML solution pen-injector, Inject 0.5 mL under the skin into the appropriate area as directed 1 (One) Time Per Week., Disp: 2 mL, Rfl: 0    venlafaxine XR (EFFEXOR-XR) 75 MG 24 hr capsule, TAKE ONE CAPSULE BY MOUTH DAILY, Disp: 30 capsule, Rfl: 5    Assessment & Plan   Diagnoses and all orders for this visit:    1. Type 2 diabetes mellitus without complication, without long-term current use of insulin (Primary)  -     POC Glycosylated Hemoglobin (Hb A1C): 5%, continue Mounjaro    2. Acute cough  -see #3    3. Acute URI  -     doxycycline (VIBRAMYCIN) 100 MG capsule; Take 1 capsule by mouth 2 (Two) Times a Day for 7 days.  Dispense: 14 capsule; Refill: 0  -if no better after doxy, she is to call and I will get CXR    4. Chronic insomnia  -     eszopiclone (Lunesta) 3 MG tablet; Take 1 tablet by mouth Every Night. Take immediately before bedtime  Dispense: 30 tablet; Refill: 2  -worsened, increase lunesta to 3mg             Jocy Snow MD

## 2024-01-05 ENCOUNTER — TELEPHONE (OUTPATIENT)
Dept: INTERNAL MEDICINE | Facility: CLINIC | Age: 54
End: 2024-01-05

## 2024-01-05 NOTE — TELEPHONE ENCOUNTER
PATIENT STATES THAT SINCE SHE SWITCHED INSURANCE SHE NEEDS A PRIOR AUTHORIZATION FOR ALBARO.    PHONE: [phone]

## 2024-01-05 NOTE — TELEPHONE ENCOUNTER
PATIENT IS CALLING TO FOLLOW UP ON THIS REQUEST. SHE NEEDS THE eszopiclone (Lunesta) 3 MG tablet [[id number]] (Order [id number])  SENT TO THE [facility] IN [place] ON [address].

## 2024-01-08 DIAGNOSIS — G71.11 MYOTONIC DYSTROPHY, TYPE 2: ICD-10-CM

## 2024-01-08 RX ORDER — ESZOPICLONE 3 MG/1
3 TABLET, FILM COATED ORAL NIGHTLY
Qty: 30 TABLET | Refills: 2 | Status: SHIPPED | OUTPATIENT
Start: 2024-01-08

## 2024-01-08 RX ORDER — DICLOFENAC SODIUM 75 MG/1
75 TABLET, DELAYED RELEASE ORAL 2 TIMES DAILY
Qty: 180 TABLET | Refills: 3 | Status: SHIPPED | OUTPATIENT
Start: 2024-01-08

## 2024-01-11 RX ORDER — FLUCONAZOLE 150 MG/1
150 TABLET ORAL ONCE
Qty: 1 TABLET | Refills: 0 | Status: SHIPPED | OUTPATIENT
Start: 2024-01-11 | End: 2024-01-11

## 2024-01-12 ENCOUNTER — HOSPITAL ENCOUNTER (OUTPATIENT)
Dept: MAMMOGRAPHY | Facility: HOSPITAL | Age: 54
Discharge: HOME | End: 2024-01-12
Admitting: INTERNAL MEDICINE
Payer: MEDICARE

## 2024-01-12 DIAGNOSIS — Z12.31 ENCOUNTER FOR SCREENING MAMMOGRAM FOR BREAST CANCER: ICD-10-CM

## 2024-01-12 PROCEDURE — 77063 BREAST TOMOSYNTHESIS BI: CPT

## 2024-01-12 PROCEDURE — 77067 SCR MAMMO BI INCL CAD: CPT

## 2024-01-15 NOTE — TELEPHONE ENCOUNTER
She has been on the mounjaro, what a shame. To me it would mean the mounjaro is working as she was on it previously not that we need to stop it. Is there a way to show that on a PA?

## 2024-01-17 ENCOUNTER — TELEPHONE (OUTPATIENT)
Dept: INTERNAL MEDICINE | Facility: CLINIC | Age: 54
End: 2024-01-17
Payer: MEDICARE

## 2024-01-17 NOTE — TELEPHONE ENCOUNTER
[facility] called and asked when the pt fist started taking mounjaro and I informed them it was 9/1/23 and they asked what her hemoglobin was before that and I checked her chart and her A1c was 6.7 4/26/23, just a fyi in case I was wrong on anything  [facility] rep's phone [phone]

## 2024-01-25 ENCOUNTER — OFFICE VISIT (OUTPATIENT)
Dept: BEHAVIORAL HEALTH | Facility: CLINIC | Age: 54
End: 2024-01-25
Payer: MEDICARE

## 2024-01-25 DIAGNOSIS — F32.1 CURRENT MODERATE EPISODE OF MAJOR DEPRESSIVE DISORDER, UNSPECIFIED WHETHER RECURRENT: ICD-10-CM

## 2024-01-25 DIAGNOSIS — F41.1 GENERALIZED ANXIETY DISORDER: Primary | ICD-10-CM
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
## 2024-01-29 DIAGNOSIS — G71.11 MYOTONIC DYSTROPHY, TYPE 2: ICD-10-CM

## 2024-01-29 RX ORDER — GABAPENTIN 600 MG/1
600 TABLET ORAL 3 TIMES DAILY
Qty: 270 TABLET | Refills: 2 | Status: SHIPPED | OUTPATIENT
Start: 2024-01-29

## 2024-01-31 ENCOUNTER — OFFICE VISIT (OUTPATIENT)
Dept: INTERNAL MEDICINE | Facility: CLINIC | Age: 54
End: 2024-01-31
Payer: MEDICARE

## 2024-01-31 VITALS
OXYGEN SATURATION: 99 % | HEIGHT: 66 IN | BODY MASS INDEX: 28.28 KG/M2 | TEMPERATURE: 98 F | DIASTOLIC BLOOD PRESSURE: 78 MMHG | HEART RATE: 78 BPM | SYSTOLIC BLOOD PRESSURE: 132 MMHG | WEIGHT: 176 LBS

## 2024-01-31 DIAGNOSIS — R51.9 FREQUENT HEADACHES: ICD-10-CM

## 2024-01-31 DIAGNOSIS — R05.1 ACUTE COUGH: Primary | ICD-10-CM

## 2024-01-31 PROCEDURE — 99213 OFFICE O/P EST LOW 20 MIN: CPT | Performed by: NURSE PRACTITIONER

## 2024-01-31 PROCEDURE — 3075F SYST BP GE 130 - 139MM HG: CPT | Performed by: NURSE PRACTITIONER

## 2024-01-31 PROCEDURE — 1159F MED LIST DOCD IN RCRD: CPT | Performed by: NURSE PRACTITIONER

## 2024-01-31 PROCEDURE — 3044F HG A1C LEVEL LT 7.0%: CPT | Performed by: NURSE PRACTITIONER

## 2024-01-31 PROCEDURE — 3078F DIAST BP <80 MM HG: CPT | Performed by: NURSE PRACTITIONER

## 2024-01-31 PROCEDURE — 1160F RVW MEDS BY RX/DR IN RCRD: CPT | Performed by: NURSE PRACTITIONER

## 2024-01-31 NOTE — PROGRESS NOTES
"Chief Complaint   Patient presents with    Cough    Headache       HPI  Hafsa Barron is a 53 y.o. female presents for follow-up on persistent cough. This has been a problem for about 2 months.  She was seen January 4th for cough and URI symptoms, she was given doxycycline and did not see any improvement. She coughs up clear phlegm. Denies chest congestion and SOA. Dr. Snow said she would send her for a chest xray if she did not improve. She also complains of frontal headaches daily that is there when she goes to sleep and when she wakes up. Denies congestion. She is concerned it is from her increase in Lunesta at her last visit. She has tried tylenol and ibuprofen with no relief.     The following portions of the patient's history were reviewed and updated as appropriate: allergies, current medications, past family history, past medical history, past social history, past surgical history, and problem list.    Subjective  Review of Systems   Constitutional:  Negative for activity change, appetite change and fatigue.   HENT:  Positive for postnasal drip. Negative for congestion, rhinorrhea, sinus pressure, sneezing, sore throat and trouble swallowing.    Respiratory:  Positive for cough. Negative for shortness of breath and wheezing.    Cardiovascular:  Negative for chest pain and leg swelling.   Gastrointestinal:  Negative for abdominal pain.   Neurological:  Positive for headache. Negative for dizziness, weakness and confusion.   Psychiatric/Behavioral:  Negative for behavioral problems and decreased concentration.        Objective  Visit Vitals  /78 (BP Location: Left arm, Patient Position: Sitting)   Pulse 78   Temp 98 °F (36.7 °C)   Ht 167.6 cm (66\")   Wt 79.8 kg (176 lb)   LMP  (LMP Unknown)   SpO2 99%   BMI 28.41 kg/m²        Physical Exam  Vitals and nursing note reviewed.   Constitutional:       General: She is not in acute distress.     Appearance: She is not ill-appearing or toxic-appearing. "   HENT:      Head: Normocephalic.      Right Ear: Tympanic membrane and ear canal normal. There is no impacted cerumen.      Left Ear: Tympanic membrane and ear canal normal. There is no impacted cerumen.      Nose: Nose normal.      Mouth/Throat:      Pharynx: No oropharyngeal exudate or posterior oropharyngeal erythema.   Eyes:      Pupils: Pupils are equal, round, and reactive to light.   Cardiovascular:      Rate and Rhythm: Normal rate and regular rhythm.      Pulses: Normal pulses.      Heart sounds: Normal heart sounds.   Pulmonary:      Effort: Pulmonary effort is normal.      Breath sounds: Normal breath sounds.   Skin:     General: Skin is warm and dry.      Capillary Refill: Capillary refill takes less than 2 seconds.   Neurological:      General: No focal deficit present.      Mental Status: She is alert and oriented to person, place, and time.      Gait: Gait is intact.   Psychiatric:         Attention and Perception: Attention normal.         Mood and Affect: Mood normal.         Behavior: Behavior normal.          Procedures     Assessment and Plan  Diagnoses and all orders for this visit:    1. Acute cough (Primary)  -     XR Chest PA & Lateral; Future    2. Frequent headaches    No relief with Doxy  Likely viral but will get XR to be safe - will notify pt of results  Recommend Mucinex to help break up phlegm  HA seems like due to increase in Lunesta - she will discuss with Dr Snow    Return if symptoms worsen or fail to improve.      TAWANNA Bravo

## 2024-02-02 ENCOUNTER — HOSPITAL ENCOUNTER (OUTPATIENT)
Dept: GENERAL RADIOLOGY | Facility: HOSPITAL | Age: 54
Discharge: HOME | End: 2024-02-02
Payer: MEDICARE

## 2024-02-02 DIAGNOSIS — R05.1 ACUTE COUGH: ICD-10-CM

## 2024-02-02 PROCEDURE — 71046 X-RAY EXAM CHEST 2 VIEWS: CPT

## 2024-02-14 ENCOUNTER — OFFICE VISIT (OUTPATIENT)
Dept: INTERNAL MEDICINE | Facility: CLINIC | Age: 54
End: 2024-02-14
Payer: MEDICARE

## 2024-02-14 VITALS
TEMPERATURE: 97.5 F | HEIGHT: 66 IN | DIASTOLIC BLOOD PRESSURE: 78 MMHG | SYSTOLIC BLOOD PRESSURE: 134 MMHG | WEIGHT: 175 LBS | RESPIRATION RATE: 18 BRPM | HEART RATE: 77 BPM | BODY MASS INDEX: 28.12 KG/M2 | OXYGEN SATURATION: 98 %

## 2024-02-14 DIAGNOSIS — Z87.891 HISTORY OF SMOKING: ICD-10-CM

## 2024-02-14 DIAGNOSIS — Z00.00 MEDICARE ANNUAL WELLNESS VISIT, SUBSEQUENT: Primary | ICD-10-CM

## 2024-02-14 DIAGNOSIS — E11.65 TYPE 2 DIABETES MELLITUS WITH HYPERGLYCEMIA, WITHOUT LONG-TERM CURRENT USE OF INSULIN: ICD-10-CM

## 2024-02-14 LAB
EXPIRATION DATE: ABNORMAL
HBA1C MFR BLD: 7.7 % (ref 4.5–5.7)
Lab: ABNORMAL

## 2024-02-14 RX ORDER — FLUCONAZOLE 150 MG/1
TABLET ORAL
COMMUNITY
Start: 2024-01-11

## 2024-02-20 ENCOUNTER — LAB (OUTPATIENT)
Dept: LAB | Facility: HOSPITAL | Age: 54
End: 2024-02-20
Payer: MEDICARE

## 2024-02-20 LAB
ALBUMIN UR-MCNC: <1.2 MG/DL
CREAT UR-MCNC: 104.8 MG/DL
MICROALBUMIN/CREAT UR: NORMAL MG/G{CREAT}

## 2024-02-20 PROCEDURE — 82043 UR ALBUMIN QUANTITATIVE: CPT | Performed by: INTERNAL MEDICINE

## 2024-02-20 PROCEDURE — 82570 ASSAY OF URINE CREATININE: CPT | Performed by: INTERNAL MEDICINE

## 2024-03-01 ENCOUNTER — HOSPITAL ENCOUNTER (OUTPATIENT)
Dept: CT IMAGING | Facility: HOSPITAL | Age: 54
Discharge: HOME | End: 2024-03-01
Payer: MEDICARE

## 2024-03-01 DIAGNOSIS — Z87.891 HISTORY OF SMOKING: ICD-10-CM

## 2024-03-01 PROCEDURE — 71271 CT THORAX LUNG CANCER SCR C-: CPT

## 2024-03-01 RX ORDER — FAMOTIDINE 20 MG/1
20 TABLET, FILM COATED ORAL DAILY
Qty: 90 TABLET | Refills: 1 | Status: SHIPPED | OUTPATIENT
Start: 2024-03-01

## 2024-03-01 RX ORDER — CLOPIDOGREL BISULFATE 75 MG/1
TABLET ORAL
Qty: 90 TABLET | Refills: 3 | Status: SHIPPED | OUTPATIENT
Start: 2024-03-01

## 2024-03-06 DIAGNOSIS — F41.9 ANXIETY AND DEPRESSION: ICD-10-CM

## 2024-03-06 DIAGNOSIS — F32.A ANXIETY AND DEPRESSION: ICD-10-CM

## 2024-03-07 RX ORDER — VENLAFAXINE HYDROCHLORIDE 75 MG/1
CAPSULE, EXTENDED RELEASE ORAL
Qty: 90 CAPSULE | Refills: 1 | Status: SHIPPED | OUTPATIENT
Start: 2024-03-07

## 2024-03-12 ENCOUNTER — OFFICE VISIT (OUTPATIENT)
Dept: BEHAVIORAL HEALTH | Facility: CLINIC | Age: 54
End: 2024-03-12
Payer: MEDICARE

## 2024-03-12 DIAGNOSIS — F32.1 CURRENT MODERATE EPISODE OF MAJOR DEPRESSIVE DISORDER, UNSPECIFIED WHETHER RECURRENT: ICD-10-CM

## 2024-03-12 DIAGNOSIS — F41.1 GENERALIZED ANXIETY DISORDER: Primary | ICD-10-CM
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
## 2024-03-25 RX ORDER — AMLODIPINE BESYLATE 2.5 MG/1
TABLET ORAL
Qty: 90 TABLET | Refills: 1 | Status: SHIPPED | OUTPATIENT
Start: 2024-03-25

## 2024-03-25 RX ORDER — POTASSIUM CHLORIDE 1500 MG/1
20 TABLET, EXTENDED RELEASE ORAL DAILY
Qty: 90 TABLET | Refills: 2 | Status: SHIPPED | OUTPATIENT
Start: 2024-03-25

## 2024-03-29 RX ORDER — BUSPIRONE HYDROCHLORIDE 15 MG/1
TABLET ORAL
Qty: 270 TABLET | Refills: 1 | Status: SHIPPED | OUTPATIENT
Start: 2024-03-29

## 2024-04-09 ENCOUNTER — OFFICE VISIT (OUTPATIENT)
Dept: BEHAVIORAL HEALTH | Facility: CLINIC | Age: 54
End: 2024-04-09
Payer: MEDICARE

## 2024-04-09 DIAGNOSIS — F32.1 CURRENT MODERATE EPISODE OF MAJOR DEPRESSIVE DISORDER, UNSPECIFIED WHETHER RECURRENT: ICD-10-CM

## 2024-04-09 DIAGNOSIS — F41.1 GENERALIZED ANXIETY DISORDER: Primary | ICD-10-CM

## 2024-04-09 NOTE — PROGRESS NOTES
[facility] [place]                 Follow Up Adult      Follow Up Adult Note     Date:2024   Patient Name: Hafsa Barron  : 1970   MRN: [id number]   Time IN: 12:10 pm    Time OUT: 1:09 pm     Referring Provider: Jocy Snow MD    Chief Complaint:      ICD-10-CM ICD-9-CM   1. Generalized anxiety disorder  F41.1 300.02   2. Current moderate episode of major depressive disorder, unspecified whether recurrent  F32.1 296.22        History of Present Illness:   Hafsa Barron is a 53 y.o. female who is being seen today for follow up counseling for anxiety and depression.     Subjective               Patient's Support Network Includes: extended family    Functional Status: Mild impairment       Current Outpatient Medications:     albuterol (PROVENTIL) (2.5 MG/3ML) 0.083% nebulizer solution, Take 2.5 mg by nebulization Every 4 (Four) Hours As Needed for Wheezing., Disp: 90 vial, Rfl: 1    amLODIPine (NORVASC) 2.5 MG tablet, TAKE 1 TABLET BY MOUTH DAILY, Disp: 90 tablet, Rfl: 1    aspirin 81 MG EC tablet, Take 1 tablet by mouth Daily., Disp: , Rfl:     atorvastatin (LIPITOR) 80 MG tablet, TAKE ONE TABLET BY MOUTH ONCE NIGHTLY, Disp: 90 tablet, Rfl: 1    baclofen (LIORESAL) 10 MG tablet, Take 1 tablet by mouth 3 (Three) Times a Day. (Patient taking differently: Take 1 tablet by mouth every night at bedtime.), Disp: 270 tablet, Rfl: 1    Blood Glucose Monitoring Suppl (ONE TOUCH ULTRA 2) w/Device kit, , Disp: , Rfl:     busPIRone (BUSPAR) 15 MG tablet, TAKE ONE TABLET BY MOUTH THREE TIMES A DAY, Disp: 270 tablet, Rfl: 1    calcium carbonate-vitamin d (CALCIUM 600+D HIGH POTENCY) 600-400 MG-UNIT per tablet, Take 1 tablet by mouth Daily., Disp: 90 tablet, Rfl: 2    clopidogrel (PLAVIX) 75 MG tablet, TAKE ONE TABLET BY MOUTH DAILY, Disp: 90 tablet, Rfl: 3    diclofenac (VOLTAREN) 75 MG EC tablet, TAKE ONE TABLET BY MOUTH TWICE A DAY, Disp: 180 tablet,  Rfl: 3    eszopiclone (Lunesta) 3 MG tablet, Take 1 tablet by mouth Every Night. Take immediately before bedtime, Disp: 30 tablet, Rfl: 2    famotidine (PEPCID) 20 MG tablet, TAKE ONE TABLET BY MOUTH DAILY, Disp: 90 tablet, Rfl: 1    fenofibrate (TRICOR) 145 MG tablet, TAKE 1 TABLET BY MOUTH DAILY, Disp: 90 tablet, Rfl: 1    fluconazole (DIFLUCAN) 150 MG tablet, , Disp: , Rfl:     gabapentin (NEURONTIN) 600 MG tablet, TAKE ONE TABLET BY MOUTH THREE TIMES A DAY, Disp: 270 tablet, Rfl: 2    HYDROcodone-acetaminophen (NORCO)  MG per tablet, Take 1 tablet by mouth Every 6 (Six) Hours As Needed for Moderate Pain., Disp: 120 tablet, Rfl: 0    hydrOXYzine (ATARAX) 25 MG tablet, TAKE ONE TABLET BY MOUTH THREE TIMES A DAY AS NEEDED FOR ANXIETY, Disp: 10 tablet, Rfl: 3    loratadine (CLARITIN) 10 MG tablet, Take 1 tablet by mouth Daily., Disp: 30 tablet, Rfl: 11    metFORMIN (GLUCOPHAGE) 1000 MG tablet, TAKE ONE TABLET BY MOUTH TWICE A DAY WITH MEALS, Disp: 180 tablet, Rfl: 2    methylphenidate (Ritalin) 20 MG tablet, Take 1 tablet by mouth Daily for 90 days., Disp: 90 tablet, Rfl: 0    montelukast (SINGULAIR) 10 MG tablet, TAKE ONE TABLET BY MOUTH EVERY EVENING, Disp: 90 tablet, Rfl: 3    nystatin (MYCOSTATIN) 017108 UNIT/GM ointment, APPLY TO AFFECTED AREA(S) TWO TIMES A DAY, Disp: 30 g, Rfl: 1    nystatin (MYCOSTATIN) 096747 UNIT/GM powder, Apply  topically to the appropriate area as directed 3 (Three) Times a Day., Disp: 30 g, Rfl: 2    ONE TOUCH ULTRA TEST test strip, , Disp: , Rfl:     oxybutynin XL (DITROPAN-XL) 5 MG 24 hr tablet, Take 1 tablet by mouth Daily., Disp: 90 tablet, Rfl: 3    pantoprazole (PROTONIX) 40 MG EC tablet, Take 1 tablet by mouth Daily., Disp: 90 tablet, Rfl: 1    potassium chloride ER (K-TAB) 20 MEQ tablet controlled-release ER tablet, TAKE 1 TABLET BY MOUTH DAILY, Disp: 90 tablet, Rfl: 2    promethazine (PHENERGAN) 12.5 MG tablet, TAKE ONE TABLET BY MOUTH EVERY 8 HOURS AS NEEDED FOR NAUSEA  OR VOMITING, Disp: 30 tablet, Rfl: 1    spironolactone (ALDACTONE) 50 MG tablet, TAKE ONE TABLET BY MOUTH DAILY, Disp: 90 tablet, Rfl: 3    venlafaxine XR (EFFEXOR-XR) 75 MG 24 hr capsule, TAKE ONE CAPSULE BY MOUTH DAILY, Disp: 90 capsule, Rfl: 1    Allergies   Allergen Reactions    Fish-Derived Products Anaphylaxis    Penicillins Anaphylaxis and Shortness Of Breath    Shellfish Allergy Anaphylaxis       Objective     Physical Exam:  Vital Signs: There were no vitals filed for this visit.  There is no height or weight on file to calculate BMI.     Mental Status Exam:   Hygiene:   good  Cooperation:  Cooperative  Eye Contact:  Good  Psychomotor Behavior:  Appropriate  Affect:  Full range  Mood: normal  Speech:  Normal  Thought Process:  Goal directed  Thought Content:  Normal  Suicidal:  None  Homicidal:  None  Hallucinations:  None  Delusion:  None  Memory:  Intact  Orientation:  Person, Place, Time, and Situation  Reliability:  good  Insight:  Good  Judgement:  Good  Impulse Control:  Good  Physical/Medical Issues:   See problem list      Assessment / Plan      Diagnosis:  Diagnoses and all orders for this visit:    1. Generalized anxiety disorder (Primary)    2. Current moderate episode of major depressive disorder, unspecified whether recurrent    Patient presented with spouse for follow-up with clinician.  Patient, clinician and spouse reassessed and evaluated disciplinary measures being taken for grandchildren at their residence.  Clinician and patient and spouse processed patient's stress regarding inconsistency between them and methods of discipline as opposed to those of their spouse.  Clinician emphasized consistency.  Patient and spouse indicated understanding.  Clinician utilized active listening and reflective response to convey empathy and support.    Progress toward goal: Not at goal    Prognosis: Good with further outpatient treatment.    PLAN:  Patient and clinician will continue to utilize a  cognitive behavioral intervention which identifies and addresses patient cognitive distortions related to anxiety and depression.  Follow-ups will occur approximately every 21 days and will last from 45 to 60 minutes in duration.    Safety: No acute safety concerns  Risk Assessment: Risk of self-harm acutely is low. Risk of self-harm chronically is also low, but could be further elevated in the event of treatment noncompliance and/or AODA.    Treatment Plan/Goals: Continue supportive psychotherapy efforts and medications as indicated. Treatment and medication options discussed during today's visit. Patient ackowledged and verbally consented to continue with current treatment plan and was educated on the importance of compliance with treatment and follow-up appointments. Patient seems reasonably able to adhere to treatment plan.      Assisted Patient in processing above session content; acknowledged and normalized patient’s thoughts, feelings, and concerns.  Rationalized patient thought process regarding anxiety and depression.      Allowed Patient to freely discuss issues  without interruption or judgement with unconditional positive regard, active listening skills, and empathy. Therapist provided a safe, confidential environment to facilitate the development of a positive therapeutic relationship and encouraged open, honest communication. Assisted Patient in identifying risk factors which would indicate the need for higher level of care including thoughts to harm self or others and/or self-harming behavior and encouraged Patient to contact this office, call 911, or present to the nearest emergency room should any of these events occur. Discussed crisis intervention services and means to access. Patient adamantly and convincingly denies current suicidal or homicidal ideation or perceptual disturbance. Assisted Patient in processing session content; acknowledged and normalized Patient’s thoughts, feelings, and  concerns by utilizing a person-centered approach in efforts to build appropriate rapport and a positive therapeutic relationship with open and honest communication. .     Part of this note may be an electronic transcription/translation of spoken language to printed text using the Dragon Dictation System.       Follow Up:   Return in about 3 weeks (around 4/30/2024) for Next scheduled follow up.    Sterling Lin, AAKASHW

## 2024-04-16 ENCOUNTER — OFFICE VISIT (OUTPATIENT)
Dept: BEHAVIORAL HEALTH | Facility: CLINIC | Age: 54
End: 2024-04-16
Payer: MEDICARE

## 2024-04-16 DIAGNOSIS — F32.1 CURRENT MODERATE EPISODE OF MAJOR DEPRESSIVE DISORDER, UNSPECIFIED WHETHER RECURRENT: ICD-10-CM

## 2024-04-16 DIAGNOSIS — F41.1 GENERALIZED ANXIETY DISORDER: Primary | ICD-10-CM

## 2024-04-16 NOTE — PROGRESS NOTES
[facility] [place]                 Follow Up Adult      Follow Up Adult Note     Date:2024   Patient Name: Hafsa Barron  : 1970   MRN: [id number]   Time IN: 12:03 pm    Time OUT: 12:55pm     Referring Provider: Jocy Snow MD    Chief Complaint:      ICD-10-CM ICD-9-CM   1. Generalized anxiety disorder  F41.1 300.02   2. Current moderate episode of major depressive disorder, unspecified whether recurrent  F32.1 296.22        History of Present Illness:   Hafsa Barron is a 53 y.o. female who is being seen today for follow up counseling for ALL and MDD.     Subjective               Patient's Support Network Includes: extended family    Functional Status: Mild impairment       Current Outpatient Medications:     albuterol (PROVENTIL) (2.5 MG/3ML) 0.083% nebulizer solution, Take 2.5 mg by nebulization Every 4 (Four) Hours As Needed for Wheezing., Disp: 90 vial, Rfl: 1    amLODIPine (NORVASC) 2.5 MG tablet, TAKE 1 TABLET BY MOUTH DAILY, Disp: 90 tablet, Rfl: 1    aspirin 81 MG EC tablet, Take 1 tablet by mouth Daily., Disp: , Rfl:     atorvastatin (LIPITOR) 80 MG tablet, TAKE ONE TABLET BY MOUTH ONCE NIGHTLY, Disp: 90 tablet, Rfl: 1    baclofen (LIORESAL) 10 MG tablet, Take 1 tablet by mouth 3 (Three) Times a Day. (Patient taking differently: Take 1 tablet by mouth every night at bedtime.), Disp: 270 tablet, Rfl: 1    Blood Glucose Monitoring Suppl (ONE TOUCH ULTRA 2) w/Device kit, , Disp: , Rfl:     busPIRone (BUSPAR) 15 MG tablet, TAKE ONE TABLET BY MOUTH THREE TIMES A DAY, Disp: 270 tablet, Rfl: 1    calcium carbonate-vitamin d (CALCIUM 600+D HIGH POTENCY) 600-400 MG-UNIT per tablet, Take 1 tablet by mouth Daily., Disp: 90 tablet, Rfl: 2    clopidogrel (PLAVIX) 75 MG tablet, TAKE ONE TABLET BY MOUTH DAILY, Disp: 90 tablet, Rfl: 3    diclofenac (VOLTAREN) 75 MG EC tablet, TAKE ONE TABLET BY MOUTH TWICE A DAY, Disp: 180 tablet, Rfl: 3     eszopiclone (Lunesta) 3 MG tablet, Take 1 tablet by mouth Every Night. Take immediately before bedtime, Disp: 30 tablet, Rfl: 2    famotidine (PEPCID) 20 MG tablet, TAKE ONE TABLET BY MOUTH DAILY, Disp: 90 tablet, Rfl: 1    fenofibrate (TRICOR) 145 MG tablet, TAKE 1 TABLET BY MOUTH DAILY, Disp: 90 tablet, Rfl: 1    fluconazole (DIFLUCAN) 150 MG tablet, , Disp: , Rfl:     gabapentin (NEURONTIN) 600 MG tablet, TAKE ONE TABLET BY MOUTH THREE TIMES A DAY, Disp: 270 tablet, Rfl: 2    HYDROcodone-acetaminophen (NORCO)  MG per tablet, Take 1 tablet by mouth Every 6 (Six) Hours As Needed for Moderate Pain., Disp: 120 tablet, Rfl: 0    hydrOXYzine (ATARAX) 25 MG tablet, TAKE ONE TABLET BY MOUTH THREE TIMES A DAY AS NEEDED FOR ANXIETY, Disp: 10 tablet, Rfl: 3    loratadine (CLARITIN) 10 MG tablet, Take 1 tablet by mouth Daily., Disp: 30 tablet, Rfl: 11    metFORMIN (GLUCOPHAGE) 1000 MG tablet, TAKE ONE TABLET BY MOUTH TWICE A DAY WITH MEALS, Disp: 180 tablet, Rfl: 2    methylphenidate (Ritalin) 20 MG tablet, Take 1 tablet by mouth Daily for 90 days., Disp: 90 tablet, Rfl: 0    montelukast (SINGULAIR) 10 MG tablet, TAKE ONE TABLET BY MOUTH EVERY EVENING, Disp: 90 tablet, Rfl: 3    nystatin (MYCOSTATIN) 804441 UNIT/GM ointment, APPLY TO AFFECTED AREA(S) TWO TIMES A DAY, Disp: 30 g, Rfl: 1    nystatin (MYCOSTATIN) 143077 UNIT/GM powder, Apply  topically to the appropriate area as directed 3 (Three) Times a Day., Disp: 30 g, Rfl: 2    ONE TOUCH ULTRA TEST test strip, , Disp: , Rfl:     oxybutynin XL (DITROPAN-XL) 5 MG 24 hr tablet, Take 1 tablet by mouth Daily., Disp: 90 tablet, Rfl: 3    pantoprazole (PROTONIX) 40 MG EC tablet, Take 1 tablet by mouth Daily., Disp: 90 tablet, Rfl: 1    potassium chloride ER (K-TAB) 20 MEQ tablet controlled-release ER tablet, TAKE 1 TABLET BY MOUTH DAILY, Disp: 90 tablet, Rfl: 2    promethazine (PHENERGAN) 12.5 MG tablet, TAKE ONE TABLET BY MOUTH EVERY 8 HOURS AS NEEDED FOR NAUSEA OR  VOMITING, Disp: 30 tablet, Rfl: 1    spironolactone (ALDACTONE) 50 MG tablet, TAKE ONE TABLET BY MOUTH DAILY, Disp: 90 tablet, Rfl: 3    venlafaxine XR (EFFEXOR-XR) 75 MG 24 hr capsule, TAKE ONE CAPSULE BY MOUTH DAILY, Disp: 90 capsule, Rfl: 1    Allergies   Allergen Reactions    Fish-Derived Products Anaphylaxis    Penicillins Anaphylaxis and Shortness Of Breath    Shellfish Allergy Anaphylaxis       Objective     Physical Exam:  Vital Signs: There were no vitals filed for this visit.  There is no height or weight on file to calculate BMI.     Mental Status Exam:   Hygiene:    Good  [place]on:   Family session without patient present  Eye Contact:   Family session without patient present  Psychomotor Behavior:   Family session without patient present  Affect:   Family session without patient present  Mood: Family session without patient present  Speech: Family session without patient present  Thought Process: Family session with outpatient present  Thought Content: Family session without patient present  Suicidal: Family session without patient present  Homicidal: Family session with outpatient present  Hallucinations: The session without patient present  Delusion: Family session without patient present  Memory: Family session with the outpatient present  Orientation: Family session without patient present  Reliability: Family session without patient present  Insight: Family session without patient present  Judgement: Family session without patient present  Impulse Control: Family session without patient present  Physical/Medical Issues:   See problem list      Assessment / Plan      Diagnosis:  Diagnoses and all orders for this visit:    1. Generalized anxiety disorder (Primary)    2. Current moderate episode of major depressive disorder, unspecified whether recurrent    Patient's spouse reported for family session with clinician.  Patient and clinician discussed consistencies and inconsistencies with their  approach as opposed to their spouse's approach to disciplinary measures with their grandchildren.  Patient and patient's spouse discussed the importance of stress relief due to the added stress of their grandchildren residing with them indefinitely.  Patient and clinician identified and addressed patient spouses cognitive distortions related to anxiety.  Clinician utilized active listening and reflective response to convey empathy and support.    Progress toward goal: Not at goal    Prognosis: Good with further treatment.    PLAN:  Patient and clinician will continue to utilize a cognitive behavioral intervention which identifies and addresses patient cognitive distortions related to anxiety and depression.  Follow-ups will occur approximately every 14 days and will last from 45 to 60 minutes in duration.    Safety: No acute safety concerns  Risk Assessment: Risk of self-harm acutely is low. Risk of self-harm chronically is also low, but could be further elevated in the event of treatment noncompliance and/or AODA.    Treatment Plan/Goals: Continue supportive psychotherapy efforts and medications as indicated. Treatment and medication options discussed during today's visit. Patient ackowledged and verbally consented to continue with current treatment plan and was educated on the importance of compliance with treatment and follow-up appointments. Patient seems reasonably able to adhere to treatment plan.      Assisted Patient in processing above session content; acknowledged and normalized patient’s thoughts, feelings, and concerns.  Rationalized patient thought process regarding anxiety and depression.      Allowed Patient to freely discuss issues  without interruption or judgement with unconditional positive regard, active listening skills, and empathy. Therapist provided a safe, confidential environment to facilitate the development of a positive therapeutic relationship and encouraged open, honest communication.  Assisted Patient in identifying risk factors which would indicate the need for higher level of care including thoughts to harm self or others and/or self-harming behavior and encouraged Patient to contact this office, call 911, or present to the nearest emergency room should any of these events occur. Discussed crisis intervention services and means to access. Patient adamantly and convincingly denies current suicidal or homicidal ideation or perceptual disturbance. Assisted Patient in processing session content; acknowledged and normalized Patient’s thoughts, feelings, and concerns by utilizing a person-centered approach in efforts to build appropriate rapport and a positive therapeutic relationship with open and honest communication. .     Part of this note may be an electronic transcription/translation of spoken language to printed text using the Dragon Dictation System.       Follow Up:   Return in about 2 weeks (around 4/30/2024) for Next scheduled follow up.    Sterling Lin LCSW

## 2024-04-22 ENCOUNTER — OFFICE VISIT (OUTPATIENT)
Dept: NEUROLOGY | Facility: CLINIC | Age: 54
End: 2024-04-22
Payer: MEDICARE

## 2024-04-22 VITALS
SYSTOLIC BLOOD PRESSURE: 132 MMHG | BODY MASS INDEX: 28.26 KG/M2 | HEART RATE: 92 BPM | OXYGEN SATURATION: 97 % | HEIGHT: 66 IN | DIASTOLIC BLOOD PRESSURE: 70 MMHG

## 2024-04-22 DIAGNOSIS — G71.11 MYOTONIC DYSTROPHY, TYPE 2: ICD-10-CM

## 2024-04-22 DIAGNOSIS — G47.19 DAYTIME HYPERSOMNOLENCE: Primary | ICD-10-CM

## 2024-04-22 PROCEDURE — 3075F SYST BP GE 130 - 139MM HG: CPT | Performed by: PSYCHIATRY & NEUROLOGY

## 2024-04-22 PROCEDURE — 99214 OFFICE O/P EST MOD 30 MIN: CPT | Performed by: PSYCHIATRY & NEUROLOGY

## 2024-04-22 PROCEDURE — 1160F RVW MEDS BY RX/DR IN RCRD: CPT | Performed by: PSYCHIATRY & NEUROLOGY

## 2024-04-22 PROCEDURE — 3078F DIAST BP <80 MM HG: CPT | Performed by: PSYCHIATRY & NEUROLOGY

## 2024-04-22 PROCEDURE — 1159F MED LIST DOCD IN RCRD: CPT | Performed by: PSYCHIATRY & NEUROLOGY

## 2024-04-22 RX ORDER — SEMAGLUTIDE 1.34 MG/ML
0.5 INJECTION, SOLUTION SUBCUTANEOUS WEEKLY
COMMUNITY
Start: 2024-02-23

## 2024-04-22 NOTE — PROGRESS NOTES
Subjective:    CC: Hafsa Barron is in clinic today for follow up for      HPI:  Initial visit: 2/18/2020: Patient is a 49-year-old female with known history of type II myotonic dystrophy referred to clinic to establish care.  She reports that she started having symptoms back in 2003 soon after delivery.  At that time she noted some leg weakness which progressively became worse and in 2009, she was seen by [facility] neuromuscular department and underwent detailed neurological examination, which was suggestive of proximal muscle weakness and possibility of myopathy.  Following this, EMG was performed which showed diffuse myotonic discharges and eventually genetic testing was done which confirmed the diagnosis of type II myotonic dystrophy.  She has followed with [facility] neuroscience since year 2009 and was followed initially by Dr. Marina and then Dr. Lynn.  Since her diagnosis in 2009, she reports that there is been slight worsening in bilateral proximal muscle weakness in foot and lower extremities.  In year 2013, she reports that she had a stroke which affected her left upper and lower extremity strength.  Currently she was uses cane for shorter distances and electrical scooter for longer distances.  She is established with ophthalmology and has regular eye examination as well as is established with cardiology to monitor her heart health.  She does have history of obstructive sleep apnea and uses CPAP machine regularly.  Initially, she reports that it had helped her significantly but now she reports that even after using it regularly, she wakes up tired and reports extreme daytime somnolence, tiredness and fatigue.  She was prescribed Nuvigil by her sleep physician which initially helped and then it stopped working.  She has never tried Provigil for daytime somnolence.  She denies any problems with vision.  Currently she takes baclofen 10 mg 3 times a day, tizanidine and cyclobenzaprine only at night.  She also takes  gabapentin for paresthesias and muscle spasms.  She does report poor sleep quality.  She is on BuSpar 15 mg 3 times a day and venlafaxine 37.5 mg daily for mood    7/15/2020: This is a follow-up done through video.  Since her last visit, she reports that bilateral proximal muscle weakness remains stable without any worsening.  She has been taking Nuvigil 100 mg daily now for last 2 months and she reports that it has helped her tremendously in keeping daytime hypersomnolence, fatigue and tiredness under good control.  She is physically more active now.    1/18/2021: This is a follow-up done through video.  Since her last visit in July, she reports that after increasing the dose of Nuvigil to 200 mg daily, she did really well until about Christmas week following which she reports that she has started feeling daytime somnolence, tiredness and fatigue again.  She continues to take 200 mg dose but reports that it has not been as effective as it used to be.  As far as myotonic dystrophy is concerned, it remains stable without any worsening in muscle strength.    4/12/2022: This is a follow-up done through video.  Since her last visit in January 2021, she reports that she has been taking Provigil 300 mg daily but reports that it has not been helping the way it did when she started taking it initially.  She is interested in trying Ritalin and see if it helps better.  Ubrelvy physical she is reporting bilateral knee pain radiating down to both her legs for quite some time now.  She has seen orthopedic surgery and has had cortisone shots in her knee which helps temporarily.  She is interested in getting a second opinion for knee replacement surgery and she will be scheduling it soon.  She had COVID back in February 2022 and since then, she feels that overall she is feeling weaker in both her upper and lower extremities.    10/25/2023: This is a follow-up done through video.  She was last seen in follow-up in April 2022.   Since her last visit, she reports that overall symptoms of myotonic dystrophy has remained stable but some days, she does feel more tired and fatigued than others.  She has also noticed some increase in low back pain shooting down to right posterior thigh up to the posterior aspect of the knee.  She reports that daytime hypersomnolence is much better with use of Ritalin 20 mg daily.  Is working better than Provigil.    Follow-up: 4/22/2024: She is in clinic for regular follow-up.  Since her last visit in October 2023, she reports that overall symptoms are stable without any worsening.  She reports that some days she does feel more fatigued and tired than others but overall muscle strength has remained stable.  She continues to take Ritalin 20 mg daily which helps significantly with daytime hypersomnolence.    The following portions of the patient's history were reviewed and updated . as of 04/22/2024: allergies, social history, and problem list.       Current Outpatient Medications:     albuterol (PROVENTIL) (2.5 MG/3ML) 0.083% nebulizer solution, Take 2.5 mg by nebulization Every 4 (Four) Hours As Needed for Wheezing., Disp: 90 vial, Rfl: 1    amLODIPine (NORVASC) 2.5 MG tablet, TAKE 1 TABLET BY MOUTH DAILY, Disp: 90 tablet, Rfl: 1    aspirin 81 MG EC tablet, Take 1 tablet by mouth Daily., Disp: , Rfl:     atorvastatin (LIPITOR) 80 MG tablet, TAKE ONE TABLET BY MOUTH ONCE NIGHTLY, Disp: 90 tablet, Rfl: 1    baclofen (LIORESAL) 10 MG tablet, Take 1 tablet by mouth 3 (Three) Times a Day. (Patient taking differently: Take 1 tablet by mouth every night at bedtime.), Disp: 270 tablet, Rfl: 1    Blood Glucose Monitoring Suppl (ONE TOUCH ULTRA 2) w/Device kit, , Disp: , Rfl:     busPIRone (BUSPAR) 15 MG tablet, TAKE ONE TABLET BY MOUTH THREE TIMES A DAY, Disp: 270 tablet, Rfl: 1    calcium carbonate-vitamin d (CALCIUM 600+D HIGH POTENCY) 600-400 MG-UNIT per tablet, Take 1 tablet by mouth Daily., Disp: 90 tablet, Rfl: 2     clopidogrel (PLAVIX) 75 MG tablet, TAKE ONE TABLET BY MOUTH DAILY, Disp: 90 tablet, Rfl: 3    diclofenac (VOLTAREN) 75 MG EC tablet, TAKE ONE TABLET BY MOUTH TWICE A DAY, Disp: 180 tablet, Rfl: 3    eszopiclone (Lunesta) 3 MG tablet, Take 1 tablet by mouth Every Night. Take immediately before bedtime, Disp: 30 tablet, Rfl: 2    famotidine (PEPCID) 20 MG tablet, TAKE ONE TABLET BY MOUTH DAILY, Disp: 90 tablet, Rfl: 1    fenofibrate (TRICOR) 145 MG tablet, TAKE 1 TABLET BY MOUTH DAILY, Disp: 90 tablet, Rfl: 1    fluconazole (DIFLUCAN) 150 MG tablet, Take 1 tablet by mouth 2 (Two) Times a Day., Disp: , Rfl:     gabapentin (NEURONTIN) 600 MG tablet, TAKE ONE TABLET BY MOUTH THREE TIMES A DAY, Disp: 270 tablet, Rfl: 2    HYDROcodone-acetaminophen (NORCO)  MG per tablet, Take 1 tablet by mouth Every 6 (Six) Hours As Needed for Moderate Pain., Disp: 120 tablet, Rfl: 0    hydrOXYzine (ATARAX) 25 MG tablet, TAKE ONE TABLET BY MOUTH THREE TIMES A DAY AS NEEDED FOR ANXIETY, Disp: 10 tablet, Rfl: 3    loratadine (CLARITIN) 10 MG tablet, Take 1 tablet by mouth Daily., Disp: 30 tablet, Rfl: 11    metFORMIN (GLUCOPHAGE) 1000 MG tablet, TAKE ONE TABLET BY MOUTH TWICE A DAY WITH MEALS, Disp: 180 tablet, Rfl: 2    montelukast (SINGULAIR) 10 MG tablet, TAKE ONE TABLET BY MOUTH EVERY EVENING, Disp: 90 tablet, Rfl: 3    nystatin (MYCOSTATIN) 859749 UNIT/GM ointment, APPLY TO AFFECTED AREA(S) TWO TIMES A DAY, Disp: 30 g, Rfl: 1    nystatin (MYCOSTATIN) 934477 UNIT/GM powder, Apply  topically to the appropriate area as directed 3 (Three) Times a Day., Disp: 30 g, Rfl: 2    ONE TOUCH ULTRA TEST test strip, , Disp: , Rfl:     oxybutynin XL (DITROPAN-XL) 5 MG 24 hr tablet, Take 1 tablet by mouth Daily., Disp: 90 tablet, Rfl: 3    pantoprazole (PROTONIX) 40 MG EC tablet, Take 1 tablet by mouth Daily., Disp: 90 tablet, Rfl: 1    potassium chloride ER (K-TAB) 20 MEQ tablet controlled-release ER tablet, TAKE 1 TABLET BY MOUTH DAILY, Disp:  90 tablet, Rfl: 2    promethazine (PHENERGAN) 12.5 MG tablet, TAKE ONE TABLET BY MOUTH EVERY 8 HOURS AS NEEDED FOR NAUSEA OR VOMITING, Disp: 30 tablet, Rfl: 1    Semaglutide,0.25 or 0.5MG/DOS, (Ozempic, 0.25 or 0.5 MG/DOSE,) 2 MG/1.5ML solution pen-injector, Inject 0.5 mg under the skin into the appropriate area as directed 1 (One) Time Per Week., Disp: , Rfl:     spironolactone (ALDACTONE) 50 MG tablet, TAKE ONE TABLET BY MOUTH DAILY, Disp: 90 tablet, Rfl: 3    venlafaxine XR (EFFEXOR-XR) 75 MG 24 hr capsule, TAKE ONE CAPSULE BY MOUTH DAILY, Disp: 90 capsule, Rfl: 1    methylphenidate (Ritalin) 20 MG tablet, Take 1 tablet by mouth Daily for 90 days., Disp: 90 tablet, Rfl: 0   Past Medical History:   Diagnosis Date    Achilles tendon rupture 08/2021    left    Allergic     Allergy     Ankle sprain     Anxiety     Arthritis     Asthma Allergies induced    Coronary artery disease     CTS (carpal tunnel syndrome)     Depression     Fibromyalgia, primary     Fracture, finger     GERD (gastroesophageal reflux disease)     Headache     High risk medication use 08/12/2016    History of blood clots     History of UTI     Hyperlipidemia     Hypertension     IBS (irritable bowel syndrome)     Internal hemorrhoid     Kidney stone     Knee sprain     Knee swelling     Low back pain     Low back strain     Muscular dystrophy     Muscular dystrophy     II    Myotonia     Obesity     Pulmonary embolism sub clavical artery clot stent put in Aug 2018    Rotator cuff syndrome     Stroke 08/31/2013    resdual- left sided weakness.     Tear of meniscus of knee     Type 2 diabetes mellitus without complication, without long-term current use of insulin       Past Surgical History:   Procedure Laterality Date    ACHILLES TENDON SURGERY Left 09/2021    Dr. Salinas achilles rupture repair    ANKLE OPEN REDUCTION INTERNAL FIXATION  Oct 2021    Achilles tendon ruptured had repair surgery    BARIATRIC SURGERY  4/29/2019    CHOLECYSTECTOMY   "2004    COLONOSCOPY  2017    D & C WITH SUCTION      KNEE SURGERY Left     LYMPH NODE BIOPSY      neck    SUBCLAVIAN ARTERY STENT Left 08/2018    x2    WISDOM TOOTH EXTRACTION        Family History   Problem Relation Age of Onset    Allergies Other     ADD / ADHD Other     Heart block Other     Other Other         CEREBROVASCULAR ACCIDENT     Hypertension Other     Cancer Other         LUNG CANCER    Hyperlipidemia Other     Alcohol abuse Mother     Depression Mother     Early death Mother          age 59 massive Heart Attack    Heart disease Mother         Mother  of Massive Heart attack    Hyperlipidemia Mother     Hypertension Mother     Miscarriages / Stillbirths Mother         1 I know of    Heart attack Mother          of  massive heart attack    Alcohol abuse Father     Cancer Father         Had Lung Cancer  had 1 lung till death    Diabetes Father         Lived on insuline shots    Early death Father          at age 53 due to Lung Cancer    Breast cancer Maternal Aunt     Learning disabilities Son         ADHD    Breast cancer Cousin     Ovarian cancer Neg Hx     Endometrial cancer Neg Hx     Uterine cancer Neg Hx     Colon cancer Neg Hx         Review of Systems  Objective:    /70   Pulse 92   Ht 167.6 cm (65.98\")   LMP  (LMP Unknown)   SpO2 97%   BMI 28.26 kg/m²     Neurology Exam:  General apperance: NAD.     Mental status: Alert, awake and oriented to time place and person.    Language and Speech: No aphasia or dysarthria.    CN II to XII: Intact.    Opthalmoscopic Exam: No papilledema.    Motor:  Right UE muscle strength 5/5 except for right deltoid which is 4/5.  Poor effort noted.     Left UE muscle strength 4/5 throughout.     Right LE muscle strength5/5 except for right hip flexor which is 4/5.  Poor effort noted.     Left LE muscle strength 4/5.    Sensory: Normal light touch, vibration and pinprick sensation bilaterally.    DTRs: 2+ bilaterally.    Babinski: " Negative bilaterally.    Co-ordination: Normal finger-to-nose, heel to shin B/L.    Rhomberg: Negative.    Gait: Normal.    Cardiovascular: Regular rate and rhythm without murmur, gallop or rub.    Assessment and Plan:  1. Daytime hypersomnolence  2. Myotonic dystrophy, type 2  -Overall stable without any worsening which is quite reassuring.  She is planning to start exercising regularly at the gym which will help her maintain/improve muscle strength.  Continue with Ritalin as it is.  I advised her to call office with any questions or concerns that she may have otherwise I will see her back in clinic in 1 year for follow-up.    I spent 30 minutes in patient care: Reviewing records prior to the visit, entering orders and documentation and spent more than prince 50% of this time face-to-face in management, instructions and education regarding above mentioned diagnosis and also on counseling and discussing about taking medication regularly, possible side effects with medication use, importance of good sleep hygiene, good hydration and regular exercise.    Return in about 1 year (around 4/22/2025).       Note to patient: The 21st Century Cures Act makes medical notes like these available to patients in the interest of transparency. However, be advised this is a medical document. It is intended as peer to peer communication. It is written in medical language and may contain abbreviations or verbiage that are unfamiliar. It may appear blunt or direct. Medical documents are intended to carry relevant information, facts as evident, and the clinical opinion of the physician.

## 2024-05-06 RX ORDER — MONTELUKAST SODIUM 10 MG/1
TABLET ORAL
Qty: 90 TABLET | Refills: 3 | Status: SHIPPED | OUTPATIENT
Start: 2024-05-06

## 2024-05-07 RX ORDER — SPIRONOLACTONE 50 MG/1
TABLET, FILM COATED ORAL
Qty: 90 TABLET | Refills: 3 | Status: SHIPPED | OUTPATIENT
Start: 2024-05-07

## 2024-05-08 DIAGNOSIS — R09.89 BRUIT OF LEFT CAROTID ARTERY: Primary | ICD-10-CM

## 2024-05-09 ENCOUNTER — OFFICE VISIT (OUTPATIENT)
Dept: ORTHOPEDIC SURGERY | Facility: CLINIC | Age: 54
End: 2024-05-09
Payer: MEDICARE

## 2024-05-09 VITALS
BODY MASS INDEX: 26.65 KG/M2 | WEIGHT: 165.8 LBS | DIASTOLIC BLOOD PRESSURE: 72 MMHG | SYSTOLIC BLOOD PRESSURE: 128 MMHG | HEIGHT: 66 IN

## 2024-05-09 DIAGNOSIS — M17.12 PRIMARY OSTEOARTHRITIS OF LEFT KNEE: Primary | ICD-10-CM

## 2024-05-09 RX ORDER — BUPIVACAINE HYDROCHLORIDE 2.5 MG/ML
3 INJECTION, SOLUTION EPIDURAL; INFILTRATION; INTRACAUDAL
Status: COMPLETED | OUTPATIENT
Start: 2024-05-09 | End: 2024-05-09

## 2024-05-09 RX ORDER — LIDOCAINE HYDROCHLORIDE 10 MG/ML
3 INJECTION, SOLUTION EPIDURAL; INFILTRATION; INTRACAUDAL; PERINEURAL
Status: COMPLETED | OUTPATIENT
Start: 2024-05-09 | End: 2024-05-09

## 2024-05-09 RX ORDER — TRIAMCINOLONE ACETONIDE 40 MG/ML
80 INJECTION, SUSPENSION INTRA-ARTICULAR; INTRAMUSCULAR
Status: COMPLETED | OUTPATIENT
Start: 2024-05-09 | End: 2024-05-09

## 2024-05-09 RX ADMIN — BUPIVACAINE HYDROCHLORIDE 3 ML: 2.5 INJECTION, SOLUTION EPIDURAL; INFILTRATION; INTRACAUDAL at 09:03

## 2024-05-09 RX ADMIN — LIDOCAINE HYDROCHLORIDE 3 ML: 10 INJECTION, SOLUTION EPIDURAL; INFILTRATION; INTRACAUDAL; PERINEURAL at 09:03

## 2024-05-09 RX ADMIN — TRIAMCINOLONE ACETONIDE 80 MG: 40 INJECTION, SUSPENSION INTRA-ARTICULAR; INTRAMUSCULAR at 09:03

## 2024-06-02 DIAGNOSIS — F51.04 CHRONIC INSOMNIA: ICD-10-CM

## 2024-06-03 RX ORDER — ESZOPICLONE 3 MG/1
3 TABLET, FILM COATED ORAL NIGHTLY
Qty: 30 TABLET | Refills: 2 | Status: SHIPPED | OUTPATIENT
Start: 2024-06-03

## 2024-06-03 RX ORDER — FENOFIBRATE 145 MG/1
145 TABLET, COATED ORAL DAILY
Qty: 90 TABLET | Refills: 1 | Status: SHIPPED | OUTPATIENT
Start: 2024-06-03

## 2024-06-24 RX ORDER — ATORVASTATIN CALCIUM 80 MG/1
TABLET, FILM COATED ORAL
Qty: 90 TABLET | Refills: 1 | Status: SHIPPED | OUTPATIENT
Start: 2024-06-24

## 2024-07-01 ENCOUNTER — OFFICE VISIT (OUTPATIENT)
Dept: BEHAVIORAL HEALTH | Facility: CLINIC | Age: 54
End: 2024-07-01
Payer: MEDICARE

## 2024-07-01 DIAGNOSIS — F32.1 CURRENT MODERATE EPISODE OF MAJOR DEPRESSIVE DISORDER, UNSPECIFIED WHETHER RECURRENT: ICD-10-CM

## 2024-07-01 DIAGNOSIS — F41.1 GENERALIZED ANXIETY DISORDER: Primary | ICD-10-CM

## 2024-07-01 PROCEDURE — 90834 PSYTX W PT 45 MINUTES: CPT | Performed by: SOCIAL WORKER

## 2024-07-01 NOTE — PROGRESS NOTES
[facility] [place]                 Follow Up Adult      Follow Up Adult Note     Date:2024   Patient Name: Hafsa Barron  : 1970   MRN: [id number]   Time IN: 2:35 pm     Time OUT: 3:27 pm     Referring Provider: Jocy Snow MD    Chief Complaint:      ICD-10-CM ICD-9-CM   1. Generalized anxiety disorder  F41.1 300.02   2. Current moderate episode of major depressive disorder, unspecified whether recurrent  F32.1 296.22        History of Present Illness:   Hafsa Barron is a 53 y.o. female who is being seen today for follow up counseling for anxiety and depression.    Subjective               Patient's Support Network Includes: extended family    Functional Status: Mild impairment       Current Outpatient Medications:     albuterol (PROVENTIL) (2.5 MG/3ML) 0.083% nebulizer solution, Take 2.5 mg by nebulization Every 4 (Four) Hours As Needed for Wheezing., Disp: 90 vial, Rfl: 1    amLODIPine (NORVASC) 2.5 MG tablet, TAKE 1 TABLET BY MOUTH DAILY, Disp: 90 tablet, Rfl: 1    aspirin 81 MG EC tablet, Take 1 tablet by mouth Daily., Disp: , Rfl:     atorvastatin (LIPITOR) 80 MG tablet, TAKE ONE TABLET BY MOUTH ONCE NIGHTLY, Disp: 90 tablet, Rfl: 1    baclofen (LIORESAL) 10 MG tablet, Take 1 tablet by mouth 3 (Three) Times a Day. (Patient taking differently: Take 1 tablet by mouth every night at bedtime.), Disp: 270 tablet, Rfl: 1    Blood Glucose Monitoring Suppl (ONE TOUCH ULTRA 2) w/Device kit, , Disp: , Rfl:     busPIRone (BUSPAR) 15 MG tablet, TAKE ONE TABLET BY MOUTH THREE TIMES A DAY, Disp: 270 tablet, Rfl: 1    calcium carbonate-vitamin d (CALCIUM 600+D HIGH POTENCY) 600-400 MG-UNIT per tablet, Take 1 tablet by mouth Daily., Disp: 90 tablet, Rfl: 2    clopidogrel (PLAVIX) 75 MG tablet, TAKE ONE TABLET BY MOUTH DAILY, Disp: 90 tablet, Rfl: 3    diclofenac (VOLTAREN) 75 MG EC tablet, Take 1 tablet by mouth 2 (Two) Times a Day., Disp: 180  tablet, Rfl: 3    eszopiclone (LUNESTA) 3 MG tablet, TAKE ONE TABLET BY MOUTH ONCE NIGHTLY; TAKE IMMEDIATELY BEFORE BEDTIME, Disp: 30 tablet, Rfl: 2    famotidine (PEPCID) 20 MG tablet, TAKE ONE TABLET BY MOUTH DAILY, Disp: 90 tablet, Rfl: 1    fenofibrate (TRICOR) 145 MG tablet, TAKE 1 TABLET BY MOUTH DAILY, Disp: 90 tablet, Rfl: 1    fluconazole (DIFLUCAN) 150 MG tablet, Take 1 tablet by mouth 2 (Two) Times a Day., Disp: , Rfl:     gabapentin (NEURONTIN) 600 MG tablet, TAKE ONE TABLET BY MOUTH THREE TIMES A DAY, Disp: 270 tablet, Rfl: 2    hydrOXYzine (ATARAX) 25 MG tablet, TAKE ONE TABLET BY MOUTH THREE TIMES A DAY AS NEEDED FOR ANXIETY, Disp: 10 tablet, Rfl: 3    loratadine (CLARITIN) 10 MG tablet, Take 1 tablet by mouth Daily., Disp: 30 tablet, Rfl: 11    metFORMIN (GLUCOPHAGE) 1000 MG tablet, TAKE ONE TABLET BY MOUTH TWICE A DAY WITH MEALS, Disp: 180 tablet, Rfl: 2    methylphenidate (Ritalin) 20 MG tablet, Take 1 tablet by mouth Daily for 90 days., Disp: 90 tablet, Rfl: 0    montelukast (SINGULAIR) 10 MG tablet, TAKE ONE TABLET BY MOUTH EVERY EVENING, Disp: 90 tablet, Rfl: 3    nystatin (MYCOSTATIN) 197961 UNIT/GM ointment, APPLY TO AFFECTED AREA(S) TWO TIMES A DAY, Disp: 30 g, Rfl: 1    nystatin (MYCOSTATIN) 360883 UNIT/GM powder, Apply  topically to the appropriate area as directed 3 (Three) Times a Day., Disp: 30 g, Rfl: 2    ONE TOUCH ULTRA TEST test strip, , Disp: , Rfl:     oxybutynin XL (DITROPAN-XL) 5 MG 24 hr tablet, Take 1 tablet by mouth Daily., Disp: 90 tablet, Rfl: 3    pantoprazole (PROTONIX) 40 MG EC tablet, Take 1 tablet by mouth Daily., Disp: 90 tablet, Rfl: 1    potassium chloride ER (K-TAB) 20 MEQ tablet controlled-release ER tablet, TAKE 1 TABLET BY MOUTH DAILY, Disp: 90 tablet, Rfl: 2    promethazine (PHENERGAN) 12.5 MG tablet, TAKE ONE TABLET BY MOUTH EVERY 8 HOURS AS NEEDED FOR NAUSEA OR VOMITING, Disp: 30 tablet, Rfl: 1    Semaglutide,0.25 or 0.5MG/DOS, (Ozempic, 0.25 or 0.5 MG/DOSE,)  2 MG/1.5ML solution pen-injector, Inject 0.5 mg under the skin into the appropriate area as directed 1 (One) Time Per Week., Disp: , Rfl:     spironolactone (ALDACTONE) 50 MG tablet, TAKE ONE TABLET BY MOUTH DAILY, Disp: 90 tablet, Rfl: 3    venlafaxine XR (EFFEXOR-XR) 75 MG 24 hr capsule, TAKE ONE CAPSULE BY MOUTH DAILY, Disp: 90 capsule, Rfl: 1    Allergies   Allergen Reactions    Fish-Derived Products Anaphylaxis    Penicillins Anaphylaxis and Shortness Of Breath    Shellfish Allergy Anaphylaxis       Objective     Physical Exam:  Vital Signs: There were no vitals filed for this visit.  There is no height or weight on file to calculate BMI.     Mental Status Exam:   Hygiene:   good  Cooperation:  Cooperative  Eye Contact:  Good  Psychomotor Behavior:  Appropriate  Affect:  Full range  Mood: normal  Speech:  Normal  Thought Process:  Goal directed  Thought Content:  Normal  Suicidal:  None  Homicidal:  None  Hallucinations:  None  Delusion:  None  Memory:  Intact  Orientation:  Person, Place, Time, and Situation  Reliability:  good  Insight:  Good  Judgement:  Good  Impulse Control:  Good  Physical/Medical Issues:   See problem list      Assessment / Plan      Diagnosis:  Diagnoses and all orders for this visit:    1. Generalized anxiety disorder (Primary)    2. Current moderate episode of major depressive disorder, unspecified whether recurrent    Patient presented for follow-up with clinician.  Patient and clinician identified and addressed patient cognitive distortions related to anxiety and depression.  The uncertain status of their grandchildren who are currently in [place] with their biological father was identified as main trigger.  Patient stated that they had been in contact with the grandchildren's biological mother.  Patient stated that the biological mother had asked them to rearrange her schedule for a visit to gather the rest of the grandchildren's things.  Patient stated that they had been assertive  and letting the biological mother know that they would not be adjusting their schedule and that they would have to work together to retrieve the rest of their children's belongings.  Patient stated that they had accepted the fact that there was a high probability that their biological son was still using substances despite claiming to be sober.  Patient stated that they had accepted the fact that they were would remain incommunicado with their son until there was clear evidence that they were no longer using substances.  Patient stated that their grandchildren were still with their biological father in [place] but would be returning to [place].  Patient and clinician discussed the status of the DCBS report filed at last session.  Clinician disclosed that the case was found to not meet criteria for further investigation.  Patient indicated understanding.  Patient and clinician agreed to further monitor the situation and report any violations that became apparent directly to [place] if necessary.  Clinician utilized active listening and reflective response to convey empathy and support.    Progress toward goal: Not at goal    Prognosis: Good with ongoing treatment    PLAN:  Patient and clinician will continue to utilize a cognitive behavioral intervention which identifies and addresses patient cognitive distortions related to depression and anxiety.  Follow-ups will occur approximately every 14 days and will last from 45 to 60 minutes in duration.    Safety: No acute safety concerns  Risk Assessment: Risk of self-harm acutely is low. Risk of self-harm chronically is also low, but could be further elevated in the event of treatment noncompliance and/or AODA.    Treatment Plan/Goals: Continue supportive psychotherapy efforts and medications as indicated. Treatment and medication options discussed during today's visit. Patient ackowledged and verbally consented to continue with current treatment plan and  was educated on the importance of compliance with treatment and follow-up appointments. Patient seems reasonably able to adhere to treatment plan.      Assisted Patient in processing above session content; acknowledged and normalized patient’s thoughts, feelings, and concerns.  Rationalized patient thought process regarding anxiety and depression.      Allowed Patient to freely discuss issues  without interruption or judgement with unconditional positive regard, active listening skills, and empathy. Therapist provided a safe, confidential environment to facilitate the development of a positive therapeutic relationship and encouraged open, honest communication. Assisted Patient in identifying risk factors which would indicate the need for higher level of care including thoughts to harm self or others and/or self-harming behavior and encouraged Patient to contact this office, call 911, or present to the nearest emergency room should any of these events occur. Discussed crisis intervention services and means to access. Patient adamantly and convincingly denies current suicidal or homicidal ideation or perceptual disturbance. Assisted Patient in processing session content; acknowledged and normalized Patient’s thoughts, feelings, and concerns by utilizing a person-centered approach in efforts to build appropriate rapport and a positive therapeutic relationship with open and honest communication. .     Part of this note may be an electronic transcription/translation of spoken language to printed text using the Dragon Dictation System.       Follow Up:   No follow-ups on file.    Sterling Lin LCSW

## 2024-07-05 DIAGNOSIS — E11.9 TYPE 2 DIABETES MELLITUS WITHOUT COMPLICATION, WITHOUT LONG-TERM CURRENT USE OF INSULIN: ICD-10-CM

## 2024-07-07 ENCOUNTER — HOSPITAL ENCOUNTER (OUTPATIENT)
Dept: CARDIOLOGY | Facility: HOSPITAL | Age: 54
Discharge: HOME | End: 2024-07-07
Admitting: NURSE PRACTITIONER
Payer: MEDICARE

## 2024-07-07 DIAGNOSIS — R09.89 BRUIT OF LEFT CAROTID ARTERY: ICD-10-CM

## 2024-07-07 PROCEDURE — 93880 EXTRACRANIAL BILAT STUDY: CPT

## 2024-07-07 PROCEDURE — 93880 EXTRACRANIAL BILAT STUDY: CPT | Performed by: INTERNAL MEDICINE

## 2024-07-09 LAB
BH CV XLRA MEAS LEFT DIST CCA EDV: 19.2 CM/SEC
BH CV XLRA MEAS LEFT DIST CCA PSV: 82.4 CM/SEC
BH CV XLRA MEAS LEFT DIST ICA EDV: 34.9 CM/SEC
BH CV XLRA MEAS LEFT DIST ICA PSV: 99.7 CM/SEC
BH CV XLRA MEAS LEFT ICA/CCA RATIO: 1.07
BH CV XLRA MEAS LEFT MID CCA EDV: 22.4 CM/SEC
BH CV XLRA MEAS LEFT MID CCA PSV: 93.3 CM/SEC
BH CV XLRA MEAS LEFT MID ICA EDV: 26.8 CM/SEC
BH CV XLRA MEAS LEFT MID ICA PSV: 92.6 CM/SEC
BH CV XLRA MEAS LEFT PROX CCA EDV: 21.7 CM/SEC
BH CV XLRA MEAS LEFT PROX CCA PSV: 98.4 CM/SEC
BH CV XLRA MEAS LEFT PROX ECA PSV: 91.3 CM/SEC
BH CV XLRA MEAS LEFT PROX ICA EDV: 20.1 CM/SEC
BH CV XLRA MEAS LEFT PROX ICA PSV: 74.3 CM/SEC
BH CV XLRA MEAS LEFT PROX SCLA PSV: 240.3 CM/SEC
BH CV XLRA MEAS LEFT VERTEBRAL A EDV: 17.7 CM/SEC
BH CV XLRA MEAS LEFT VERTEBRAL A PSV: 72.3 CM/SEC
BH CV XLRA MEAS RIGHT DIST CCA EDV: 24.5 CM/SEC
BH CV XLRA MEAS RIGHT DIST CCA PSV: 77.5 CM/SEC
BH CV XLRA MEAS RIGHT DIST ICA EDV: 43.7 CM/SEC
BH CV XLRA MEAS RIGHT DIST ICA PSV: 119.4 CM/SEC
BH CV XLRA MEAS RIGHT ICA/CCA RATIO: 1.42
BH CV XLRA MEAS RIGHT MID CCA EDV: 23.8 CM/SEC
BH CV XLRA MEAS RIGHT MID CCA PSV: 84.3 CM/SEC
BH CV XLRA MEAS RIGHT MID ICA EDV: 38.1 CM/SEC
BH CV XLRA MEAS RIGHT MID ICA PSV: 108.2 CM/SEC
BH CV XLRA MEAS RIGHT PROX CCA EDV: 20.2 CM/SEC
BH CV XLRA MEAS RIGHT PROX CCA PSV: 111.8 CM/SEC
BH CV XLRA MEAS RIGHT PROX ECA PSV: 93.8 CM/SEC
BH CV XLRA MEAS RIGHT PROX ICA EDV: 22.9 CM/SEC
BH CV XLRA MEAS RIGHT PROX ICA PSV: 89.1 CM/SEC
BH CV XLRA MEAS RIGHT PROX SCLA PSV: 126.48 CM/SEC
BH CV XLRA MEAS RIGHT VERTEBRAL A EDV: 25.6 CM/SEC
BH CV XLRA MEAS RIGHT VERTEBRAL A PSV: 93.6 CM/SEC
LEFT ARM BP: NORMAL MMHG
RIGHT ARM BP: NORMAL MMHG

## 2024-07-10 NOTE — PROGRESS NOTES
[facility] Cardiothoracic Surgery Office Follow Up Note     Date of Encounter: 2024     Name: Hafsa Barron  : 1970     Referred By: No ref. provider found  PCP: Jocy Snow MD    Chief Complaint:    Chief Complaint   Patient presents with    Subclavian Artery Stenosis     1 year follow-up with results from carotid stenosis.        Subjective      History of Present Illness:    Hafsa Barron is a 53 y.o. female former smoker with history of muscular dystrophy, fibromyalgia, GIORGIO, HTN, HLD on statin therapy, non-insulin-dependent DM, CVA  with left-sided deficits,  and left subclavian stenosis s/p left subclavian stent 2018 at . She was referred to Dr Calero in , found to be asymptomatic, and returns with annual surveillance duplex. Pt denies hx of TIA or CVA since last visit, has had no new focal neurologic dysfunction, specifically vision changes or amaurosis fugax.  She has no change in her chronic mild LUE weakness/numbness from her stroke in . She follows with cardiologist Dr Peralta.     Review of Systems:  Review of Systems   Constitutional: Negative for chills, decreased appetite, diaphoresis, fever, malaise/fatigue, night sweats, weight gain and weight loss.   HENT:  Negative for hoarse voice.    Eyes:  Positive for blurred vision (cataracts). Negative for double vision and visual disturbance.   Cardiovascular:  Positive for leg swelling (left leg). Negative for chest pain, claudication, dyspnea on exertion, irregular heartbeat, near-syncope, orthopnea, palpitations, paroxysmal nocturnal dyspnea and syncope.   Respiratory:  Negative for cough, hemoptysis, shortness of breath, sputum production and wheezing.    Hematologic/Lymphatic: Negative for adenopathy and bleeding problem. Does not bruise/bleed easily.   Skin:  Negative for color change, nail changes, poor wound healing and rash.   Musculoskeletal:  Positive for muscle cramps and muscle weakness.  Negative for back pain and falls.   Gastrointestinal:  Negative for abdominal pain, dysphagia and heartburn.   Genitourinary:  Negative for flank pain.   Neurological:  Negative for brief paralysis, disturbances in coordination, dizziness, focal weakness, headaches, light-headedness, loss of balance, numbness, paresthesias, sensory change, vertigo and weakness.   Psychiatric/Behavioral:  Negative for depression and suicidal ideas.    Allergic/Immunologic: Negative for persistent infections.       I have reviewed the following portions of the patient's history: problem list, current medications, allergies, past surgical history, past medical history, past social history, past family history, and ROS and confirm it's accurate.    Allergies:  Allergies   Allergen Reactions    Fish-Derived Products Anaphylaxis    Penicillins Anaphylaxis and Shortness Of Breath    Shellfish Allergy Anaphylaxis       Medications:      Current Outpatient Medications:     albuterol (PROVENTIL) (2.5 MG/3ML) 0.083% nebulizer solution, Take 2.5 mg by nebulization Every 4 (Four) Hours As Needed for Wheezing., Disp: 90 vial, Rfl: 1    amLODIPine (NORVASC) 2.5 MG tablet, TAKE 1 TABLET BY MOUTH DAILY, Disp: 90 tablet, Rfl: 1    aspirin 81 MG EC tablet, Take 1 tablet by mouth Daily., Disp: , Rfl:     atorvastatin (LIPITOR) 80 MG tablet, TAKE ONE TABLET BY MOUTH ONCE NIGHTLY, Disp: 90 tablet, Rfl: 1    baclofen (LIORESAL) 10 MG tablet, Take 1 tablet by mouth 3 (Three) Times a Day. (Patient taking differently: Take 1 tablet by mouth every night at bedtime.), Disp: 270 tablet, Rfl: 1    Blood Glucose Monitoring Suppl (ONE TOUCH ULTRA 2) w/Device kit, , Disp: , Rfl:     busPIRone (BUSPAR) 15 MG tablet, TAKE ONE TABLET BY MOUTH THREE TIMES A DAY, Disp: 270 tablet, Rfl: 1    calcium carbonate-vitamin d (CALCIUM 600+D HIGH POTENCY) 600-400 MG-UNIT per tablet, Take 1 tablet by mouth Daily., Disp: 90 tablet, Rfl: 2    clopidogrel (PLAVIX) 75 MG tablet, TAKE  ONE TABLET BY MOUTH DAILY, Disp: 90 tablet, Rfl: 3    diclofenac (VOLTAREN) 75 MG EC tablet, Take 1 tablet by mouth 2 (Two) Times a Day., Disp: 180 tablet, Rfl: 3    eszopiclone (LUNESTA) 3 MG tablet, TAKE ONE TABLET BY MOUTH ONCE NIGHTLY; TAKE IMMEDIATELY BEFORE BEDTIME, Disp: 30 tablet, Rfl: 2    famotidine (PEPCID) 20 MG tablet, TAKE ONE TABLET BY MOUTH DAILY, Disp: 90 tablet, Rfl: 1    fenofibrate (TRICOR) 145 MG tablet, TAKE 1 TABLET BY MOUTH DAILY, Disp: 90 tablet, Rfl: 1    fluconazole (DIFLUCAN) 150 MG tablet, Take 1 tablet by mouth 2 (Two) Times a Day., Disp: , Rfl:     gabapentin (NEURONTIN) 600 MG tablet, TAKE ONE TABLET BY MOUTH THREE TIMES A DAY, Disp: 270 tablet, Rfl: 2    hydrOXYzine (ATARAX) 25 MG tablet, TAKE ONE TABLET BY MOUTH THREE TIMES A DAY AS NEEDED FOR ANXIETY, Disp: 10 tablet, Rfl: 3    loratadine (CLARITIN) 10 MG tablet, Take 1 tablet by mouth Daily., Disp: 30 tablet, Rfl: 11    metFORMIN (GLUCOPHAGE) 1000 MG tablet, TAKE 1 TABLET BY MOUTH TWICE A DAY WITH A MEAL, Disp: 180 tablet, Rfl: 2    montelukast (SINGULAIR) 10 MG tablet, TAKE ONE TABLET BY MOUTH EVERY EVENING, Disp: 90 tablet, Rfl: 3    nystatin (MYCOSTATIN) 566160 UNIT/GM ointment, APPLY TO AFFECTED AREA(S) TWO TIMES A DAY, Disp: 30 g, Rfl: 1    nystatin (MYCOSTATIN) 539990 UNIT/GM powder, Apply  topically to the appropriate area as directed 3 (Three) Times a Day., Disp: 30 g, Rfl: 2    ONE TOUCH ULTRA TEST test strip, , Disp: , Rfl:     oxybutynin XL (DITROPAN-XL) 5 MG 24 hr tablet, Take 1 tablet by mouth Daily., Disp: 90 tablet, Rfl: 3    pantoprazole (PROTONIX) 40 MG EC tablet, Take 1 tablet by mouth Daily., Disp: 90 tablet, Rfl: 1    potassium chloride ER (K-TAB) 20 MEQ tablet controlled-release ER tablet, TAKE 1 TABLET BY MOUTH DAILY, Disp: 90 tablet, Rfl: 2    promethazine (PHENERGAN) 12.5 MG tablet, TAKE ONE TABLET BY MOUTH EVERY 8 HOURS AS NEEDED FOR NAUSEA OR VOMITING, Disp: 30 tablet, Rfl: 1    Semaglutide,0.25 or  0.5MG/DOS, (Ozempic, 0.25 or 0.5 MG/DOSE,) 2 MG/1.5ML solution pen-injector, Inject 0.5 mg under the skin into the appropriate area as directed 1 (One) Time Per Week., Disp: , Rfl:     spironolactone (ALDACTONE) 50 MG tablet, TAKE ONE TABLET BY MOUTH DAILY, Disp: 90 tablet, Rfl: 3    venlafaxine XR (EFFEXOR-XR) 75 MG 24 hr capsule, TAKE ONE CAPSULE BY MOUTH DAILY, Disp: 90 capsule, Rfl: 1    methylphenidate (Ritalin) 20 MG tablet, Take 1 tablet by mouth Daily for 90 days., Disp: 90 tablet, Rfl: 0    History:   Past Medical History:   Diagnosis Date    Achilles tendon rupture 08/2021    left    Allergic     Allergy     Ankle sprain     Anxiety     Arthritis     Asthma Allergies induced    Coronary artery disease     CTS (carpal tunnel syndrome)     Depression     Fibromyalgia, primary     Fracture, finger     GERD (gastroesophageal reflux disease)     Headache     High risk medication use 08/12/2016    History of blood clots     History of UTI     Hyperlipidemia     Hypertension     IBS (irritable bowel syndrome)     Internal hemorrhoid     Kidney stone     Knee sprain     Knee swelling     Low back pain     Low back strain     Muscular dystrophy     Muscular dystrophy     II    Myotonia     Obesity     Pulmonary embolism sub clavical artery clot stent put in Aug 2018    Rotator cuff syndrome     Stroke 08/31/2013    resdual- left sided weakness.     Tear of meniscus of knee     Type 2 diabetes mellitus without complication, without long-term current use of insulin        Past Surgical History:   Procedure Laterality Date    ACHILLES TENDON SURGERY Left 09/2021    Dr. Salinas achilles rupture repair    ANKLE OPEN REDUCTION INTERNAL FIXATION  Oct 2021    Achilles tendon ruptured had repair surgery    BARIATRIC SURGERY  4/29/2019    CHOLECYSTECTOMY  07/2004    COLONOSCOPY  07/2017    D & C WITH SUCTION      KNEE SURGERY Left     LYMPH NODE BIOPSY      neck    SUBCLAVIAN ARTERY STENT Left 08/2018    x2    WISDOM TOOTH  EXTRACTION         Social History     Socioeconomic History    Marital status:     Number of children: 2   Tobacco Use    Smoking status: Former     Current packs/day: 0.00     Average packs/day: 2.0 packs/day for 27.0 years (54.0 ttl pk-yrs)     Types: Electronic Cigarette, Cigarettes     Start date:      Quit date:      Years since quittin.5     Passive exposure: Past    Smokeless tobacco: Never   Vaping Use    Vaping status: Former    Substances: Flavoring    Devices: Pre-filled pod   Substance and Sexual Activity    Alcohol use: Yes    Drug use: No    Sexual activity: Yes     Partners: Male     Birth control/protection: Post-menopausal, None     Comment: Menapause no cycle since 2013        Family History   Problem Relation Age of Onset    Allergies Other     ADD / ADHD Other     Heart block Other     Other Other         CEREBROVASCULAR ACCIDENT     Hypertension Other     Cancer Other         LUNG CANCER    Hyperlipidemia Other     Alcohol abuse Mother     Depression Mother     Early death Mother          age 59 massive Heart Attack    Heart disease Mother         Mother  of Massive Heart attack    Hyperlipidemia Mother     Hypertension Mother     Miscarriages / Stillbirths Mother         1 I know of    Heart attack Mother          of  massive heart attack    Alcohol abuse Father     Cancer Father         Had Lung Cancer  had 1 lung till death    Diabetes Father         Lived on insuline shots    Early death Father          at age 53 due to Lung Cancer    Breast cancer Maternal Aunt     Learning disabilities Son         ADHD    Breast cancer Cousin     Ovarian cancer Neg Hx     Endometrial cancer Neg Hx     Uterine cancer Neg Hx     Colon cancer Neg Hx        Objective   Physical Exam:  Vitals:    24 1326 24 1327   BP: 128/70 140/80   BP Location: Right arm Left arm   Patient Position: Sitting Sitting   Pulse: 98    Temp: 97.5 °F (36.4 °C)    SpO2: 98%   "  Weight: 76.5 kg (168 lb 9.6 oz)    Height: 167.6 cm (66\")  Comment: patient reported       Body mass index is 27.21 kg/m².    Physical Exam  Vitals and nursing note reviewed.   Constitutional:       Appearance: Normal appearance.   Cardiovascular:      Rate and Rhythm: Normal rate.      Pulses:           Radial pulses are 2+ on the right side and 2+ on the left side.   Pulmonary:      Effort: Pulmonary effort is normal.   Skin:     General: Skin is warm and dry.   Neurological:      Mental Status: She is alert and oriented to person, place, and time. Mental status is at baseline.         Imaging/Labs:  Duplex Carotid Ultrasound CAR (07/07/2024 14:44)     Right internal carotid artery demonstrates a less than 50% stenosis.    Left internal carotid artery demonstrates a less than 50% stenosis.    Duplex Carotid Ultrasound CAR (08/18/2023 14:05)     Right internal carotid artery demonstrates normal flow without evidence of hemodynamically significant stenosis.    Left internal carotid artery demonstrates normal flow without evidence of hemodynamically significant stenosis.    Assessment / Plan      Assessment / Plan:  Diagnoses and all orders for this visit:    1. Thrombosis of subclavian artery s/p stent 2018 (Primary)       s/p left subclavian stent 2018 at   referred to Dr Calero in 2023  annual surveillance with no interval TIA or CVA symptoms. No LUE complaints or concerns  On exam she had equal BUE BPS and equal quality radial pulses  Her duplex is normal with antegrade flow to bilateral vertebrals   Compliant on DAPT and statin therapy   In the absence of symptoms and normal surveillance 5 years post-op will release back to her cardiologist Dr Peralta and defer to their service for ongoing surveillance requirements     Follow Up:   Return for PRN: on as needed basis.   Or sooner for any further concerns or worsening sign and symptoms. If unable to reach us in the office please dial 911 or go to the nearest " emergency department.      TAWANNA Valera  [facility] Cardiothoracic Surgery      Time Spent: I spent 25 minutes caring for Hafsa on this date of service. This time includes time spent by me in the following activities: preparing for the visit, reviewing tests, obtaining and/or reviewing a separately obtained history, performing a medically appropriate examination and/or evaluation, counseling and educating the patient/family/caregiver, ordering medications, tests, or procedures, referring and communicating with other health care professionals, documenting information in the medical record, independently interpreting results and communicating that information with the patient/family/caregiver, and care coordination.

## 2024-07-11 ENCOUNTER — OFFICE VISIT (OUTPATIENT)
Dept: CARDIAC SURGERY | Facility: CLINIC | Age: 54
End: 2024-07-11
Payer: MEDICARE

## 2024-07-11 ENCOUNTER — LAB (OUTPATIENT)
Dept: INTERNAL MEDICINE | Facility: CLINIC | Age: 54
End: 2024-07-11
Payer: MEDICARE

## 2024-07-11 ENCOUNTER — TELEPHONE (OUTPATIENT)
Dept: INTERNAL MEDICINE | Facility: CLINIC | Age: 54
End: 2024-07-11

## 2024-07-11 VITALS
BODY MASS INDEX: 27.1 KG/M2 | TEMPERATURE: 97.5 F | WEIGHT: 168.6 LBS | DIASTOLIC BLOOD PRESSURE: 80 MMHG | OXYGEN SATURATION: 98 % | HEIGHT: 66 IN | HEART RATE: 98 BPM | SYSTOLIC BLOOD PRESSURE: 140 MMHG

## 2024-07-11 DIAGNOSIS — I83.12 VARICOSE VEINS OF BOTH LOWER EXTREMITIES WITH INFLAMMATION: Primary | ICD-10-CM

## 2024-07-11 DIAGNOSIS — I83.11 VARICOSE VEINS OF BOTH LOWER EXTREMITIES WITH INFLAMMATION: Primary | ICD-10-CM

## 2024-07-11 DIAGNOSIS — I74.8: Primary | ICD-10-CM

## 2024-07-11 DIAGNOSIS — Z11.1 SCREENING-PULMONARY TB: Primary | ICD-10-CM

## 2024-07-11 PROCEDURE — 86480 TB TEST CELL IMMUN MEASURE: CPT | Performed by: INTERNAL MEDICINE

## 2024-07-11 PROCEDURE — 36415 COLL VENOUS BLD VENIPUNCTURE: CPT | Performed by: INTERNAL MEDICINE

## 2024-07-11 NOTE — TELEPHONE ENCOUNTER
Can Dr. Snow put referral in for her to see doctor at [facility], for her varicose veins

## 2024-07-13 LAB
GAMMA INTERFERON BACKGROUND BLD IA-ACNC: 0.02 IU/ML
M TB IFN-G BLD-IMP: NEGATIVE
M TB IFN-G CD4+ BCKGRND COR BLD-ACNC: 0.02 IU/ML
M TB IFN-G CD4+CD8+ BCKGRND COR BLD-ACNC: 0.06 IU/ML
MITOGEN IGNF BCKGRD COR BLD-ACNC: >10 IU/ML
SERVICE CMNT-IMP: NORMAL

## 2024-07-23 ENCOUNTER — TELEPHONE (OUTPATIENT)
Dept: INTERNAL MEDICINE | Facility: CLINIC | Age: 54
End: 2024-07-23
Payer: MEDICARE

## 2024-07-23 NOTE — TELEPHONE ENCOUNTER
Caller: Hafsa Barron    Relationship: Self    Best call back number: [phone]     What is the best time to reach you: ANYTIME    Who are you requesting to speak with (clinical staff, provider,  specific staff member): REFERRALS    Do you know the name of the person who called: TONO NOLAND    What was the call regarding: TONO WITH [employer] STATED THE PATIENT CALLED AND STATED SHE WAS TOLD THE REFERRAL FOR VASCULAR SURGERY WAS HELD UP BY INS. SHE WANTED TO CLARIFY EXACTLY WHAT THAT MEANT. I WAS ASKED TO LEAVE A TE BY  AND TONO ASKED THE CALLBACK GO TO THE PATIENT TO ADVISE WHAT IS EXACTLY THE ISSUE    Is it okay if the provider responds through MyChart: CALLBACK TODAY

## 2024-07-30 NOTE — TELEPHONE ENCOUNTER
THE REFERRAL HAS BEEN REFAXED TO [facility] [phone]. THERE HAS NOT BEEN AUTH OBTAINED BY OUR OFFICE.

## 2024-08-15 ENCOUNTER — OFFICE VISIT (OUTPATIENT)
Dept: ORTHOPEDIC SURGERY | Facility: CLINIC | Age: 54
End: 2024-08-15
Payer: MEDICARE

## 2024-08-15 VITALS
WEIGHT: 164.8 LBS | BODY MASS INDEX: 26.48 KG/M2 | SYSTOLIC BLOOD PRESSURE: 132 MMHG | DIASTOLIC BLOOD PRESSURE: 80 MMHG | HEIGHT: 66 IN

## 2024-08-15 DIAGNOSIS — M17.12 PRIMARY OSTEOARTHRITIS OF LEFT KNEE: Primary | ICD-10-CM

## 2024-08-15 RX ORDER — BUPIVACAINE HYDROCHLORIDE 2.5 MG/ML
3 INJECTION, SOLUTION EPIDURAL; INFILTRATION; INTRACAUDAL
Status: COMPLETED | OUTPATIENT
Start: 2024-08-15 | End: 2024-08-15

## 2024-08-15 RX ORDER — LIDOCAINE HYDROCHLORIDE 10 MG/ML
3 INJECTION, SOLUTION EPIDURAL; INFILTRATION; INTRACAUDAL; PERINEURAL
Status: COMPLETED | OUTPATIENT
Start: 2024-08-15 | End: 2024-08-15

## 2024-08-15 RX ORDER — TRIAMCINOLONE ACETONIDE 40 MG/ML
80 INJECTION, SUSPENSION INTRA-ARTICULAR; INTRAMUSCULAR
Status: COMPLETED | OUTPATIENT
Start: 2024-08-15 | End: 2024-08-15

## 2024-08-15 RX ADMIN — BUPIVACAINE HYDROCHLORIDE 3 ML: 2.5 INJECTION, SOLUTION EPIDURAL; INFILTRATION; INTRACAUDAL at 09:52

## 2024-08-15 RX ADMIN — LIDOCAINE HYDROCHLORIDE 3 ML: 10 INJECTION, SOLUTION EPIDURAL; INFILTRATION; INTRACAUDAL; PERINEURAL at 09:52

## 2024-08-15 RX ADMIN — TRIAMCINOLONE ACETONIDE 80 MG: 40 INJECTION, SUSPENSION INTRA-ARTICULAR; INTRAMUSCULAR at 09:52

## 2024-08-15 NOTE — PROGRESS NOTES
Orthopaedic Clinic Note: Knee Established Patient    Chief Complaint   Patient presents with    Follow-up     3 month f/u--Primary osteoarthritis of left knee         HPI    It has been 3  month(s) since Ms. Barron's last visit. She returns to clinic today for follow-up left knee osteoarthritis.  Patient had 1 cortisone injection left knee 3 months ago.  The injection worked well for about 2 months.  Over the last month, her pain has returned.  She rates her pain 8/10 on the pain scale today.  She is ambulating with the assistance of a cane.  Denies fevers chills or constitutional symptoms.  Overall she is doing worse since the injection wore off.    Past Medical History:   Diagnosis Date    Achilles tendon rupture 08/2021    left    Allergic     Allergy     Ankle sprain     Anxiety     Arthritis     Asthma Allergies induced    Coronary artery disease     CTS (carpal tunnel syndrome)     Depression     Fibromyalgia, primary     Fracture, finger     GERD (gastroesophageal reflux disease)     Headache     High risk medication use 08/12/2016    History of blood clots     History of UTI     Hyperlipidemia     Hypertension     IBS (irritable bowel syndrome)     Internal hemorrhoid     Kidney stone     Knee sprain     Knee swelling     Low back pain     Low back strain     Muscular dystrophy     Muscular dystrophy     II    Myotonia     Obesity     Pulmonary embolism sub clavical artery clot stent put in Aug 2018    Rotator cuff syndrome     Stroke 08/31/2013    resdual- left sided weakness.     Tear of meniscus of knee     Type 2 diabetes mellitus without complication, without long-term current use of insulin       Past Surgical History:   Procedure Laterality Date    ACHILLES TENDON SURGERY Left 09/2021    Dr. Salinas achilles rupture repair    ANKLE OPEN REDUCTION INTERNAL FIXATION  Oct 2021    Achilles tendon ruptured had repair surgery    BARIATRIC SURGERY  4/29/2019    CHOLECYSTECTOMY  07/2004    COLONOSCOPY   2017    D & C WITH SUCTION      KNEE SURGERY Left     LYMPH NODE BIOPSY      neck    SUBCLAVIAN ARTERY STENT Left 08/2018    x2    WISDOM TOOTH EXTRACTION        Family History   Problem Relation Age of Onset    Allergies Other     ADD / ADHD Other     Heart block Other     Hypertension Other     Cancer Other         LUNG CANCER    Hyperlipidemia Other     Alcohol abuse Mother     Depression Mother     Early death Mother          age 59 massive Heart Attack    Heart disease Mother         Mother  of Massive Heart attack    Hyperlipidemia Mother     Hypertension Mother     Miscarriages / Stillbirths Mother         1 I know of    Heart attack Mother          of  massive heart attack    Alcohol abuse Father     Cancer Father         Lung Cancer Diabetes I    Diabetes Father         Lived on insuline shots    Early death Father          at age 53 due to Lung Cancer    Breast cancer Maternal Aunt     Learning disabilities Son         ADHD    Breast cancer Cousin     Ovarian cancer Neg Hx     Endometrial cancer Neg Hx     Uterine cancer Neg Hx     Colon cancer Neg Hx      Social History     Socioeconomic History    Marital status:     Number of children: 2   Tobacco Use    Smoking status: Former     Current packs/day: 0.00     Average packs/day: 2.0 packs/day for 27.0 years (54.0 ttl pk-yrs)     Types: Cigarettes, Electronic Cigarette     Start date:      Quit date:      Years since quittin.6     Passive exposure: Past    Smokeless tobacco: Never    Tobacco comments:     Smoked for 29 years quit 2013 due to had stroke.   Vaping Use    Vaping status: Former    Substances: Flavoring    Devices: Pre-filled pod   Substance and Sexual Activity    Alcohol use: Not Currently    Drug use: No    Sexual activity: Not Currently     Partners: Male     Birth control/protection: Post-menopausal, None     Comment: Menapause no cycle since 2013      Current Outpatient Medications on File  Prior to Visit   Medication Sig Dispense Refill    albuterol (PROVENTIL) (2.5 MG/3ML) 0.083% nebulizer solution Take 2.5 mg by nebulization Every 4 (Four) Hours As Needed for Wheezing. 90 vial 1    amLODIPine (NORVASC) 2.5 MG tablet TAKE 1 TABLET BY MOUTH DAILY 90 tablet 1    aspirin 81 MG EC tablet Take 1 tablet by mouth Daily.      atorvastatin (LIPITOR) 80 MG tablet TAKE ONE TABLET BY MOUTH ONCE NIGHTLY 90 tablet 1    baclofen (LIORESAL) 10 MG tablet Take 1 tablet by mouth 3 (Three) Times a Day. (Patient taking differently: Take 1 tablet by mouth every night at bedtime.) 270 tablet 1    Blood Glucose Monitoring Suppl (ONE TOUCH ULTRA 2) w/Device kit       busPIRone (BUSPAR) 15 MG tablet TAKE ONE TABLET BY MOUTH THREE TIMES A  tablet 1    calcium carbonate-vitamin d (CALCIUM 600+D HIGH POTENCY) 600-400 MG-UNIT per tablet Take 1 tablet by mouth Daily. 90 tablet 2    clopidogrel (PLAVIX) 75 MG tablet TAKE ONE TABLET BY MOUTH DAILY 90 tablet 3    diclofenac (VOLTAREN) 75 MG EC tablet Take 1 tablet by mouth 2 (Two) Times a Day. 180 tablet 3    eszopiclone (LUNESTA) 3 MG tablet TAKE ONE TABLET BY MOUTH ONCE NIGHTLY; TAKE IMMEDIATELY BEFORE BEDTIME 30 tablet 2    famotidine (PEPCID) 20 MG tablet TAKE ONE TABLET BY MOUTH DAILY 90 tablet 1    fenofibrate (TRICOR) 145 MG tablet TAKE 1 TABLET BY MOUTH DAILY 90 tablet 1    fluconazole (DIFLUCAN) 150 MG tablet Take 1 tablet by mouth 2 (Two) Times a Day.      gabapentin (NEURONTIN) 600 MG tablet TAKE ONE TABLET BY MOUTH THREE TIMES A  tablet 2    hydrOXYzine (ATARAX) 25 MG tablet TAKE ONE TABLET BY MOUTH THREE TIMES A DAY AS NEEDED FOR ANXIETY 10 tablet 3    loratadine (CLARITIN) 10 MG tablet Take 1 tablet by mouth Daily. 30 tablet 11    metFORMIN (GLUCOPHAGE) 1000 MG tablet TAKE 1 TABLET BY MOUTH TWICE A DAY WITH A MEAL 180 tablet 2    montelukast (SINGULAIR) 10 MG tablet TAKE ONE TABLET BY MOUTH EVERY EVENING 90 tablet 3    nystatin (MYCOSTATIN) 062285 UNIT/GM  ointment APPLY TO AFFECTED AREA(S) TWO TIMES A DAY 30 g 1    nystatin (MYCOSTATIN) 741191 UNIT/GM powder Apply  topically to the appropriate area as directed 3 (Three) Times a Day. 30 g 2    ONE TOUCH ULTRA TEST test strip       oxybutynin XL (DITROPAN-XL) 5 MG 24 hr tablet Take 1 tablet by mouth Daily. 90 tablet 3    pantoprazole (PROTONIX) 40 MG EC tablet Take 1 tablet by mouth Daily. 90 tablet 1    potassium chloride ER (K-TAB) 20 MEQ tablet controlled-release ER tablet TAKE 1 TABLET BY MOUTH DAILY 90 tablet 2    promethazine (PHENERGAN) 12.5 MG tablet TAKE ONE TABLET BY MOUTH EVERY 8 HOURS AS NEEDED FOR NAUSEA OR VOMITING 30 tablet 1    Semaglutide,0.25 or 0.5MG/DOS, (Ozempic, 0.25 or 0.5 MG/DOSE,) 2 MG/1.5ML solution pen-injector Inject 0.5 mg under the skin into the appropriate area as directed 1 (One) Time Per Week.      spironolactone (ALDACTONE) 50 MG tablet TAKE ONE TABLET BY MOUTH DAILY 90 tablet 3    venlafaxine XR (EFFEXOR-XR) 75 MG 24 hr capsule TAKE ONE CAPSULE BY MOUTH DAILY 90 capsule 1    methylphenidate (Ritalin) 20 MG tablet Take 1 tablet by mouth Daily for 90 days. 90 tablet 0    [DISCONTINUED] modafinil (PROVIGIL) 200 MG tablet Take 1 tablet by mouth Daily. 30 tablet 3     No current facility-administered medications on file prior to visit.      Allergies   Allergen Reactions    Fish-Derived Products Anaphylaxis    Penicillins Anaphylaxis and Shortness Of Breath    Shellfish Allergy Anaphylaxis        Review of Systems   Constitutional: Negative.    HENT: Negative.     Eyes: Negative.    Respiratory: Negative.     Cardiovascular: Negative.    Gastrointestinal: Negative.    Endocrine: Negative.    Genitourinary: Negative.    Musculoskeletal:  Positive for arthralgias.   Skin: Negative.    Allergic/Immunologic: Negative.    Neurological: Negative.    Hematological: Negative.    Psychiatric/Behavioral: Negative.          The patient's Review of Systems was personally reviewed and confirmed as  "accurate.    Physical Exam  Blood pressure 132/80, height 167.6 cm (65.98\"), weight 74.8 kg (164 lb 12.8 oz), not currently breastfeeding.    Body mass index is 26.61 kg/m².    GENERAL APPEARANCE: awake, alert, oriented, in no acute distress and well developed, well nourished  LUNGS:  breathing nonlabored  EXTREMITIES: no clubbing, cyanosis  PERIPHERAL PULSES: palpable dorsalis pedis and posterior tibial pulses bilaterally.    GAIT:  Antalgic        ----------  Left Knee Exam:  ----------  ALIGNMENT: severe varus, correctable to neutral  ----------  RANGE OF MOTION:  Decreased (5 - 115 degrees) with no extensor lag  LIGAMENTOUS STABILITY:   stable to varus and valgus stress at terminal extension and 30 degrees; retensioning of the MCL is appreciated with valgus stress at 30 degrees consistent with medial compartment degeneration  ----------  STRENGTH:  KNEE FLEXION 4/5  KNEE EXTENSION  4/5  ANKLE DORSIFLEXION  5/5  ANKLE PLANTARFLEXION  5/5  ----------  PAIN WITH PALPATION:global  KNEE EFFUSION: yes, mild effusion  PAIN WITH KNEE ROM: yes  PATELLAR CREPITUS:  yes, painful and symptomatic  ----------  SENSATION TO LIGHT TOUCH:  DEEP PERONEAL/SUPERFICIAL PERONEAL/SURAL/SAPHENOUS/TIBIAL:    intact  ----------  EDEMA:  no  ERYTHEMA:    no  WOUNDS/INCISIONS:   no  _____________________________________________________________________  _____________________________________________________________________    RADIOGRAPHIC FINDINGS:   No new imaging today    Assessment/Plan:   Diagnosis Plan   1. Primary osteoarthritis of left knee          The patient has failed conservative treatment measures and is a candidate for joint arthroplasty.  I discussed the joint arthroplasty surgical process as well as the recovery and rehabilitation time frame.  The patient asked several questions regarding the joint arthroplasty surgery, which were answered accordingly.  Ultimately, the patient declines surgical intervention at this time and " wishes to continue with conservative treatment measures.  She is wanting to wait till the end of the year to proceed to surgery due to insurance costs.  Alternative conservative treatment measures were discussed including bracing, therapy, topical/oral anti-inflammatories, activity modification, and weight loss.  The patient considered these treatment options and wishes to proceed with corticosteroid injection(s) today.  Therefore we will proceed with corticosteroid injection(s) today.  Follow-up 3 months for repeat evaluation with x-ray 4 views left knee on return.    Procedure Note:  I discussed with the patient the potential benefits of performing a therapeutic injection of the left knee as well as potential risks including but not limited to infection, swelling, pain, bleeding, bruising, nerve/vessel damage, skin color changes, transient elevation in blood glucose levels, and fat atrophy. After informed consent and verifying correct patient, procedure site, and type of procedure, the area was prepped with alcohol, ethyl chloride was used to numb the skin. Via the superolateral approach, 3 cc of 1% lidocaine, 3 cc of 0.25% Marcaine and 2 cc of 40mg/ml of Kenalog were injected into the left knee. The patient tolerated the procedure well. There were no complications. A sterile dressing was placed over the injection site.      Mk Sim MD  08/15/24  09:55 EDT

## 2024-08-15 NOTE — PROGRESS NOTES
Procedure   - Large Joint Arthrocentesis: L knee on 8/15/2024 9:52 AM  Indications: pain  Details: 21 G needle, superolateral approach  Medications: 3 mL lidocaine PF 1% 1 %; 3 mL bupivacaine (PF) 0.25 %; 80 mg triamcinolone acetonide 40 MG/ML  Outcome: tolerated well, no immediate complications  Procedure, treatment alternatives, risks and benefits explained, specific risks discussed. Immediately prior to procedure a time out was called to verify the correct patient, procedure, equipment, support staff and site/side marked as required. Patient was prepped and draped in the usual sterile fashion.

## 2024-08-25 DIAGNOSIS — G47.19 DAYTIME HYPERSOMNOLENCE: ICD-10-CM

## 2024-08-26 RX ORDER — METHYLPHENIDATE HYDROCHLORIDE 20 MG/1
20 TABLET ORAL DAILY
Qty: 90 TABLET | Refills: 0 | Status: SHIPPED | OUTPATIENT
Start: 2024-08-26 | End: 2024-11-24

## 2024-08-26 NOTE — TELEPHONE ENCOUNTER
Rx Refill Note  Requested Prescriptions     Pending Prescriptions Disp Refills    methylphenidate (Ritalin) 20 MG tablet 90 tablet 0     Sig: Take 1 tablet by mouth Daily for 90 days.      Last filled: 10/25/23 90 days with 0 refills    Last office visit with prescribing clinician: 4/22/2024      Next office visit with prescribing clinician: Visit date not found. Contacting pt to schedule her yearly follow up      Olvin Garcia MA  08/26/24, 09:55 EDT

## 2024-08-30 RX ORDER — FAMOTIDINE 20 MG/1
20 TABLET, FILM COATED ORAL DAILY
Qty: 90 TABLET | Refills: 1 | Status: SHIPPED | OUTPATIENT
Start: 2024-08-30

## 2024-09-03 DIAGNOSIS — F51.04 CHRONIC INSOMNIA: ICD-10-CM

## 2024-09-03 RX ORDER — ESZOPICLONE 3 MG/1
3 TABLET, FILM COATED ORAL NIGHTLY
Qty: 30 TABLET | Refills: 2 | Status: SHIPPED | OUTPATIENT
Start: 2024-09-03

## 2024-09-23 DIAGNOSIS — F32.A ANXIETY AND DEPRESSION: ICD-10-CM

## 2024-09-23 DIAGNOSIS — F41.9 ANXIETY AND DEPRESSION: ICD-10-CM

## 2024-09-23 RX ORDER — VENLAFAXINE HYDROCHLORIDE 75 MG/1
CAPSULE, EXTENDED RELEASE ORAL
Qty: 90 CAPSULE | Refills: 1 | Status: SHIPPED | OUTPATIENT
Start: 2024-09-23

## 2024-09-23 RX ORDER — AMLODIPINE BESYLATE 2.5 MG/1
TABLET ORAL
Qty: 90 TABLET | Refills: 1 | Status: SHIPPED | OUTPATIENT
Start: 2024-09-23

## 2024-10-01 RX ORDER — BUSPIRONE HYDROCHLORIDE 15 MG/1
TABLET ORAL
Qty: 270 TABLET | Refills: 1 | Status: SHIPPED | OUTPATIENT
Start: 2024-10-01

## 2024-10-04 RX ORDER — SEMAGLUTIDE 1.34 MG/ML
0.5 INJECTION, SOLUTION SUBCUTANEOUS WEEKLY
OUTPATIENT
Start: 2024-10-04

## 2024-10-04 NOTE — TELEPHONE ENCOUNTER
Patient called to follow up about her ozempic, she was in communication with Deena about the dosage. She needs to pick it up form the office. She also would like to know what happened with her Lunesta as it was not refilled.

## 2024-10-22 ENCOUNTER — TELEPHONE (OUTPATIENT)
Dept: INTERNAL MEDICINE | Facility: CLINIC | Age: 54
End: 2024-10-22

## 2024-10-30 ENCOUNTER — TELEPHONE (OUTPATIENT)
Dept: INTERNAL MEDICINE | Facility: CLINIC | Age: 54
End: 2024-10-30

## 2024-10-30 NOTE — TELEPHONE ENCOUNTER
Spoke with pt and explained what was faxed over to us. I signed it and faxed it back. Will try and call [facility] pt assist program to see what else they need no samples available

## 2024-10-30 NOTE — TELEPHONE ENCOUNTER
Caller: Hafsa Barron    Relationship: Self    Best call back number: [phone]     What is the best time to reach you: ANYTIME    Who are you requesting to speak with (clinical staff, provider,  specific staff member): MARAH    Do you know the name of the person who called: HAFSA    What was the call regarding: PATIENT WAS RETURNING THE MISSED CALL FROM MARAH    Is it okay if the provider responds through [facility]: NO PLEASE CALL

## 2024-11-08 ENCOUNTER — PATIENT MESSAGE (OUTPATIENT)
Dept: INTERNAL MEDICINE | Facility: CLINIC | Age: 54
End: 2024-11-08
Payer: MEDICARE

## 2024-11-15 ENCOUNTER — OFFICE VISIT (OUTPATIENT)
Dept: ORTHOPEDIC SURGERY | Facility: CLINIC | Age: 54
End: 2024-11-15
Payer: MEDICARE

## 2024-11-15 VITALS
DIASTOLIC BLOOD PRESSURE: 90 MMHG | WEIGHT: 178 LBS | BODY MASS INDEX: 28.61 KG/M2 | HEIGHT: 66 IN | SYSTOLIC BLOOD PRESSURE: 142 MMHG

## 2024-11-15 DIAGNOSIS — M17.12 PRIMARY OSTEOARTHRITIS OF LEFT KNEE: Primary | ICD-10-CM

## 2024-11-15 RX ORDER — LIDOCAINE HYDROCHLORIDE 10 MG/ML
3 INJECTION, SOLUTION EPIDURAL; INFILTRATION; INTRACAUDAL; PERINEURAL
Status: COMPLETED | OUTPATIENT
Start: 2024-11-15 | End: 2024-11-15

## 2024-11-15 RX ORDER — BUPIVACAINE HYDROCHLORIDE 2.5 MG/ML
3 INJECTION, SOLUTION EPIDURAL; INFILTRATION; INTRACAUDAL
Status: COMPLETED | OUTPATIENT
Start: 2024-11-15 | End: 2024-11-15

## 2024-11-15 RX ORDER — TRIAMCINOLONE ACETONIDE 40 MG/ML
80 INJECTION, SUSPENSION INTRA-ARTICULAR; INTRAMUSCULAR
Status: COMPLETED | OUTPATIENT
Start: 2024-11-15 | End: 2024-11-15

## 2024-11-15 RX ADMIN — LIDOCAINE HYDROCHLORIDE 3 ML: 10 INJECTION, SOLUTION EPIDURAL; INFILTRATION; INTRACAUDAL; PERINEURAL at 10:29

## 2024-11-15 RX ADMIN — BUPIVACAINE HYDROCHLORIDE 3 ML: 2.5 INJECTION, SOLUTION EPIDURAL; INFILTRATION; INTRACAUDAL at 10:29

## 2024-11-15 RX ADMIN — TRIAMCINOLONE ACETONIDE 80 MG: 40 INJECTION, SUSPENSION INTRA-ARTICULAR; INTRAMUSCULAR at 10:29

## 2024-11-15 NOTE — PROGRESS NOTES
Procedure   - Large Joint Arthrocentesis: L knee on 11/15/2024 10:29 AM  Indications: pain  Details: 21 G needle, superolateral approach  Medications: 80 mg triamcinolone acetonide 40 MG/ML; 3 mL lidocaine PF 1% 1 %; 3 mL bupivacaine (PF) 0.25 %  Outcome: tolerated well, no immediate complications  Procedure, treatment alternatives, risks and benefits explained, specific risks discussed. Consent was given by the patient. Immediately prior to procedure a time out was called to verify the correct patient, procedure, equipment, support staff and site/side marked as required. Patient was prepped and draped in the usual sterile fashion.

## 2024-11-15 NOTE — PROGRESS NOTES
Orthopaedic Clinic Note: Knee Established Patient    Chief Complaint   Patient presents with    Follow-up     3 month follow up - Primary osteoarthritis of left knee         HPI    It has been 3  month(s) since Ms. Barron's last visit. She returns to clinic today for follow-up left knee osteoarthritis.  Patient aware cortisone injection left knee 3 months ago.  The injection provided 2-1/2 months of relief.  Pain is gradually returned.  She rates her pain 8/10 on the pain scale.  She is having worsening swelling and stiffness in the knees and difficulty walking weightbearing activities.  Overall she is doing worse since the injection wore off.    Past Medical History:   Diagnosis Date    Achilles tendon rupture 08/2021    left    Allergic     Allergy     Ankle sprain     Anxiety     Arthritis     Asthma Allergies induced    Coronary artery disease     CTS (carpal tunnel syndrome)     Depression     Fibromyalgia, primary     Fracture, finger     GERD (gastroesophageal reflux disease)     Headache     High risk medication use 08/12/2016    History of blood clots     History of UTI     Hyperlipidemia     Hypertension     IBS (irritable bowel syndrome)     Internal hemorrhoid     Kidney stone     Knee sprain     Knee swelling     Low back pain     Low back strain     Muscular dystrophy     Muscular dystrophy     II    Myotonia     Obesity     Pulmonary embolism sub clavical artery clot stent put in Aug 2018    Rotator cuff syndrome     Stroke 08/31/2013    resdual- left sided weakness.     Tear of meniscus of knee     Type 2 diabetes mellitus without complication, without long-term current use of insulin       Past Surgical History:   Procedure Laterality Date    ACHILLES TENDON SURGERY Left 09/2021    Dr. Salinas achilles rupture repair    ANKLE OPEN REDUCTION INTERNAL FIXATION  Oct 2021    Achilles tendon ruptured had repair surgery    BARIATRIC SURGERY  4/29/2019    CHOLECYSTECTOMY  07/2004    COLONOSCOPY  07/2017     D & C WITH SUCTION      KNEE SURGERY Left     LYMPH NODE BIOPSY      neck    SUBCLAVIAN ARTERY STENT Left 08/2018    x2    WISDOM TOOTH EXTRACTION        Family History   Problem Relation Age of Onset    Allergies Other     ADD / ADHD Other     Heart block Other     Hypertension Other     Cancer Other         LUNG CANCER    Hyperlipidemia Other     Alcohol abuse Mother     Depression Mother     Early death Mother          age 59 massive Heart Attack    Heart disease Mother         Mother  of Massive Heart attack    Hyperlipidemia Mother     Hypertension Mother     Miscarriages / Stillbirths Mother         1 I know of    Heart attack Mother          of  massive heart attack    Alcohol abuse Father     Cancer Father         Lung Cancer Diabetes I    Diabetes Father         Lived on insuline shots    Early death Father          at age 53 due to Lung Cancer    Breast cancer Maternal Aunt     Learning disabilities Son         ADHD    Breast cancer Cousin     Ovarian cancer Neg Hx     Endometrial cancer Neg Hx     Uterine cancer Neg Hx     Colon cancer Neg Hx      Social History     Socioeconomic History    Marital status:     Number of children: 2   Tobacco Use    Smoking status: Former     Current packs/day: 0.00     Average packs/day: 2.0 packs/day for 27.0 years (54.0 ttl pk-yrs)     Types: Cigarettes, Electronic Cigarette     Start date:      Quit date:      Years since quittin.8     Passive exposure: Past    Smokeless tobacco: Never    Tobacco comments:     Smoked for 29 years quit 2013 due to had stroke.   Vaping Use    Vaping status: Former    Substances: Flavoring    Devices: Pre-filled pod   Substance and Sexual Activity    Alcohol use: Not Currently    Drug use: No    Sexual activity: Not Currently     Partners: Male     Birth control/protection: Post-menopausal, None     Comment: Menapause no cycle since 2013      Current Outpatient Medications on File Prior to  Visit   Medication Sig Dispense Refill    albuterol (PROVENTIL) (2.5 MG/3ML) 0.083% nebulizer solution Take 2.5 mg by nebulization Every 4 (Four) Hours As Needed for Wheezing. 90 vial 1    amLODIPine (NORVASC) 2.5 MG tablet TAKE 1 TABLET BY MOUTH DAILY 90 tablet 1    aspirin 81 MG EC tablet Take 1 tablet by mouth Daily.      atorvastatin (LIPITOR) 80 MG tablet TAKE ONE TABLET BY MOUTH ONCE NIGHTLY 90 tablet 1    baclofen (LIORESAL) 10 MG tablet Take 1 tablet by mouth 3 (Three) Times a Day. (Patient taking differently: Take 1 tablet by mouth every night at bedtime.) 270 tablet 1    Blood Glucose Monitoring Suppl (ONE TOUCH ULTRA 2) w/Device kit       busPIRone (BUSPAR) 15 MG tablet TAKE ONE TABLET BY MOUTH THREE TIMES A  tablet 1    calcium carbonate-vitamin d (CALCIUM 600+D HIGH POTENCY) 600-400 MG-UNIT per tablet Take 1 tablet by mouth Daily. 90 tablet 2    clopidogrel (PLAVIX) 75 MG tablet TAKE ONE TABLET BY MOUTH DAILY 90 tablet 3    diclofenac (VOLTAREN) 75 MG EC tablet Take 1 tablet by mouth 2 (Two) Times a Day. 180 tablet 3    eszopiclone (LUNESTA) 3 MG tablet TAKE ONE TABLET BY MOUTH ONCE NIGHTLY; TAKE IMMEDIATELY BEFORE BEDTIME 30 tablet 2    famotidine (PEPCID) 20 MG tablet TAKE 1 TABLET BY MOUTH DAILY 90 tablet 1    fenofibrate (TRICOR) 145 MG tablet TAKE 1 TABLET BY MOUTH DAILY 90 tablet 1    fluconazole (DIFLUCAN) 150 MG tablet Take 1 tablet by mouth 2 (Two) Times a Day.      gabapentin (NEURONTIN) 600 MG tablet TAKE ONE TABLET BY MOUTH THREE TIMES A  tablet 2    hydrOXYzine (ATARAX) 25 MG tablet TAKE ONE TABLET BY MOUTH THREE TIMES A DAY AS NEEDED FOR ANXIETY 10 tablet 3    loratadine (CLARITIN) 10 MG tablet Take 1 tablet by mouth Daily. 30 tablet 11    metFORMIN (GLUCOPHAGE) 1000 MG tablet TAKE 1 TABLET BY MOUTH TWICE A DAY WITH A MEAL 180 tablet 2    methylphenidate (Ritalin) 20 MG tablet Take 1 tablet by mouth Daily for 90 days. 90 tablet 0    montelukast (SINGULAIR) 10 MG tablet TAKE  ONE TABLET BY MOUTH EVERY EVENING 90 tablet 3    nystatin (MYCOSTATIN) 880401 UNIT/GM ointment APPLY TO AFFECTED AREA(S) TWO TIMES A DAY 30 g 1    nystatin (MYCOSTATIN) 986055 UNIT/GM powder Apply  topically to the appropriate area as directed 3 (Three) Times a Day. 30 g 2    ONE TOUCH ULTRA TEST test strip       oxybutynin XL (DITROPAN-XL) 5 MG 24 hr tablet Take 1 tablet by mouth Daily. 90 tablet 3    pantoprazole (PROTONIX) 40 MG EC tablet Take 1 tablet by mouth Daily. 90 tablet 1    potassium chloride ER (K-TAB) 20 MEQ tablet controlled-release ER tablet TAKE 1 TABLET BY MOUTH DAILY 90 tablet 2    promethazine (PHENERGAN) 12.5 MG tablet TAKE ONE TABLET BY MOUTH EVERY 8 HOURS AS NEEDED FOR NAUSEA OR VOMITING 30 tablet 1    Semaglutide,0.25 or 0.5MG/DOS, (Ozempic, 0.25 or 0.5 MG/DOSE,) 2 MG/1.5ML solution pen-injector Inject 0.5 mg under the skin into the appropriate area as directed 1 (One) Time Per Week.      spironolactone (ALDACTONE) 50 MG tablet TAKE ONE TABLET BY MOUTH DAILY 90 tablet 3    venlafaxine XR (EFFEXOR-XR) 75 MG 24 hr capsule TAKE ONE CAPSULE BY MOUTH DAILY 90 capsule 1    [DISCONTINUED] modafinil (PROVIGIL) 200 MG tablet Take 1 tablet by mouth Daily. 30 tablet 3     No current facility-administered medications on file prior to visit.      Allergies   Allergen Reactions    Fish-Derived Products Anaphylaxis    Penicillins Anaphylaxis and Shortness Of Breath    Shellfish Allergy Anaphylaxis        Review of Systems   Constitutional: Negative.    HENT: Negative.     Eyes: Negative.    Respiratory: Negative.     Cardiovascular: Negative.    Gastrointestinal: Negative.    Endocrine: Negative.    Genitourinary: Negative.    Musculoskeletal:  Positive for arthralgias.   Skin: Negative.    Allergic/Immunologic: Negative.    Neurological: Negative.    Hematological: Negative.    Psychiatric/Behavioral: Negative.          The patient's Review of Systems was personally reviewed and confirmed as  "accurate.    Physical Exam  Blood pressure 142/90, height 167.6 cm (65.98\"), weight 80.7 kg (178 lb), not currently breastfeeding.    Body mass index is 28.74 kg/m².    GENERAL APPEARANCE: awake, alert, oriented, in no acute distress and well developed, well nourished  LUNGS:  breathing nonlabored  EXTREMITIES: no clubbing, cyanosis  PERIPHERAL PULSES: palpable dorsalis pedis and posterior tibial pulses bilaterally.    GAIT:  Antalgic        ----------  Left Knee Exam:  ----------  ALIGNMENT: severe varus, correctable to neutral  ----------  RANGE OF MOTION:  Decreased (5 - 115 degrees) with no extensor lag  LIGAMENTOUS STABILITY:   stable to varus and valgus stress at terminal extension and 30 degrees; retensioning of the MCL is appreciated with valgus stress at 30 degrees consistent with medial compartment degeneration  ----------  STRENGTH:  KNEE FLEXION 4/5  KNEE EXTENSION  4/5  ANKLE DORSIFLEXION  5/5  ANKLE PLANTARFLEXION  5/5  ----------  PAIN WITH PALPATION:global  KNEE EFFUSION: yes, mild effusion  PAIN WITH KNEE ROM: yes  PATELLAR CREPITUS:  yes, painful and symptomatic  ----------  SENSATION TO LIGHT TOUCH:  DEEP PERONEAL/SUPERFICIAL PERONEAL/SURAL/SAPHENOUS/TIBIAL:    intact  ----------  EDEMA:  no  ERYTHEMA:    no  WOUNDS/INCISIONS:   no  _____________________________________________________________________  _____________________________________________________________________    RADIOGRAPHIC FINDINGS:   Indication: Left knee pain    Comparison: Todays xrays were compared to previous xrays from 5/9/2024    Knee films: moderate to severe tricompartmental arthritis with genu varum alignment, periarticular osteophytes visualized in all compartments and No significant changes compared to prior radiographs.    Assessment/Plan:   Diagnosis Plan   1. Primary osteoarthritis of left knee  XR Knee 4+ View Left        The patient has failed conservative treatment measures and is a candidate for joint arthroplasty. "  I discussed the joint arthroplasty surgical process as well as the recovery and rehabilitation time frame.  The patient asked several questions regarding the joint arthroplasty surgery, which were answered accordingly.  Ultimately, the patient declines surgical intervention at this time and wishes to continue with conservative treatment measures.  Alternative conservative treatment measures were discussed including bracing, therapy, topical/oral anti-inflammatories, activity modification, and weight loss.  The patient considered these treatment options and wishes to proceed with corticosteroid injection(s) today.  Therefore we will proceed with corticosteroid injection(s) today.  Follow-up 3 months for repeat assessment.    Procedure Note:  I discussed with the patient the potential benefits of performing a therapeutic injection of the left knee as well as potential risks including but not limited to infection, swelling, pain, bleeding, bruising, nerve/vessel damage, skin color changes, transient elevation in blood glucose levels, and fat atrophy. After informed consent and verifying correct patient, procedure site, and type of procedure, the area was prepped with alcohol, ethyl chloride was used to numb the skin. Via the superolateral approach, 3 cc of 1% lidocaine, 3 cc of 0.25% Marcaine and 2 cc of 40mg/ml of Kenalog were injected into the left knee. The patient tolerated the procedure well. There were no complications. A sterile dressing was placed over the injection site.        Deneen Pacheco MA  11/15/24  10:10 EST

## 2024-12-01 RX ORDER — FENOFIBRATE 145 MG/1
145 TABLET, COATED ORAL DAILY
Qty: 90 TABLET | Refills: 1 | Status: SHIPPED | OUTPATIENT
Start: 2024-12-01

## 2024-12-05 DIAGNOSIS — F51.04 CHRONIC INSOMNIA: ICD-10-CM

## 2024-12-05 RX ORDER — ESZOPICLONE 3 MG/1
3 TABLET, FILM COATED ORAL NIGHTLY
Qty: 30 TABLET | Refills: 2 | Status: SHIPPED | OUTPATIENT
Start: 2024-12-05

## 2024-12-09 ENCOUNTER — TRANSCRIBE ORDERS (OUTPATIENT)
Dept: ADMINISTRATIVE | Facility: HOSPITAL | Age: 54
End: 2024-12-09
Payer: MEDICARE

## 2024-12-09 DIAGNOSIS — Z12.31 SCREENING MAMMOGRAM FOR BREAST CANCER: Primary | ICD-10-CM

## 2024-12-16 RX ORDER — SEMAGLUTIDE 1.34 MG/ML
1 INJECTION, SOLUTION SUBCUTANEOUS WEEKLY
Qty: 3 ML | Refills: 0 | Status: SHIPPED | OUTPATIENT
Start: 2024-12-16

## 2024-12-23 RX ORDER — ATORVASTATIN CALCIUM 80 MG/1
TABLET, FILM COATED ORAL
Qty: 90 TABLET | Refills: 1 | Status: SHIPPED | OUTPATIENT
Start: 2024-12-23

## 2024-12-23 RX ORDER — POTASSIUM CHLORIDE 1500 MG/1
20 TABLET, EXTENDED RELEASE ORAL DAILY
Qty: 90 TABLET | Refills: 2 | Status: SHIPPED | OUTPATIENT
Start: 2024-12-23

## 2025-01-28 RX ORDER — SEMAGLUTIDE 1.34 MG/ML
1 INJECTION, SOLUTION SUBCUTANEOUS WEEKLY
Qty: 3 ML | Refills: 0 | Status: SHIPPED | OUTPATIENT
Start: 2025-01-28

## 2025-02-18 ENCOUNTER — OFFICE VISIT (OUTPATIENT)
Dept: ORTHOPEDIC SURGERY | Facility: CLINIC | Age: 55
End: 2025-02-18
Payer: MEDICARE

## 2025-02-18 VITALS
BODY MASS INDEX: 29.06 KG/M2 | DIASTOLIC BLOOD PRESSURE: 84 MMHG | HEIGHT: 66 IN | WEIGHT: 180.8 LBS | SYSTOLIC BLOOD PRESSURE: 134 MMHG

## 2025-02-18 DIAGNOSIS — M17.12 PRIMARY OSTEOARTHRITIS OF LEFT KNEE: Primary | ICD-10-CM

## 2025-02-18 RX ORDER — TRIAMCINOLONE ACETONIDE 40 MG/ML
80 INJECTION, SUSPENSION INTRA-ARTICULAR; INTRAMUSCULAR
Status: COMPLETED | OUTPATIENT
Start: 2025-02-18 | End: 2025-02-18

## 2025-02-18 RX ORDER — BUPIVACAINE HYDROCHLORIDE 2.5 MG/ML
3 INJECTION, SOLUTION EPIDURAL; INFILTRATION; INTRACAUDAL
Status: COMPLETED | OUTPATIENT
Start: 2025-02-18 | End: 2025-02-18

## 2025-02-18 RX ORDER — LIDOCAINE HYDROCHLORIDE 10 MG/ML
3 INJECTION, SOLUTION EPIDURAL; INFILTRATION; INTRACAUDAL; PERINEURAL
Status: COMPLETED | OUTPATIENT
Start: 2025-02-18 | End: 2025-02-18

## 2025-02-18 RX ORDER — HYDROCODONE BITARTRATE AND ACETAMINOPHEN 10; 325 MG/1; MG/1
TABLET ORAL
COMMUNITY
Start: 2025-02-07

## 2025-02-18 RX ADMIN — BUPIVACAINE HYDROCHLORIDE 3 ML: 2.5 INJECTION, SOLUTION EPIDURAL; INFILTRATION; INTRACAUDAL at 10:50

## 2025-02-18 RX ADMIN — TRIAMCINOLONE ACETONIDE 80 MG: 40 INJECTION, SUSPENSION INTRA-ARTICULAR; INTRAMUSCULAR at 10:50

## 2025-02-18 RX ADMIN — LIDOCAINE HYDROCHLORIDE 3 ML: 10 INJECTION, SOLUTION EPIDURAL; INFILTRATION; INTRACAUDAL; PERINEURAL at 10:50

## 2025-02-18 NOTE — PROGRESS NOTES
Procedure   - Large Joint Arthrocentesis: L knee on 2/18/2025 10:50 AM  Indications: pain  Details: 21 G needle, superolateral approach  Medications: 3 mL lidocaine PF 1% 1 %; 3 mL bupivacaine (PF) 0.25 %; 80 mg triamcinolone acetonide 40 MG/ML  Outcome: tolerated well, no immediate complications  Procedure, treatment alternatives, risks and benefits explained, specific risks discussed. Consent was given by the patient. Immediately prior to procedure a time out was called to verify the correct patient, procedure, equipment, support staff and site/side marked as required. Patient was prepped and draped in the usual sterile fashion.

## 2025-02-18 NOTE — PROGRESS NOTES
Orthopaedic Clinic Note: Knee Established Patient    Chief Complaint   Patient presents with    Follow-up     3 month follow up - Primary osteoarthritis of left knee        HPI    It has been 3  month(s) since Ms. Barron's last visit. She returns to clinic today for follow-up left knee osteoarthritis.  Patient went cortisone injection in the left knee 3 months ago.  The injection provided 6 weeks of relief.  Her pain has returned.  Over the last month is gotten progressively worse.  She rates it an 8/10 on the pain scale.  She is ambulating with the assistance of a cane.  Denies fevers chills or constitutional symptoms.  Overall she is doing worse since the injection wore off.    Past Medical History:   Diagnosis Date    Achilles tendon rupture 08/2021    left    Allergic     Allergy     Ankle sprain     Anxiety     Arthritis     Asthma Allergies induced    Coronary artery disease     CTS (carpal tunnel syndrome)     Depression     Fibromyalgia, primary     Fracture, finger     GERD (gastroesophageal reflux disease)     Headache     High risk medication use 08/12/2016    History of blood clots     History of UTI     Hyperlipidemia     Hypertension     IBS (irritable bowel syndrome)     Internal hemorrhoid     Kidney stone     Knee sprain     Knee swelling     Low back pain     Low back strain     Muscular dystrophy     Muscular dystrophy     II    Myotonia     Obesity     Pulmonary embolism sub clavical artery clot stent put in Aug 2018    Rotator cuff syndrome     Stroke 08/31/2013    resdual- left sided weakness.     Tear of meniscus of knee     Type 2 diabetes mellitus without complication, without long-term current use of insulin       Past Surgical History:   Procedure Laterality Date    ACHILLES TENDON SURGERY Left 09/2021    Dr. Salinas achilles rupture repair    ANKLE OPEN REDUCTION INTERNAL FIXATION  Oct 2021    Achilles tendon ruptured had repair surgery    BARIATRIC SURGERY  4/29/2019    CHOLECYSTECTOMY   2004    COLONOSCOPY  2017    D & C WITH SUCTION      KNEE SURGERY Left     LYMPH NODE BIOPSY      neck    SUBCLAVIAN ARTERY STENT Left 08/2018    x2    WISDOM TOOTH EXTRACTION        Family History   Problem Relation Age of Onset    Allergies Other     ADD / ADHD Other     Heart block Other     Hypertension Other     Cancer Other         LUNG CANCER    Hyperlipidemia Other     Alcohol abuse Mother     Depression Mother     Early death Mother          age 59 massive Heart Attack    Heart disease Mother         Mother  of Massive Heart attack    Hyperlipidemia Mother     Hypertension Mother     Miscarriages / Stillbirths Mother         1 I know of    Heart attack Mother          of  massive heart attack    Alcohol abuse Father     Cancer Father         Lung Cancer Diabetes I    Diabetes Father         Lived on insuline shots    Early death Father          at age 53 due to Lung Cancer    Breast cancer Maternal Aunt     Learning disabilities Son         ADHD    Breast cancer Cousin     Ovarian cancer Neg Hx     Endometrial cancer Neg Hx     Uterine cancer Neg Hx     Colon cancer Neg Hx      Social History     Socioeconomic History    Marital status:     Number of children: 2   Tobacco Use    Smoking status: Former     Current packs/day: 0.00     Average packs/day: 2.0 packs/day for 27.0 years (54.0 ttl pk-yrs)     Types: Cigarettes, Electronic Cigarette     Start date:      Quit date:      Years since quittin.1     Passive exposure: Past    Smokeless tobacco: Never    Tobacco comments:     Smoked for 29 years quit 2013 due to had stroke.   Vaping Use    Vaping status: Former    Substances: Flavoring    Devices: Pre-filled pod   Substance and Sexual Activity    Alcohol use: Not Currently    Drug use: No    Sexual activity: Not Currently     Partners: Male     Birth control/protection: Post-menopausal, None     Comment: Menapause no cycle since 2013      Current  Outpatient Medications on File Prior to Visit   Medication Sig Dispense Refill    albuterol (PROVENTIL) (2.5 MG/3ML) 0.083% nebulizer solution Take 2.5 mg by nebulization Every 4 (Four) Hours As Needed for Wheezing. 90 vial 1    amLODIPine (NORVASC) 2.5 MG tablet TAKE 1 TABLET BY MOUTH DAILY 90 tablet 1    aspirin 81 MG EC tablet Take 1 tablet by mouth Daily.      atorvastatin (LIPITOR) 80 MG tablet TAKE ONE TABLET BY MOUTH ONCE NIGHTLY 90 tablet 1    baclofen (LIORESAL) 10 MG tablet Take 1 tablet by mouth 3 (Three) Times a Day. (Patient taking differently: Take 1 tablet by mouth every night at bedtime.) 270 tablet 1    Blood Glucose Monitoring Suppl (ONE TOUCH ULTRA 2) w/Device kit       busPIRone (BUSPAR) 15 MG tablet TAKE ONE TABLET BY MOUTH THREE TIMES A  tablet 1    calcium carbonate-vitamin d (CALCIUM 600+D HIGH POTENCY) 600-400 MG-UNIT per tablet Take 1 tablet by mouth Daily. 90 tablet 2    clopidogrel (PLAVIX) 75 MG tablet TAKE ONE TABLET BY MOUTH DAILY 90 tablet 3    diclofenac (VOLTAREN) 75 MG EC tablet Take 1 tablet by mouth 2 (Two) Times a Day. 180 tablet 3    eszopiclone (LUNESTA) 3 MG tablet TAKE ONE TABLET BY MOUTH ONCE NIGHTLY. TAKE IMMEDIATELY BEFORE BEDTIME. 30 tablet 2    famotidine (PEPCID) 20 MG tablet TAKE 1 TABLET BY MOUTH DAILY 90 tablet 1    fenofibrate (TRICOR) 145 MG tablet TAKE 1 TABLET BY MOUTH DAILY 90 tablet 1    fluconazole (DIFLUCAN) 150 MG tablet Take 1 tablet by mouth 2 (Two) Times a Day.      gabapentin (NEURONTIN) 600 MG tablet TAKE ONE TABLET BY MOUTH THREE TIMES A  tablet 2    HYDROcodone-acetaminophen (NORCO)  MG per tablet       hydrOXYzine (ATARAX) 25 MG tablet TAKE ONE TABLET BY MOUTH THREE TIMES A DAY AS NEEDED FOR ANXIETY 10 tablet 3    loratadine (CLARITIN) 10 MG tablet Take 1 tablet by mouth Daily. 30 tablet 11    metFORMIN (GLUCOPHAGE) 1000 MG tablet TAKE 1 TABLET BY MOUTH TWICE A DAY WITH A MEAL 180 tablet 2    montelukast (SINGULAIR) 10 MG tablet  TAKE ONE TABLET BY MOUTH EVERY EVENING 90 tablet 3    nystatin (MYCOSTATIN) 897182 UNIT/GM ointment APPLY TO AFFECTED AREA(S) TWO TIMES A DAY 30 g 1    nystatin (MYCOSTATIN) 509439 UNIT/GM powder Apply  topically to the appropriate area as directed 3 (Three) Times a Day. 30 g 2    ONE TOUCH ULTRA TEST test strip       oxybutynin XL (DITROPAN-XL) 5 MG 24 hr tablet Take 1 tablet by mouth Daily. 90 tablet 3    pantoprazole (PROTONIX) 40 MG EC tablet Take 1 tablet by mouth Daily. 90 tablet 1    potassium chloride ER (K-TAB) 20 MEQ tablet controlled-release ER tablet TAKE 1 TABLET BY MOUTH DAILY 90 tablet 2    promethazine (PHENERGAN) 12.5 MG tablet TAKE ONE TABLET BY MOUTH EVERY 8 HOURS AS NEEDED FOR NAUSEA OR VOMITING 30 tablet 1    Semaglutide, 1 MG/DOSE, (Ozempic, 1 MG/DOSE,) 4 MG/3ML solution pen-injector Inject 1 mg under the skin into the appropriate area as directed 1 (One) Time Per Week. 3 mL 0    spironolactone (ALDACTONE) 50 MG tablet TAKE ONE TABLET BY MOUTH DAILY 90 tablet 3    venlafaxine XR (EFFEXOR-XR) 75 MG 24 hr capsule TAKE ONE CAPSULE BY MOUTH DAILY 90 capsule 1    methylphenidate (Ritalin) 20 MG tablet Take 1 tablet by mouth Daily for 90 days. 90 tablet 0    [DISCONTINUED] modafinil (PROVIGIL) 200 MG tablet Take 1 tablet by mouth Daily. 30 tablet 3     No current facility-administered medications on file prior to visit.      Allergies   Allergen Reactions    Fish-Derived Products Anaphylaxis    Penicillins Anaphylaxis and Shortness Of Breath    Shellfish Allergy Anaphylaxis        Review of Systems   Constitutional: Negative.    HENT: Negative.     Eyes: Negative.    Respiratory: Negative.     Cardiovascular: Negative.    Gastrointestinal: Negative.    Endocrine: Negative.    Genitourinary: Negative.    Musculoskeletal:  Positive for arthralgias.   Skin: Negative.    Allergic/Immunologic: Negative.    Neurological: Negative.    Hematological: Negative.    Psychiatric/Behavioral: Negative.       "    The patient's Review of Systems was personally reviewed and confirmed as accurate.    Physical Exam  Blood pressure 134/84, height 167.6 cm (65.98\"), weight 82 kg (180 lb 12.8 oz), not currently breastfeeding.    Body mass index is 29.2 kg/m².    GENERAL APPEARANCE: awake, alert, oriented, in no acute distress and well developed, well nourished  LUNGS:  breathing nonlabored  EXTREMITIES: no clubbing, cyanosis  PERIPHERAL PULSES: palpable dorsalis pedis and posterior tibial pulses bilaterally.    GAIT:  Antalgic        ----------  Left Knee Exam:  ----------  ALIGNMENT: severe varus, correctable to neutral  ----------  RANGE OF MOTION:  Decreased (5 - 115 degrees) with no extensor lag  LIGAMENTOUS STABILITY:   stable to varus and valgus stress at terminal extension and 30 degrees; retensioning of the MCL is appreciated with valgus stress at 30 degrees consistent with medial compartment degeneration  ----------  STRENGTH:  KNEE FLEXION 4/5  KNEE EXTENSION  4/5  ANKLE DORSIFLEXION  5/5  ANKLE PLANTARFLEXION  5/5  ----------  PAIN WITH PALPATION:global  KNEE EFFUSION: yes, mild effusion  PAIN WITH KNEE ROM: yes  PATELLAR CREPITUS:  yes, painful and symptomatic  ----------  SENSATION TO LIGHT TOUCH:  DEEP PERONEAL/SUPERFICIAL PERONEAL/SURAL/SAPHENOUS/TIBIAL:    intact  ----------  EDEMA:  no  ERYTHEMA:    no  WOUNDS/INCISIONS:   no  _____________________________________________________________________  _____________________________________________________________________    RADIOGRAPHIC FINDINGS:   No new imaging today    Assessment/Plan:   Diagnosis Plan   1. Primary osteoarthritis of left knee          The patient has failed conservative treatment measures and is a candidate for joint arthroplasty.  I discussed the joint arthroplasty surgical process as well as the recovery and rehabilitation time frame.  The patient asked several questions regarding the joint arthroplasty surgery, which were answered accordingly.  " Ultimately, the patient declines surgical intervention at this time and wishes to continue with conservative treatment measures.  Alternative conservative treatment measures were discussed including bracing, therapy, topical/oral anti-inflammatories, activity modification, and weight loss.  The patient considered these treatment options and wishes to proceed with corticosteroid injection(s) today.  Therefore we will proceed with corticosteroid injection(s) today.  Follow-up 3 months for repeat evaluation with x-ray 4 views left knee on return.    Procedure Note:  I discussed with the patient the potential benefits of performing a therapeutic injection of the left knee as well as potential risks including but not limited to infection, swelling, pain, bleeding, bruising, nerve/vessel damage, skin color changes, transient elevation in blood glucose levels, and fat atrophy. After informed consent and verifying correct patient, procedure site, and type of procedure, the area was prepped with alcohol, ethyl chloride was used to numb the skin. Via the superolateral approach, 3 cc of 1% lidocaine, 3 cc of 0.25% Marcaine and 2 cc of 40mg/ml of Kenalog were injected into the left knee. The patient tolerated the procedure well. There were no complications. A sterile dressing was placed over the injection site.        Mk Sim MD  02/18/25  10:48 EST

## 2025-02-19 ENCOUNTER — DOCUMENTATION (OUTPATIENT)
Dept: GENETICS | Facility: HOSPITAL | Age: 55
End: 2025-02-19
Payer: MEDICARE

## 2025-02-19 LAB
NCCN CRITERIA FLAG: ABNORMAL
TYRER CUZICK SCORE: 9.4

## 2025-02-19 NOTE — PROGRESS NOTES
This patient recently completed the CARE risk assessment for a mammogram appointment. Based on the patient's responses, NCCN criteria for genetic testing was met.     Navigator follow-up:   Upon completion of the assessment, the patient declined genetic testing.

## 2025-02-26 ENCOUNTER — OFFICE VISIT (OUTPATIENT)
Dept: INTERNAL MEDICINE | Facility: CLINIC | Age: 55
End: 2025-02-26
Payer: MEDICARE

## 2025-02-26 ENCOUNTER — LAB (OUTPATIENT)
Dept: INTERNAL MEDICINE | Facility: CLINIC | Age: 55
End: 2025-02-26
Payer: MEDICARE

## 2025-02-26 VITALS
SYSTOLIC BLOOD PRESSURE: 124 MMHG | BODY MASS INDEX: 28.61 KG/M2 | HEIGHT: 66 IN | WEIGHT: 178 LBS | DIASTOLIC BLOOD PRESSURE: 72 MMHG | HEART RATE: 91 BPM | OXYGEN SATURATION: 97 %

## 2025-02-26 DIAGNOSIS — Z00.00 MEDICARE ANNUAL WELLNESS VISIT, SUBSEQUENT: Primary | ICD-10-CM

## 2025-02-26 DIAGNOSIS — E11.9 TYPE 2 DIABETES MELLITUS WITHOUT COMPLICATION, WITHOUT LONG-TERM CURRENT USE OF INSULIN: ICD-10-CM

## 2025-02-26 DIAGNOSIS — Z12.11 COLON CANCER SCREENING: ICD-10-CM

## 2025-02-26 DIAGNOSIS — Z87.891 HISTORY OF CIGARETTE SMOKING: ICD-10-CM

## 2025-02-26 LAB
EXPIRATION DATE: NORMAL
HBA1C MFR BLD: 5.1 % (ref 4.5–5.7)
Lab: NORMAL

## 2025-02-26 PROCEDURE — 3074F SYST BP LT 130 MM HG: CPT | Performed by: INTERNAL MEDICINE

## 2025-02-26 PROCEDURE — 80053 COMPREHEN METABOLIC PANEL: CPT | Performed by: INTERNAL MEDICINE

## 2025-02-26 PROCEDURE — 3078F DIAST BP <80 MM HG: CPT | Performed by: INTERNAL MEDICINE

## 2025-02-26 PROCEDURE — 80061 LIPID PANEL: CPT | Performed by: INTERNAL MEDICINE

## 2025-02-26 PROCEDURE — 99396 PREV VISIT EST AGE 40-64: CPT | Performed by: INTERNAL MEDICINE

## 2025-02-26 PROCEDURE — 82043 UR ALBUMIN QUANTITATIVE: CPT | Performed by: INTERNAL MEDICINE

## 2025-02-26 PROCEDURE — 1125F AMNT PAIN NOTED PAIN PRSNT: CPT | Performed by: INTERNAL MEDICINE

## 2025-02-26 PROCEDURE — 83036 HEMOGLOBIN GLYCOSYLATED A1C: CPT | Performed by: INTERNAL MEDICINE

## 2025-02-26 PROCEDURE — 3044F HG A1C LEVEL LT 7.0%: CPT | Performed by: INTERNAL MEDICINE

## 2025-02-26 PROCEDURE — 82570 ASSAY OF URINE CREATININE: CPT | Performed by: INTERNAL MEDICINE

## 2025-02-26 PROCEDURE — G0439 PPPS, SUBSEQ VISIT: HCPCS | Performed by: INTERNAL MEDICINE

## 2025-02-26 PROCEDURE — 1160F RVW MEDS BY RX/DR IN RCRD: CPT | Performed by: INTERNAL MEDICINE

## 2025-02-26 PROCEDURE — 1159F MED LIST DOCD IN RCRD: CPT | Performed by: INTERNAL MEDICINE

## 2025-02-26 PROCEDURE — 85027 COMPLETE CBC AUTOMATED: CPT | Performed by: INTERNAL MEDICINE

## 2025-02-26 RX ORDER — FAMOTIDINE 20 MG/1
20 TABLET, FILM COATED ORAL DAILY
Qty: 90 TABLET | Refills: 1 | Status: SHIPPED | OUTPATIENT
Start: 2025-02-26

## 2025-02-26 RX ORDER — CLOPIDOGREL BISULFATE 75 MG/1
75 TABLET ORAL DAILY
Qty: 90 TABLET | Refills: 3 | Status: SHIPPED | OUTPATIENT
Start: 2025-02-26

## 2025-02-26 RX ORDER — FLUCONAZOLE 150 MG/1
150 TABLET ORAL ONCE
Qty: 1 TABLET | Refills: 0 | Status: SHIPPED | OUTPATIENT
Start: 2025-02-26 | End: 2025-02-26

## 2025-02-26 NOTE — PROGRESS NOTES
The ABCs of the Annual Wellness Visit  Subsequent Medicare Wellness Visit    Chief Complaint   Patient presents with    Medicare Wellness-subsequent    Vaginal Itching      Subjective    History of Present Illness:  Hafsa Barron is a 54 y.o. female who presents for a Subsequent Medicare Wellness Visit.  No complaints today, overall doing well.    The following portions of the patient's history were reviewed and   updated as appropriate: allergies, current medications, past medical history, past social history, past surgical history, and problem list.     Compared to one year ago, the patient feels her physical   health is the same.    Compared to one year ago, the patient feels her mental   health is the same.    Recent Hospitalizations:  She was not admitted to the hospital during the last year.       Current Medical Providers:  Patient Care Team:  Jocy Snow MD as PCP - General (Internal Medicine)  Geetha Lamar APRN as Nurse Practitioner (Family Medicine)  Silke Marcelino DO (Inactive) as Consulting Physician (Obstetrics and Gynecology)  Ge Ace MD as Consulting Physician (Urology)  Fausto Edwards MD as Consulting Physician (Neurology)    Outpatient Medications Prior to Visit   Medication Sig Dispense Refill    albuterol (PROVENTIL) (2.5 MG/3ML) 0.083% nebulizer solution Take 2.5 mg by nebulization Every 4 (Four) Hours As Needed for Wheezing. 90 vial 1    amLODIPine (NORVASC) 2.5 MG tablet TAKE 1 TABLET BY MOUTH DAILY 90 tablet 1    aspirin 81 MG EC tablet Take 1 tablet by mouth Daily.      atorvastatin (LIPITOR) 80 MG tablet TAKE ONE TABLET BY MOUTH ONCE NIGHTLY 90 tablet 1    baclofen (LIORESAL) 10 MG tablet Take 1 tablet by mouth 3 (Three) Times a Day. (Patient taking differently: Take 1 tablet by mouth every night at bedtime.) 270 tablet 1    Blood Glucose Monitoring Suppl (ONE TOUCH ULTRA 2) w/Device kit       busPIRone (BUSPAR) 15 MG tablet TAKE ONE TABLET BY MOUTH THREE  TIMES A  tablet 1    calcium carbonate-vitamin d (CALCIUM 600+D HIGH POTENCY) 600-400 MG-UNIT per tablet Take 1 tablet by mouth Daily. 90 tablet 2    clopidogrel (PLAVIX) 75 MG tablet TAKE 1 TABLET BY MOUTH DAILY 90 tablet 3    diclofenac (VOLTAREN) 75 MG EC tablet Take 1 tablet by mouth 2 (Two) Times a Day. 180 tablet 3    eszopiclone (LUNESTA) 3 MG tablet TAKE ONE TABLET BY MOUTH ONCE NIGHTLY. TAKE IMMEDIATELY BEFORE BEDTIME. 30 tablet 2    famotidine (PEPCID) 20 MG tablet TAKE 1 TABLET BY MOUTH DAILY 90 tablet 1    fenofibrate (TRICOR) 145 MG tablet TAKE 1 TABLET BY MOUTH DAILY 90 tablet 1    gabapentin (NEURONTIN) 600 MG tablet TAKE ONE TABLET BY MOUTH THREE TIMES A  tablet 2    HYDROcodone-acetaminophen (NORCO)  MG per tablet       hydrOXYzine (ATARAX) 25 MG tablet TAKE ONE TABLET BY MOUTH THREE TIMES A DAY AS NEEDED FOR ANXIETY 10 tablet 3    loratadine (CLARITIN) 10 MG tablet Take 1 tablet by mouth Daily. 30 tablet 11    metFORMIN (GLUCOPHAGE) 1000 MG tablet TAKE 1 TABLET BY MOUTH TWICE A DAY WITH A MEAL 180 tablet 2    montelukast (SINGULAIR) 10 MG tablet TAKE ONE TABLET BY MOUTH EVERY EVENING 90 tablet 3    nystatin (MYCOSTATIN) 680032 UNIT/GM ointment APPLY TO AFFECTED AREA(S) TWO TIMES A DAY 30 g 1    nystatin (MYCOSTATIN) 566748 UNIT/GM powder Apply  topically to the appropriate area as directed 3 (Three) Times a Day. 30 g 2    ONE TOUCH ULTRA TEST test strip       oxybutynin XL (DITROPAN-XL) 5 MG 24 hr tablet Take 1 tablet by mouth Daily. 90 tablet 3    pantoprazole (PROTONIX) 40 MG EC tablet Take 1 tablet by mouth Daily. 90 tablet 1    potassium chloride ER (K-TAB) 20 MEQ tablet controlled-release ER tablet TAKE 1 TABLET BY MOUTH DAILY 90 tablet 2    promethazine (PHENERGAN) 12.5 MG tablet TAKE ONE TABLET BY MOUTH EVERY 8 HOURS AS NEEDED FOR NAUSEA OR VOMITING 30 tablet 1    Semaglutide, 1 MG/DOSE, (Ozempic, 1 MG/DOSE,) 4 MG/3ML solution pen-injector Inject 1 mg under the skin into  the appropriate area as directed 1 (One) Time Per Week. 3 mL 0    spironolactone (ALDACTONE) 50 MG tablet TAKE ONE TABLET BY MOUTH DAILY 90 tablet 3    venlafaxine XR (EFFEXOR-XR) 75 MG 24 hr capsule TAKE ONE CAPSULE BY MOUTH DAILY 90 capsule 1    methylphenidate (Ritalin) 20 MG tablet Take 1 tablet by mouth Daily for 90 days. 90 tablet 0    fluconazole (DIFLUCAN) 150 MG tablet Take 1 tablet by mouth 2 (Two) Times a Day. (Patient not taking: Reported on 2/26/2025)       No facility-administered medications prior to visit.       Opioid medication/s are on active medication list.  and I have evaluated her active treatment plan and pain score trends (see table).  Vitals:    02/26/25 1106   PainSc: 8    PainLoc: Knee     I have reviewed the chart for potential of high risk medication and harmful drug interactions in the elderly.          Aspirin is on active medication list. Aspirin use is indicated based on review of current medical condition/s. Pros and cons of this therapy have been discussed today. Benefits of this medication outweigh potential harm.  Patient has been encouraged to continue taking this medication.  .      Patient Active Problem List   Diagnosis    Fibromyalgia    Hypertension    Sciatica    Seasonal allergic rhinitis    Hyperlipidemia    Type 2 diabetes mellitus without complication, without long-term current use of insulin    H/O: stroke    Myotonic dystrophy, type 2    High risk medication use    Chronic pain    Bruit of left carotid artery    Anxiety and depression    Thrombosis of subclavian artery s/p stent 2018    Recurrent UTI (urinary tract infection)    GIORGIO (obstructive sleep apnea)     Advance Care Planning   Advance Directive is not on file.  ACP discussion was declined by the patient. Patient does not have an advance directive, declines further assistance.       Objective       Vitals:    02/26/25 1106   BP: 124/72   BP Location: Left arm   Pulse: 91   SpO2: 97%   Weight: 80.7 kg (178  "lb)   Height: 167.6 cm (66\")   PainSc: 8    PainLoc: Knee     BMI Readings from Last 1 Encounters:   25 28.73 kg/m²   BMI is within normal parameters. No follow-up required.  BMI Readings from Last 1 Encounters:   25 28.73 kg/m²   BMI is above normal parameters. Recommendations include: Information on healthy weight added to patient's after visit summary    Does the patient have evidence of cognitive impairment? No    Physical Exam            HEALTH RISK ASSESSMENT    Smoking Status:  Social History     Tobacco Use   Smoking Status Former    Current packs/day: 0.00    Average packs/day: 2.0 packs/day for 27.0 years (54.0 ttl pk-yrs)    Types: Cigarettes, Electronic Cigarette    Start date: 1986    Quit date:     Years since quittin.1    Passive exposure: Past   Smokeless Tobacco Never   Tobacco Comments    Smoked for 29 years quit 2013 due to had stroke.     Alcohol Consumption:  Social History     Substance and Sexual Activity   Alcohol Use Not Currently     Fall Risk Screen:    STEADI Fall Risk Assessment was completed, and patient is at HIGH risk for falls. Assessment completed on:2025    Depression Screenin/26/2025    11:00 AM   PHQ-2/PHQ-9 Depression Screening   Little interest or pleasure in doing things Over half   Feeling down, depressed, or hopeless Not at all   Trouble falling or staying asleep, or sleeping too much Not at all   Feeling tired or having little energy Over half   Poor appetite or overeating Not at all   Feeling bad about yourself - or that you are a failure or have let yourself or your family down Not at all   Trouble concentrating on things, such as reading the newspaper or watching television Not at all   Moving or speaking so slowly that other people could have noticed? Or the opposite - being so fidgety or restless that you have been moving around a lot more than usual. Not at all   Thoughts that you would be better off dead or hurting yourself " in some way Not at all   Patient Health Questionnaire-9 Score 4   How difficult have these problems made it for you to do your work, take care of things at home, or get along with other people? Not difficult at all       Health Habits and Functional and Cognitive Screenin/19/2025     4:22 PM   Functional & Cognitive Status   Do you have difficulty preparing food and eating? No    Do you have difficulty bathing yourself, getting dressed or grooming yourself? Yes    Do you have difficulty using the toilet? No    Do you have difficulty moving around from place to place? Yes    Do you have trouble with steps or getting out of a bed or a chair? Yes    Current Diet Well Balanced Diet    Dental Exam Up to date    Eye Exam Up to date    Exercise (times per week) 0 times per week    Current Exercises Include No Regular Exercise    Do you need help using the phone?  No    Are you deaf or do you have serious difficulty hearing?  Yes    Do you need help to go to places out of walking distance? Yes    Do you need help shopping? No    Do you need help preparing meals?  No    Do you need help with housework?  Yes    Do you need help with laundry? No    Do you need help taking your medications? No    Do you need help managing money? No    Do you ever drive or ride in a car without wearing a seat belt? No    Have you felt unusual stress, anger or loneliness in the last month? No    Who do you live with? Spouse    If you need help, do you have trouble finding someone available to you? Yes    Have you been bothered in the last four weeks by sexual problems? No    Do you have difficulty concentrating, remembering or making decisions? Yes        Patient-reported       Age-appropriate Screening Schedule:  Refer to the list below for future screening recommendations based on patient's age, sex and/or medical conditions. Orders for these recommended tests are listed in the plan section. The patient has been provided with a written  plan.    Health Maintenance   Topic Date Due    DIABETIC FOOT EXAM  Never done    Hepatitis B (1 of 3 - 19+ 3-dose series) Never done    Pneumococcal Vaccine 50+ (1 of 2 - PCV) Never done    URINE MICROALBUMIN-CREATININE RATIO (uACR)  05/04/2019    DIABETIC EYE EXAM  06/18/2019    PAP SMEAR  04/12/2022    LIPID PANEL  04/26/2024    HEMOGLOBIN A1C  08/14/2024    COVID-19 Vaccine (1 - 2024-25 season) Never done    ANNUAL WELLNESS VISIT  02/14/2025    LUNG CANCER SCREENING  03/01/2025    COLORECTAL CANCER SCREENING  07/01/2025    MAMMOGRAM  01/12/2026    BMI FOLLOWUP  01/28/2026    TDAP/TD VACCINES (2 - Td or Tdap) 08/25/2030    HEPATITIS C SCREENING  Completed    INFLUENZA VACCINE  Completed    ZOSTER VACCINE  Completed              Assessment & Plan     CMS Preventative Services Quick Reference  Risk Factors Identified During Encounter  Immunizations Discussed/Encouraged: COVID19  The above risks/problems have been discussed with the patient.  Follow up actions/plans if indicated are seen below in the Assessment/Plan Section.  Pertinent information has been shared with the patient in the After Visit Summary.    Diagnoses and all orders for this visit:    1. Medicare annual wellness visit, subsequent (Primary)  -Yearly labs as below  -Eye exam up-to-date  -Recommended dermatology for skin cancer screening  -Sees dentist  -Colonoscopy and lung cancer screening ordered  -Mammogram up-to-date due for Pap, advised patient of this  -    2. Type 2 diabetes mellitus without complication, without long-term current use of insulin  -     POC Glycosylated Hemoglobin (Hb A1C)  -     Microalbumin / Creatinine Urine Ratio - Urine, Clean Catch; Future  -     CBC (No Diff)  -     Comprehensive Metabolic Panel  -     Lipid Panel    3. History of cigarette smoking  -     CT Chest Low Dose Wo; Future    4. Colon cancer screening  -     Ambulatory Referral For Screening Colonoscopy    Other orders  -     fluconazole (Diflucan) 150 MG  tablet; Take 1 tablet by mouth 1 (One) Time for 1 dose.  Dispense: 1 tablet; Refill: 0        Follow Up:   No follow-ups on file.     An After Visit Summary and PPPS were given to the patient.

## 2025-02-27 ENCOUNTER — TELEPHONE (OUTPATIENT)
Dept: INTERNAL MEDICINE | Facility: CLINIC | Age: 55
End: 2025-02-27

## 2025-02-27 LAB
ALBUMIN SERPL-MCNC: 4.2 G/DL (ref 3.5–5.2)
ALBUMIN UR-MCNC: 2.6 MG/DL
ALBUMIN/GLOB SERPL: 2.1 G/DL
ALP SERPL-CCNC: 64 U/L (ref 39–117)
ALT SERPL W P-5'-P-CCNC: 17 U/L (ref 1–33)
ANION GAP SERPL CALCULATED.3IONS-SCNC: 10 MMOL/L (ref 5–15)
AST SERPL-CCNC: 19 U/L (ref 1–32)
BILIRUB SERPL-MCNC: 0.3 MG/DL (ref 0–1.2)
BUN SERPL-MCNC: 24 MG/DL (ref 6–20)
BUN/CREAT SERPL: 40 (ref 7–25)
CALCIUM SPEC-SCNC: 10.1 MG/DL (ref 8.6–10.5)
CHLORIDE SERPL-SCNC: 107 MMOL/L (ref 98–107)
CHOLEST SERPL-MCNC: 139 MG/DL (ref 0–200)
CO2 SERPL-SCNC: 27 MMOL/L (ref 22–29)
CREAT SERPL-MCNC: 0.6 MG/DL (ref 0.57–1)
CREAT UR-MCNC: 99.2 MG/DL
DEPRECATED RDW RBC AUTO: 40.6 FL (ref 37–54)
EGFRCR SERPLBLD CKD-EPI 2021: 106.2 ML/MIN/1.73
ERYTHROCYTE [DISTWIDTH] IN BLOOD BY AUTOMATED COUNT: 12.3 % (ref 12.3–15.4)
GLOBULIN UR ELPH-MCNC: 2 GM/DL
GLUCOSE SERPL-MCNC: 54 MG/DL (ref 65–99)
HCT VFR BLD AUTO: 42.6 % (ref 34–46.6)
HDLC SERPL-MCNC: 67 MG/DL (ref 40–60)
HGB BLD-MCNC: 14.3 G/DL (ref 12–15.9)
LDLC SERPL CALC-MCNC: 59 MG/DL (ref 0–100)
LDLC/HDLC SERPL: 0.88 {RATIO}
MCH RBC QN AUTO: 30.8 PG (ref 26.6–33)
MCHC RBC AUTO-ENTMCNC: 33.6 G/DL (ref 31.5–35.7)
MCV RBC AUTO: 91.8 FL (ref 79–97)
MICROALBUMIN/CREAT UR: 26.2 MG/G (ref 0–29)
PLATELET # BLD AUTO: 323 10*3/MM3 (ref 140–450)
PMV BLD AUTO: 11.4 FL (ref 6–12)
POTASSIUM SERPL-SCNC: 4.5 MMOL/L (ref 3.5–5.2)
PROT SERPL-MCNC: 6.2 G/DL (ref 6–8.5)
RBC # BLD AUTO: 4.64 10*6/MM3 (ref 3.77–5.28)
SODIUM SERPL-SCNC: 144 MMOL/L (ref 136–145)
TRIGL SERPL-MCNC: 65 MG/DL (ref 0–150)
VLDLC SERPL-MCNC: 13 MG/DL (ref 5–40)
WBC NRBC COR # BLD AUTO: 6.69 10*3/MM3 (ref 3.4–10.8)

## 2025-02-27 NOTE — TELEPHONE ENCOUNTER
That would definitely be a bleeding risk.  Can you call the patient and ask her how often she is taking the diclofenac?

## 2025-02-27 NOTE — TELEPHONE ENCOUNTER
Hub staff attempted to follow warm transfer process and was unsuccessful     Caller: [facility] [id number] - [place], [place] - [address] - [phone]  - [phone] FX    Relationship to patient: Pharmacy    Patient is needing: PHARMACY CALLED WANTING TO SPEAK TO NURSE, PHARMACY SAID THAT THE CLOPIDOGREL CAN CAUSE BLEEDING WITH THE DICLOFENAC PATIENT IS TAKING, AND THEY ARE NEEDING A CALL BACK BEFORE THEY CAN FILL THE ORDER FOR CLARIFICATION

## 2025-02-28 ENCOUNTER — TELEPHONE (OUTPATIENT)
Dept: INTERNAL MEDICINE | Facility: CLINIC | Age: 55
End: 2025-02-28

## 2025-02-28 ENCOUNTER — TELEPHONE (OUTPATIENT)
Dept: GASTROENTEROLOGY | Facility: CLINIC | Age: 55
End: 2025-02-28
Payer: MEDICARE

## 2025-02-28 NOTE — TELEPHONE ENCOUNTER
Attempted to reach patient again- krish says she is on vacation and will call back when she comes back

## 2025-02-28 NOTE — TELEPHONE ENCOUNTER
WHAT: COLONOSCOPY  WHEN: 4/2/25  WHO: DR. AUGUSTIN  WHY: BLOOD THINNERS AND STENT  DOC: DR. KWON AND DR. CHAPIN

## 2025-02-28 NOTE — TELEPHONE ENCOUNTER
Caller: [facility] [id number] - [place], [place] - [address] - [phone] PH - [phone] FX    Relationship: Pharmacy    Best call back number: [phone]     Which medication are you concerned about: clopidogrel (PLAVIX) 75 MG tablet     Who prescribed you this medication: TAVARES        What are your concerns: PHARMACY STATED THE PATIENT IS TAKING DICLOFENAC 75 MG AND IT SHOWS AN INTERACTION WITH THE CLOPIDOGREL. INCREASED RISK OF BLEEDING. WANTED TO MAKE SURE THE PCP WAS AWARE AND HOW TO MOVE FORWARD. CALLBACK

## 2025-03-01 DIAGNOSIS — F51.04 CHRONIC INSOMNIA: ICD-10-CM

## 2025-03-03 RX ORDER — ESZOPICLONE 3 MG/1
3 TABLET, FILM COATED ORAL
Qty: 30 TABLET | Refills: 5 | Status: SHIPPED | OUTPATIENT
Start: 2025-03-03

## 2025-03-03 NOTE — TELEPHONE ENCOUNTER
March 3, 2025 [address] [place] [address]  Phone: [phone]  Fax: [phone]                 Hafsa Barron  [address]  [facility] [address]  1970        Patient will be having a a colonoscopy by Dr. Brent Holliday on April 2, 2025. The patient is needing cardiac clearance due to being on blood thinners. Please complete the following and fax back to the office.     Patient's was last seen in the office on:_____________________    Procedure/Test Performed:    _____ Stress Test       _____ Echocardiogram    _____ EKG     _____ Heart Cath    Based on the above test results and/or clinical evaluation it is my recommendation:    ____ Patient may proceed with the scheduled surgical procedure with an acceptable cardiac risk.    ____ Patient CAN NOT stop Plavix, Effient, Ticlid, Aspirin, Coumadin, Pradaxa, Xarelto, Eliquis, Savaysa, or Brilinta prior to procedure.     ____ Patient CAN stop Plavix, Effient, Ticlid, Aspirin, Coumadin, Pradaxa, Xarelto, Eliquis, Savaysa, or Brilinta  ______ days prior to procedure.    Please sign and date below.    Signature________________________________________   Date:_________________     Thank You    Brent Holliday M.D.

## 2025-03-14 ENCOUNTER — TELEPHONE (OUTPATIENT)
Age: 55
End: 2025-03-14
Payer: MEDICARE

## 2025-03-14 NOTE — TELEPHONE ENCOUNTER
"Relay     \"[facility] - \"I haven’t had meds for several months that were helping with the urgency problems and they’re getting worse again.\"  LVM for PT to return call to office to schedule next available follow up with TAWANNA Paez.  \"                    "

## 2025-03-17 ENCOUNTER — TELEPHONE (OUTPATIENT)
Dept: INTERNAL MEDICINE | Facility: CLINIC | Age: 55
End: 2025-03-17
Payer: MEDICARE

## 2025-03-17 DIAGNOSIS — R30.9 PAINFUL URINATION: Primary | ICD-10-CM

## 2025-03-18 ENCOUNTER — LAB (OUTPATIENT)
Dept: LAB | Facility: HOSPITAL | Age: 55
End: 2025-03-18
Payer: MEDICARE

## 2025-03-18 ENCOUNTER — TELEPHONE (OUTPATIENT)
Dept: NEUROLOGY | Facility: CLINIC | Age: 55
End: 2025-03-18
Payer: MEDICARE

## 2025-03-18 DIAGNOSIS — R30.9 PAINFUL URINATION: ICD-10-CM

## 2025-03-18 LAB
BILIRUB UR QL STRIP: NEGATIVE
CLARITY UR: ABNORMAL
COLOR UR: YELLOW
GLUCOSE UR STRIP-MCNC: NEGATIVE MG/DL
HGB UR QL STRIP.AUTO: NEGATIVE
HOLD SPECIMEN: NORMAL
KETONES UR QL STRIP: ABNORMAL
LEUKOCYTE ESTERASE UR QL STRIP.AUTO: ABNORMAL
NITRITE UR QL STRIP: NEGATIVE
PH UR STRIP.AUTO: 6 [PH] (ref 5–8)
PROT UR QL STRIP: NEGATIVE
SP GR UR STRIP: 1.02 (ref 1–1.03)
UROBILINOGEN UR QL STRIP: ABNORMAL

## 2025-03-18 PROCEDURE — 81001 URINALYSIS AUTO W/SCOPE: CPT

## 2025-03-18 PROCEDURE — 87086 URINE CULTURE/COLONY COUNT: CPT

## 2025-03-18 PROCEDURE — 87186 SC STD MICRODIL/AGAR DIL: CPT

## 2025-03-18 PROCEDURE — 87077 CULTURE AEROBIC IDENTIFY: CPT

## 2025-03-18 NOTE — TELEPHONE ENCOUNTER
Patient request TATIANA due our location is closer to her home. Patient has no complaints or concerns. Please advise

## 2025-03-19 LAB
BACTERIA UR QL AUTO: ABNORMAL /HPF
COD CRY URNS QL: PRESENT /HPF
HYALINE CASTS UR QL AUTO: ABNORMAL /LPF
RBC # UR STRIP: ABNORMAL /HPF
REF LAB TEST METHOD: ABNORMAL
SQUAMOUS #/AREA URNS HPF: ABNORMAL /HPF
WBC # UR STRIP: ABNORMAL /HPF

## 2025-03-20 RX ORDER — NITROFURANTOIN 25; 75 MG/1; MG/1
100 CAPSULE ORAL 2 TIMES DAILY
Qty: 10 CAPSULE | Refills: 0 | Status: SHIPPED | OUTPATIENT
Start: 2025-03-20

## 2025-03-20 RX ORDER — CLOPIDOGREL BISULFATE 75 MG/1
75 TABLET ORAL DAILY
Qty: 90 TABLET | Refills: 3 | Status: SHIPPED | OUTPATIENT
Start: 2025-03-20

## 2025-03-21 LAB — BACTERIA SPEC AEROBE CULT: ABNORMAL

## 2025-03-24 DIAGNOSIS — F41.9 ANXIETY AND DEPRESSION: ICD-10-CM

## 2025-03-24 DIAGNOSIS — F32.A ANXIETY AND DEPRESSION: ICD-10-CM

## 2025-03-24 RX ORDER — SULFAMETHOXAZOLE AND TRIMETHOPRIM 800; 160 MG/1; MG/1
1 TABLET ORAL 2 TIMES DAILY
Qty: 10 TABLET | Refills: 0 | Status: SHIPPED | OUTPATIENT
Start: 2025-03-24 | End: 2025-03-29

## 2025-03-24 RX ORDER — VENLAFAXINE HYDROCHLORIDE 75 MG/1
75 CAPSULE, EXTENDED RELEASE ORAL DAILY
Qty: 90 CAPSULE | Refills: 1 | Status: SHIPPED | OUTPATIENT
Start: 2025-03-24

## 2025-03-24 RX ORDER — BUSPIRONE HYDROCHLORIDE 15 MG/1
15 TABLET ORAL 3 TIMES DAILY
Qty: 270 TABLET | Refills: 1 | Status: SHIPPED | OUTPATIENT
Start: 2025-03-24

## 2025-03-24 RX ORDER — AMLODIPINE BESYLATE 2.5 MG/1
2.5 TABLET ORAL DAILY
Qty: 90 TABLET | Refills: 1 | Status: SHIPPED | OUTPATIENT
Start: 2025-03-24

## 2025-03-26 ENCOUNTER — TELEPHONE (OUTPATIENT)
Dept: GASTROENTEROLOGY | Facility: CLINIC | Age: 55
End: 2025-03-26
Payer: MEDICARE

## 2025-03-26 ENCOUNTER — PRIOR AUTHORIZATION (OUTPATIENT)
Dept: GASTROENTEROLOGY | Facility: CLINIC | Age: 55
End: 2025-03-26
Payer: MEDICARE

## 2025-03-26 NOTE — TELEPHONE ENCOUNTER
Approved today by [employer] 2017  PA Case: [id number], Status: Approved, Coverage Starts on: 1/1/2025 12:00:00 AM, Coverage Ends on: 3/26/2026 12:00:00 AM.  Effective Date: 1/1/2025  Authorization Expiration Date: 3/26/2026

## 2025-03-31 ENCOUNTER — TELEPHONE (OUTPATIENT)
Dept: GASTROENTEROLOGY | Facility: CLINIC | Age: 55
End: 2025-03-31

## 2025-03-31 NOTE — TELEPHONE ENCOUNTER
Hub staff attempted to follow warm transfer process and was unsuccessful     Caller: Hafsa Barron    Relationship to patient: Self    Best call back number: [phone]    Patient is needing: PATIENT CALLED NEEDS TO R/S PROCEDURE ON 4-2-25 TO A DIFFERENT DATE JUST REALIZED SHE MESSED UP THOUGHT SHE WAS TO STOP PLAVIX 3 DAYS PRIOR, DID NOT REALIZE IT WAS ACTUALLY 7 DAYS PRIOR

## 2025-04-02 DIAGNOSIS — E11.9 TYPE 2 DIABETES MELLITUS WITHOUT COMPLICATION, WITHOUT LONG-TERM CURRENT USE OF INSULIN: ICD-10-CM

## 2025-04-04 ENCOUNTER — HOSPITAL ENCOUNTER (OUTPATIENT)
Dept: CT IMAGING | Facility: HOSPITAL | Age: 55
Discharge: HOME | End: 2025-04-04
Payer: MEDICARE

## 2025-04-04 ENCOUNTER — PRIOR AUTHORIZATION (OUTPATIENT)
Dept: GASTROENTEROLOGY | Facility: CLINIC | Age: 55
End: 2025-04-04
Payer: MEDICARE

## 2025-04-04 DIAGNOSIS — Z87.891 HISTORY OF CIGARETTE SMOKING: ICD-10-CM

## 2025-04-04 PROCEDURE — 71271 CT THORAX LUNG CANCER SCR C-: CPT

## 2025-04-04 NOTE — TELEPHONE ENCOUNTER
(Key: [id number])  PA Case ID #: [id number]  Rx #: [id number]  Need Help? Call us at [phone]  Outcome  Approved today by [employer] 2017  PA Case: [id number], Status: Approved, Coverage Starts on: 1/3/2025 12:00:00 AM, Coverage Ends on: 4/4/2026 12:00:00 AM.  Effective Date: 1/3/2025  Authorization Expiration Date: 4/4/2026  Drug  Na Sulfate-K Sulfate-Mg Sulf 17.5-3.13-1.6GM/177ML solution  ePA cloud logo  Form  [employer] Medicare Electronic PA Form (2017 NCPDP)  Original Claim Info

## 2025-04-11 DIAGNOSIS — G71.11 MYOTONIC DYSTROPHY, TYPE 2: ICD-10-CM

## 2025-04-14 RX ORDER — GABAPENTIN 600 MG/1
600 TABLET ORAL 3 TIMES DAILY
Qty: 270 TABLET | Refills: 2 | Status: SHIPPED | OUTPATIENT
Start: 2025-04-14

## 2025-04-14 RX ORDER — PROMETHAZINE HYDROCHLORIDE 12.5 MG/1
12.5 TABLET ORAL EVERY 8 HOURS PRN
Qty: 30 TABLET | Refills: 1 | Status: SHIPPED | OUTPATIENT
Start: 2025-04-14

## 2025-04-17 ENCOUNTER — TELEMEDICINE (OUTPATIENT)
Dept: INTERNAL MEDICINE | Facility: CLINIC | Age: 55
End: 2025-04-17
Payer: MEDICARE

## 2025-04-17 DIAGNOSIS — G89.4 CHRONIC PAIN SYNDROME: ICD-10-CM

## 2025-04-17 DIAGNOSIS — R42 DIZZINESS: Primary | ICD-10-CM

## 2025-04-17 DIAGNOSIS — G43.819 OTHER MIGRAINE WITHOUT STATUS MIGRAINOSUS, INTRACTABLE: ICD-10-CM

## 2025-04-17 DIAGNOSIS — E83.42 HYPOMAGNESEMIA: ICD-10-CM

## 2025-04-17 RX ORDER — METHOCARBAMOL 500 MG/1
500 TABLET, FILM COATED ORAL 3 TIMES DAILY PRN
Qty: 90 TABLET | Refills: 5 | Status: SHIPPED | OUTPATIENT
Start: 2025-04-17

## 2025-04-17 RX ORDER — CELECOXIB 100 MG/1
100 CAPSULE ORAL 2 TIMES DAILY PRN
Qty: 60 CAPSULE | Refills: 5 | Status: SHIPPED | OUTPATIENT
Start: 2025-04-17

## 2025-04-17 NOTE — PROGRESS NOTES
Mode of Visit: Video  Location of patient: home  [place] [facility] [place]  You have chosen to receive care through a telehealth visit.  The patient has signed the video visit consent form.  The visit included audio and video interaction. No technical issues occurred during this visit.     Subjective   Hafsa Barron is a 54 y.o. female here for     I have reviewed the patient's pertinent history and confirmed it is accurate.    I have personally reviewed and performed the ROS. Jocy Snow MD     Objective     Physical Exam      Current Outpatient Medications:     albuterol (PROVENTIL) (2.5 MG/3ML) 0.083% nebulizer solution, Take 2.5 mg by nebulization Every 4 (Four) Hours As Needed for Wheezing., Disp: 90 vial, Rfl: 1    amLODIPine (NORVASC) 2.5 MG tablet, TAKE 1 TABLET BY MOUTH DAILY, Disp: 90 tablet, Rfl: 1    aspirin 81 MG EC tablet, Take 1 tablet by mouth Daily., Disp: , Rfl:     atorvastatin (LIPITOR) 80 MG tablet, TAKE ONE TABLET BY MOUTH ONCE NIGHTLY, Disp: 90 tablet, Rfl: 1    baclofen (LIORESAL) 10 MG tablet, Take 1 tablet by mouth 3 (Three) Times a Day. (Patient taking differently: Take 1 tablet by mouth every night at bedtime.), Disp: 270 tablet, Rfl: 1    Blood Glucose Monitoring Suppl (ONE TOUCH ULTRA 2) w/Device kit, , Disp: , Rfl:     busPIRone (BUSPAR) 15 MG tablet, TAKE ONE TABLET BY MOUTH THREE TIMES A DAY, Disp: 270 tablet, Rfl: 1    calcium carbonate-vitamin d (CALCIUM 600+D HIGH POTENCY) 600-400 MG-UNIT per tablet, Take 1 tablet by mouth Daily., Disp: 90 tablet, Rfl: 2    clopidogrel (PLAVIX) 75 MG tablet, TAKE 1 TABLET BY MOUTH DAILY, Disp: 90 tablet, Rfl: 3    diclofenac (VOLTAREN) 75 MG EC tablet, Take 1 tablet by mouth 2 (Two) Times a Day., Disp: 180 tablet, Rfl: 3    eszopiclone (LUNESTA) 3 MG tablet, TAKE 1 TABLET BY MOUTH EVERY NIGHT AT BEDTIME. TAKE IMMEDIATELY BEFORE BEDTIME., Disp: 30 tablet, Rfl: 5    famotidine (PEPCID) 20 MG tablet, TAKE 1 TABLET BY MOUTH  DAILY, Disp: 90 tablet, Rfl: 1    fenofibrate (TRICOR) 145 MG tablet, TAKE 1 TABLET BY MOUTH DAILY, Disp: 90 tablet, Rfl: 1    gabapentin (NEURONTIN) 600 MG tablet, Take 1 tablet by mouth 3 (Three) Times a Day., Disp: 270 tablet, Rfl: 2    HYDROcodone-acetaminophen (NORCO)  MG per tablet, , Disp: , Rfl:     hydrOXYzine (ATARAX) 25 MG tablet, TAKE ONE TABLET BY MOUTH THREE TIMES A DAY AS NEEDED FOR ANXIETY, Disp: 10 tablet, Rfl: 3    loratadine (CLARITIN) 10 MG tablet, Take 1 tablet by mouth Daily., Disp: 30 tablet, Rfl: 11    metFORMIN (GLUCOPHAGE) 1000 MG tablet, TAKE 1 TABLET BY MOUTH TWICE A DAY WITH A MEAL, Disp: 180 tablet, Rfl: 2    methylphenidate (Ritalin) 20 MG tablet, Take 1 tablet by mouth Daily for 90 days., Disp: 90 tablet, Rfl: 0    montelukast (SINGULAIR) 10 MG tablet, TAKE ONE TABLET BY MOUTH EVERY EVENING, Disp: 90 tablet, Rfl: 3    nitrofurantoin, macrocrystal-monohydrate, (Macrobid) 100 MG capsule, Take 1 capsule by mouth 2 (Two) Times a Day., Disp: 10 capsule, Rfl: 0    nystatin (MYCOSTATIN) 944434 UNIT/GM ointment, APPLY TO AFFECTED AREA(S) TWO TIMES A DAY, Disp: 30 g, Rfl: 1    nystatin (MYCOSTATIN) 439423 UNIT/GM powder, Apply  topically to the appropriate area as directed 3 (Three) Times a Day., Disp: 30 g, Rfl: 2    ONE TOUCH ULTRA TEST test strip, , Disp: , Rfl:     oxybutynin XL (DITROPAN-XL) 5 MG 24 hr tablet, Take 1 tablet by mouth Daily., Disp: 90 tablet, Rfl: 3    pantoprazole (PROTONIX) 40 MG EC tablet, Take 1 tablet by mouth Daily., Disp: 90 tablet, Rfl: 1    potassium chloride ER (K-TAB) 20 MEQ tablet controlled-release ER tablet, TAKE 1 TABLET BY MOUTH DAILY, Disp: 90 tablet, Rfl: 2    promethazine (PHENERGAN) 12.5 MG tablet, Take 1 tablet by mouth Every 8 (Eight) Hours As Needed for Nausea or Vomiting., Disp: 30 tablet, Rfl: 1    Semaglutide, 2 MG/DOSE, (OZEMPIC) 8 MG/3ML solution pen-injector, Inject 2 mg under the skin into the appropriate area as directed 1 (One) Time Per  Week., Disp: 3 mL, Rfl: 0    Sodium Sulfate-Mag Sulfate-KCl (SUTAB) 3882-641-097 MG tablet, Take 24 tablets by mouth Take As Directed., Disp: 24 tablet, Rfl: 0    spironolactone (ALDACTONE) 50 MG tablet, TAKE ONE TABLET BY MOUTH DAILY, Disp: 90 tablet, Rfl: 3    venlafaxine XR (EFFEXOR-XR) 75 MG 24 hr capsule, TAKE ONE CAPSULE BY MOUTH DAILY, Disp: 90 capsule, Rfl: 1    Assessment & Plan   Diagnoses and all orders for this visit:    1. Dizziness (Primary)  -improved, likely 2/2 migraine    2. Other migraine without status migrainosus, intractable  -     celecoxib (CeleBREX) 100 MG capsule; Take 1 capsule by mouth 2 (Two) Times a Day As Needed for Mild Pain.  Dispense: 60 capsule; Refill: 5    3. Hypomagnesemia  -     Magnesium; Future    4. Chronic pain syndrome  -     celecoxib (CeleBREX) 100 MG capsule; Take 1 capsule by mouth 2 (Two) Times a Day As Needed for Mild Pain.  Dispense: 60 capsule; Refill: 5  - Will do a trial of Celebrex.  Discussed risk of bleeding when taking an NSAID with Plavix, but she did so for a number of years and she saw great benefit from diclofenac.   - Chronic with flare    Reviewed ER visit note and labs         Jocy Snow MD

## 2025-04-22 ENCOUNTER — TELEPHONE (OUTPATIENT)
Dept: INTERNAL MEDICINE | Age: 55
End: 2025-04-22

## 2025-04-22 NOTE — TELEPHONE ENCOUNTER
Caller: RUBEN GALLEGOS    Relationship: Other    Best call back number: [phone]     What form or medical record are you requesting: OZEMPIC PAPERWORK    Who is requesting this form or medical record from you: ROSY    How would you like to receive the form or medical records (pick-up, mail, fax): FAX  If fax, what is the fax number: [phone]    Timeframe paperwork needed: ASAP    Additional notes: PLEASE CORRECT DATE OF BIRTH ON PAGE 7 OF PAPERWORK. THE PAPER WORK HAS THE WRONG DATE OF BIRTH SHOWING [birth year]  INSTEAD [birth year]

## 2025-04-25 ENCOUNTER — TELEPHONE (OUTPATIENT)
Dept: NEUROLOGY | Facility: CLINIC | Age: 55
End: 2025-04-25
Payer: MEDICARE

## 2025-04-25 NOTE — TELEPHONE ENCOUNTER
I have been suffering from migraines many trips to the emergency room and just can’t get any relief long-term. All this on top of my normal issues with my muscular dystrophy.    PATIENT SENT A [employer] MESSAGE TO SCHEDULE AN APPOINTMENT BUT HER REQUESTED TIMES WERE WHEN DR BLANC DOES PROCEDURES ALL DAY ON WED AND IS ONLY HERE ON THURSDAY MORNINGS. REQUESTED PATIENT EITHER SEND A [facility] [facility] [employer] MESSAGE FOR ANOTHER DAY OR TO CALL THE OFFICE AND WE WILL FIND SOMETHING THAT WILL WORK FOR HER.

## 2025-04-29 ENCOUNTER — OFFICE VISIT (OUTPATIENT)
Dept: NEUROLOGY | Facility: CLINIC | Age: 55
End: 2025-04-29
Payer: MEDICARE

## 2025-04-29 VITALS — OXYGEN SATURATION: 97 % | HEART RATE: 91 BPM | DIASTOLIC BLOOD PRESSURE: 86 MMHG | SYSTOLIC BLOOD PRESSURE: 134 MMHG

## 2025-04-29 DIAGNOSIS — G47.19 DAYTIME HYPERSOMNOLENCE: ICD-10-CM

## 2025-04-29 DIAGNOSIS — G71.11 MYOTONIC DYSTROPHY, TYPE 2: Primary | ICD-10-CM

## 2025-04-29 DIAGNOSIS — G43.011 INTRACTABLE MIGRAINE WITHOUT AURA AND WITH STATUS MIGRAINOSUS: ICD-10-CM

## 2025-04-29 RX ORDER — METHYLPREDNISOLONE 4 MG/1
TABLET ORAL
Qty: 21 TABLET | Refills: 0 | Status: SHIPPED | OUTPATIENT
Start: 2025-04-29

## 2025-04-29 RX ORDER — METHYLPHENIDATE HYDROCHLORIDE 20 MG/1
20 TABLET ORAL DAILY
Qty: 90 TABLET | Refills: 0 | Status: SHIPPED | OUTPATIENT
Start: 2025-04-29 | End: 2025-07-28

## 2025-04-29 RX ORDER — RIZATRIPTAN BENZOATE 10 MG/1
10 TABLET ORAL ONCE AS NEEDED
Qty: 10 TABLET | Refills: 3 | Status: SHIPPED | OUTPATIENT
Start: 2025-04-29 | End: 2025-05-29
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
## 2025-04-30 ENCOUNTER — PATIENT ROUNDING (BHMG ONLY) (OUTPATIENT)
Dept: NEUROLOGY | Facility: CLINIC | Age: 55
End: 2025-04-30
Payer: MEDICARE

## 2025-05-05 RX ORDER — MONTELUKAST SODIUM 10 MG/1
10 TABLET ORAL EVERY EVENING
Qty: 90 TABLET | Refills: 3 | Status: SHIPPED | OUTPATIENT
Start: 2025-05-05

## 2025-05-05 RX ORDER — SPIRONOLACTONE 50 MG/1
50 TABLET, FILM COATED ORAL DAILY
Qty: 90 TABLET | Refills: 3 | Status: SHIPPED | OUTPATIENT
Start: 2025-05-05

## 2025-05-14 ENCOUNTER — TELEPHONE (OUTPATIENT)
Dept: GASTROENTEROLOGY | Facility: CLINIC | Age: 55
End: 2025-05-14
Payer: MEDICARE

## 2025-05-14 RX ORDER — SODIUM, POTASSIUM,MAG SULFATES 17.5-3.13G
1 SOLUTION, RECONSTITUTED, ORAL ORAL TAKE AS DIRECTED
Qty: 354 ML | Refills: 0 | Status: SHIPPED | OUTPATIENT
Start: 2025-05-14

## 2025-05-19 ENCOUNTER — OUTSIDE FACILITY SERVICE (OUTPATIENT)
Dept: GASTROENTEROLOGY | Facility: CLINIC | Age: 55
End: 2025-05-19
Payer: MEDICARE

## 2025-05-19 PROCEDURE — 88305 TISSUE EXAM BY PATHOLOGIST: CPT | Performed by: INTERNAL MEDICINE

## 2025-05-19 PROCEDURE — 45385 COLONOSCOPY W/LESION REMOVAL: CPT | Performed by: INTERNAL MEDICINE

## 2025-05-20 ENCOUNTER — OFFICE VISIT (OUTPATIENT)
Dept: ORTHOPEDIC SURGERY | Facility: CLINIC | Age: 55
End: 2025-05-20
Payer: MEDICARE

## 2025-05-20 ENCOUNTER — LAB REQUISITION (OUTPATIENT)
Dept: LAB | Facility: HOSPITAL | Age: 55
End: 2025-05-20
Payer: MEDICARE

## 2025-05-20 VITALS
BODY MASS INDEX: 28.61 KG/M2 | HEIGHT: 66 IN | DIASTOLIC BLOOD PRESSURE: 80 MMHG | WEIGHT: 178 LBS | SYSTOLIC BLOOD PRESSURE: 130 MMHG

## 2025-05-20 DIAGNOSIS — D12.8 BENIGN NEOPLASM OF RECTUM: ICD-10-CM

## 2025-05-20 DIAGNOSIS — K57.30 DIVERTICULOSIS OF LARGE INTESTINE WITHOUT PERFORATION OR ABSCESS WITHOUT BLEEDING: ICD-10-CM

## 2025-05-20 DIAGNOSIS — M17.12 PRIMARY OSTEOARTHRITIS OF LEFT KNEE: Primary | ICD-10-CM

## 2025-05-20 DIAGNOSIS — Z12.11 ENCOUNTER FOR SCREENING FOR MALIGNANT NEOPLASM OF COLON: ICD-10-CM

## 2025-05-20 DIAGNOSIS — K64.8 OTHER HEMORRHOIDS: ICD-10-CM

## 2025-05-20 RX ORDER — LIDOCAINE HYDROCHLORIDE 10 MG/ML
3 INJECTION, SOLUTION EPIDURAL; INFILTRATION; INTRACAUDAL; PERINEURAL
Status: COMPLETED | OUTPATIENT
Start: 2025-05-20 | End: 2025-05-20

## 2025-05-20 RX ORDER — BUPIVACAINE HYDROCHLORIDE 2.5 MG/ML
3 INJECTION, SOLUTION EPIDURAL; INFILTRATION; INTRACAUDAL; PERINEURAL
Status: COMPLETED | OUTPATIENT
Start: 2025-05-20 | End: 2025-05-20

## 2025-05-20 RX ORDER — TRIAMCINOLONE ACETONIDE 40 MG/ML
80 INJECTION, SUSPENSION INTRA-ARTICULAR; INTRAMUSCULAR
Status: COMPLETED | OUTPATIENT
Start: 2025-05-20 | End: 2025-05-20

## 2025-05-20 RX ADMIN — BUPIVACAINE HYDROCHLORIDE 3 ML: 2.5 INJECTION, SOLUTION EPIDURAL; INFILTRATION; INTRACAUDAL; PERINEURAL at 10:48

## 2025-05-20 RX ADMIN — TRIAMCINOLONE ACETONIDE 80 MG: 40 INJECTION, SUSPENSION INTRA-ARTICULAR; INTRAMUSCULAR at 10:48

## 2025-05-20 RX ADMIN — LIDOCAINE HYDROCHLORIDE 3 ML: 10 INJECTION, SOLUTION EPIDURAL; INFILTRATION; INTRACAUDAL; PERINEURAL at 10:48

## 2025-05-20 NOTE — PROGRESS NOTES
Procedure   - Large Joint Arthrocentesis: L knee on 5/20/2025 10:48 AM  Indications: pain  Details: 21 G needle, superolateral approach  Medications: 3 mL lidocaine PF 1% 1 %; 3 mL bupivacaine (PF) 0.25 %; 80 mg triamcinolone acetonide 40 MG/ML  Outcome: tolerated well, no immediate complications  Procedure, treatment alternatives, risks and benefits explained, specific risks discussed. Consent was given by the patient. Immediately prior to procedure a time out was called to verify the correct patient, procedure, equipment, support staff and site/side marked as required. Patient was prepped and draped in the usual sterile fashion.
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
## 2025-05-21 ENCOUNTER — RESULTS FOLLOW-UP (OUTPATIENT)
Dept: LAB | Facility: HOSPITAL | Age: 55
End: 2025-05-21
Payer: MEDICARE

## 2025-05-21 LAB
CYTO UR: NORMAL
LAB AP CASE REPORT: NORMAL
LAB AP CLINICAL INFORMATION: NORMAL
PATH REPORT.FINAL DX SPEC: NORMAL
PATH REPORT.GROSS SPEC: NORMAL

## 2025-05-22 ENCOUNTER — OFFICE VISIT (OUTPATIENT)
Dept: ORTHOPEDIC SURGERY | Facility: CLINIC | Age: 55
End: 2025-05-22
Payer: MEDICARE

## 2025-05-22 VITALS
HEIGHT: 66 IN | WEIGHT: 178 LBS | DIASTOLIC BLOOD PRESSURE: 80 MMHG | SYSTOLIC BLOOD PRESSURE: 132 MMHG | BODY MASS INDEX: 28.61 KG/M2

## 2025-05-22 DIAGNOSIS — M25.511 RIGHT SHOULDER PAIN, UNSPECIFIED CHRONICITY: Primary | ICD-10-CM

## 2025-05-22 DIAGNOSIS — M19.011 ARTHRITIS OF RIGHT ACROMIOCLAVICULAR JOINT: ICD-10-CM

## 2025-05-22 DIAGNOSIS — M75.101 ROTATOR CUFF SYNDROME OF RIGHT SHOULDER: ICD-10-CM

## 2025-05-22 RX ORDER — TRIAMCINOLONE ACETONIDE 40 MG/ML
40 INJECTION, SUSPENSION INTRA-ARTICULAR; INTRAMUSCULAR
Status: COMPLETED | OUTPATIENT
Start: 2025-05-22 | End: 2025-05-22

## 2025-05-22 RX ORDER — LIDOCAINE HYDROCHLORIDE 10 MG/ML
3 INJECTION, SOLUTION EPIDURAL; INFILTRATION; INTRACAUDAL; PERINEURAL
Status: COMPLETED | OUTPATIENT
Start: 2025-05-22 | End: 2025-05-22

## 2025-05-22 RX ADMIN — LIDOCAINE HYDROCHLORIDE 3 ML: 10 INJECTION, SOLUTION EPIDURAL; INFILTRATION; INTRACAUDAL; PERINEURAL at 12:28

## 2025-05-22 RX ADMIN — TRIAMCINOLONE ACETONIDE 40 MG: 40 INJECTION, SUSPENSION INTRA-ARTICULAR; INTRAMUSCULAR at 12:28

## 2025-05-22 NOTE — TELEPHONE ENCOUNTER
I tried to call Ms Barron regarding results. No answer; left voice message. I will send results to my chart.

## 2025-05-22 NOTE — PROGRESS NOTES
Procedure   - Large Joint Arthrocentesis: R subacromial bursa on 5/22/2025 12:28 PM  Indications: pain  Details: 21 G needle, posterior approach  Medications: 40 mg triamcinolone acetonide 40 MG/ML; 3 mL lidocaine PF 1% 1 %  Outcome: tolerated well, no immediate complications  Procedure, treatment alternatives, risks and benefits explained, specific risks discussed. Consent was given by the patient. Immediately prior to procedure a time out was called to verify the correct patient, procedure, equipment, support staff and site/side marked as required. Patient was prepped and draped in the usual sterile fashion.
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
## 2025-05-27 RX ORDER — FENOFIBRATE 145 MG/1
145 TABLET, FILM COATED ORAL DAILY
Qty: 90 TABLET | Refills: 1 | Status: SHIPPED | OUTPATIENT
Start: 2025-05-27

## 2025-05-29 ENCOUNTER — HOSPITAL ENCOUNTER (OUTPATIENT)
Dept: MAMMOGRAPHY | Facility: HOSPITAL | Age: 55
Discharge: HOME | End: 2025-05-29
Admitting: INTERNAL MEDICINE
Payer: MEDICARE

## 2025-05-29 DIAGNOSIS — Z12.31 SCREENING MAMMOGRAM FOR BREAST CANCER: ICD-10-CM

## 2025-05-29 PROCEDURE — 77063 BREAST TOMOSYNTHESIS BI: CPT

## 2025-05-29 PROCEDURE — 77067 SCR MAMMO BI INCL CAD: CPT

## 2025-06-05 DIAGNOSIS — Z11.1 TUBERCULOSIS SCREENING: Primary | ICD-10-CM

## 2025-06-24 NOTE — TELEPHONE ENCOUNTER
Rx Refill Note  Requested Prescriptions     Pending Prescriptions Disp Refills    amitriptyline (ELAVIL) 25 MG tablet [Pharmacy Med Name: AMITRIPTYLINE HCL 25 MG TAB] 30 tablet 1     Sig: TAKE 1 TABLET BY MOUTH ONCE NIGHTLY      Last filled: 4/29/25 for 2 mos total  Last office visit with prescribing clinician: 4/29/2025      Next office visit with prescribing clinician: 9/19/2025     Olvin Garcia MA  06/24/25, 13:37 EDT

## 2025-07-24 ENCOUNTER — LAB (OUTPATIENT)
Dept: LAB | Facility: HOSPITAL | Age: 55
End: 2025-07-24
Payer: MEDICARE

## 2025-07-24 DIAGNOSIS — Z11.1 TUBERCULOSIS SCREENING: ICD-10-CM

## 2025-07-24 PROCEDURE — 86480 TB TEST CELL IMMUN MEASURE: CPT

## 2025-07-24 PROCEDURE — 36415 COLL VENOUS BLD VENIPUNCTURE: CPT

## 2025-07-29 ENCOUNTER — TELEPHONE (OUTPATIENT)
Dept: INTERNAL MEDICINE | Age: 55
End: 2025-07-29
Payer: MEDICARE

## 2025-07-29 LAB
GAMMA INTERFERON BACKGROUND BLD IA-ACNC: 0.03 IU/ML
M TB IFN-G BLD-IMP: NEGATIVE
M TB IFN-G CD4+ BCKGRND COR BLD-ACNC: 0.05 IU/ML
M TB IFN-G CD4+CD8+ BCKGRND COR BLD-ACNC: 0.02 IU/ML
MITOGEN IGNF BCKGRD COR BLD-ACNC: >10 IU/ML
SERVICE CMNT-IMP: NORMAL

## 2025-07-29 NOTE — TELEPHONE ENCOUNTER
"Ok for that, I resulted her quant gold it was negative. Just say \"tuberculosis screening using quanterferon gold blood testing was negative indicating no active tuberculosis infection.\"  "
PT CALLED THE OFFICE AND ASKED IF WE COULD WRITE LETTER STATING HER TB SKIN TEST WAS NEGATIVE. SHE SAID THE LETTER WOULD HAVE TO STATE HER FULL NAME AS WELL AS  SIGNATURE AND \DATE. PT WOULD LIKE FOR US TO SEND THE LETTER TO HER MYCHART ONCE COMPLETED. PT HAS TO GO BACK TO WORK TOMORROW AND NEEDS THE LETTER BEFORE THEN. PLEASE ADVISE. THE RESULTS WEREN'T GOOD ENOUGH FOR HER WORK.   
Statement Selected

## 2025-08-04 RX ORDER — FAMOTIDINE 20 MG/1
20 TABLET, FILM COATED ORAL DAILY
Qty: 90 TABLET | Refills: 1 | Status: SHIPPED | OUTPATIENT
Start: 2025-08-04

## 2025-08-13 RX ORDER — ATORVASTATIN CALCIUM 80 MG/1
80 TABLET, FILM COATED ORAL NIGHTLY
Qty: 90 TABLET | Refills: 1 | Status: SHIPPED | OUTPATIENT
Start: 2025-08-13

## 2025-08-20 ENCOUNTER — OFFICE VISIT (OUTPATIENT)
Age: 55
End: 2025-08-20
Payer: MEDICARE

## 2025-08-20 VITALS — BODY MASS INDEX: 28.61 KG/M2 | HEIGHT: 66 IN | WEIGHT: 178 LBS

## 2025-08-20 DIAGNOSIS — N95.2 POSTMENOPAUSAL ATROPHIC VAGINITIS: ICD-10-CM

## 2025-08-20 DIAGNOSIS — R39.15 URINARY URGENCY: ICD-10-CM

## 2025-08-20 DIAGNOSIS — N39.0 RECURRENT UTI: Primary | ICD-10-CM

## 2025-08-20 DIAGNOSIS — N32.81 OAB (OVERACTIVE BLADDER): ICD-10-CM

## 2025-08-20 LAB
BILIRUB BLD-MCNC: ABNORMAL MG/DL
CLARITY, POC: CLEAR
COLOR UR: YELLOW
EXPIRATION DATE: ABNORMAL
GLUCOSE UR STRIP-MCNC: NEGATIVE MG/DL
KETONES UR QL: NEGATIVE
LEUKOCYTE EST, POC: ABNORMAL
Lab: ABNORMAL
NITRITE UR-MCNC: NEGATIVE MG/ML
PH UR: 6 [PH] (ref 5–8)
PROT UR STRIP-MCNC: NEGATIVE MG/DL
RBC # UR STRIP: NEGATIVE /UL
SP GR UR: 1.02 (ref 1–1.03)
UROBILINOGEN UR QL: NORMAL

## 2025-08-20 PROCEDURE — 81003 URINALYSIS AUTO W/O SCOPE: CPT | Performed by: NURSE PRACTITIONER

## 2025-08-20 PROCEDURE — 87086 URINE CULTURE/COLONY COUNT: CPT | Performed by: NURSE PRACTITIONER

## 2025-08-20 PROCEDURE — 1159F MED LIST DOCD IN RCRD: CPT | Performed by: NURSE PRACTITIONER

## 2025-08-20 PROCEDURE — 87088 URINE BACTERIA CULTURE: CPT | Performed by: NURSE PRACTITIONER

## 2025-08-20 PROCEDURE — 87186 SC STD MICRODIL/AGAR DIL: CPT | Performed by: NURSE PRACTITIONER

## 2025-08-20 PROCEDURE — 99214 OFFICE O/P EST MOD 30 MIN: CPT | Performed by: NURSE PRACTITIONER

## 2025-08-20 PROCEDURE — 1160F RVW MEDS BY RX/DR IN RCRD: CPT | Performed by: NURSE PRACTITIONER

## 2025-08-20 RX ORDER — OXYBUTYNIN CHLORIDE 5 MG/1
5 TABLET, EXTENDED RELEASE ORAL DAILY
Qty: 90 TABLET | Refills: 3 | Status: SHIPPED | OUTPATIENT
Start: 2025-08-20

## 2025-08-20 RX ORDER — ESTRADIOL 0.1 MG/G
CREAM VAGINAL
Qty: 42.5 G | Refills: 12 | Status: SHIPPED | OUTPATIENT
Start: 2025-08-20

## 2025-08-20 RX ORDER — PEG-3350, SODIUM SULFATE, SODIUM CHLORIDE, POTASSIUM CHLORIDE, SODIUM ASCORBATE AND ASCORBIC ACID 7.5-2.691G
KIT ORAL
COMMUNITY
Start: 2025-05-13

## 2025-08-20 RX ORDER — NITROFURANTOIN 25; 75 MG/1; MG/1
CAPSULE ORAL
Qty: 30 CAPSULE | Refills: 3 | Status: SHIPPED | OUTPATIENT
Start: 2025-08-20

## 2025-08-22 ENCOUNTER — OFFICE VISIT (OUTPATIENT)
Dept: ORTHOPEDIC SURGERY | Facility: CLINIC | Age: 55
End: 2025-08-22
Payer: MEDICARE

## 2025-08-22 VITALS
DIASTOLIC BLOOD PRESSURE: 80 MMHG | WEIGHT: 178 LBS | BODY MASS INDEX: 28.61 KG/M2 | HEIGHT: 66 IN | SYSTOLIC BLOOD PRESSURE: 128 MMHG

## 2025-08-22 DIAGNOSIS — M19.011 ARTHRITIS OF RIGHT ACROMIOCLAVICULAR JOINT: Primary | ICD-10-CM

## 2025-08-22 DIAGNOSIS — M17.12 PRIMARY OSTEOARTHRITIS OF LEFT KNEE: Primary | ICD-10-CM

## 2025-08-22 DIAGNOSIS — M25.511 RIGHT SHOULDER PAIN, UNSPECIFIED CHRONICITY: ICD-10-CM

## 2025-08-22 DIAGNOSIS — M75.101 ROTATOR CUFF SYNDROME OF RIGHT SHOULDER: ICD-10-CM

## 2025-08-22 LAB — BACTERIA SPEC AEROBE CULT: ABNORMAL

## 2025-08-22 RX ORDER — BUPIVACAINE HYDROCHLORIDE 2.5 MG/ML
3 INJECTION, SOLUTION EPIDURAL; INFILTRATION; INTRACAUDAL; PERINEURAL
Status: COMPLETED | OUTPATIENT
Start: 2025-08-22 | End: 2025-08-22

## 2025-08-22 RX ORDER — LIDOCAINE HYDROCHLORIDE 10 MG/ML
3 INJECTION, SOLUTION EPIDURAL; INFILTRATION; INTRACAUDAL; PERINEURAL
Status: COMPLETED | OUTPATIENT
Start: 2025-08-22 | End: 2025-08-22

## 2025-08-22 RX ORDER — DEXAMETHASONE SODIUM PHOSPHATE 4 MG/ML
8 INJECTION, SOLUTION INTRA-ARTICULAR; INTRALESIONAL; INTRAMUSCULAR; INTRAVENOUS; SOFT TISSUE
Status: COMPLETED | OUTPATIENT
Start: 2025-08-22 | End: 2025-08-22

## 2025-08-22 RX ADMIN — LIDOCAINE HYDROCHLORIDE 3 ML: 10 INJECTION, SOLUTION EPIDURAL; INFILTRATION; INTRACAUDAL; PERINEURAL at 11:33

## 2025-08-22 RX ADMIN — DEXAMETHASONE SODIUM PHOSPHATE 8 MG: 4 INJECTION, SOLUTION INTRA-ARTICULAR; INTRALESIONAL; INTRAMUSCULAR; INTRAVENOUS; SOFT TISSUE at 11:33

## 2025-08-22 RX ADMIN — BUPIVACAINE HYDROCHLORIDE 3 ML: 2.5 INJECTION, SOLUTION EPIDURAL; INFILTRATION; INTRACAUDAL; PERINEURAL at 11:33
